# Patient Record
Sex: MALE | Race: WHITE | NOT HISPANIC OR LATINO | Employment: OTHER | URBAN - NONMETROPOLITAN AREA
[De-identification: names, ages, dates, MRNs, and addresses within clinical notes are randomized per-mention and may not be internally consistent; named-entity substitution may affect disease eponyms.]

---

## 2017-01-03 ENCOUNTER — OFFICE VISIT (OUTPATIENT)
Dept: UROLOGY | Facility: CLINIC | Age: 67
End: 2017-01-03

## 2017-01-03 DIAGNOSIS — N52.31 ERECTILE DYSFUNCTION FOLLOWING RADICAL PROSTATECTOMY: Primary | ICD-10-CM

## 2017-01-03 DIAGNOSIS — C61 PROSTATE CANCER (HCC): ICD-10-CM

## 2017-01-03 LAB — PSA SERPL-MCNC: 0.02 NG/ML (ref 0–4)

## 2017-01-03 PROCEDURE — 99213 OFFICE O/P EST LOW 20 MIN: CPT | Performed by: UROLOGY

## 2017-01-03 PROCEDURE — 96372 THER/PROPH/DIAG INJ SC/IM: CPT | Performed by: UROLOGY

## 2017-01-03 PROCEDURE — 84153 ASSAY OF PSA TOTAL: CPT | Performed by: UROLOGY

## 2017-01-03 PROCEDURE — 36415 COLL VENOUS BLD VENIPUNCTURE: CPT | Performed by: UROLOGY

## 2017-01-03 NOTE — PROGRESS NOTES
Chief Complaint:          Chief Complaint   Patient presents with   • Erectile Dysfunction       HPI:   66 y.o. male.    HPI  Pt here because the triple p he has been using is no longer effective.  It has never worked in his home.        Past Medical History:        Past Medical History   Diagnosis Date   • Elevated prostate specific antigen (PSA)    • Erectile dysfunction    • Hypercholesterolemia    • Hypercholesterolemia    • Prostatitis          Current Meds:     Current Outpatient Prescriptions   Medication Sig Dispense Refill   • amLODIPine-atorvastatin (CADUET) 5-10 MG per tablet Take 1 tablet by mouth daily.     • Multiple Vitamin (MULTI VITAMIN DAILY) tablet Take 1 tablet by mouth daily.     • sildenafil (REVATIO) 20 MG tablet Take  by mouth.     • SILDENAFIL CITRATE PO Take 20 mg by mouth. Take 3-5 tablets as needed       No current facility-administered medications for this visit.         Allergies:      Allergies   Allergen Reactions   • Amoxicillin         Past Surgical History:     Past Surgical History   Procedure Laterality Date   • Other surgical history       destruction of hemorrhoids, Rubber band ligatioin of hemmorrhoids   • Other surgical history       surgery for an ulcer, PNEUMOTHORAX AND ULCER THERAPY   • Hemorrhoidectomy           Social History:     Social History     Social History   • Marital status:      Spouse name: N/A   • Number of children: N/A   • Years of education: N/A     Occupational History   • Not on file.     Social History Main Topics   • Smoking status: Former Smoker   • Smokeless tobacco: Never Used      Comment: 3 packs per day for 20 yrs with a duration of 21 years of non smoking   • Alcohol use No   • Drug use: No   • Sexual activity: Not on file     Other Topics Concern   • Not on file     Social History Narrative       Family History:     Family History   Problem Relation Age of Onset   • Nephrolithiasis Father    • Heart disease Sister    • Diabetes Other         Review of Systems:     Review of Systems   Constitutional: Negative.    HENT: Negative.    Eyes: Negative.    Respiratory: Negative.    Cardiovascular: Negative.    Gastrointestinal: Negative.    Endocrine: Negative.    Genitourinary: Negative.    Musculoskeletal: Negative.    Skin: Negative.    Allergic/Immunologic: Negative.    Neurological: Negative.    Hematological: Negative.    Psychiatric/Behavioral: Negative.        Physical Exam:     Physical Exam    Procedure:     No notes on file  0.5 cc Triple P injection  Fair response.      Assessment:     Encounter Diagnoses   Name Primary?   • Erectile dysfunction following radical prostatectomy Yes   • Prostate cancer      No orders of the defined types were placed in this encounter.      Plan:   Discussed either the medicine was ineffective or injection inaccurate.  We will see him back in 1 week to observe him doing the injection with the same medication we injected him today with.  He had a fair to good response to my injection.    Counseling was given to patient for the following topics instructions for management. and the interim medical history and current results were reviewed.  A treatment plan with follow-up was made. Total time of the encounter was 21 minutes and 21 minutes were spent discussing Erectile dysfunction following radical prostatectomy [N52.31] face-to-face.    This document has been electronically signed by Shaw Sebastian MD January 3, 2017 1:50 PM

## 2017-01-03 NOTE — MR AVS SNAPSHOT
Alexander Zamora   1/3/2017 1:00 PM   Office Visit    Dept Phone:  [phone]   Encounter #:  [id number]    Provider:  Shaw Sebastian MD   Department:  [facility]                Your Full Care Plan              Your Updated Medication List          This list is accurate as of: 1/3/17 11:59 PM.  Always use your most recent med list.                amLODIPine-atorvastatin 5-10 MG per tablet   Commonly known as:  CADUET       MULTI VITAMIN DAILY tablet       sildenafil 20 MG tablet   Commonly known as:  REVATIO       SILDENAFIL CITRATE PO               We Performed the Following     PSA       You Were Diagnosed With        Codes Comments    Erectile dysfunction following radical prostatectomy    -  Primary ICD-10-CM: N52.31  ICD-9-CM: 607.84     Prostate cancer     ICD-10-CM: C61  ICD-9-CM: 185       Instructions     None    Patient Instructions History      Upcoming Appointments     Visit Type Date Time Department    FOLLOW UP 1/10/2017  1:00 PM [facility]      [employer] Signup     [facility] [employer] allows you to send messages to your doctor, view your test results, renew your prescriptions, schedule appointments, and more. To sign up, go to [employer] and click on the Sign Up Now link in the New User? box. Enter your [employer] Activation Code exactly as it appears below along with the last four digits of your Social Security Number and your Date of Birth () to complete the sign-up process. If you do not sign up before the expiration date, you must request a new code.    [employer] Activation Code: [email or web address]  Expires: 2017  2:00 PM    If you have questions, you can email [email or web address] or call [phone] to talk to our [employer] staff. Remember, [employer] is NOT to be used for urgent needs. For medical emergencies, dial 911.               Other Info from Your Visit           Allergies     Amoxicillin           Reason for Visit     Erectile Dysfunction           Vital Signs     Smoking Status                   Former Smoker           Problems and Diagnoses Noted     Failure of erection    Prostate cancer      Results     PSA      Component Value Standard Range & Units    PSA 0.020 0.000 - 4.000 ng/mL

## 2017-01-10 ENCOUNTER — OFFICE VISIT (OUTPATIENT)
Dept: UROLOGY | Facility: CLINIC | Age: 67
End: 2017-01-10

## 2017-01-10 VITALS — DIASTOLIC BLOOD PRESSURE: 79 MMHG | HEART RATE: 67 BPM | SYSTOLIC BLOOD PRESSURE: 150 MMHG

## 2017-01-10 DIAGNOSIS — C61 PROSTATE CANCER (HCC): Primary | ICD-10-CM

## 2017-01-10 DIAGNOSIS — N52.31 ERECTILE DYSFUNCTION FOLLOWING RADICAL PROSTATECTOMY: ICD-10-CM

## 2017-01-10 PROCEDURE — 99213 OFFICE O/P EST LOW 20 MIN: CPT | Performed by: UROLOGY

## 2017-01-10 PROCEDURE — 96372 THER/PROPH/DIAG INJ SC/IM: CPT | Performed by: UROLOGY

## 2017-01-10 NOTE — PROGRESS NOTES
Chief Complaint:          Chief Complaint   Patient presents with   • Erectile Dysfunction     f/u PPP       HPI:   66 y.o. male.  Pt's injection by us last week lasted 3 to 4 hours.  His erection increased when he went home.    HPI        Past Medical History:        Past Medical History   Diagnosis Date   • Elevated prostate specific antigen (PSA)    • Erectile dysfunction    • Hypercholesterolemia    • Hypercholesterolemia    • Prostatitis          Current Meds:     Current Outpatient Prescriptions   Medication Sig Dispense Refill   • amLODIPine-atorvastatin (CADUET) 5-10 MG per tablet Take 1 tablet by mouth daily.     • Multiple Vitamin (MULTI VITAMIN DAILY) tablet Take 1 tablet by mouth daily.     • sildenafil (REVATIO) 20 MG tablet Take  by mouth.     • SILDENAFIL CITRATE PO Take 20 mg by mouth. Take 3-5 tablets as needed       No current facility-administered medications for this visit.         Allergies:      Allergies   Allergen Reactions   • Amoxicillin         Past Surgical History:     Past Surgical History   Procedure Laterality Date   • Other surgical history       destruction of hemorrhoids, Rubber band ligatioin of hemmorrhoids   • Other surgical history       surgery for an ulcer, PNEUMOTHORAX AND ULCER THERAPY   • Hemorrhoidectomy           Social History:     Social History     Social History   • Marital status:      Spouse name: N/A   • Number of children: N/A   • Years of education: N/A     Occupational History   • Not on file.     Social History Main Topics   • Smoking status: Former Smoker   • Smokeless tobacco: Never Used      Comment: 3 packs per day for 20 yrs with a duration of 21 years of non smoking   • Alcohol use No   • Drug use: No   • Sexual activity: Not on file     Other Topics Concern   • Not on file     Social History Narrative       Family History:     Family History   Problem Relation Age of Onset   • Nephrolithiasis Father    • Heart disease Sister    • Diabetes Other         Review of Systems:     Review of Systems   All other systems reviewed and are negative.      Physical Exam:     Physical Exam    Procedure:     No notes on file  0.5 cc of triple p injected.  We used his medication from his home.    Assessment:     Encounter Diagnoses   Name Primary?   • Prostate cancer Yes   • Erectile dysfunction following radical prostatectomy      No orders of the defined types were placed in this encounter.      Plan:   Pt had a good response with 0.5 cc triple p.  Will see him in 2 weeks and will teach him proper technique.  Will use his medication from home.  Will see him in one year with psa prior for his follow up on prostate cancer sp robotic radical prostatectomy.    Counseling was given to patient for the following topics instructions for management. and the interim medical history and current results were reviewed.  A treatment plan with follow-up was made. Total time of the encounter was 21 minutes and 21 minutes were spent discussing Prostate cancer [C61] face-to-face.      This document has been electronically signed by Shaw Sebastian MD January 10, 2017 1:59 PM

## 2017-01-10 NOTE — MR AVS SNAPSHOT
Alexander Zamora   1/10/2017 1:00 PM   Office Visit    Dept Phone:  [phone]   Encounter #:  [id number]    Provider:  Shaw Sebastian MD   Department:  [facility]                Your Full Care Plan              Your Updated Medication List          This list is accurate as of: 1/10/17  2:27 PM.  Always use your most recent med list.                amLODIPine-atorvastatin 5-10 MG per tablet   Commonly known as:  CADUET       MULTI VITAMIN DAILY tablet       sildenafil 20 MG tablet   Commonly known as:  REVATIO       SILDENAFIL CITRATE PO               You Were Diagnosed With        Codes Comments    Prostate cancer    -  Primary ICD-10-CM: C61  ICD-9-CM: 185     Erectile dysfunction following radical prostatectomy     ICD-10-CM: N52.31  ICD-9-CM: 607.84       Instructions     None    Patient Instructions History      Upcoming Appointments     Visit Type Date Time Department    FOLLOW UP 1/10/2017  1:00 PM [facility]      [employer] Signup     [facility] [employer] allows you to send messages to your doctor, view your test results, renew your prescriptions, schedule appointments, and more. To sign up, go to [employer] and click on the Sign Up Now link in the New User? box. Enter your [employer] Activation Code exactly as it appears below along with the last four digits of your Social Security Number and your Date of Birth () to complete the sign-up process. If you do not sign up before the expiration date, you must request a new code.    [employer] Activation Code: [email or web address]  Expires: 2017  2:00 PM    If you have questions, you can email [email or web address] or call [phone] to talk to our [employer] staff. Remember, [employer] is NOT to be used for urgent needs. For medical emergencies, dial 911.               Other Info from Your Visit           Allergies     Amoxicillin        Reason for Visit     Erectile Dysfunction  f/u PPP      Vital Signs     Blood Pressure Pulse Smoking Status             150/79 (BP Location: Right arm, Patient Position: Sitting, Cuff Size: Adult) 66 Former Smoker         Problems and Diagnoses Noted     Failure of erection    Prostate cancer

## 2017-08-28 DIAGNOSIS — N39.45 CONTINUOUS LEAKAGE OF URINE: ICD-10-CM

## 2017-08-28 DIAGNOSIS — N39.45 CONTINUOUS LEAKAGE OF URINE: Primary | ICD-10-CM

## 2017-08-28 RX ORDER — MEDICAL SUPPLY, MISCELLANEOUS
2 EACH MISCELLANEOUS CONTINUOUS
Qty: 2 EACH | Refills: 0 | Status: SHIPPED | OUTPATIENT
Start: 2017-08-28 | End: 2019-12-04

## 2017-08-28 RX ORDER — MEDICAL SUPPLY, MISCELLANEOUS
2 EACH MISCELLANEOUS CONTINUOUS
Qty: 2 EACH | Refills: 0 | Status: SHIPPED | OUTPATIENT
Start: 2017-08-28 | End: 2017-08-28 | Stop reason: SDUPTHER

## 2018-01-26 ENCOUNTER — LAB (OUTPATIENT)
Dept: UROLOGY | Facility: CLINIC | Age: 68
End: 2018-01-26

## 2018-01-26 DIAGNOSIS — C61 PROSTATE CANCER (HCC): Primary | ICD-10-CM

## 2018-01-26 LAB — PSA SERPL-MCNC: 0.01 NG/ML (ref 0–4)

## 2018-01-26 PROCEDURE — 36415 COLL VENOUS BLD VENIPUNCTURE: CPT | Performed by: UROLOGY

## 2018-01-26 PROCEDURE — 84153 ASSAY OF PSA TOTAL: CPT | Performed by: UROLOGY

## 2018-02-01 ENCOUNTER — OFFICE VISIT (OUTPATIENT)
Dept: UROLOGY | Facility: CLINIC | Age: 68
End: 2018-02-01

## 2018-02-01 VITALS — WEIGHT: 168 LBS | HEIGHT: 69 IN | BODY MASS INDEX: 24.88 KG/M2

## 2018-02-01 DIAGNOSIS — C61 PROSTATE CANCER (HCC): Primary | ICD-10-CM

## 2018-02-01 DIAGNOSIS — N36.42 INTRINSIC SPHINCTER DEFICIENCY (ISD): ICD-10-CM

## 2018-02-01 DIAGNOSIS — N52.31 ERECTILE DYSFUNCTION FOLLOWING RADICAL PROSTATECTOMY: ICD-10-CM

## 2018-02-01 PROCEDURE — 99214 OFFICE O/P EST MOD 30 MIN: CPT | Performed by: UROLOGY

## 2018-02-01 NOTE — PROGRESS NOTES
Chief Complaint:          Chief Complaint   Patient presents with   • Prostate Cancer     Yearly Follow up    • Erectile Dysfunction       HPI:   67 y.o. male.    HPI  Pt had a radical robotic prostatectomy 2 years ago his psa is 0.01  Pt is not happy with SENDY.  Pt can't do the injections because he has a tremor.  Pt has 1 to 2 pad a day leakage.  The cunningham clamp is uncomfortable.      Past Medical History:        Past Medical History:   Diagnosis Date   • Elevated prostate specific antigen (PSA)    • Erectile dysfunction    • Hypercholesterolemia    • Hypercholesterolemia    • Prostatitis          Current Meds:     Current Outpatient Prescriptions   Medication Sig Dispense Refill   • amLODIPine-atorvastatin (CADUET) 5-10 MG per tablet Take 1 tablet by mouth daily.     • Incontinence Supplies (BARD C[place]AM INCONTIN CLAMP) misc 2 Units Continuous. 2 each 0   • Multiple Vitamin (MULTI VITAMIN DAILY) tablet Take 1 tablet by mouth daily.     • sildenafil (REVATIO) 20 MG tablet Take  by mouth.     • SILDENAFIL CITRATE PO Take 20 mg by mouth. Take 3-5 tablets as needed       No current facility-administered medications for this visit.         Allergies:      Allergies   Allergen Reactions   • Amoxicillin         Past Surgical History:     Past Surgical History:   Procedure Laterality Date   • HEMORRHOIDECTOMY     • OTHER SURGICAL HISTORY      destruction of hemorrhoids, Rubber band ligatioin of hemmorrhoids   • OTHER SURGICAL HISTORY      surgery for an ulcer, PNEUMOTHORAX AND ULCER THERAPY         Social History:     Social History     Social History   • Marital status:      Spouse name: N/A   • Number of children: N/A   • Years of education: N/A     Occupational History   • Not on file.     Social History Main Topics   • Smoking status: Former Smoker   • Smokeless tobacco: Never Used      Comment: 3 packs per day for 20 yrs with a duration of 21 years of non smoking   • Alcohol use No   • Drug use: No   •  Sexual activity: Not on file     Other Topics Concern   • Not on file     Social History Narrative       Family History:     Family History   Problem Relation Age of Onset   • Nephrolithiasis Father    • Heart disease Sister    • Diabetes Other        Review of Systems:     Review of Systems   Constitutional: Positive for fatigue.   Gastrointestinal: Negative for constipation, diarrhea, nausea and vomiting.   Genitourinary: Negative for frequency.   Neurological: Negative for headaches.       Physical Exam:     Physical Exam    Procedure:     No notes on file      Assessment:     Encounter Diagnoses   Name Primary?   • Prostate cancer Yes   • Erectile dysfunction following radical prostatectomy    • Intrinsic sphincter deficiency (ISD)      No orders of the defined types were placed in this encounter.      Plan:   Will try and teach the pt's wife on penile injections with triple P.  I discussed artificial sphincter for intrinsic sphincter deficiency and the success rate for this and we offer referral to [facility].  I said that I would recommend penile injections over a penile injections.  I discussed how well his is doing from his prostate cancer.  Pt and I agree to reteach injection techniques and if he and his wife can do it will test the medication in office until they are comfortable.    Counseling was given to patient for the following topics instructions for management and impressions. and the interim medical history and current results were reviewed.  A treatment plan with follow-up was made. Total time of the encounter was 37 minutes and 33 minutes were spent discussing Prostate cancer [C61] face-to-face.       This document has been electronically signed by Shaw Sebastian MD February 1, 2018 4:32 PM

## 2018-03-01 ENCOUNTER — OFFICE VISIT (OUTPATIENT)
Dept: UROLOGY | Facility: CLINIC | Age: 68
End: 2018-03-01

## 2018-03-01 VITALS — WEIGHT: 168 LBS | BODY MASS INDEX: 24.88 KG/M2 | HEIGHT: 69 IN

## 2018-03-01 DIAGNOSIS — R33.9 INCOMPLETE BLADDER EMPTYING: Primary | ICD-10-CM

## 2018-03-01 PROCEDURE — 99213 OFFICE O/P EST LOW 20 MIN: CPT | Performed by: UROLOGY

## 2018-03-01 PROCEDURE — 51798 US URINE CAPACITY MEASURE: CPT | Performed by: UROLOGY

## 2018-03-01 NOTE — PROGRESS NOTES
Chief Complaint:          Dribbling and prostate cancer    HPI:   67 y.o. male.    HPI  Pt's wife does not want injections  He has used 0.3 cc in the past with triple.      Past Medical History:        Past Medical History:   Diagnosis Date   • Elevated prostate specific antigen (PSA)    • Erectile dysfunction    • Hypercholesterolemia    • Hypercholesterolemia    • Prostatitis          Current Meds:     Current Outpatient Prescriptions   Medication Sig Dispense Refill   • amLODIPine-atorvastatin (CADUET) 5-10 MG per tablet Take 1 tablet by mouth daily.     • Incontinence Supplies (BARD CUNNING[place]AM INCONTIN CLAMP) misc 2 Units Continuous. 2 each 0   • Multiple Vitamin (MULTI VITAMIN DAILY) tablet Take 1 tablet by mouth daily.     • sildenafil (REVATIO) 20 MG tablet Take  by mouth.     • SILDENAFIL CITRATE PO Take 20 mg by mouth. Take 3-5 tablets as needed       No current facility-administered medications for this visit.         Allergies:      Allergies   Allergen Reactions   • Amoxicillin         Past Surgical History:     Past Surgical History:   Procedure Laterality Date   • HEMORRHOIDECTOMY     • OTHER SURGICAL HISTORY      destruction of hemorrhoids, Rubber band ligatioin of hemmorrhoids   • OTHER SURGICAL HISTORY      surgery for an ulcer, PNEUMOTHORAX AND ULCER THERAPY         Social History:     Social History     Social History   • Marital status:      Spouse name: N/A   • Number of children: N/A   • Years of education: N/A     Occupational History   • Not on file.     Social History Main Topics   • Smoking status: Former Smoker   • Smokeless tobacco: Never Used      Comment: 3 packs per day for 20 yrs with a duration of 21 years of non smoking   • Alcohol use No   • Drug use: No   • Sexual activity: Not on file     Other Topics Concern   • Not on file     Social History Narrative       Family History:     Family History   Problem Relation Age of Onset   • Nephrolithiasis Father    • Heart disease  "Sister    • Diabetes Other        Review of Systems:     Review of Systems   Constitutional: Negative for chills, fatigue and fever.   HENT: Negative for congestion and sinus pressure.    Eyes: Negative for pain.   Respiratory: Negative for chest tightness, shortness of breath and wheezing.    Cardiovascular: Negative for chest pain.   Gastrointestinal: Negative for abdominal pain, constipation, diarrhea, nausea and vomiting.   Endocrine: Negative for cold intolerance and heat intolerance.   Genitourinary: Negative for difficulty urinating, frequency and urgency.   Musculoskeletal: Negative for back pain and neck pain.   Neurological: Negative for dizziness and headaches.   Hematological: Does not bruise/bleed easily.   Psychiatric/Behavioral: The patient is not nervous/anxious.        The following portions of the patient's history were reviewed and updated as appropriate:allergies, current medications, past family history, past medical history, past social history, past surgical history and ROS  Physical Exam:     Physical Exam    Ht 175.3 cm (69.02\")  Wt 76.2 kg (168 lb)  BMI 24.8 kg/m2   Procedure:         Assessment:   No diagnosis found.  No orders of the defined types were placed in this encounter.      Plan:   Will rx triple p and will teach him how to do self injection again will start with 0.3 cc.  Will see him in a month.  Pt has urinary frequency .  Will check post void bladder scan today.    Counseling was given to patient for the following topics diagnostic results including: penile injections and instructions for management as follows: erectile dysfunction. The interim medical history and current results were reviewed.  A treatment plan with follow-up was made for No primary diagnosis found.. I spent 24 minutes face to face with Alexander Zamora and 87 percentage was spent in counseling.    Patient's BMI is within normal parameters. No follow-up required.    This document has been electronically " signed by Shaw Sebastian MD March 1, 2018 3:29 PM

## 2018-04-11 ENCOUNTER — OFFICE VISIT (OUTPATIENT)
Dept: UROLOGY | Facility: CLINIC | Age: 68
End: 2018-04-11

## 2018-04-11 VITALS — HEIGHT: 69 IN | BODY MASS INDEX: 24.88 KG/M2 | WEIGHT: 168 LBS

## 2018-04-11 DIAGNOSIS — C61 PROSTATE CANCER (HCC): ICD-10-CM

## 2018-04-11 DIAGNOSIS — N52.9 ERECTILE DYSFUNCTION, UNSPECIFIED ERECTILE DYSFUNCTION TYPE: Primary | ICD-10-CM

## 2018-04-11 PROCEDURE — 99213 OFFICE O/P EST LOW 20 MIN: CPT | Performed by: UROLOGY

## 2018-04-11 NOTE — PROGRESS NOTES
Chief Complaint:          Erectile dysfunction    HPI:   67 y.o. male.    HPI  Pt has used triple P in the past and his dose was 0.5 cc by history.  Pt had a robotic radical prostatectomy in 10/2015  His last psa was 0.01 in 1/2018    Past Medical History:        Past Medical History:   Diagnosis Date   • Elevated prostate specific antigen (PSA)    • Erectile dysfunction    • Hypercholesterolemia    • Hypercholesterolemia    • Prostatitis          Current Meds:     Current Outpatient Prescriptions   Medication Sig Dispense Refill   • amLODIPine-atorvastatin (CADUET) 5-10 MG per tablet Take 1 tablet by mouth daily.     • Incontinence Supplies (BARD CUNN[place]AM INCONTIN CLAMP) misc 2 Units Continuous. 2 each 0   • Multiple Vitamin (MULTI VITAMIN DAILY) tablet Take 1 tablet by mouth daily.     • sildenafil (REVATIO) 20 MG tablet Take  by mouth.     • SILDENAFIL CITRATE PO Take 20 mg by mouth. Take 3-5 tablets as needed       No current facility-administered medications for this visit.         Allergies:      Allergies   Allergen Reactions   • Amoxicillin         Past Surgical History:     Past Surgical History:   Procedure Laterality Date   • HEMORRHOIDECTOMY     • OTHER SURGICAL HISTORY      destruction of hemorrhoids, Rubber band ligatioin of hemmorrhoids   • OTHER SURGICAL HISTORY      surgery for an ulcer, PNEUMOTHORAX AND ULCER THERAPY         Social History:     Social History     Social History   • Marital status:      Spouse name: N/A   • Number of children: N/A   • Years of education: N/A     Occupational History   • Not on file.     Social History Main Topics   • Smoking status: Former Smoker   • Smokeless tobacco: Never Used      Comment: 3 packs per day for 20 yrs with a duration of 21 years of non smoking   • Alcohol use No   • Drug use: No   • Sexual activity: Not on file     Other Topics Concern   • Not on file     Social History Narrative   • No narrative on file       Family History:     Family  "History   Problem Relation Age of Onset   • Nephrolithiasis Father    • Heart disease Sister    • Diabetes Other        Review of Systems:     Review of Systems   Constitutional: Negative for chills, fatigue and fever.   HENT: Negative for congestion and sinus pressure.    Respiratory: Negative for shortness of breath.    Cardiovascular: Negative for chest pain.   Gastrointestinal: Negative for abdominal pain, constipation, diarrhea, nausea and vomiting.   Genitourinary: Negative for difficulty urinating, flank pain, hematuria and urgency.   Musculoskeletal: Positive for back pain and neck pain.   Neurological: Negative for dizziness and headaches.   Hematological: Does not bruise/bleed easily.   Psychiatric/Behavioral: The patient is nervous/anxious.        IPSS Questionnaire (AUA-7):  Over the past month…    1)  How often have you had a sensation of not emptying your bladder completely after you finish urinating?  0 - Not at all   2)  How often have you had to urinate again less than two hours after you finished urinating? 1 - Less than 1 time in 5   3)  How often have you found you stopped and started again several times when you urinated?  2 - Less than half the time   4) How difficult have you found it to postpone urination?  0 - Not at all   5) How often have you had a weak urinary stream?  0 - Not at all   6) How often have you had to push or strain to begin urination?  0 - Not at all   7) How many times did you most typically get up to urinate from the time you went to bed until the time you got up in the morning?  3 - 3 times   Total score:  0-7 mildly symptomatic                                                6               The following portions of the patient's history were reviewed and updated as appropriate:allergies, current medications, past family history, past medical history, past social history, past surgical history, problem list and ROS  Physical Exam:     Physical Exam    Ht 175.3 cm (69.02\") "   Wt 76.2 kg (168 lb)   BMI 24.80 kg/m²    Procedure:     Pt was injected with 0.3 cc with good response.  Lab Results   Component Value Date    PSA 0.010 01/26/2018    PSA 0.020 01/03/2017    PSA 0.03 04/19/2016        Assessment:     Encounter Diagnoses   Name Primary?   • Erectile dysfunction, unspecified erectile dysfunction type Yes   • Prostate cancer      No orders of the defined types were placed in this encounter.      Plan:   Return to learn self injection technique.  Will check a psa next January.    Patient's Body mass index is 24.8 kg/m². BMI is within normal parameters. No follow-up required.      Counseling was given to patient for the following topics instructions for management as follows: erections. The interim medical history and current results were reviewed.  A treatment plan with follow-up was made for Erectile dysfunction, unspecified erectile dysfunction type [N52.9]. I spent 21 minutes face to face with Alexander Zamora and 80 percentage was spent in counseling.    This document has been electronically signed by Shaw Sebastian MD April 11, 2018 11:06 AM

## 2018-05-16 ENCOUNTER — OFFICE VISIT (OUTPATIENT)
Dept: UROLOGY | Facility: CLINIC | Age: 68
End: 2018-05-16

## 2018-05-16 VITALS — BODY MASS INDEX: 24.88 KG/M2 | WEIGHT: 168 LBS | HEIGHT: 69 IN

## 2018-05-16 DIAGNOSIS — N47.1 PHIMOSIS: Primary | ICD-10-CM

## 2018-05-16 DIAGNOSIS — C61 PROSTATE CANCER (HCC): ICD-10-CM

## 2018-05-16 DIAGNOSIS — N52.31 ERECTILE DYSFUNCTION FOLLOWING RADICAL PROSTATECTOMY: ICD-10-CM

## 2018-05-16 PROCEDURE — 99214 OFFICE O/P EST MOD 30 MIN: CPT | Performed by: UROLOGY

## 2018-05-16 NOTE — PROGRESS NOTES
Chief Complaint:          Erectile dysfunction    HPI:   68 y.o. male.    HPI  Pt was up and down after last triple p injection for 6 hours.  We used 0.3 cc.  He was never rock hard.  Pt has phimosis    Past Medical History:        Past Medical History:   Diagnosis Date   • Elevated prostate specific antigen (PSA)    • Erectile dysfunction    • Hypercholesterolemia    • Hypercholesterolemia    • Prostatitis          Current Meds:     Current Outpatient Prescriptions   Medication Sig Dispense Refill   • amLODIPine-atorvastatin (CADUET) 5-10 MG per tablet Take 1 tablet by mouth daily.     • Incontinence Supplies (BARD C[place]AM INCONTIN CLAMP) misc 2 Units Continuous. 2 each 0   • Multiple Vitamin (MULTI VITAMIN DAILY) tablet Take 1 tablet by mouth daily.     • sildenafil (REVATIO) 20 MG tablet Take  by mouth.     • SILDENAFIL CITRATE PO Take 20 mg by mouth. Take 3-5 tablets as needed       No current facility-administered medications for this visit.         Allergies:      Allergies   Allergen Reactions   • Amoxicillin         Past Surgical History:     Past Surgical History:   Procedure Laterality Date   • HEMORRHOIDECTOMY     • OTHER SURGICAL HISTORY      destruction of hemorrhoids, Rubber band ligatioin of hemmorrhoids   • OTHER SURGICAL HISTORY      surgery for an ulcer, PNEUMOTHORAX AND ULCER THERAPY         Social History:     Social History     Social History   • Marital status:      Spouse name: N/A   • Number of children: N/A   • Years of education: N/A     Occupational History   • Not on file.     Social History Main Topics   • Smoking status: Former Smoker   • Smokeless tobacco: Never Used      Comment: 3 packs per day for 20 yrs with a duration of 21 years of non smoking   • Alcohol use No   • Drug use: No   • Sexual activity: Not on file     Other Topics Concern   • Not on file     Social History Narrative   • No narrative on file       Family History:     Family History   Problem Relation Age of  Onset   • Nephrolithiasis Father    • Heart disease Sister    • Diabetes Other        Review of Systems:     Review of Systems   Constitutional: Negative for chills, fatigue and fever.   HENT: Negative for congestion and sinus pressure.    Respiratory: Negative for shortness of breath.    Cardiovascular: Negative for chest pain.   Gastrointestinal: Negative for abdominal pain, diarrhea, nausea and vomiting.   Genitourinary: Negative for frequency and urgency.   Musculoskeletal: Negative for back pain and neck pain.   Neurological: Negative for dizziness and headaches.   Hematological: Does not bruise/bleed easily.   Psychiatric/Behavioral: The patient is not nervous/anxious.            I have reviewed the follow portions of the patient's history and confirmed they are accurate today:  allergies, current medications, past family history, past medical history, past social history, past surgical history, problem list and ROS  Physical Exam:     Physical Exam   Constitutional: He is oriented to person, place, and time.   HENT:   Head: Normocephalic and atraumatic.   Right Ear: External ear normal.   Left Ear: External ear normal.   Nose: Nose normal.   Mouth/Throat: Oropharynx is clear and moist.   Eyes: Conjunctivae and EOM are normal. Pupils are equal, round, and reactive to light.   Neck: Normal range of motion. Neck supple. No thyromegaly present.   Cardiovascular: Normal rate, regular rhythm, normal heart sounds and intact distal pulses.    No murmur heard.  Pulmonary/Chest: Effort normal and breath sounds normal. No respiratory distress. He has no wheezes. He has no rales. He exhibits no tenderness.   Abdominal: Soft. Bowel sounds are normal. He exhibits no distension and no mass. There is no tenderness. No hernia.   Genitourinary: Rectum normal.   Genitourinary Comments: Absent Prostate  Phimosis of foreskin   Musculoskeletal: Normal range of motion. He exhibits no edema or tenderness.   Lymphadenopathy:     He  "has no cervical adenopathy.   Neurological: He is alert and oriented to person, place, and time. No cranial nerve deficit. He exhibits normal muscle tone. Coordination normal.   Skin: Skin is warm. No rash noted.   Psychiatric: He has a normal mood and affect. His behavior is normal. Judgment and thought content normal.   Nursing note and vitals reviewed.      Ht 175.3 cm (69.02\")   Wt 76.2 kg (168 lb)   BMI 24.80 kg/m²    Procedure:   0.2 cc triple P injected.      Assessment:     Encounter Diagnoses   Name Primary?   • Phimosis Yes   • Prostate cancer    • Erectile dysfunction following radical prostatectomy      No orders of the defined types were placed in this encounter.      Plan:   I would recommend circumcision.  Pt will use 0.2 cc of triple p    Patient's Body mass index is 24.8 kg/m². BMI is within normal parameters. No follow-up required.      Counseling was given to patient for the following topics impressions as follows: phimosis. The interim medical history and current results were reviewed.  A treatment plan with follow-up was made for Phimosis [N47.1]. I spent 31 minutes face to face with Alexander Zamora and 80 percentage was spent in counseling.    This document has been electronically signed by Shaw Sebastian MD May 16, 2018 10:18 AM  "

## 2018-06-11 ENCOUNTER — HOSPITAL ENCOUNTER (OUTPATIENT)
Dept: PHYSICAL THERAPY | Facility: HOSPITAL | Age: 68
Setting detail: THERAPIES SERIES
Discharge: HOME | End: 2018-06-11

## 2018-06-11 DIAGNOSIS — M54.5 LOW BACK PAIN, UNSPECIFIED BACK PAIN LATERALITY, UNSPECIFIED CHRONICITY, WITH SCIATICA PRESENCE UNSPECIFIED: Primary | ICD-10-CM

## 2018-06-11 PROCEDURE — G8978 MOBILITY CURRENT STATUS: HCPCS | Performed by: PHYSICAL THERAPIST

## 2018-06-11 PROCEDURE — 97163 PT EVAL HIGH COMPLEX 45 MIN: CPT | Performed by: PHYSICAL THERAPIST

## 2018-06-11 PROCEDURE — G8979 MOBILITY GOAL STATUS: HCPCS | Performed by: PHYSICAL THERAPIST

## 2018-06-12 ENCOUNTER — TELEPHONE (OUTPATIENT)
Dept: UROLOGY | Facility: CLINIC | Age: 68
End: 2018-06-12

## 2018-06-12 NOTE — TELEPHONE ENCOUNTER
I called and spoke with the patient regarding the order for a circumcision to be done per Dr Sebastian. The patient informed me that he was still thinking about it and he would call me within a month to let me know for sure if he wanted to have it done.

## 2018-06-13 ENCOUNTER — HOSPITAL ENCOUNTER (OUTPATIENT)
Dept: PHYSICAL THERAPY | Facility: HOSPITAL | Age: 68
Setting detail: THERAPIES SERIES
Discharge: HOME | End: 2018-06-13

## 2018-06-13 DIAGNOSIS — M54.5 LOW BACK PAIN, UNSPECIFIED BACK PAIN LATERALITY, UNSPECIFIED CHRONICITY, WITH SCIATICA PRESENCE UNSPECIFIED: Primary | ICD-10-CM

## 2018-06-13 PROCEDURE — G0283 ELEC STIM OTHER THAN WOUND: HCPCS

## 2018-06-13 PROCEDURE — 97110 THERAPEUTIC EXERCISES: CPT

## 2018-06-13 PROCEDURE — 97035 APP MDLTY 1+ULTRASOUND EA 15: CPT

## 2018-06-15 ENCOUNTER — HOSPITAL ENCOUNTER (OUTPATIENT)
Dept: PHYSICAL THERAPY | Facility: HOSPITAL | Age: 68
Setting detail: THERAPIES SERIES
Discharge: HOME | End: 2018-06-15

## 2018-06-15 DIAGNOSIS — M54.5 LOW BACK PAIN, UNSPECIFIED BACK PAIN LATERALITY, UNSPECIFIED CHRONICITY, WITH SCIATICA PRESENCE UNSPECIFIED: Primary | ICD-10-CM

## 2018-06-15 PROCEDURE — G0283 ELEC STIM OTHER THAN WOUND: HCPCS

## 2018-06-15 PROCEDURE — 97110 THERAPEUTIC EXERCISES: CPT

## 2018-06-15 PROCEDURE — 97035 APP MDLTY 1+ULTRASOUND EA 15: CPT

## 2018-06-15 NOTE — THERAPY TREATMENT NOTE
Outpatient Physical Therapy Ortho Treatment Note   [facility]     Patient Name: Alexander Zamora  : 1950  MRN: [id number]  Today's Date: 6/15/2018      Visit Date: 06/15/2018    Visit Dx:    ICD-10-CM ICD-9-CM   1. Low back pain, unspecified back pain laterality, unspecified chronicity, with sciatica presence unspecified M54.5 724.2       Patient Active Problem List   Diagnosis   • Prostate cancer   • Erectile dysfunction following radical prostatectomy        Past Medical History:   Diagnosis Date   • Cancer     Postrate   • Elevated prostate specific antigen (PSA)    • Erectile dysfunction    • Hypercholesterolemia    • Hypercholesterolemia    • Prostatitis         Past Surgical History:   Procedure Laterality Date   • HEMORRHOIDECTOMY     • OTHER SURGICAL HISTORY      destruction of hemorrhoids, Rubber band ligatioin of hemmorrhoids   • OTHER SURGICAL HISTORY      surgery for an ulcer, PNEUMOTHORAX AND ULCER THERAPY             PT Ortho     Row Name 06/15/18 1000       Subjective Comments    Subjective Comments Patient reports that his back pain is better today. Patient reports that he is working on his home exercises.  -AC       Subjective Pain    Able to rate subjective pain? yes  -AC    Pre-Treatment Pain Level 5  -AC    Post-Treatment Pain Level 0  -AC    Row Name 18 0900       Subjective Comments    Subjective Comments Patient arrives to therapy w/ reports of 8/10 low back pain.  Pt verbalizes compliance of initiating home program w/ no complaints.   -MARLEN       Subjective Pain    Able to rate subjective pain? yes  -MARLEN    Pre-Treatment Pain Level 8  -MARLEN    Post-Treatment Pain Level 0  -MARLEN      User Key  (r) = Recorded By, (t) = Taken By, (c) = Cosigned By    Initials Name Provider Type    MARLEN Wendy Ward, PTA Physical Therapy Assistant    LISA Mccarty PTA Physical Therapy Assistant                            PT Assessment/Plan     Row Name 06/15/18 1016          PT  Assessment    Assessment Comments New ther ex added per the patient's tolerance, patient demonstrated and understood new ther ex with no increase in pain noted. Patient tolerated treatment session well with rest breaks taken as needed by the patient. Reps increased per the pateint's tolerance with no increase in pain noted. No adverse reactions with modalities or treatment. Decrease in pain noted following treatment session.   -AC        PT Plan    PT Plan Comments Continue per PT's POC, progress per the patient's tolerance.  -AC       User Key  (r) = Recorded By, (t) = Taken By, (c) = Cosigned By    Initials Name Provider Type    LISA Mccarty PTA Physical Therapy Assistant                Modalities     Row Name 06/15/18 1000             Moist Heat    MH Applied Yes   No redness noted following moist heat  -AC      Location lumbar  -AC      Rx Minutes 15 mins  -AC      MH Prior to Rx Yes  -AC         Ultrasound 70718    Location lumbar paraspinals   8 minutes, prone  -AC      Duty Cycle 100  -AC      Frequency 1.0 MHz  -AC      Intensity - Wts/cm 1.2  -AC         ELECTRICAL STIMULATION    Attended/Unattended Unattended  -AC      Stimulation Type IFC  -AC      Max mAmp --   per the patient's tolerance, 15 minutes with MH  -AC      Location/Electrode Placement/Other lumbar   -AC        User Key  (r) = Recorded By, (t) = Taken By, (c) = Cosigned By    Initials Name Provider Type    LISA Mccarty PTA Physical Therapy Assistant                Exercises     Row Name 06/15/18 1000             Subjective Comments    Subjective Comments Patient reports that his back pain is better today. Patient reports that he is working on his home exercises.  -AC         Subjective Pain    Able to rate subjective pain? yes  -AC      Pre-Treatment Pain Level 5  -AC      Post-Treatment Pain Level 0  -AC         Total Minutes    75347 - PT Therapeutic Exercise Minutes 30  -AC         Exercise 1    Exercise Name 1  SKTC 20 sec hold x3, LTR x25, piriformis stretch 20 sec hold x3, bridges 10x2, supine clams 10x2, PPT x25, supine march 10x2, ball squeeze 10x2, scap squeeze 10x2  -AC      Cueing 1 Verbal;Tactile;Demo  -AC      Time 1 30 minutes  -AC        User Key  (r) = Recorded By, (t) = Taken By, (c) = Cosigned By    Initials Name Provider Type    AC Angelica Mccarty PTA Physical Therapy Assistant                             Therapy Education  Given: HEP, Symptoms/condition management, Pain management, Posture/body mechanics  Program: Progressed  How Provided: Verbal, Demonstration  Provided to: Patient  Level of Understanding: Verbalized, Demonstrated              Time Calculation:   Start Time: 0900  Stop Time: 1000  Time Calculation (min): 60 min  Therapy Suggested Charges     Code   Minutes Charges    45174 (CPT®) Hc Pt Neuromusc Re Education Ea 15 Min      73114 (CPT®) Hc Pt Ther Proc Ea 15 Min 30 2    50839 (CPT®) Hc Gait Training Ea 15 Min      14668 (CPT®) Hc Pt Therapeutic Act Ea 15 Min      18660 (CPT®) Hc Pt Manual Therapy Ea 15 Min      07600 (CPT®) Hc Pt Ther Massage- Per 15 Min      61473 (CPT®) Hc Pt Iontophoresis Ea 15 Min      64231 (CPT®) Hc Pt Elec Stim Ea-Per 15 Min      08395 (CPT®) Hc Pt Ultrasound Ea 15 Min      17998 (CPT®) Hc Pt Self Care/Mgmt/Train Ea 15 Min      Total  30 2        Therapy Charges for Today     Code Description Service Date Service Provider Modifiers Qty    37264942352 HC PT ELECTRICAL STIM UNATTENDED 6/15/2018 Angelica Mccarty PTA  1    82516913772 HC PT THER PROC EA 15 MIN 6/15/2018 Angelica Mccarty PTA GP 2    20848215434 HC PT ULTRASOUND EA 15 MIN 6/15/2018 Angelica Mccarty PTA GP 1                    Angelica Mccarty PTA  6/15/2018

## 2018-06-18 ENCOUNTER — HOSPITAL ENCOUNTER (OUTPATIENT)
Dept: PHYSICAL THERAPY | Facility: HOSPITAL | Age: 68
Setting detail: THERAPIES SERIES
Discharge: HOME | End: 2018-06-18

## 2018-06-18 DIAGNOSIS — M54.5 LOW BACK PAIN, UNSPECIFIED BACK PAIN LATERALITY, UNSPECIFIED CHRONICITY, WITH SCIATICA PRESENCE UNSPECIFIED: Primary | ICD-10-CM

## 2018-06-18 PROCEDURE — 97035 APP MDLTY 1+ULTRASOUND EA 15: CPT

## 2018-06-18 PROCEDURE — 97110 THERAPEUTIC EXERCISES: CPT

## 2018-06-18 PROCEDURE — G0283 ELEC STIM OTHER THAN WOUND: HCPCS

## 2018-06-18 NOTE — THERAPY TREATMENT NOTE
Outpatient Physical Therapy Ortho Treatment Note   [facility]     Patient Name: Alexander Zamora  : 1950  MRN: [id number]  Today's Date: 2018      Visit Date: 2018    Visit Dx:    ICD-10-CM ICD-9-CM   1. Low back pain, unspecified back pain laterality, unspecified chronicity, with sciatica presence unspecified M54.5 724.2       Patient Active Problem List   Diagnosis   • Prostate cancer   • Erectile dysfunction following radical prostatectomy        Past Medical History:   Diagnosis Date   • Cancer     Postrate   • Elevated prostate specific antigen (PSA)    • Erectile dysfunction    • Hypercholesterolemia    • Hypercholesterolemia    • Prostatitis         Past Surgical History:   Procedure Laterality Date   • HEMORRHOIDECTOMY     • OTHER SURGICAL HISTORY      destruction of hemorrhoids, Rubber band ligatioin of hemmorrhoids   • OTHER SURGICAL HISTORY      surgery for an ulcer, PNEUMOTHORAX AND ULCER THERAPY             PT Ortho     Row Name 18 1000       Subjective Comments    Subjective Comments Patient states that his pain is worse in his back today secondary to mowing and working on his car.  -AC       Subjective Pain    Able to rate subjective pain? yes  -AC    Pre-Treatment Pain Level 9  -AC    Post-Treatment Pain Level 6  -AC      User Key  (r) = Recorded By, (t) = Taken By, (c) = Cosigned By    Initials Name Provider Type    LISA Mccarty, PTA Physical Therapy Assistant                            PT Assessment/Plan     Row Name 18 1037          PT Assessment    Assessment Comments Patient tolerated treatment session well with rest breaks taken as needed by the patient. Educated patient to perform ther ex per his toleance, patient verbalized understanding. Per patient's request exercises continued to be performed but decrease in reps performed on several exercises due to patient's increased pain. No adverse reactions with modalities or treatment session. Decrease  in pain noted following treatment session.   -AC        PT Plan    PT Plan Comments Continue per PT's POC, progress per the patient's tolerance.  -AC       User Key  (r) = Recorded By, (t) = Taken By, (c) = Cosigned By    Initials Name Provider Type    LISA Mccarty PTA Physical Therapy Assistant                Modalities     Row Name 06/18/18 1000             Moist Heat    MH Applied Yes   No redness noted following moist heat  -AC      Location lumbar  -AC      Rx Minutes 15 mins  -AC      MH Prior to Rx Yes  -AC         Ultrasound 20061    Location lumbar paraspinals   8 minutes  -AC      Duty Cycle 100  -AC      Frequency 1.0 MHz  -AC      Intensity - Wts/cm 1.2  -AC         ELECTRICAL STIMULATION    Attended/Unattended Unattended   No irritation noted following estim  -AC      Stimulation Type IFC  -AC      Max mAmp --   per the patient's tolerance, 15 minutes  -AC      Location/Electrode Placement/Other lumbar   -AC        User Key  (r) = Recorded By, (t) = Taken By, (c) = Cosigned By    Initials Name Provider Type    LISA Mccarty PTA Physical Therapy Assistant                Exercises     Row Name 06/18/18 1000             Subjective Comments    Subjective Comments Patient states that his pain is worse in his back today secondary to mowing and working on his car.  -AC         Subjective Pain    Able to rate subjective pain? yes  -AC      Pre-Treatment Pain Level 9  -AC      Post-Treatment Pain Level 6  -AC         Total Minutes    06554 - PT Therapeutic Exercise Minutes 30  -AC         Exercise 1    Exercise Name 1 SKTC 20 sec hold x3, LTR 10x2, piriformis stretch 20 sec hold x3, bridges 10x2, supine clams 10x2, PPT 10x2, supine march 10x2, ball squeeze 10x2, scap squeeze 10x2  -AC      Cueing 1 Verbal;Tactile;Demo  -AC      Time 1 30 minutes  -AC        User Key  (r) = Recorded By, (t) = Taken By, (c) = Cosigned By    Initials Name Provider Type    LISA Mccarty PTA  Physical Therapy Assistant                             Therapy Education  Given: HEP, Symptoms/condition management, Pain management, Posture/body mechanics  Program: Reinforced  How Provided: Verbal, Demonstration  Provided to: Patient  Level of Understanding: Verbalized, Demonstrated              Time Calculation:   Start Time: 0928  Stop Time: 1028  Time Calculation (min): 60 min  Therapy Suggested Charges     Code   Minutes Charges    10355 (CPT®) Hc Pt Neuromusc Re Education Ea 15 Min      26750 (CPT®) Hc Pt Ther Proc Ea 15 Min 30 2    68202 (CPT®) Hc Gait Training Ea 15 Min      52003 (CPT®) Hc Pt Therapeutic Act Ea 15 Min      26709 (CPT®) Hc Pt Manual Therapy Ea 15 Min      28525 (CPT®) Hc Pt Ther Massage- Per 15 Min      10706 (CPT®) Hc Pt Iontophoresis Ea 15 Min      12863 (CPT®) Hc Pt Elec Stim Ea-Per 15 Min      20949 (CPT®) Hc Pt Ultrasound Ea 15 Min      25621 (CPT®) Hc Pt Self Care/Mgmt/Train Ea 15 Min      Total  30 2        Therapy Charges for Today     Code Description Service Date Service Provider Modifiers Qty    31654955850 HC PT THER PROC EA 15 MIN 6/18/2018 Angelica Mccarty PTA GP 2    56348826386 HC PT ELECTRICAL STIM UNATTENDED 6/18/2018 Angelica Mccarty PTA  1    63210583705 HC PT ULTRASOUND EA 15 MIN 6/18/2018 Angelica Mccarty PTA GP 1                    Angeliac Mccarty, [facility]  6/18/2018

## 2018-06-20 ENCOUNTER — HOSPITAL ENCOUNTER (OUTPATIENT)
Dept: PHYSICAL THERAPY | Facility: HOSPITAL | Age: 68
Setting detail: THERAPIES SERIES
Discharge: HOME | End: 2018-06-20

## 2018-06-20 DIAGNOSIS — M54.5 LOW BACK PAIN, UNSPECIFIED BACK PAIN LATERALITY, UNSPECIFIED CHRONICITY, WITH SCIATICA PRESENCE UNSPECIFIED: Primary | ICD-10-CM

## 2018-06-20 PROCEDURE — G0283 ELEC STIM OTHER THAN WOUND: HCPCS | Performed by: PHYSICAL THERAPIST

## 2018-06-20 PROCEDURE — 97035 APP MDLTY 1+ULTRASOUND EA 15: CPT | Performed by: PHYSICAL THERAPIST

## 2018-06-20 PROCEDURE — 97110 THERAPEUTIC EXERCISES: CPT | Performed by: PHYSICAL THERAPIST

## 2018-06-20 NOTE — PROGRESS NOTES
Outpatient Physical Therapy Ortho Treatment Note   [facility]     Patient Name: Alexander Zamora  : 1950  MRN: [id number]  Today's Date: 2018      Visit Date: 2018    Visit Dx:    ICD-10-CM ICD-9-CM   1. Low back pain, unspecified back pain laterality, unspecified chronicity, with sciatica presence unspecified M54.5 724.2       Patient Active Problem List   Diagnosis   • Prostate cancer   • Erectile dysfunction following radical prostatectomy        Past Medical History:   Diagnosis Date   • Cancer     Postrate   • Elevated prostate specific antigen (PSA)    • Erectile dysfunction    • Hypercholesterolemia    • Hypercholesterolemia    • Prostatitis         Past Surgical History:   Procedure Laterality Date   • HEMORRHOIDECTOMY     • OTHER SURGICAL HISTORY      destruction of hemorrhoids, Rubber band ligatioin of hemmorrhoids   • OTHER SURGICAL HISTORY      surgery for an ulcer, PNEUMOTHORAX AND ULCER THERAPY             PT Ortho     Row Name 18 1300       Subjective Comments    Subjective Comments Pt reports ongoing low back pain prior to today's treatment session.  -AD       Subjective Pain    Able to rate subjective pain? yes  -AD    Pre-Treatment Pain Level 5  -AD    Post-Treatment Pain Level 0  -AD    Row Name 18 1000       Subjective Comments    Subjective Comments Patient states that his pain is worse in his back today secondary to mowing and working on his car.  -AC       Subjective Pain    Able to rate subjective pain? yes  -AC    Pre-Treatment Pain Level 9  -AC    Post-Treatment Pain Level 6  -AC      User Key  (r) = Recorded By, (t) = Taken By, (c) = Cosigned By    Initials Name Provider Type    AD Ashley Claudene Dalton, PT Physical Therapist    AC Angelica Mccarty, PTA Physical Therapy Assistant                            PT Assessment/Plan     Row Name 18 1311          PT Assessment    Assessment Comments Today's session was initiated with moist heat  combined with IFC to the lumbar region, in the seated position. Therapeutic exercises were then performed to address lumbar range of motion, strength, core stability, and posture. Therapeutic exercises were progressed to include standing exercises as well as the total gym. The session concluded with therapeutic ultrasound to bilateral paraspinals. No skin irritation was observed following modalities. He will be progressed as tolerated.  -AD        PT Plan    PT Plan Comments Progress as tolerated per POC.  -AD       User Key  (r) = Recorded By, (t) = Taken By, (c) = Cosigned By    Initials Name Provider Type    AD Ashley Claudene Dalton, PT Physical Therapist                Modalities     Row Name 06/20/18 1300             Moist Heat    MH Applied Yes  -AD      Location lumbar  -AD      Rx Minutes 15 mins  -AD      MH Prior to Rx Yes   With IFC, no skin irrittion observed.  -AD         Ultrasound 86979    Location lumbar paraspinals   8 minutes  -AD      Duty Cycle 100  -AD      Frequency 1.0 MHz  -AD      Intensity - Wts/cm 1.2  -AD      34790 - PT Ultrasound Minutes 8   No skin irritation observed.  -AD         ELECTRICAL STIMULATION    Attended/Unattended Unattended   No irritation noted following estim  -AD      Stimulation Type IFC  -AD      Max mAmp --   per the patient's tolerance, 15 minutes  -AD      Location/Electrode Placement/Other lumbar   -AD        User Key  (r) = Recorded By, (t) = Taken By, (c) = Cosigned By    Initials Name Provider Type    AD Ashley Claudene Dalton, PT Physical Therapist                Exercises     Row Name 06/20/18 1300             Subjective Comments    Subjective Comments Pt reports ongoing low back pain prior to today's treatment session.  -AD         Subjective Pain    Able to rate subjective pain? yes  -AD      Pre-Treatment Pain Level 5  -AD      Post-Treatment Pain Level 0  -AD         Total Minutes    16313 - PT Therapeutic Exercise Minutes 35  -AD         Exercise 1     Exercise Name 1 SKTC, LTR,supine clams, bridges, piriformis stretch, supine march, supine ball squeeze, PPT, scapular squeeze, mid row with RTB, low row with RTB, total gym (double leg, single leg), standing hip extension, standing hip abduction.  -AD      Cueing 1 Verbal;Tactile;Demo  -AD      Time 1 35 minutes  -AD        User Key  (r) = Recorded By, (t) = Taken By, (c) = Cosigned By    Initials Name Provider Type    AD Ashley Claudene Dalton, PT Physical Therapist                             Therapy Education  Given: HEP, Symptoms/condition management, Pain management, Posture/body mechanics  Program: Reinforced  How Provided: Verbal, Demonstration  Provided to: Patient  Level of Understanding: Verbalized, Demonstrated              Time Calculation:   Start Time: 0920  Stop Time: 1026  Time Calculation (min): 66 min  Therapy Suggested Charges     Code   Minutes Charges    12551 (CPT®) Hc Pt Neuromusc Re Education Ea 15 Min      78434 (CPT®) Hc Pt Ther Proc Ea 15 Min 35 2    71586 (CPT®) Hc Gait Training Ea 15 Min      06624 (CPT®) Hc Pt Therapeutic Act Ea 15 Min      55619 (CPT®) Hc Pt Manual Therapy Ea 15 Min      87745 (CPT®) Hc Pt Ther Massage- Per 15 Min      36720 (CPT®) Hc Pt Iontophoresis Ea 15 Min      95182 (CPT®) Hc Pt Elec Stim Ea-Per 15 Min      84556 (CPT®) Hc Pt Ultrasound Ea 15 Min 8 1    73105 (CPT®) Hc Pt Self Care/Mgmt/Train Ea 15 Min      Total  43 3        Therapy Charges for Today     Code Description Service Date Service Provider Modifiers Qty    92838284263 HC PT THER PROC EA 15 MIN 6/20/2018 Ashley Claudene Dalton, PT GP 2    55124847671 HC PT ULTRASOUND EA 15 MIN 6/20/2018 Ashley Claudene Dalton, PT GP 1    56679752679 HC PT ELECTRICAL STIM UNATTENDED 6/20/2018 Ashley Claudene Dalton, PT  1                    Ashley Claudene Dalton, PT  6/20/2018

## 2018-06-22 ENCOUNTER — HOSPITAL ENCOUNTER (OUTPATIENT)
Dept: PHYSICAL THERAPY | Facility: HOSPITAL | Age: 68
Setting detail: THERAPIES SERIES
Discharge: HOME | End: 2018-06-22

## 2018-06-22 DIAGNOSIS — M54.5 LOW BACK PAIN, UNSPECIFIED BACK PAIN LATERALITY, UNSPECIFIED CHRONICITY, WITH SCIATICA PRESENCE UNSPECIFIED: Primary | ICD-10-CM

## 2018-06-22 PROCEDURE — G0283 ELEC STIM OTHER THAN WOUND: HCPCS

## 2018-06-22 PROCEDURE — 97035 APP MDLTY 1+ULTRASOUND EA 15: CPT

## 2018-06-22 PROCEDURE — 97110 THERAPEUTIC EXERCISES: CPT

## 2018-06-22 NOTE — THERAPY TREATMENT NOTE
Outpatient Physical Therapy Ortho Treatment Note   Sin     Patient Name: Alexander Zamora  : 1950  MRN: [id number]  Today's Date: 2018      Visit Date: 2018    Visit Dx:    ICD-10-CM ICD-9-CM   1. Low back pain, unspecified back pain laterality, unspecified chronicity, with sciatica presence unspecified M54.5 724.2       Patient Active Problem List   Diagnosis   • Prostate cancer   • Erectile dysfunction following radical prostatectomy        Past Medical History:   Diagnosis Date   • Cancer     Postrate   • Elevated prostate specific antigen (PSA)    • Erectile dysfunction    • Hypercholesterolemia    • Hypercholesterolemia    • Prostatitis         Past Surgical History:   Procedure Laterality Date   • HEMORRHOIDECTOMY     • OTHER SURGICAL HISTORY      destruction of hemorrhoids, Rubber band ligatioin of hemmorrhoids   • OTHER SURGICAL HISTORY      surgery for an ulcer, PNEUMOTHORAX AND ULCER THERAPY             PT Ortho     Row Name 18 1000       Subjective Comments    Subjective Comments Patient states that he was cramping in his legs following last treatment session.   -AC       Subjective Pain    Able to rate subjective pain? yes  -AC    Pre-Treatment Pain Level 10  -AC    Post-Treatment Pain Level 3  -AC    Row Name 18 1300       Subjective Comments    Subjective Comments Pt reports ongoing low back pain prior to today's treatment session.  -AD       Subjective Pain    Able to rate subjective pain? yes  -AD    Pre-Treatment Pain Level 5  -AD    Post-Treatment Pain Level 0  -AD      User Key  (r) = Recorded By, (t) = Taken By, (c) = Cosigned By    Initials Name Provider Type    AD Ashley Claudene Dalton, PT Physical Therapist    AC Angelica Mccarty, PTA Physical Therapy Assistant                            PT Assessment/Plan     Row Name 18 1056          PT Assessment    Assessment Comments Patient tolerated treatment session well with rest breaks taken as  needed by the patient. Several standing exercises and TG not performed secondary to patient's reports of cramping last treatment session and increased pain prior to treatment session. No adverse reactions with modalities or treatment. Educated patient to perform ther ex per his tolerance, patient verbalized understanding.   -AC        PT Plan    PT Plan Comments Continue per PT's POC, progress per the patient's tolerance.  -AC       User Key  (r) = Recorded By, (t) = Taken By, (c) = Cosigned By    Initials Name Provider Type    LISA Mccarty PTA Physical Therapy Assistant                Modalities     Row Name 06/22/18 1000             Moist Heat    MH Applied Yes   No redness noted following moist heat  -AC      Location lumbar  -AC      Rx Minutes 15 mins  -AC      MH Prior to Rx Yes  -AC         Ultrasound 33531    Location lumbar paraspinals  -AC      Duty Cycle 100  -AC      Frequency 1.0 MHz  -AC      Intensity - Wts/cm 1.2  -AC      40021 - PT Ultrasound Minutes 8  -AC         ELECTRICAL STIMULATION    Attended/Unattended Unattended   No irritation noted following estim  -AC      Stimulation Type IFC  -AC      Max mAmp --   per the patient's tolerance, 15 minutes with MH  -AC      Location/Electrode Placement/Other lumbar   -AC        User Key  (r) = Recorded By, (t) = Taken By, (c) = Cosigned By    Initials Name Provider Type    LISA Mccarty PTA Physical Therapy Assistant                Exercises     Row Name 06/22/18 1000             Subjective Comments    Subjective Comments Patient states that he was cramping in his legs following last treatment session.   -AC         Subjective Pain    Able to rate subjective pain? yes  -AC      Pre-Treatment Pain Level 10  -AC      Post-Treatment Pain Level 3  -AC         Total Minutes    34709 - PT Therapeutic Exercise Minutes 30  -AC         Exercise 1    Exercise Name 1 SKTC 20 sec hold x3, LTR 10x2, piriformis stretch 20 sec hold x3, bridges  10x2, supine ball squeeze 10x2, supine clams 10x2, scap squeeze 10x2, seated march 10x2, LAQ 10x2  -AC      Cueing 1 Verbal;Tactile;Demo  -AC      Time 1 30 minutes  -AC        User Key  (r) = Recorded By, (t) = Taken By, (c) = Cosigned By    Initials Name Provider Type    AC Angelica Mccarty PTA Physical Therapy Assistant                             Therapy Education  Given: HEP, Symptoms/condition management, Pain management, Posture/body mechanics  Program: Reinforced  How Provided: Verbal, Demonstration  Provided to: Patient  Level of Understanding: Verbalized, Demonstrated              Time Calculation:   Start Time: 0942  Stop Time: 1043  Time Calculation (min): 61 min  Therapy Suggested Charges     Code   Minutes Charges    77453 (CPT®) Hc Pt Neuromusc Re Education Ea 15 Min      28516 (CPT®) Hc Pt Ther Proc Ea 15 Min 30 2    89739 (CPT®) Hc Gait Training Ea 15 Min      77033 (CPT®) Hc Pt Therapeutic Act Ea 15 Min      00005 (CPT®) Hc Pt Manual Therapy Ea 15 Min      70569 (CPT®) Hc Pt Ther Massage- Per 15 Min      46963 (CPT®) Hc Pt Iontophoresis Ea 15 Min      99252 (CPT®) Hc Pt Elec Stim Ea-Per 15 Min      20462 (CPT®) Hc Pt Ultrasound Ea 15 Min 8 1    50075 (CPT®) Hc Pt Self Care/Mgmt/Train Ea 15 Min      Total  38 3        Therapy Charges for Today     Code Description Service Date Service Provider Modifiers Qty    18150984900 HC PT THER PROC EA 15 MIN 6/22/2018 Angelica Mccarty PTA GP 2    81742450336 HC PT ULTRASOUND EA 15 MIN 6/22/2018 Angelica Mccarty PTA GP 1    30651214017 HC PT ELECTRICAL STIM UNATTENDED 6/22/2018 Angelica Mccarty PTA  1                    Angelica Mccarty PTA  6/22/2018

## 2018-06-25 ENCOUNTER — HOSPITAL ENCOUNTER (OUTPATIENT)
Dept: PHYSICAL THERAPY | Facility: HOSPITAL | Age: 68
Setting detail: THERAPIES SERIES
Discharge: HOME | End: 2018-06-25

## 2018-06-25 DIAGNOSIS — M54.5 LOW BACK PAIN, UNSPECIFIED BACK PAIN LATERALITY, UNSPECIFIED CHRONICITY, WITH SCIATICA PRESENCE UNSPECIFIED: Primary | ICD-10-CM

## 2018-06-25 PROCEDURE — G0283 ELEC STIM OTHER THAN WOUND: HCPCS

## 2018-06-25 PROCEDURE — 97110 THERAPEUTIC EXERCISES: CPT

## 2018-06-25 PROCEDURE — 97035 APP MDLTY 1+ULTRASOUND EA 15: CPT

## 2018-06-25 NOTE — THERAPY TREATMENT NOTE
Outpatient Physical Therapy Ortho Treatment Note  APRIL Vogt     Patient Name: Alexander Zamora  : 1950  MRN: [id number]  Today's Date: 2018      Visit Date: 2018    Visit Dx:    ICD-10-CM ICD-9-CM   1. Low back pain, unspecified back pain laterality, unspecified chronicity, with sciatica presence unspecified M54.5 724.2       Patient Active Problem List   Diagnosis   • Prostate cancer   • Erectile dysfunction following radical prostatectomy        Past Medical History:   Diagnosis Date   • Cancer     Postrate   • Elevated prostate specific antigen (PSA)    • Erectile dysfunction    • Hypercholesterolemia    • Hypercholesterolemia    • Prostatitis         Past Surgical History:   Procedure Laterality Date   • HEMORRHOIDECTOMY     • OTHER SURGICAL HISTORY      destruction of hemorrhoids, Rubber band ligatioin of hemmorrhoids   • OTHER SURGICAL HISTORY      surgery for an ulcer, PNEUMOTHORAX AND ULCER THERAPY             PT Ortho     Row Name 18 1000       Subjective Comments    Subjective Comments Patient reports that his back is feeling better today. Patient states that he wants to do the bike and total gym.  -AC       Subjective Pain    Able to rate subjective pain? yes  -AC    Pre-Treatment Pain Level 4  -AC      User Key  (r) = Recorded By, (t) = Taken By, (c) = Cosigned By    Initials Name Provider Type    LISA Mccarty, PTA Physical Therapy Assistant                            PT Assessment/Plan     Row Name 18 1038          PT Assessment    Assessment Comments Reps increased per the patient's tolerance with no increase in pain noted. Patient tolerated treatment session well with no increase in pain noted. Educated patient to perform ther ex per his tolerance, patient verbalized understanding. No adverse reactions with modalities or treatment. Patient requested to perform LE bike and TG with no pain noted. Decrease in pain noted following treatment session. Slight  "redness noted on the lumbar musculature where estim patches were placed, patient educated and said \"it was fine, it would go away.\"  -AC        PT Plan    PT Plan Comments Continue per PT's POC, progress per the patient's tolerance.  -AC       User Key  (r) = Recorded By, (t) = Taken By, (c) = Cosigned By    Initials Name Provider Type    LISA Mccarty PTA Physical Therapy Assistant                Modalities     Row Name 06/25/18 1000             Moist Heat    MH Applied Yes   No redness noted following moist heat  -AC      Location lumbar  -AC      Rx Minutes 15 mins  -AC      MH Prior to Rx Yes  -AC         Ultrasound 19538    Location lumbar paraspinals  -AC      Duty Cycle 100  -AC      Frequency 1.0 MHz  -AC      Intensity - Wts/cm 1.2  -AC      51684 - PT Ultrasound Minutes 8  -AC         ELECTRICAL STIMULATION    Attended/Unattended Unattended   No irritation noted following estim  -AC      Stimulation Type IFC  -AC      Max mAmp --   per the patient's tolerance, 15 minutes with MH  -AC      Location/Electrode Placement/Other lumbar   -AC        User Key  (r) = Recorded By, (t) = Taken By, (c) = Cosigned By    Initials Name Provider Type     Angelica Mccarty PTA Physical Therapy Assistant                Exercises     Row Name 06/25/18 1000             Subjective Comments    Subjective Comments Patient reports that his back is feeling better today. Patient states that he wants to do the bike and total gym.  -AC         Subjective Pain    Able to rate subjective pain? yes  -AC      Pre-Treatment Pain Level 4  -AC         Total Minutes    16890 - PT Therapeutic Exercise Minutes 35  -AC         Exercise 1    Exercise Name 1 SKTC 20 sec hold x3, LTR 15x2, piriformis stretch 20 sec hold x3, bridges 15x2, supine ball squeeze 15x2, supine clams 15x2, scap squeeze 15x2, seated march 15x2, LAQ 15x2, LE bike LV 1.5 6 min, TG LV 16 15x2  -AC      Cueing 1 Verbal;Tactile;Demo  -AC      Time 1 35 " minutes  -AC        User Key  (r) = Recorded By, (t) = Taken By, (c) = Cosigned By    Initials Name Provider Type    AC Angelica Mccarty PTA Physical Therapy Assistant                             Therapy Education  Given: Symptoms/condition management, HEP, Pain management, Posture/body mechanics  Program: Progressed  How Provided: Verbal, Demonstration  Provided to: Patient  Level of Understanding: Verbalized, Demonstrated              Time Calculation:   Start Time: 0940  Stop Time: 1040  Time Calculation (min): 60 min  Therapy Suggested Charges     Code   Minutes Charges    07078 (CPT®) Hc Pt Neuromusc Re Education Ea 15 Min      21098 (CPT®) Hc Pt Ther Proc Ea 15 Min 35 2    36047 (CPT®) Hc Gait Training Ea 15 Min      75058 (CPT®) Hc Pt Therapeutic Act Ea 15 Min      59639 (CPT®) Hc Pt Manual Therapy Ea 15 Min      68487 (CPT®) Hc Pt Ther Massage- Per 15 Min      00370 (CPT®) Hc Pt Iontophoresis Ea 15 Min      88436 (CPT®) Hc Pt Elec Stim Ea-Per 15 Min      53307 (CPT®) Hc Pt Ultrasound Ea 15 Min 8 1    37741 (CPT®) Hc Pt Self Care/Mgmt/Train Ea 15 Min      Total  43 3        Therapy Charges for Today     Code Description Service Date Service Provider Modifiers Qty    01736906891 HC PT THER PROC EA 15 MIN 6/25/2018 Angelica Mccarty, PTA GP 2    46692052619 HC PT ULTRASOUND EA 15 MIN 6/25/2018 Angelica Mccarty, PTA GP 1    91144518433 HC PT ELECTRICAL STIM UNATTENDED 6/25/2018 Angelica Mccarty PTA  1                    Angelica Mccarty PTA  6/25/2018

## 2018-06-27 ENCOUNTER — HOSPITAL ENCOUNTER (OUTPATIENT)
Dept: PHYSICAL THERAPY | Facility: HOSPITAL | Age: 68
Setting detail: THERAPIES SERIES
Discharge: HOME | End: 2018-06-27

## 2018-06-27 DIAGNOSIS — M54.5 LOW BACK PAIN, UNSPECIFIED BACK PAIN LATERALITY, UNSPECIFIED CHRONICITY, WITH SCIATICA PRESENCE UNSPECIFIED: Primary | ICD-10-CM

## 2018-06-27 PROCEDURE — 97110 THERAPEUTIC EXERCISES: CPT

## 2018-06-27 PROCEDURE — G0283 ELEC STIM OTHER THAN WOUND: HCPCS

## 2018-06-27 NOTE — THERAPY TREATMENT NOTE
Outpatient Physical Therapy Ortho Treatment Note   [facility]     Patient Name: Alexander Zamora  : 1950  MRN: [id number]  Today's Date: 2018      Visit Date: 2018    Visit Dx:    ICD-10-CM ICD-9-CM   1. Low back pain, unspecified back pain laterality, unspecified chronicity, with sciatica presence unspecified M54.5 724.2       Patient Active Problem List   Diagnosis   • Prostate cancer   • Erectile dysfunction following radical prostatectomy        Past Medical History:   Diagnosis Date   • Cancer     Postrate   • Elevated prostate specific antigen (PSA)    • Erectile dysfunction    • Hypercholesterolemia    • Hypercholesterolemia    • Prostatitis         Past Surgical History:   Procedure Laterality Date   • HEMORRHOIDECTOMY     • OTHER SURGICAL HISTORY      destruction of hemorrhoids, Rubber band ligatioin of hemmorrhoids   • OTHER SURGICAL HISTORY      surgery for an ulcer, PNEUMOTHORAX AND ULCER THERAPY             PT Ortho     Row Name 18 1000       Subjective Comments    Subjective Comments Patient reports that his back is feeling better today. Patient states that he wants to do the bike and total gym.  -AC       Subjective Pain    Able to rate subjective pain? yes  -AC    Pre-Treatment Pain Level 4  -AC      User Key  (r) = Recorded By, (t) = Taken By, (c) = Cosigned By    Initials Name Provider Type    LISA Mccarty, PTA Physical Therapy Assistant                            PT Assessment/Plan     Row Name 18 1329          PT Assessment    Assessment Comments TE for lumbar strengthening and stabilization added this date. Patient tolerant to treatment session with no increased pain noted throughout. Patient provided education on newly added TE, education was verbalized.   -RF     Please refer to paper survey for additional self-reported information Yes  -RF     Rehab Potential Good  -RF     Patient/caregiver participated in establishment of treatment plan and  goals Yes  -RF     Patient would benefit from skilled therapy intervention Yes  -RF       User Key  (r) = Recorded By, (t) = Taken By, (c) = Cosigned By    Initials Name Provider Type    RF Vandana Connell PTA Physical Therapy Assistant                Modalities     Row Name 06/27/18 1100             Moist Heat    Location lumbar  -RF      Rx Minutes 15 mins  -RF      MH Prior to Rx Yes  -RF         ELECTRICAL STIMULATION    Attended/Unattended Unattended   No irritation noted following estim  -RF      Stimulation Type IFC  -RF      Max mAmp --   per the patient's tolerance, 15 minutes with MH  -RF      Location/Electrode Placement/Other lumbar   -RF        User Key  (r) = Recorded By, (t) = Taken By, (c) = Cosigned By    Initials Name Provider Type    RF Vandana Connell PTA Physical Therapy Assistant                Exercises     Row Name 06/27/18 1300 06/27/18 1200          Subjective Pain    Able to rate subjective pain? yes  -RF  --     Pre-Treatment Pain Level 4  -RF  --     Post-Treatment Pain Level 0  -RF  --        Total Minutes    41280 - PT Therapeutic Exercise Minutes 35  -RF  --        Exercise 1    Exercise Name 1 SKTC 20 sec hold x3, LTR 15x2, piriformis stretch 20 sec hold x3, pelvic tilt X20, bridges 15x2, supine ball squeeze 15x2, supine clams 15x2, supine clams 20X2, scap squeeze 15x2, seated march 15x2, LAQ 15x2, LE bike LV 4.0 6 min, TG LV 16 15x2  -RF SKTC 20 sec hold x3, LTR 15x2, piriformis stretch 20 sec hold x3, bridges 15x2, supine ball squeeze 15x2, supine clams 15x2, scap squeeze 15x2, seated march 15x2, LAQ 15x2, LE bike LV 1.5 6 min, TG LV 16 15x2  -RF     Cueing 1 Verbal;Tactile  -RF Verbal;Tactile;Demo  -RF     Time 1 35 minutes  -RF 35 minutes  -RF     Additional Comments Pt cued for pelvic tilt and neutral spine throughout  -RF  --       User Key  (r) = Recorded By, (t) = Taken By, (c) = Cosigned By    Initials Name Provider Type    RF Vandana Connell PTA Physical Therapy  Assistant                               PT OP Goals     Row Name 06/27/18 1332          Time Calculation    PT Goal Re-Cert Due Date 06/11/18  -RF       User Key  (r) = Recorded By, (t) = Taken By, (c) = Cosigned By    Initials Name Provider Type    RF Vandana Connell PTA Physical Therapy Assistant          Therapy Education  Education Details: Pt educated on new TE for HEP with cuing for correct technique.   Given: HEP, Symptoms/condition management  Program: Progressed  How Provided: Verbal, Demonstration  Provided to: Patient  Level of Understanding: Verbalized, Demonstrated              Time Calculation:   Start Time: 0945  Stop Time: 1040  Time Calculation (min): 55 min  Total Timed Code Minutes- PT: 55 minute(s)  Therapy Suggested Charges     Code   Minutes Charges    69946 (CPT®) Hc Pt Neuromusc Re Education Ea 15 Min      48680 (CPT®) Hc Pt Ther Proc Ea 15 Min 35 2    88059 (CPT®) Hc Gait Training Ea 15 Min      43001 (CPT®) Hc Pt Therapeutic Act Ea 15 Min      96117 (CPT®) Hc Pt Manual Therapy Ea 15 Min      66921 (CPT®) Hc Pt Ther Massage- Per 15 Min      86320 (CPT®) Hc Pt Iontophoresis Ea 15 Min      99118 (CPT®) Hc Pt Elec Stim Ea-Per 15 Min      50060 (CPT®) Hc Pt Ultrasound Ea 15 Min      57982 (CPT®) Hc Pt Self Care/Mgmt/Train Ea 15 Min      Total  35 2        Therapy Charges for Today     Code Description Service Date Service Provider Modifiers Qty    73535885129 HC PT ELECTRICAL STIM UNATTENDED 6/27/2018 Vandana Connell, PTA  1    42536663016 HC PT THER PROC EA 15 MIN 6/27/2018 Vandana Connell, [facility] GP 3                    Vandana Connell PTA  6/27/2018

## 2018-06-29 ENCOUNTER — HOSPITAL ENCOUNTER (OUTPATIENT)
Dept: PHYSICAL THERAPY | Facility: HOSPITAL | Age: 68
Setting detail: THERAPIES SERIES
Discharge: HOME | End: 2018-06-29

## 2018-06-29 DIAGNOSIS — M54.5 LOW BACK PAIN, UNSPECIFIED BACK PAIN LATERALITY, UNSPECIFIED CHRONICITY, WITH SCIATICA PRESENCE UNSPECIFIED: Primary | ICD-10-CM

## 2018-06-29 PROCEDURE — 97110 THERAPEUTIC EXERCISES: CPT

## 2018-06-29 PROCEDURE — G0283 ELEC STIM OTHER THAN WOUND: HCPCS

## 2018-06-29 NOTE — THERAPY TREATMENT NOTE
Outpatient Physical Therapy Ortho Treatment Note   [facility]     Patient Name: Alexander Zamora  : 1950  MRN: [id number]  Today's Date: 2018      Visit Date: 2018    Visit Dx:    ICD-10-CM ICD-9-CM   1. Low back pain, unspecified back pain laterality, unspecified chronicity, with sciatica presence unspecified M54.5 724.2       Patient Active Problem List   Diagnosis   • Prostate cancer   • Erectile dysfunction following radical prostatectomy        Past Medical History:   Diagnosis Date   • Cancer     Postrate   • Elevated prostate specific antigen (PSA)    • Erectile dysfunction    • Hypercholesterolemia    • Hypercholesterolemia    • Prostatitis         Past Surgical History:   Procedure Laterality Date   • HEMORRHOIDECTOMY     • OTHER SURGICAL HISTORY      destruction of hemorrhoids, Rubber band ligatioin of hemmorrhoids   • OTHER SURGICAL HISTORY      surgery for an ulcer, PNEUMOTHORAX AND ULCER THERAPY             PT Ortho     Row Name 18 1000       Subjective Pain    Able to rate subjective pain? yes  -MARLEN    Pre-Treatment Pain Level 5  -MARLEN    Post-Treatment Pain Level 0  -MARLEN      User Key  (r) = Recorded By, (t) = Taken By, (c) = Cosigned By    Initials Name Provider Type    MARLEN Wendy Ward, PTA Physical Therapy Assistant                            PT Assessment/Plan     Row Name 18 1123          PT Assessment    Assessment Comments Patient responded well to today's session w/ no reports of pain noted at conclusion.  Pt reported subjectively improvement in symptoms w/ therapy, and wishes to continue.  TE progressed w/ initiation of tband postural strengthening.  Pt received cues during new added therex.  Pt continues to progress as tolerated.  No adverse reactions observed following modalities.   -MARLEN        PT Plan    PT Plan Comments Continue with PT's POC and will progress tx as tolerated by patient.   -MARLEN       User Key  (r) = Recorded By, (t) = Taken By, (c) =  "Cosigned By    Initials Name Provider Type    MARLEN Ward PTA Physical Therapy Assistant                Modalities     Row Name 06/29/18 1000             Moist Heat    MH Applied Yes   no redness observed following MH  -MARLEN      Location lumbar  -MARLEN      Rx Minutes 15 mins  -MARLEN      MH Prior to Rx Yes   w/ estim in seated position  -MARLEN         ELECTRICAL STIMULATION    Attended/Unattended Unattended   no skin irritation observed following estim  -MARLEN      Stimulation Type IFC  -MARLEN      Max mAmp --   as to pt's tolerance  -MARLEN      Location/Electrode Placement/Other lumbar   -MARLEN        User Key  (r) = Recorded By, (t) = Taken By, (c) = Cosigned By    Initials Name Provider Type    MARLEN Wendy Aelurossamanta Ward, [facility] Physical Therapy Assistant                Exercises     Row Name 06/29/18 1000             Subjective Comments    Subjective Comments Patient states his pain is increased today due to mowing yesterday.  Pt states he feels therapy is helping, and he wishes to continue.  Pt reports 5/10 pain prior to tx.   -MARLEN         Subjective Pain    Able to rate subjective pain? yes  -MARLEN      Pre-Treatment Pain Level 5  -MARLEN      Post-Treatment Pain Level 0  -MARLEN         Total Minutes    66979 - PT Therapeutic Exercise Minutes 40  -MARLEN         Exercise 1    Exercise Name 1 SKTC 3x20\", piriformis stretch 3x20\", LTR 15x2, PPT 15x2, ball squeeze 15x2, supine march 15x2, hip abd w/ tband (green) 15x2, bridge 10x3, TG squats LV 17 15x2, LE bike LV 4 x10min, midrow w/ tband (red) 10x2   -MARLEN      Cueing 1 Verbal;Tactile;Demo  -MARLEN      Time 1 40  -MARLEN        User Key  (r) = Recorded By, (t) = Taken By, (c) = Cosigned By    Initials Name Provider Type    MARLEN Nguyen Aelurossamanta Ward PTA Physical Therapy Assistant                                            Time Calculation:   Start Time: 0925  Stop Time: 1025  Time Calculation (min): 60 min  Therapy Suggested Charges     Code   Minutes Charges    99419 (CPT®)  Pt Neuromusc Re " Education Ea 15 Min      96602 (CPT®) Hc Pt Ther Proc Ea 15 Min 40 3    92505 (CPT®) Hc Gait Training Ea 15 Min      25312 (CPT®) Hc Pt Therapeutic Act Ea 15 Min      67056 (CPT®) Hc Pt Manual Therapy Ea 15 Min      98463 (CPT®) Hc Pt Ther Massage- Per 15 Min      23588 (CPT®) Hc Pt Iontophoresis Ea 15 Min      56102 (CPT®) Hc Pt Elec Stim Ea-Per 15 Min      60551 (CPT®) Hc Pt Ultrasound Ea 15 Min      94585 (CPT®) Hc Pt Self Care/Mgmt/Train Ea 15 Min      Total  40 3        Therapy Charges for Today     Code Description Service Date Service Provider Modifiers Qty    57334628380 HC PT THER PROC EA 15 MIN 6/29/2018 Wendy Ward, PTA GP 3    64836815629 HC PT ELECTRICAL STIM UNATTENDED 6/29/2018 Wendy Ward, PTA  1                    Wendy Mcgee. Sylvia, PTA  6/29/2018

## 2018-07-02 ENCOUNTER — HOSPITAL ENCOUNTER (OUTPATIENT)
Dept: PHYSICAL THERAPY | Facility: HOSPITAL | Age: 68
Setting detail: THERAPIES SERIES
Discharge: HOME | End: 2018-07-02

## 2018-07-02 DIAGNOSIS — M54.5 LOW BACK PAIN, UNSPECIFIED BACK PAIN LATERALITY, UNSPECIFIED CHRONICITY, WITH SCIATICA PRESENCE UNSPECIFIED: Primary | ICD-10-CM

## 2018-07-02 PROCEDURE — G0283 ELEC STIM OTHER THAN WOUND: HCPCS

## 2018-07-02 PROCEDURE — 97110 THERAPEUTIC EXERCISES: CPT

## 2018-07-02 NOTE — THERAPY TREATMENT NOTE
Outpatient Physical Therapy Ortho Treatment Note  APRIL Vogt     Patient Name: Alexander Zamora  : 1950  MRN: [id number]  Today's Date: 2018      Visit Date: 2018    Visit Dx:    ICD-10-CM ICD-9-CM   1. Low back pain, unspecified back pain laterality, unspecified chronicity, with sciatica presence unspecified M54.5 724.2       Patient Active Problem List   Diagnosis   • Prostate cancer   • Erectile dysfunction following radical prostatectomy        Past Medical History:   Diagnosis Date   • Cancer     Postrate   • Elevated prostate specific antigen (PSA)    • Erectile dysfunction    • Hypercholesterolemia    • Hypercholesterolemia    • Prostatitis         Past Surgical History:   Procedure Laterality Date   • HEMORRHOIDECTOMY     • OTHER SURGICAL HISTORY      destruction of hemorrhoids, Rubber band ligatioin of hemmorrhoids   • OTHER SURGICAL HISTORY      surgery for an ulcer, PNEUMOTHORAX AND ULCER THERAPY             PT Ortho     Row Name 18 1200       Subjective Comments    Subjective Comments Patient reports that he is having soreness in his low back. Patient states he has been mowing this weekend and thats why he is having so much pain. Patient reports that he thinks therapy is helping him.  -AC       Subjective Pain    Able to rate subjective pain? yes  -AC    Pre-Treatment Pain Level 5  -AC    Post-Treatment Pain Level 0  -AC      User Key  (r) = Recorded By, (t) = Taken By, (c) = Cosigned By    Initials Name Provider Type    LISA Mccarty, PTA Physical Therapy Assistant                            PT Assessment/Plan     Row Name 18 1222          PT Assessment    Assessment Comments New standing ther ex and step ups added to treatment session per the patient's tolerance with no increase in pain noted. Patient tolerated treatment session good with rest breaks taken as needed by the patient. Educated patient to perform ther ex per his tolerance, patient verbalized  "understanding. Verbal cues needed on proper technique of exercises. No adverse reactions with modalities or treatment. Christen Mon, PT, DPT initiated treatment session.   -AC        PT Plan    PT Plan Comments Continue per PT's POC, progress per the patient's tolerance.  -AC       User Key  (r) = Recorded By, (t) = Taken By, (c) = Cosigned By    Initials Name Provider Type     Angelica Mccarty, [facility] Physical Therapy Assistant                Modalities     Row Name 07/02/18 1200             Moist Heat    MH Applied Yes   No redness noted following moist heat  -AC      Location lumbar  -AC      Rx Minutes 15 mins  -AC      MH Prior to Rx Yes  -AC         ELECTRICAL STIMULATION    Attended/Unattended Unattended   No irritation noted following estim  -AC      Stimulation Type IFC  -AC      Max mAmp --   per the patient's tolerance, 15 minutes with MH  -AC      Location/Electrode Placement/Other lumbar   -AC        User Key  (r) = Recorded By, (t) = Taken By, (c) = Cosigned By    Initials Name Provider Type     Angelica Mccarty, [facility] Physical Therapy Assistant                Exercises     Row Name 07/02/18 1200             Subjective Comments    Subjective Comments Patient reports that he is having soreness in his low back. Patient states he has been mowing this weekend and thats why he is having so much pain. Patient reports that he thinks therapy is helping him.  -AC         Subjective Pain    Able to rate subjective pain? yes  -AC      Pre-Treatment Pain Level 5  -AC      Post-Treatment Pain Level 0  -AC         Total Minutes    51629 - PT Therapeutic Exercise Minutes 40  -AC         Exercise 1    Exercise Name 1 SKTC 20 sec hold x3, piriformis stretch 20 sec hold x3, LTR 15x2, ball squeeze 15x2, bridge 15x2, PPT 15x2, supine hip abd with GTB 15x2, St. march 10x2, midrow with RTB 10x2, low row with RTB 10x2, TG LV 17 15x2, step ups 6\" step 10x2, LE bike LV 4 10 min  -AC      Cueing 1 " Verbal;Tactile;Demo  -AC      Time 1 40 minutes  -AC        User Key  (r) = Recorded By, (t) = Taken By, (c) = Cosigned By    Initials Name Provider Type    AC Angelica Mccarty PTA Physical Therapy Assistant                             Therapy Education  Given: HEP, Symptoms/condition management, Posture/body mechanics, Pain management  Program: Progressed  How Provided: Verbal  Provided to: Patient  Level of Understanding: Verbalized, Demonstrated              Time Calculation:   Start Time: 0945  Stop Time: 1045  Time Calculation (min): 60 min  Therapy Suggested Charges     Code   Minutes Charges    80589 (CPT®) Hc Pt Neuromusc Re Education Ea 15 Min      07231 (CPT®) Hc Pt Ther Proc Ea 15 Min 40 3    80128 (CPT®) Hc Gait Training Ea 15 Min      16451 (CPT®) Hc Pt Therapeutic Act Ea 15 Min      63507 (CPT®) Hc Pt Manual Therapy Ea 15 Min      96304 (CPT®) Hc Pt Ther Massage- Per 15 Min      52794 (CPT®) Hc Pt Iontophoresis Ea 15 Min      27965 (CPT®) Hc Pt Elec Stim Ea-Per 15 Min      04266 (CPT®) Hc Pt Ultrasound Ea 15 Min      46659 (CPT®) Hc Pt Self Care/Mgmt/Train Ea 15 Min      Total  40 3        Therapy Charges for Today     Code Description Service Date Service Provider Modifiers Qty    24729025428 HC PT THER PROC EA 15 MIN 7/2/2018 Angelica Mccarty, PTA GP 3    21654050737 HC PT ELECTRICAL STIM UNATTENDED 7/2/2018 Angelica Mccarty PTA  1                    Angelica Mccarty PTA  7/2/2018

## 2018-07-03 ENCOUNTER — HOSPITAL ENCOUNTER (OUTPATIENT)
Dept: PHYSICAL THERAPY | Facility: HOSPITAL | Age: 68
Setting detail: THERAPIES SERIES
Discharge: HOME | End: 2018-07-03

## 2018-07-03 DIAGNOSIS — M54.5 LOW BACK PAIN, UNSPECIFIED BACK PAIN LATERALITY, UNSPECIFIED CHRONICITY, WITH SCIATICA PRESENCE UNSPECIFIED: Primary | ICD-10-CM

## 2018-07-03 PROCEDURE — 97110 THERAPEUTIC EXERCISES: CPT | Performed by: PHYSICAL THERAPIST

## 2018-07-03 PROCEDURE — G0283 ELEC STIM OTHER THAN WOUND: HCPCS | Performed by: PHYSICAL THERAPIST

## 2018-07-03 PROCEDURE — G8979 MOBILITY GOAL STATUS: HCPCS | Performed by: PHYSICAL THERAPIST

## 2018-07-03 PROCEDURE — G8978 MOBILITY CURRENT STATUS: HCPCS | Performed by: PHYSICAL THERAPIST

## 2018-07-03 NOTE — THERAPY RE-EVALUATION
Outpatient Physical Therapy Ortho Re-Evaluation  APRIL Vogt     Patient Name: Alexander Zamora  : 1950  MRN: [id number]  Today's Date: 7/3/2018      Visit Date: 2018    Patient Active Problem List   Diagnosis   • Prostate cancer (CMS/HCC)   • Erectile dysfunction following radical prostatectomy        Past Medical History:   Diagnosis Date   • Cancer (CMS/HCC)     Postrate   • Elevated prostate specific antigen (PSA)    • Erectile dysfunction    • Hypercholesterolemia    • Hypercholesterolemia    • Prostatitis         Past Surgical History:   Procedure Laterality Date   • HEMORRHOIDECTOMY     • OTHER SURGICAL HISTORY      destruction of hemorrhoids, Rubber band ligatioin of hemmorrhoids   • OTHER SURGICAL HISTORY      surgery for an ulcer, PNEUMOTHORAX AND ULCER THERAPY       Visit Dx:     ICD-10-CM ICD-9-CM   1. Low back pain, unspecified back pain laterality, unspecified chronicity, with sciatica presence unspecified M54.5 724.2             Patient History     Row Name 18 1300             Pain     Pain Location Back  -BE      Pain at Present 0  -BE      Pain at Best 0  -BE      Pain at Worst 8  -BE        User Key  (r) = Recorded By, (t) = Taken By, (c) = Cosigned By    Initials Name Provider Type    BE Christen Mon, PT Physical Therapist                PT Ortho     Row Name 18 1300       Subjective Comments    Subjective Comments Patient reports that he has noticed decreased pain since starting therapy.  He reports that he performs his HEP daily.  -BE       Subjective Pain    Able to rate subjective pain? yes  -BE    Pre-Treatment Pain Level 0  -BE    Post-Treatment Pain Level 0  -BE       Myotomal Screen- Lower Quarter Clearing    Hip flexion (L2) Right:;4+ (Good +);Left:;4 (Good)  -BE    Knee extension (L3) Bilateral:;4+ (Good +)  -BE    Knee flexion (S2) Bilateral:;4+ (Good +)  -BE       Lumbar ROM Screen- Lower Quarter Clearing    Lumbar Flexion Impaired   75%  -BE     Lumbar Extension Normal  -BE    Lumbar Lateral Flexion Normal  -BE    Lumbar Rotation Normal  -BE    Row Name 07/02/18 1200       Subjective Comments    Subjective Comments Patient reports that he is having soreness in his low back. Patient states he has been mowing this weekend and thats why he is having so much pain. Patient reports that he thinks therapy is helping him.  -AC       Subjective Pain    Able to rate subjective pain? yes  -AC    Pre-Treatment Pain Level 5  -AC    Post-Treatment Pain Level 0  -AC      User Key  (r) = Recorded By, (t) = Taken By, (c) = Cosigned By    Initials Name Provider Type    AC Angelica Mccarty, PTA Physical Therapy Assistant    BE Christen Mon, PT Physical Therapist                      Therapy Education  Given: HEP, Symptoms/condition management, Posture/body mechanics, Pain management  Program: Reinforced  How Provided: Verbal  Provided to: Patient  Level of Understanding: Verbalized, Demonstrated           PT OP Goals     Row Name 07/03/18 1400 07/03/18 1300       PT Short Term Goals    STG Date to Achieve  -- 06/25/18  -BE    STG 1  -- Pt will be instructed in HEP for improved independence.  -BE    STG 1 Progress  -- Met  -BE    STG 2  -- Pt will demonstrate 4/5 LLE strength for improved function.  -BE    STG 2 Progress  -- Met  -BE    STG 3  -- Pt will report the ability to stand for 60 minutes with no more than 3/10 lumbar pain,  -BE    STG 3 Progress  -- Met  -BE    STG 3 Progress Comments  -- Patient reports no increase in back pain when standing for 60 minutes.  -BE       Long Term Goals    LTG Date to Achieve  -- 07/11/18  -BE    LTG 1  -- Pt will demonstrate lumbar ROM within normal limits for improved mobility and function.  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  -BE    LTG 2 Progress  -- Ongoing;Progressing  -BE    LTG 2 Progress Comments  -- Patient reports  pain of 4-8/10 with lawn mowing.  -BE    LTG 3  -- Pt will demonstrate 4+/5 BLE strength for improved function.  -BE    LTG 3 Progress  -- Partially Met;Ongoing  -BE    LTG 4  -- Pt will report less than 20% impairment on the Modified Oswestry for improved independence.  -BE    LTG 4 Progress  -- Met  -BE    LTG 4 Progress Comments  -- 7/50=14% impairment  -BE       Time Calculation    PT Goal Re-Cert Due Date 08/02/18  -BE  --      User Key  (r) = Recorded By, (t) = Taken By, (c) = Cosigned By    Initials Name Provider Type    BE Christen Mon, PT Physical Therapist                PT Assessment/Plan     Row Name 07/03/18 1413          PT Assessment    Functional Limitations Decreased safety during functional activities;Limitation in home management;Limitations in community activities;Performance in work activities;Performance in self-care ADL;Performance in leisure activities;Limitations in functional capacity and performance  -BE     Impairments Balance;Gait;Range of motion;Sensation;Muscle strength;Pain;Poor body mechanics;Posture;Joint mobility;Joint integrity  -BE     Assessment Comments Patient is making improvements with skilled PT.  Patient has shown improved lumbar ROM and LE strength.  Patient does continue to lack full lumbar flexion and displays some weakness in the LEs.  Patient reports pain of 0/10 at best and 8/10 at worst.  He reports a 14% functional mobility impairment, based on his response to the Modified Oswestry; this is a 10% improvement since start of care.  Patient will continue to benefit from skilled PT so that he can achieve his maximum level of function.  -BE     Please refer to paper survey for additional self-reported information Yes  -BE     Rehab Potential Good  -BE     Patient would benefit from skilled therapy intervention Yes  -BE        PT Plan    PT Frequency 2x/week  -BE     Predicted Duration of Therapy Intervention (Therapy Eval) 4 weeks  -BE     Planned CPT's? PT  RE-EVAL: 97124;PT THER PROC EA 15 MIN: 24465;PT THER ACT EA 15 MIN: 31656;PT MANUAL THERAPY EA 15 MIN: 08375;PT NEUROMUSC RE-EDUCATION EA 15 MIN: 15963;PT GAIT TRAINING EA 15 MIN: 46730;PT ELECTRICAL STIM UNATTEND: ;PT ELECTRICAL STIM ATTD EA 15 MIN: 54506;PT ULTRASOUND EA 15 MIN: 39439;PT HOT OR COLD PACK TREAT MCARE;PT THER SUPP EA 15 MIN  -BE     PT Plan Comments Progress per patient's tolerance and POC.  -BE       User Key  (r) = Recorded By, (t) = Taken By, (c) = Cosigned By    Initials Name Provider Type    SUSAN Mon, PT Physical Therapist                Modalities     Row Name 07/03/18 1300             Moist Heat    MH Applied Yes   no redness following MH  -BE      Location lumbar  -BE      Rx Minutes 10 mins  -BE      MH Prior to Rx Yes   with ESTIM in seated position  -BE         ELECTRICAL STIMULATION    Attended/Unattended Unattended   No irritation noted following estim  -BE      Stimulation Type IFC  -BE      Max mAmp --   per the patient's tolerance, 10 minutes with MH  -BE      Location/Electrode Placement/Other lumbar   -BE         Other Treatment Provided    Rx Minutes 10 mins   MH/ESTIM  -BE        User Key  (r) = Recorded By, (t) = Taken By, (c) = Cosigned By    Initials Name Provider Type    BE Christen Mon, PT Physical Therapist              Exercises     Row Name 07/03/18 1400 07/03/18 1300          Subjective Comments    Subjective Comments  -- Patient reports that he has noticed decreased pain since starting therapy.  He reports that he performs his HEP daily.  -BE        Subjective Pain    Able to rate subjective pain?  -- yes  -BE     Pre-Treatment Pain Level  -- 0  -BE     Post-Treatment Pain Level  -- 0  -BE        Total Minutes    37618 - PT Therapeutic Exercise Minutes 40  -BE  --        Exercise 1    Exercise Name 1 SKTC 20 sec hold x3, piriformis stretch 20 sec hold x3, LTR 15x2, ball squeeze 15x2, bridge 15x2, PPT 15x2, St. march 10x2, midrow with RTB 10x2,  "low row with RTB 10x2, TG LV 17 15x2, step ups 6\" step 10x2, LE bike LV 4 10 min  -BE  --     Cueing 1 Verbal;Tactile;Demo  -BE  --     Time 1 40 minutes  -BE  --       User Key  (r) = Recorded By, (t) = Taken By, (c) = Cosigned By    Initials Name Provider Type    BE Christen Mon, PT Physical Therapist                        Outcome Measure Options: Modifed Owestry  Modified Oswestry  Modified Oswestry Score/Comments: 7/50=14%      Time Calculation:     Therapy Suggested Charges     Code   Minutes Charges    12085 (CPT®) Hc Pt Neuromusc Re Education Ea 15 Min      76078 (CPT®) Hc Pt Ther Proc Ea 15 Min 40 3    97761 (CPT®) Hc Gait Training Ea 15 Min      35638 (CPT®) Hc Pt Therapeutic Act Ea 15 Min      06544 (CPT®) Hc Pt Manual Therapy Ea 15 Min      94500 (CPT®) Hc Pt Ther Massage- Per 15 Min      39410 (CPT®) Hc Pt Iontophoresis Ea 15 Min      94389 (CPT®) Hc Pt Elec Stim Ea-Per 15 Min      26298 (CPT®) Hc Pt Ultrasound Ea 15 Min      84968 (CPT®) Hc Pt Self Care/Mgmt/Train Ea 15 Min      Total  40 3          Start Time: 1300  Stop Time: 1400  Time Calculation (min): 60 min     Therapy Charges for Today     Code Description Service Date Service Provider Modifiers Qty    28244782137 HC PT MOBILITY CURRENT 7/3/2018 Christen Mon, PT GP, CI 1    38044977839 HC PT MOBILITY PROJECTED 7/3/2018 Christen Mon, PT GP, CI 1    51247162984 HC PT THER PROC EA 15 MIN 7/3/2018 Christen Mon, PT GP 3    13447599496 HC PT ELECTRICAL STIM UNATTENDED 7/3/2018 Christen Mon, PT  1          PT G-Codes  Outcome Measure Options: Modifed Owestry  Score: 7/50=14%  Functional Limitation: Mobility: Walking and moving around  Mobility: Walking and Moving Around Current Status (): At least 1 percent but less than 20 percent impaired, limited or restricted  Mobility: Walking and Moving Around Goal Status (): At least 1 percent but less than 20 percent impaired, limited or restricted         Christen MURRAY" Elieser, PT  7/3/2018

## 2018-07-09 ENCOUNTER — HOSPITAL ENCOUNTER (OUTPATIENT)
Dept: PHYSICAL THERAPY | Facility: HOSPITAL | Age: 68
Setting detail: THERAPIES SERIES
Discharge: HOME | End: 2018-07-09

## 2018-07-09 DIAGNOSIS — M54.5 LOW BACK PAIN, UNSPECIFIED BACK PAIN LATERALITY, UNSPECIFIED CHRONICITY, WITH SCIATICA PRESENCE UNSPECIFIED: Primary | ICD-10-CM

## 2018-07-09 PROCEDURE — 97110 THERAPEUTIC EXERCISES: CPT

## 2018-07-09 PROCEDURE — G0283 ELEC STIM OTHER THAN WOUND: HCPCS

## 2018-07-09 NOTE — THERAPY TREATMENT NOTE
Outpatient Physical Therapy Ortho Treatment Note  APRIL Vogt     Patient Name: Alexander Zamora  : 1950  MRN: [id number]  Today's Date: 2018      Visit Date: 2018    Visit Dx:    ICD-10-CM ICD-9-CM   1. Low back pain, unspecified back pain laterality, unspecified chronicity, with sciatica presence unspecified M54.5 724.2       Patient Active Problem List   Diagnosis   • Prostate cancer (CMS/HCC)   • Erectile dysfunction following radical prostatectomy        Past Medical History:   Diagnosis Date   • Cancer (CMS/HCC)     Postrate   • Elevated prostate specific antigen (PSA)    • Erectile dysfunction    • Hypercholesterolemia    • Hypercholesterolemia    • Prostatitis         Past Surgical History:   Procedure Laterality Date   • HEMORRHOIDECTOMY     • OTHER SURGICAL HISTORY      destruction of hemorrhoids, Rubber band ligatioin of hemmorrhoids   • OTHER SURGICAL HISTORY      surgery for an ulcer, PNEUMOTHORAX AND ULCER THERAPY             PT Ortho     Row Name 18 1000       Subjective Comments    Subjective Comments Patient states that he is having increased back pain today. Patient states that he thinks the hot pack makes his back and legs weak because its hot outside.,  -AC       Subjective Pain    Able to rate subjective pain? yes  -AC    Pre-Treatment Pain Level 7  -AC    Post-Treatment Pain Level 0  -AC      User Key  (r) = Recorded By, (t) = Taken By, (c) = Cosigned By    Initials Name Provider Type    LISA Mccarty, PTA Physical Therapy Assistant                            PT Assessment/Plan     Row Name 18 1040          PT Assessment    Assessment Comments Patient tolerated treatment session well with rest breaks taken as needed by the patient. Educated patient to perform ther ex per his tolerance, patient verbalized understanding. No adverse reactions with modalities or treatment. Estim performed with ice today secondary to patient's request. Treatment session  "kept the same due to patient's reports of increased pain in the low back musculature. Decrease in pain noted following treatment session.  -AC        PT Plan    PT Plan Comments Continue per PT's POC, progress per the patient's tolerance.  -AC       User Key  (r) = Recorded By, (t) = Taken By, (c) = Cosigned By    Initials Name Provider Type     Angelica Mccarty, [facility] Physical Therapy Assistant                Modalities     Row Name 07/09/18 1000             Ice    Ice Applied Yes   No redness noted following ice  -AC      Location low back  -AC      Rx Minutes 15 mins  -AC      Ice S/P Rx Yes  -AC         ELECTRICAL STIMULATION    Attended/Unattended Unattended   No irritation noted following estim  -AC      Stimulation Type IFC  -AC      Max mAmp --   per the patient's tolerance, 15 minutes with ice  -AC      Location/Electrode Placement/Other lumbar   -AC        User Key  (r) = Recorded By, (t) = Taken By, (c) = Cosigned By    Initials Name Provider Type     Angelica Mccarty, [facility] Physical Therapy Assistant                Exercises     Row Name 07/09/18 1000             Subjective Comments    Subjective Comments Patient states that he is having increased back pain today. Patient states that he thinks the hot pack makes his back and legs weak because its hot outside.,  -AC         Subjective Pain    Able to rate subjective pain? yes  -AC      Pre-Treatment Pain Level 7  -AC      Post-Treatment Pain Level 0  -AC         Total Minutes    52077 - PT Therapeutic Exercise Minutes 40  -AC         Exercise 1    Exercise Name 1 SKTC 20 sec hold x3, piriformis stretch 20 sec hold x3, LTR 15x2, ball squeeze 15x2, bridge 15x2, PPT 15x2, St. march 10x2, midrow with RTB 10x2, low row with RTB 10x2, TG LV 17 15x2, step ups 6\" step 10x2, LE bike LV 4.2 10 min  -AC      Cueing 1 Verbal;Tactile;Demo  -AC      Time 1 40 minutes  -AC        User Key  (r) = Recorded By, (t) = Taken By, (c) = Cosigned By    Initials Name " Provider Type    AC Angelica Mccarty PTA Physical Therapy Assistant                             Therapy Education  Given: Symptoms/condition management, HEP, Pain management, Posture/body mechanics  Program: Reinforced  How Provided: Verbal, Demonstration  Provided to: Patient  Level of Understanding: Verbalized, Demonstrated              Time Calculation:   Start Time: 0945  Stop Time: 1045  Time Calculation (min): 60 min  Therapy Suggested Charges     Code   Minutes Charges    61912 (CPT®) Hc Pt Neuromusc Re Education Ea 15 Min      25161 (CPT®) Hc Pt Ther Proc Ea 15 Min 40 3    43282 (CPT®) Hc Gait Training Ea 15 Min      12978 (CPT®) Hc Pt Therapeutic Act Ea 15 Min      26071 (CPT®) Hc Pt Manual Therapy Ea 15 Min      87557 (CPT®) Hc Pt Ther Massage- Per 15 Min      13574 (CPT®) Hc Pt Iontophoresis Ea 15 Min      95968 (CPT®) Hc Pt Elec Stim Ea-Per 15 Min      34902 (CPT®) Hc Pt Ultrasound Ea 15 Min      09433 (CPT®) Hc Pt Self Care/Mgmt/Train Ea 15 Min      Total  40 3        Therapy Charges for Today     Code Description Service Date Service Provider Modifiers Qty    52789415716 HC PT THER PROC EA 15 MIN 7/9/2018 Angelica Mccarty, PTA GP 3    48734780191 HC PT ELECTRICAL STIM UNATTENDED 7/9/2018 Angelica Mccarty, [facility]  1                    Angelica Mccarty, [facility]  7/9/2018

## 2018-07-11 ENCOUNTER — HOSPITAL ENCOUNTER (OUTPATIENT)
Dept: PHYSICAL THERAPY | Facility: HOSPITAL | Age: 68
Setting detail: THERAPIES SERIES
Discharge: HOME | End: 2018-07-11

## 2018-07-11 DIAGNOSIS — M54.5 LOW BACK PAIN, UNSPECIFIED BACK PAIN LATERALITY, UNSPECIFIED CHRONICITY, WITH SCIATICA PRESENCE UNSPECIFIED: Primary | ICD-10-CM

## 2018-07-11 PROCEDURE — 97110 THERAPEUTIC EXERCISES: CPT

## 2018-07-11 PROCEDURE — G0283 ELEC STIM OTHER THAN WOUND: HCPCS

## 2018-07-11 NOTE — THERAPY TREATMENT NOTE
Outpatient Physical Therapy Ortho Treatment Note   Sin     Patient Name: Alexander Zamora  : 1950  MRN: [id number]  Today's Date: 2018      Visit Date: 2018    Visit Dx:    ICD-10-CM ICD-9-CM   1. Low back pain, unspecified back pain laterality, unspecified chronicity, with sciatica presence unspecified M54.5 724.2       Patient Active Problem List   Diagnosis   • Prostate cancer (CMS/HCC)   • Erectile dysfunction following radical prostatectomy        Past Medical History:   Diagnosis Date   • Cancer (CMS/HCC)     Postrate   • Elevated prostate specific antigen (PSA)    • Erectile dysfunction    • Hypercholesterolemia    • Hypercholesterolemia    • Prostatitis         Past Surgical History:   Procedure Laterality Date   • HEMORRHOIDECTOMY     • OTHER SURGICAL HISTORY      destruction of hemorrhoids, Rubber band ligatioin of hemmorrhoids   • OTHER SURGICAL HISTORY      surgery for an ulcer, PNEUMOTHORAX AND ULCER THERAPY             PT Ortho     Row Name 18 1000       Subjective Comments    Subjective Comments Patient states of no pain prior to treatment.  Pt states he was able to mow a few lawns following previous therapy session.    -MARLEN       Subjective Pain    Able to rate subjective pain? yes  -MARLEN    Pre-Treatment Pain Level 0  -MARLEN    Row Name 18 1000       Subjective Comments    Subjective Comments Patient states that he is having increased back pain today. Patient states that he thinks the hot pack makes his back and legs weak because its hot outside.,  -AC       Subjective Pain    Able to rate subjective pain? yes  -AC    Pre-Treatment Pain Level 7  -AC    Post-Treatment Pain Level 0  -AC      User Key  (r) = Recorded By, (t) = Taken By, (c) = Cosigned By    Initials Name Provider Type    MARLEN Wendy Ward, PTA Physical Therapy Assistant    AC Angelica Mccarty, PTA Physical Therapy Assistant                            PT Assessment/Plan     Row Name  "07/11/18 1033          PT Assessment    Assessment Comments Patient tolerated treatment well today w/ no complaints of pain noted pre and post session.  Pt initiated tx w/ TE for improved lumbar ROM, functional mobility, core stability and LE strength, as to pt's tolerance.  Treatment concluded with cryotherapy w/ estim.   Resistance on LE bike increased during today's session w/ good response.  No adverse reactions observed following modalities.   -MARLEN        PT Plan    PT Plan Comments Continue with PT's POC and will progress tx as tolerated by patient.   -MARLEN       User Key  (r) = Recorded By, (t) = Taken By, (c) = Cosigned By    Initials Name Provider Type    MARLEN Ward PTA Physical Therapy Assistant                Modalities     Row Name 07/11/18 1000             Ice    Ice Applied Yes   no skin irritation observed following  -MARLEN      Location low back  -MARLEN      Rx Minutes 10 mins  -MARLEN      Ice S/P Rx Yes   w/ estim in seated position  -MARLEN         ELECTRICAL STIMULATION    Attended/Unattended Unattended   no skin irritation observed following estim  -MARLEN      Stimulation Type IFC  -MARLEN      Max mAmp --   as to pt's tolerance  -MARLEN      Location/Electrode Placement/Other lumbar   -MARLEN        User Key  (r) = Recorded By, (t) = Taken By, (c) = Cosigned By    Initials Name Provider Type    MARLEN Ward PTA Physical Therapy Assistant                Exercises     Row Name 07/11/18 1000             Subjective Comments    Subjective Comments Patient states of no pain prior to treatment.  Pt states he was able to mow a few lawns following previous therapy session.    -MARLEN         Subjective Pain    Able to rate subjective pain? yes  -MARLEN      Pre-Treatment Pain Level 0  -MARLEN         Total Minutes    03221 - PT Therapeutic Exercise Minutes 45  -MARLEN         Exercise 1    Exercise Name 1 LTR 15x2, SKTC 3x20\", piriformis stretch 3x20\", PPT 15x2, bridge 15x2, ball squeeze 15x2, St. march 15x2, midrow w/ " "tband (green) 15x2, low row w/tband (green) 15x2, step ups 6\" 10x3, LE bike LV 4.5 x10min  -MARLEN      Cueing 1 Verbal;Tactile;Demo  -MARLEN      Time 1 45 min  -MARLEN        User Key  (r) = Recorded By, (t) = Taken By, (c) = Cosigned By    Initials Name Provider Type    MARLEN Wendy Ward PTA Physical Therapy Assistant                                            Time Calculation:   Start Time: 0940  Stop Time: 1040  Time Calculation (min): 60 min  Therapy Suggested Charges     Code   Minutes Charges    84389 (CPT®) Hc Pt Neuromusc Re Education Ea 15 Min      14015 (CPT®) Hc Pt Ther Proc Ea 15 Min 45 3    75847 (CPT®) Hc Gait Training Ea 15 Min      48640 (CPT®) Hc Pt Therapeutic Act Ea 15 Min      98041 (CPT®) Hc Pt Manual Therapy Ea 15 Min      73882 (CPT®) Hc Pt Ther Massage- Per 15 Min      47548 (CPT®) Hc Pt Iontophoresis Ea 15 Min      35365 (CPT®) Hc Pt Elec Stim Ea-Per 15 Min      08643 (CPT®) Hc Pt Ultrasound Ea 15 Min      66459 (CPT®) Hc Pt Self Care/Mgmt/Train Ea 15 Min      Total  45 3        Therapy Charges for Today     Code Description Service Date Service Provider Modifiers Qty    47323262467 HC PT THER PROC EA 15 MIN 7/11/2018 Wendy Ward PTA GP 3    42528299214 HC PT ELECTRICAL STIM UNATTENDED 7/11/2018 Wendy Ward, [facility]  1                    Wendy Ward PTA  7/11/2018     "

## 2018-07-16 ENCOUNTER — HOSPITAL ENCOUNTER (OUTPATIENT)
Dept: PHYSICAL THERAPY | Facility: HOSPITAL | Age: 68
Setting detail: THERAPIES SERIES
Discharge: HOME | End: 2018-07-16

## 2018-07-16 DIAGNOSIS — M54.5 LOW BACK PAIN, UNSPECIFIED BACK PAIN LATERALITY, UNSPECIFIED CHRONICITY, WITH SCIATICA PRESENCE UNSPECIFIED: Primary | ICD-10-CM

## 2018-07-16 PROCEDURE — 97110 THERAPEUTIC EXERCISES: CPT

## 2018-07-16 PROCEDURE — G0283 ELEC STIM OTHER THAN WOUND: HCPCS

## 2018-07-16 NOTE — THERAPY TREATMENT NOTE
Outpatient Physical Therapy Ortho Treatment Note  APRIL Vogt     Patient Name: Alexander Zamora  : 1950  MRN: [id number]  Today's Date: 2018      Visit Date: 2018    Visit Dx:    ICD-10-CM ICD-9-CM   1. Low back pain, unspecified back pain laterality, unspecified chronicity, with sciatica presence unspecified M54.5 724.2       Patient Active Problem List   Diagnosis   • Prostate cancer (CMS/HCC)   • Erectile dysfunction following radical prostatectomy        Past Medical History:   Diagnosis Date   • Cancer (CMS/HCC)     Postrate   • Elevated prostate specific antigen (PSA)    • Erectile dysfunction    • Hypercholesterolemia    • Hypercholesterolemia    • Prostatitis         Past Surgical History:   Procedure Laterality Date   • HEMORRHOIDECTOMY     • OTHER SURGICAL HISTORY      destruction of hemorrhoids, Rubber band ligatioin of hemmorrhoids   • OTHER SURGICAL HISTORY      surgery for an ulcer, PNEUMOTHORAX AND ULCER THERAPY             PT Ortho     Row Name 18 1200       Subjective Comments    Subjective Comments Patient reports of mowing 3 yards this weekend with no increase in pain. Patient states of stiffness in his low back.  -AC       Subjective Pain    Able to rate subjective pain? yes  -AC    Pre-Treatment Pain Level 0  -AC    Post-Treatment Pain Level 0  -AC      User Key  (r) = Recorded By, (t) = Taken By, (c) = Cosigned By    Initials Name Provider Type    LISA Mccarty PTA Physical Therapy Assistant                            PT Assessment/Plan     Row Name 18 9039          PT Assessment    Assessment Comments Patient tolerated treatment session well with rest breaks taken as needed by the patient. Verbal and tactile cues needed on proper technique of exercises. No adverse reactions with modalities or treatment. Pain remained the same from pre to post treatment.   -AC        PT Plan    PT Plan Comments Continue per PT's POC, progress per the patient's  "tolernace.  -AC       User Key  (r) = Recorded By, (t) = Taken By, (c) = Cosigned By    Initials Name Provider Type     Angelica Mccarty, [facility] Physical Therapy Assistant                Modalities     Row Name 07/16/18 1200             Ice    Ice Applied Yes   No skin irritation noted   -AC      Location low back  -AC      Rx Minutes 10 mins  -AC      Ice S/P Rx Yes  -AC         ELECTRICAL STIMULATION    Attended/Unattended Unattended   No irritation noted following estim  -AC      Stimulation Type IFC  -AC      Max mAmp --   per the patient's tolerance, 10 minutes with ice  -AC      Location/Electrode Placement/Other lumbar   -AC        User Key  (r) = Recorded By, (t) = Taken By, (c) = Cosigned By    Initials Name Provider Type     Angelica Mccarty, [facility] Physical Therapy Assistant                Exercises     Row Name 07/16/18 1200             Subjective Comments    Subjective Comments Patient reports of mowing 3 yards this weekend with no increase in pain. Patient states of stiffness in his low back.  -AC         Subjective Pain    Able to rate subjective pain? yes  -AC      Pre-Treatment Pain Level 0  -AC      Post-Treatment Pain Level 0  -AC         Total Minutes    59528 - PT Therapeutic Exercise Minutes 45  -AC         Exercise 1    Exercise Name 1 LTR 15x2, SKTC 3x20\", piriformis stretch 3x20\", PPT 15x2, bridge 15x2, ball squeeze 15x2, St. march 15x2, midrow w/ tband (green) 15x2, low row w/tband (green) 15x2, step ups 6\" 10x3, LE bike LV 4.5 x10min, TG LV 18 10x2, St. hip abd x15 each  -AC      Cueing 1 Verbal;Tactile;Demo  -AC      Time 1 45 min  -AC        User Key  (r) = Recorded By, (t) = Taken By, (c) = Cosigned By    Initials Name Provider Type     Angelica Mccarty, [facility] Physical Therapy Assistant                             Therapy Education  Given: HEP, Symptoms/condition management  Program: Reinforced  How Provided: Verbal, Demonstration  Provided to: Patient  Level of " Understanding: Verbalized, Demonstrated              Time Calculation:   Start Time: 1000  Stop Time: 1100  Time Calculation (min): 60 min  Therapy Suggested Charges     Code   Minutes Charges    03683 (CPT®) Hc Pt Neuromusc Re Education Ea 15 Min      66836 (CPT®) Hc Pt Ther Proc Ea 15 Min 45 3    46009 (CPT®) Hc Gait Training Ea 15 Min      02297 (CPT®) Hc Pt Therapeutic Act Ea 15 Min      53389 (CPT®) Hc Pt Manual Therapy Ea 15 Min      60377 (CPT®) Hc Pt Ther Massage- Per 15 Min      57605 (CPT®) Hc Pt Iontophoresis Ea 15 Min      08738 (CPT®) Hc Pt Elec Stim Ea-Per 15 Min      99304 (CPT®) Hc Pt Ultrasound Ea 15 Min      47072 (CPT®) Hc Pt Self Care/Mgmt/Train Ea 15 Min      Total  45 3        Therapy Charges for Today     Code Description Service Date Service Provider Modifiers Qty    14348107902 HC PT THER PROC EA 15 MIN 7/16/2018 Angelica Mccarty, PTA GP 3    20647079850 HC PT ELECTRICAL STIM UNATTENDED 7/16/2018 Angelica Mccarty, PTA  1                    Angelica Mccarty, OM[facility]  7/16/2018

## 2018-07-18 ENCOUNTER — HOSPITAL ENCOUNTER (OUTPATIENT)
Dept: PHYSICAL THERAPY | Facility: HOSPITAL | Age: 68
Setting detail: THERAPIES SERIES
Discharge: HOME | End: 2018-07-18

## 2018-07-18 DIAGNOSIS — M54.5 LOW BACK PAIN, UNSPECIFIED BACK PAIN LATERALITY, UNSPECIFIED CHRONICITY, WITH SCIATICA PRESENCE UNSPECIFIED: Primary | ICD-10-CM

## 2018-07-18 PROCEDURE — G0283 ELEC STIM OTHER THAN WOUND: HCPCS

## 2018-07-18 PROCEDURE — 97110 THERAPEUTIC EXERCISES: CPT

## 2018-07-18 PROCEDURE — G8979 MOBILITY GOAL STATUS: HCPCS

## 2018-07-18 PROCEDURE — G8978 MOBILITY CURRENT STATUS: HCPCS

## 2018-07-18 NOTE — THERAPY DISCHARGE NOTE
Outpatient Physical Therapy Ortho Treatment Note/Discharge Summary   Sin     Patient Name: Alexander Zamora  : 1950  MRN: [id number]  Today's Date: 2018      Visit Date: 2018    Visit Dx:    ICD-10-CM ICD-9-CM   1. Low back pain, unspecified back pain laterality, unspecified chronicity, with sciatica presence unspecified M54.5 724.2       Patient Active Problem List   Diagnosis   • Prostate cancer (CMS/HCC)   • Erectile dysfunction following radical prostatectomy        Past Medical History:   Diagnosis Date   • Cancer (CMS/HCC)     Postrate   • Elevated prostate specific antigen (PSA)    • Erectile dysfunction    • Hypercholesterolemia    • Hypercholesterolemia    • Prostatitis         Past Surgical History:   Procedure Laterality Date   • HEMORRHOIDECTOMY     • OTHER SURGICAL HISTORY      destruction of hemorrhoids, Rubber band ligatioin of hemmorrhoids   • OTHER SURGICAL HISTORY      surgery for an ulcer, PNEUMOTHORAX AND ULCER THERAPY             PT Ortho     Row Name 18 1000       Subjective Pain    Able to rate subjective pain? 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 Patient states of stiffness in his low back.  -AC       Subjective Pain    Able to rate subjective pain? yes  -AC    Pre-Treatment Pain Level 0  -AC    Post-Treatment Pain Level 0  -AC      User Key  (r) = Recorded By, (t) = Taken By, (c) = Cosigned By    Initials Name Provider Type    MARLEN Wendy Ward, PTA Physical Therapy Assistant    AC Angelica Mccarty PTA Physical Therapy Assistant                            PT Assessment/Plan     Row Name 18 1117          PT Assessment    Assessment Comments Patient tolerated treatment well today w/ no changes in pain reported pre and post session.  Patient displayed good mechanics while performing exercises.  Pt displayed improved  "improved functional mobility and LE strength while in therapy.  Supervising therapist Austin Costello, PT completed patient's discharge.  See record for details. No adverse reactions observed following modalities.   -MARLEN        PT Plan    PT Plan Comments Continue with PT's POC and will progress tx as tolerated by patient.   -MARLEN       User Key  (r) = Recorded By, (t) = Taken By, (c) = Cosigned By    Initials Name Provider Type    MARLEN Wendy Ward PTA Physical Therapy Assistant                Modalities     Row Name 07/18/18 1000             Ice    Ice Applied Yes   no skin irritation noted following  -MARLEN      Location low back  -MARLEN      Rx Minutes 15 mins  -MARLEN      Ice S/P Rx Yes  -MARLEN         ELECTRICAL STIMULATION    Attended/Unattended Unattended   no skin irritation observed following  -MARLEN      Stimulation Type IFC  -MARLEN      Max mAmp --   as to pt's tolerance  -MARLEN      Location/Electrode Placement/Other lumbar   -MARLEN        User Key  (r) = Recorded By, (t) = Taken By, (c) = Cosigned By    Initials Name Provider Type    MARLEN Wendy Ward PTA Physical Therapy Assistant                Exercises     Row Name 07/18/18 1000             Subjective Comments    Subjective Comments Patient states today is his last scheduled visit, and he wishes to discharge following.  Pt states he is independent w/ home exercise program.   -MARLEN         Subjective Pain    Able to rate subjective pain? yes  -MARLEN      Pre-Treatment Pain Level 0  -MARLEN      Post-Treatment Pain Level 0  -MARLEN         Total Minutes    35615 - PT Therapeutic Exercise Minutes 40  -MARLEN         Exercise 1    Exercise Name 1 LTR 15x2, SKTC 3x20\", piriformis stretch 3x20\", PPT 15x2, bridge 15x2, St. march 15x2, TG squats LV 18 15x2, step ups 6\" 15x2, midrow w/ tband (green) 15x2, lowrow w/ tband (green) 15x2, LE bike LV 4 x10 min   therex initiated by Angelica Mccarty PTA  -MARLEN      Cueing 1 Verbal;Tactile;Demo  -MARLEN      Time 1 40 min  -MARLEN        User Key  (r) = " Recorded By, (t) = Taken By, (c) = Cosigned By    Initials Name Provider Type    MARLEN Ward PTA Physical Therapy Assistant                               PT OP Goals     Row Name 07/18/18 1100          PT Short Term Goals    STG 1 Pt will be instructed in HEP for improved independence.  -MARLEN     STG 1 Progress Met  -MARLEN     STG 2 Pt will demonstrate 4/5 LLE strength for improved function.  -MARLEN     STG 2 Progress Met  -MARLEN     STG 3 Pt will report the ability to stand for 60 minutes with no more than 3/10 lumbar pain,  -MARLEN     STG 3 Progress Met  -MARLEN        Long Term Goals    LTG 1 Pt will demonstrate lumbar ROM within normal limits for improved mobility and function.  -MARLEN     LTG 1 Progress Partially Met;Ongoing  -MARLEN     LTG 1 Progress Comments Pt continues to display impaired lumbar flexion AROM; ongoing.   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  -MARLEN     LTG 2 Progress Met  -MARLEN     LTG 3 Pt will demonstrate 4+/5 BLE strength for improved function.  -MARLEN     LTG 3 Progress Met  -MARLEN     LTG 4 Pt will report less than 20% impairment on the Modified Oswestry for improved independence.  -MARLEN     LTG 4 Progress Met  -MARLEN     LTG 4 Progress Comments 5/50= 10%  -MARLEN       User Key  (r) = Recorded By, (t) = Taken By, (c) = Cosigned By    Initials Name Provider Type    MARLEN Ward PTA Physical Therapy Assistant          Therapy Education  Given: HEP, Symptoms/condition management, Posture/body mechanics, Pain management  Program: Reinforced  How Provided: Verbal, Demonstration  Provided to: Patient  Level of Understanding: Verbalized, Demonstrated    Outcome Measure Options: Monie [place]  Modified Oswestry  Modified Oswestry Score/Comments: 5/50=10%      Time Calculation:   Start Time: 0947  Stop Time: 1055  Time Calculation (min): 68 min  Therapy Suggested Charges     Code   Minutes Charges    20114 (CPT®) Hc Pt Neuromusc Re Education Ea 15 Min      39783  (CPT®) Hc Pt Ther Proc Ea 15 Min 40 3    34969 (CPT®) Hc Gait Training Ea 15 Min      47560 (CPT®) Hc Pt Therapeutic Act Ea 15 Min      42400 (CPT®) Hc Pt Manual Therapy Ea 15 Min      15712 (CPT®) Hc Pt Ther Massage- Per 15 Min      75181 (CPT®) Hc Pt Iontophoresis Ea 15 Min      97374 (CPT®) Hc Pt Elec Stim Ea-Per 15 Min      87526 (CPT®) Hc Pt Ultrasound Ea 15 Min      56429 (CPT®) Hc Pt Self Care/Mgmt/Train Ea 15 Min      Total  40 3        Therapy Charges for Today     Code Description Service Date Service Provider Modifiers Qty    22602095205 HC PT MOBILITY CURRENT 7/18/2018 Austin Costello, PT GP, CI 1    93433473956 HC PT MOBILITY PROJECTED 7/18/2018 Austin Costello, PT GP, CI 1    52238180061 HC PT THER PROC EA 15 MIN 7/18/2018 Austin Costello, PT GP 3    19725831797 HC PT ELECTRICAL STIM UNATTENDED 7/18/2018 Austin Costello, PT  1          PT G-Codes  Outcome Measure Options: Monie [place]  Score: 10%  Mobility: Walking and Moving Around Current Status (): At least 1 percent but less than 20 percent impaired, limited or restricted  Mobility: Walking and Moving Around Goal Status (): At least 1 percent but less than 20 percent impaired, limited or restricted     OP PT Discharge Summary  Date of Discharge: 07/18/18  Reason for Discharge: Maximum functional potential achieved, At baseline function  Discharge Destination: Home with home program  Discharge Instructions/Additional Comments: Pt has demonstrated good effort with therapy and has met 3/3 stgs and 2/3 ltgs.  Pt reports 0/10 pain and demonstrated HEP with no cues today.  Pt will cont with HEP and will be discharged at this time.  Pt instructed to contact therapy as needed.      Austin Costello, PT  7/18/2018

## 2018-07-18 NOTE — THERAPY TREATMENT NOTE
Outpatient Physical Therapy Ortho Treatment Note  APRIL Vogt     Patient Name: Alexander Zamora  : 1950  MRN: [id number]  Today's Date: 2018      Visit Date: 2018    Visit Dx:    ICD-10-CM ICD-9-CM   1. Low back pain, unspecified back pain laterality, unspecified chronicity, with sciatica presence unspecified M54.5 724.2       Patient Active Problem List   Diagnosis   • Prostate cancer (CMS/HCC)   • Erectile dysfunction following radical prostatectomy        Past Medical History:   Diagnosis Date   • Cancer (CMS/HCC)     Postrate   • Elevated prostate specific antigen (PSA)    • Erectile dysfunction    • Hypercholesterolemia    • Hypercholesterolemia    • Prostatitis         Past Surgical History:   Procedure Laterality Date   • HEMORRHOIDECTOMY     • OTHER SURGICAL HISTORY      destruction of hemorrhoids, Rubber band ligatioin of hemmorrhoids   • OTHER SURGICAL HISTORY      surgery for an ulcer, PNEUMOTHORAX AND ULCER THERAPY             PT Ortho     Row Name 18 1000       Subjective Pain    Able to rate subjective pain? 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 Patient states of stiffness in his low back.  -AC       Subjective Pain    Able to rate subjective pain? yes  -AC    Pre-Treatment Pain Level 0  -AC    Post-Treatment Pain Level 0  -AC      User Key  (r) = Recorded By, (t) = Taken By, (c) = Cosigned By    Initials Name Provider Type    MARLEN Wendy Ward, PTA Physical Therapy Assistant    LISA Mccarty PTA Physical Therapy Assistant                            PT Assessment/Plan     Row Name 18 1117          PT Assessment    Assessment Comments Patient tolerated treatment well today w/ no changes in pain reported pre and post session.  Patient displayed good mechanics while performing exercises.  Pt noted with improved functional mobility and  "LE strength while in therapy.  Supervising therapist Austin Costello, PT completed patient's discharge.  See record for details. No adverse reactions observed following modalities.   -MARLEN        PT Plan    PT Plan Comments Patient discharged from therapy services on this date secondary to maximum level achieved and per pt request.   -MARLEN       User Key  (r) = Recorded By, (t) = Taken By, (c) = Cosigned By    Initials Name Provider Type    MARLEN Ward PTA Physical Therapy Assistant                Modalities     Row Name 07/18/18 1000             Ice    Ice Applied Yes   no skin irritation noted following  -MARLEN      Location low back  -MARLEN      Rx Minutes 15 mins  -MARLEN      Ice S/P Rx Yes  -MARLEN         ELECTRICAL STIMULATION    Attended/Unattended Unattended   no skin irritation observed following  -MARLEN      Stimulation Type IFC  -MARLEN      Max mAmp --   as to pt's tolerance  -MARLEN      Location/Electrode Placement/Other lumbar   -MARLEN        User Key  (r) = Recorded By, (t) = Taken By, (c) = Cosigned By    Initials Name Provider Type    MARLEN Wendy Ward PTA Physical Therapy Assistant                Exercises     Row Name 07/18/18 1000             Subjective Comments    Subjective Comments Patient states today is his last scheduled visit, and he wishes to discharge following.  Pt states he is independent w/ home exercise program.   -MARLEN         Subjective Pain    Able to rate subjective pain? yes  -MARLEN      Pre-Treatment Pain Level 0  -MARLEN      Post-Treatment Pain Level 0  -MARLEN         Total Minutes    51637 - PT Therapeutic Exercise Minutes 40  -MARLEN         Exercise 1    Exercise Name 1 LTR 15x2, SKTC 3x20\", piriformis stretch 3x20\", PPT 15x2, bridge 15x2, St. march 15x2, TG squats LV 18 15x2, step ups 6\" 15x2, midrow w/ tband (green) 15x2, lowrow w/ tband (green) 15x2, LE bike LV 4 x10 min   therex initiated by Angelica Mccarty PTA  -MARLEN      Cueing 1 Verbal;Tactile;Demo  -MARLEN      Time 1 40 min  -MARLEN        User Key "  (r) = Recorded By, (t) = Taken By, (c) = Cosigned By    Initials Name Provider Type    MARLEN Ward PTA Physical Therapy Assistant                               PT OP Goals     Row Name 07/18/18 1100          PT Short Term Goals    STG 1 Pt will be instructed in HEP for improved independence.  -MARLEN     STG 1 Progress Met  -MARLEN     STG 2 Pt will demonstrate 4/5 LLE strength for improved function.  -MARLEN     STG 2 Progress Met  -MARLEN     STG 3 Pt will report the ability to stand for 60 minutes with no more than 3/10 lumbar pain,  -MARLEN     STG 3 Progress Met  -MARLEN        Long Term Goals    LTG 1 Pt will demonstrate lumbar ROM within normal limits for improved mobility and function.  -MARLEN     LTG 1 Progress Partially Met;Ongoing  -MARLEN     LTG 1 Progress Comments Pt continues to display impaired lumbar flexion AROM; ongoing.   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  -MARLEN     LTG 2 Progress Met  -MARLEN     LTG 3 Pt will demonstrate 4+/5 BLE strength for improved function.  -MARLEN     LTG 3 Progress Met  -MARLEN     LTG 4 Pt will report less than 20% impairment on the Modified Oswestry for improved independence.  -MARLEN     LTG 4 Progress Met  -MARLEN     LTG 4 Progress Comments 5/50= 10%  -MARLEN       User Key  (r) = Recorded By, (t) = Taken By, (c) = Cosigned By    Initials Name Provider Type    MARLEN Ward PTA Physical Therapy Assistant          Therapy Education  Given: HEP, Symptoms/condition management, Posture/body mechanics, Pain management  Program: Reinforced  How Provided: Verbal, Demonstration  Provided to: Patient  Level of Understanding: Verbalized, Demonstrated    Outcome Measure Options: Monie London  Modified Oswestry  Modified Oswestry Score/Comments: 5/50=10%      Time Calculation:   Start Time: 0947  Stop Time: 1055  Time Calculation (min): 68 min  Therapy Suggested Charges     Code   Minutes Charges    71846 (CPT®) Hc Pt Neuromusc Re Education Ea 15 Min       89513 (CPT®) Hc Pt Ther Proc Ea 15 Min 40 3    26055 (CPT®) Hc Gait Training Ea 15 Min      12072 (CPT®) Hc Pt Therapeutic Act Ea 15 Min      64083 (CPT®) Hc Pt Manual Therapy Ea 15 Min      51994 (CPT®) Hc Pt Ther Massage- Per 15 Min      35284 (CPT®) Hc Pt Iontophoresis Ea 15 Min      40481 (CPT®) Hc Pt Elec Stim Ea-Per 15 Min      46349 (CPT®) Hc Pt Ultrasound Ea 15 Min      73532 (CPT®) Hc Pt Self Care/Mgmt/Train Ea 15 Min      Total  40 3            PT G-Codes  Outcome Measure Options: Monie Mcgee. Sylvia, PTA  7/18/2018

## 2018-11-28 ENCOUNTER — OFFICE VISIT (OUTPATIENT)
Dept: UROLOGY | Facility: CLINIC | Age: 68
End: 2018-11-28

## 2018-11-28 VITALS — BODY MASS INDEX: 24.88 KG/M2 | HEIGHT: 69 IN | WEIGHT: 168 LBS

## 2018-11-28 DIAGNOSIS — C61 PROSTATE CANCER (HCC): Primary | ICD-10-CM

## 2018-11-28 DIAGNOSIS — N52.31 ERECTILE DYSFUNCTION AFTER RADICAL PROSTATECTOMY: ICD-10-CM

## 2018-11-28 PROCEDURE — 99213 OFFICE O/P EST LOW 20 MIN: CPT | Performed by: UROLOGY

## 2018-11-28 NOTE — PROGRESS NOTES
Chief Complaint:          Prostate cancer    HPI:   68 y.o. male.  Pt is doing well with triple P of 0.2 cc and it lasts about 2 hours.  He has had a robotic radical prostatectomy his psa was 0.01 in January of this year.  HPI      Past Medical History:        Past Medical History:   Diagnosis Date   • Cancer (CMS/HCC) 2015    Postrate   • Elevated prostate specific antigen (PSA)    • Erectile dysfunction    • Hypercholesterolemia    • Hypercholesterolemia    • Prostatitis          Current Meds:     Current Outpatient Medications   Medication Sig Dispense Refill   • amLODIPine-atorvastatin (CADUET) 5-10 MG per tablet Take 1 tablet by mouth daily.     • Incontinence Supplies (BARD CUNN[place]HAM INCONTIN CLAMP) misc 2 Units Continuous. 2 each 0   • sildenafil (REVATIO) 20 MG tablet Take  by mouth.     • SILDENAFIL CITRATE PO Take 20 mg by mouth. Take 3-5 tablets as needed     • Multiple Vitamin (MULTI VITAMIN DAILY) tablet Take 1 tablet by mouth daily.       No current facility-administered medications for this visit.         Allergies:      Allergies   Allergen Reactions   • Amoxicillin         Past Surgical History:     Past Surgical History:   Procedure Laterality Date   • HEMORRHOIDECTOMY     • OTHER SURGICAL HISTORY      destruction of hemorrhoids, Rubber band ligatioin of hemmorrhoids   • OTHER SURGICAL HISTORY      surgery for an ulcer, PNEUMOTHORAX AND ULCER THERAPY         Social History:     Social History     Socioeconomic History   • Marital status:      Spouse name: Not on file   • Number of children: Not on file   • Years of education: Not on file   • Highest education level: Not on file   Social Needs   • Financial resource strain: Not on file   • Food insecurity - worry: Not on file   • Food insecurity - inability: Not on file   • Transportation needs - medical: Not on file   • Transportation needs - non-medical: Not on file   Occupational History   • Not on file   Tobacco Use   • Smoking status:  Former Smoker   • Smokeless tobacco: Never Used   • Tobacco comment: 3 packs per day for 20 yrs with a duration of 21 years of non smoking   Substance and Sexual Activity   • Alcohol use: No   • Drug use: No   • Sexual activity: Not on file   Other Topics Concern   • Not on file   Social History Narrative   • Not on file       Family History:     Family History   Problem Relation Age of Onset   • Nephrolithiasis Father    • Heart disease Sister    • Diabetes Other        Review of Systems:     Review of Systems   Constitutional: Negative for fatigue.   HENT: Negative for sinus pressure and sinus pain.    Eyes: Negative for pain.   Respiratory: Negative for chest tightness.    Cardiovascular: Negative for chest pain.   Gastrointestinal: Negative for abdominal pain.   Endocrine: Negative for heat intolerance.   Genitourinary: Negative for frequency.   Musculoskeletal: Negative for back pain.   Allergic/Immunologic: Negative for food allergies.   Neurological: Negative for headaches.       I      I have reviewed the follow portions of the patient's history and confirmed they are accurate today:  allergies, current medications, past family history, past medical history, past social history, past surgical history, problem list and ROS  Physical Exam:     Physical Exam   Constitutional: He is oriented to person, place, and time.   HENT:   Head: Normocephalic and atraumatic.   Right Ear: External ear normal.   Left Ear: External ear normal.   Nose: Nose normal.   Mouth/Throat: Oropharynx is clear and moist.   Eyes: Conjunctivae and EOM are normal. Pupils are equal, round, and reactive to light.   Neck: Normal range of motion. Neck supple. No thyromegaly present.   Cardiovascular: Normal rate, regular rhythm, normal heart sounds and intact distal pulses.   No murmur heard.  Pulmonary/Chest: Effort normal and breath sounds normal. No respiratory distress. He has no wheezes. He has no rales. He exhibits no tenderness.  "  Abdominal: Soft. Bowel sounds are normal. He exhibits no distension and no mass. There is no tenderness. No hernia.   Genitourinary: Rectum normal and penis normal.   Genitourinary Comments: Absent.   Musculoskeletal: Normal range of motion. He exhibits no edema or tenderness.   Lymphadenopathy:     He has no cervical adenopathy.   Neurological: He is alert and oriented to person, place, and time. No cranial nerve deficit. He exhibits normal muscle tone. Coordination normal.   Skin: Skin is warm. No rash noted.   Psychiatric: He has a normal mood and affect. His behavior is normal. Judgment and thought content normal.   Nursing note and vitals reviewed.      Ht 175.3 cm (69\")   Wt 76.2 kg (168 lb)   BMI 24.81 kg/m²    Procedure:         Assessment:     Encounter Diagnoses   Name Primary?   • Erectile dysfunction after radical prostatectomy    • Prostate cancer (CMS/HCC) Yes     No orders of the defined types were placed in this encounter.      Plan:   Will check a psa next year.    Patient's Body mass index is 24.81 kg/m². BMI is within normal parameters. No follow-up required.      Counseling was given to patient for the following topics instructions for management as follows: prostate cancer and erectile dysfunction. The interim medical history and current results were reviewed.  A treatment plan with follow-up was made for Prostate cancer (CMS/HCC) [C61].  This document has been electronically signed by Shaw Sebastian MD November 28, 2018 5:13 PM  "

## 2018-12-21 ENCOUNTER — TELEPHONE (OUTPATIENT)
Dept: UROLOGY | Facility: CLINIC | Age: 68
End: 2018-12-21

## 2018-12-21 NOTE — TELEPHONE ENCOUNTER
[facility] called requesting patients last office note. Attn to Julia Corrales. Fax is [phone].

## 2019-02-01 ENCOUNTER — TRANSCRIBE ORDERS (OUTPATIENT)
Dept: PHYSICAL THERAPY | Facility: HOSPITAL | Age: 69
End: 2019-02-01

## 2019-02-01 ENCOUNTER — HOSPITAL ENCOUNTER (OUTPATIENT)
Dept: PHYSICAL THERAPY | Facility: HOSPITAL | Age: 69
Setting detail: THERAPIES SERIES
Discharge: HOME | End: 2019-02-01

## 2019-02-01 DIAGNOSIS — M54.40 LOW BACK PAIN WITH SCIATICA, SCIATICA LATERALITY UNSPECIFIED, UNSPECIFIED BACK PAIN LATERALITY, UNSPECIFIED CHRONICITY: Primary | ICD-10-CM

## 2019-02-01 DIAGNOSIS — M54.5 LOW BACK PAIN, UNSPECIFIED BACK PAIN LATERALITY, UNSPECIFIED CHRONICITY, WITH SCIATICA PRESENCE UNSPECIFIED: Primary | ICD-10-CM

## 2019-02-01 PROCEDURE — 97162 PT EVAL MOD COMPLEX 30 MIN: CPT | Performed by: PHYSICAL THERAPIST

## 2019-02-01 NOTE — THERAPY EVALUATION
"    Outpatient Physical Therapy Ortho Initial Evaluation   [facility]     Patient Name: Alexander Zamora  : 1950  MRN: [id number]  Today's Date: 2019      Visit Date: 2019    Patient Active Problem List   Diagnosis   • Prostate cancer (CMS/HCC)   • Erectile dysfunction following radical prostatectomy        Past Medical History:   Diagnosis Date   • Cancer (CMS/HCC) 2015    Postrate   • Depression    • Elevated prostate specific antigen (PSA)    • Erectile dysfunction    • Hypercholesterolemia    • Hypercholesterolemia    • Prostatitis         Past Surgical History:   Procedure Laterality Date   • HEMORRHOIDECTOMY     • OTHER SURGICAL HISTORY      destruction of hemorrhoids, Rubber band ligatioin of hemmorrhoids   • OTHER SURGICAL HISTORY      surgery for an ulcer, PNEUMOTHORAX AND ULCER THERAPY       Visit Dx:     ICD-10-CM ICD-9-CM   1. Low back pain, unspecified back pain laterality, unspecified chronicity, with sciatica presence unspecified M54.5 724.2       Patient History     Row Name 19 1000             History    Chief Complaint  Pain  -AD      Type of Pain  Back pain  -AD      Date Current Problem(s) Began  -- 2-3 weeks  -AD      Brief Description of Current Complaint  Pt reported waking up 2-3 weeks ago with severe pain, stating he had to \"roll out of bed\". He reported the pain has gradually increased over the last 2-3 weeks. He reported relief with hot showers and has not tried medication for pain relief. He stated he had a steroid shot in his shoulders for shoulder pain two days ago, and hopes to have a PT order for shoulder. He reported difficulty with lifting, prolonged sitting or standing, sleeping, and walking. He stated he is unable to perfom hobbies such as hunting and fishing due to back and arm pain.  -AD      Patient/Caregiver Goals  Relieve pain;Return to prior level of function;Improve strength  -AD      Smoking Status  Former smoker  -AD      Patient's Rating of General " "Health  Fair  -AD      Hand Dominance  right-handed  -AD      Occupation/sports/leisure activities  Fishing  -AD      Patient seeing anyone else for problem(s)?  Physician  -AD      How has patient tried to help current problem?  PT in 2018  -AD      Results of Clinical Tests  -- Pt reports being informed he had 2 slipped discs \"years ago\"  -AD         Pain     Pain Location  Back  -AD      Pain at Present  6  -AD      Pain at Best  6  -AD      Pain at Worst  9  -AD      Pain Frequency  Constant/continuous  -AD      Pain Description  Aching;Sharp;Shooting;Stabbing  -AD      What Performance Factors Make the Current Problem(s) WORSE?  Lifting, bending, twisting, prolonged standing and walking.  -AD      What Performance Factors Make the Current Problem(s) BETTER?  Hot shower  -AD      Tolerance Time- Standing  30 minutes  -AD      Tolerance Time- Sitting  30 minutes  -AD      Is your sleep disturbed?  Yes  -AD      Is medication used to assist with sleep?  No  -AD      Difficulties with ADL's?  Bathing, dressing  -AD         Fall Risk Assessment    Any falls in the past year:  No  -AD         Daily Activities    Primary Language  English  -AD      Pt Participated in POC and Goals  Yes  -AD         Safety    Are you being hurt, hit, or frightened by anyone at home or in your life?  No  -AD      Are you being neglected by a caregiver  No  -AD        User Key  (r) = Recorded By, (t) = Taken By, (c) = Cosigned By    Initials Name Provider Type    AD Dalton, Ashley Claudene, PT Physical Therapist          PT Ortho     Row Name 02/01/19 1000       Posture/Observations    Posture- WNL  -- Flexed forward posture.  -AD       Neural Tension Signs- Lower Quarter Clearing    Slump  Bilateral:;Negative  -AD    SLR  Bilateral:;Negative  -AD       Pathological Reflexes- Lower Quarter Clearing    Clonus  Bilateral:;Negative  -AD       Sensory Screen for Light Touch- Lower Quarter Clearing    L1 (inguinal area)  Bilateral:;Intact  " -AD    L2 (anterior mid thigh)  Bilateral:;Intact  -AD    L3 (distal anterior thigh)  Bilateral:;Intact  -AD    L4 (medial lower leg/foot)  Bilateral:;Intact  -AD       Myotomal Screen- Lower Quarter Clearing    Hip flexion (L2)  Bilateral:;4 (Good)  -AD    Knee extension (L3)  Bilateral:;4 (Good)  -AD    Knee flexion (S2)  Bilateral:;4 (Good)  -AD       Lumbar ROM Screen- Lower Quarter Clearing    Lumbar Flexion  Impaired 50%  -AD    Lumbar Extension  Impaired 50%  -AD    Lumbar Lateral Flexion  Impaired Right 75%, left 50%  -AD    Lumbar Rotation  Impaired Right 75%, left 50%  -AD       SI/Hip Screen- Lower Quarter Clearing    ASIS compression  Negative  -AD    ASIS distraction  Negative  -AD    Sendy's/Yoel's test  Bilateral:;Positive  -AD    Posterior thigh sheer  Bilateral:;Positive  -AD       Special Tests/Palpation    Special Tests/Palpation  -- Bilateral TP L1-L5 tenderness  -AD       Lumbosacral Palpation    SI  -- No tenderness to palpation  -AD    Spinous Process  -- No tenderness to palpation  -AD    Piriformis  Bilateral:;Tender  -AD    Greater Tuberosity  Bilateral:;Tender  -AD       Sensation    Sensation WNL?  WNL  -AD       Gait/Stairs Assessment/Training    Comment (Gait/Stairs)  Flexed forward posture with gait.  -AD      User Key  (r) = Recorded By, (t) = Taken By, (c) = Cosigned By    Initials Name Provider Type    AD Dalton, Ashley Claudene, PT Physical Therapist                      Therapy Education  Given: HEP, Symptoms/condition management, Posture/body mechanics, Pain management  Program: Reinforced  How Provided: Verbal, Demonstration  Provided to: Patient  Level of Understanding: Verbalized, Demonstrated     PT OP Goals     Row Name 02/01/19 1400          PT Short Term Goals    STG Date to Achieve  02/15/19  -AD     STG 1  Patient will be instructed in HEP for improved independence.  -AD     STG 1 Progress  New  -AD     STG 2  Pt will report less than 55% impairment on the Modified  Oswestry for improved independence.  -AD     STG 2 Progress  New  -AD     STG 3  Pt will report no more than 7/10 low back pain with daily activities.  -AD     STG 3 Progress  New  -AD        Long Term Goals    LTG 1  Pt will report 4+/5 bilateral lower extremity strength for improved functional independence.  -AD     LTG 1 Progress  New  -AD     LTG 2  Pt will demonstrate 75% lumbar ROM for improved function.  -AD     LTG 2 Progress  New  -AD     LTG 3  Pt will report less than 50% impairment on the Modified Oswestry for improved function.  -AD     LTG 3 Progress  New  -AD     LTG 4  Pt will report a maximum of 5/10 low back pain with daily and household activities.  -AD     LTG 4 Progress  New  -AD        Time Calculation    PT Goal Re-Cert Due Date  03/03/19  -AD       User Key  (r) = Recorded By, (t) = Taken By, (c) = Cosigned By    Initials Name Provider Type    AD Dalton, Ashley Claudene, PT Physical Therapist          PT Assessment/Plan     Row Name 02/01/19 1432          PT Assessment    Functional Limitations  Decreased safety during functional activities;Limitation in home management;Limitations in community activities;Performance in work activities;Performance in self-care ADL;Performance in leisure activities;Limitations in functional capacity and performance  -AD     Impairments  Balance;Gait;Range of motion;Sensation;Muscle strength;Pain;Poor body mechanics;Posture;Joint mobility;Joint integrity  -AD     Assessment Comments  The patient is a 68 year old male presenting to the clinic with low back pain. He demonstrated impaired lumbar ROM and bilateral lower extremity weakness and reported 64% impairment on the Modified Oswestry. Special tests to produce radicular pain were negative. He would benefit from skilled physical therapy to address functional limitations and impairments. He will be progressed as tolerated.  -AD     Please refer to paper survey for additional self-reported information  Yes  -AD      Rehab Potential  Good  -AD     Patient/caregiver participated in establishment of treatment plan and goals  Yes  -AD     Patient would benefit from skilled therapy intervention  Yes  -AD        PT Plan    PT Frequency  2x/week  -AD     Predicted Duration of Therapy Intervention (Therapy Eval)  4 weeks  -AD     Planned CPT's?  PT EVAL MOD COMPLELITY: 44103;PT THER ACT EA 15 MIN: 94850;PT THER PROC EA 15 MIN: 21560;PT RE-EVAL: 82010;PT MANUAL THERAPY EA 15 MIN: 04211;PT NEUROMUSC RE-EDUCATION EA 15 MIN: 71761;PT GAIT TRAINING EA 15 MIN: 09318;PT SELF CARE/HOME MGMT/TRAIN EA 15: 42702;PT HOT OR COLD PACK TREAT MCARE;PT ELECTRICAL STIM ATTD EA 15 MIN: 66627;PT ULTRASOUND EA 15 MIN: 80777;PT IONTOPHORESIS EA 15 MIN: 63852;PT THER SUPP EA 15 MIN  -AD     Physical Therapy Interventions (Optional Details)  balance training;gait training;gross motor skills;neuromuscular re-education;motor coordination training;modalities;manual therapy techniques;lumbar stabilization;joint mobilization;home exercise program;patient/family education;postural re-education;ROM (Range of Motion);stair training;strengthening;stretching  -AD     PT Plan Comments  The above noted interventions will be used to address functional limitations and impairments. He will be progressed as tolerated to improve functional independence.  -AD       User Key  (r) = Recorded By, (t) = Taken By, (c) = Cosigned By    Initials Name Provider Type    AD Dalton, Ashley Claudene, PT Physical Therapist            Exercises     Row Name 02/01/19 1400             Exercise 1    Exercise Name 1  HEP: LTR, SKTC, piriformis stretch  -AD        User Key  (r) = Recorded By, (t) = Taken By, (c) = Cosigned By    Initials Name Provider Type    AD Dalton, Ashley Claudene, PT Physical Therapist                        Outcome Measure Options: Modifed Owestry  Modified Oswestry  Modified Oswestry Score/Comments: 32/50 = 64%      Time Calculation:     Therapy Suggested Charges      Code   Minutes Charges    None             Start Time: 1000  Stop Time: 1100  Time Calculation (min): 60 min     Therapy Charges for Today     Code Description Service Date Service Provider Modifiers Qty    50364907881 HC PT EVAL MOD COMPLEXITY 4 2/1/2019 Dalton, Ashley Claudene, PT GP 1          PT G-Codes  Outcome Measure Options: Modifed Owestry  Modified Oswestry Score/Comments: 32/50 = 64%         Ashley Claudene Dalton, PT  2/1/2019

## 2019-02-05 ENCOUNTER — HOSPITAL ENCOUNTER (OUTPATIENT)
Dept: PHYSICAL THERAPY | Facility: HOSPITAL | Age: 69
Setting detail: THERAPIES SERIES
Discharge: HOME | End: 2019-02-05

## 2019-02-05 DIAGNOSIS — M25.511 CHRONIC RIGHT SHOULDER PAIN: ICD-10-CM

## 2019-02-05 DIAGNOSIS — M54.5 LOW BACK PAIN, UNSPECIFIED BACK PAIN LATERALITY, UNSPECIFIED CHRONICITY, WITH SCIATICA PRESENCE UNSPECIFIED: Primary | ICD-10-CM

## 2019-02-05 DIAGNOSIS — G89.29 CHRONIC LEFT SHOULDER PAIN: ICD-10-CM

## 2019-02-05 DIAGNOSIS — G89.29 CHRONIC RIGHT SHOULDER PAIN: ICD-10-CM

## 2019-02-05 DIAGNOSIS — M25.512 CHRONIC LEFT SHOULDER PAIN: ICD-10-CM

## 2019-02-05 PROCEDURE — 97110 THERAPEUTIC EXERCISES: CPT | Performed by: PHYSICAL THERAPIST

## 2019-02-05 PROCEDURE — 97035 APP MDLTY 1+ULTRASOUND EA 15: CPT | Performed by: PHYSICAL THERAPIST

## 2019-02-05 PROCEDURE — G0283 ELEC STIM OTHER THAN WOUND: HCPCS | Performed by: PHYSICAL THERAPIST

## 2019-02-05 NOTE — THERAPY RE-EVALUATION
Outpatient Physical Therapy Ortho Re-Evaluation  APRIL Vogt     Patient Name: Alexander Zamora  : 1950  MRN: [id number]  Today's Date: 2019      Visit Date: 2019    Patient Active Problem List   Diagnosis   • Prostate cancer (CMS/HCC)   • Erectile dysfunction following radical prostatectomy        Past Medical History:   Diagnosis Date   • Cancer (CMS/HCC) 2015    Postrate   • Depression    • Elevated prostate specific antigen (PSA)    • Erectile dysfunction    • Hypercholesterolemia    • Hypercholesterolemia    • Prostatitis         Past Surgical History:   Procedure Laterality Date   • HEMORRHOIDECTOMY     • OTHER SURGICAL HISTORY      destruction of hemorrhoids, Rubber band ligatioin of hemmorrhoids   • OTHER SURGICAL HISTORY      surgery for an ulcer, PNEUMOTHORAX AND ULCER THERAPY       Visit Dx:     ICD-10-CM ICD-9-CM   1. Low back pain, unspecified back pain laterality, unspecified chronicity, with sciatica presence unspecified M54.5 724.2   2. Chronic right shoulder pain M25.511 719.41    G89.29 338.29   3. Chronic left shoulder pain M25.512 719.41    G89.29 338.29       Patient History     Row Name 19 1400             History    Brief Description of Current Complaint  Patient presents with new order for bilateral shoulder pain.  He notes that he has been experiencing the pain for a few months.  He is unable to recall a specific mechanism of injury.  He reports that he experiences tingling in the right UE, which extencs to the fingertips.  He notes that it is difficult to lift his arms, noting the right UE is worse.  -BE         Pain     Pain Location  Back;Shoulder  -BE      Pain at Present  — shoulder-4/10, back-8/10  -BE      Pain at Best  — shoulder-4/10, back 6/10  -BE      Pain at Worst  — shoulder-10/10, back-9/10  -BE        User Key  (r) = Recorded By, (t) = Taken By, (c) = Cosigned By    Initials Name Provider Type    BE Christen Mon PT Physical Therapist           PT Ortho     Row Name 02/05/19 1600       Subjective Comments    Subjective Comments  Patient reports 5/10 pain today.  He notes that his right shoulder is bothering him the most.  -BE       Subjective Pain    Able to rate subjective pain?  yes  -BE    Pre-Treatment Pain Level  5  -BE    Post-Treatment Pain Level  2  -BE    Row Name 02/05/19 1400       Myotomal Screen- Upper Quarter Clearing    Elbow flexion/wrist extension (C6)  Bilateral:;4 (Good)  -BE    Elbow extension/wrist flexion (C7)  Bilateral:;4 (Good)  -BE       Cervical/Shoulder ROM Screen    Cervical quadrant (Spurling's)  — negative-no radicular symptoms reported  -BE       Myotomal Screen- Lower Quarter Clearing    Hip flexion (L2)  Bilateral:;4 (Good)  -BE    Knee extension (L3)  Bilateral:;4 (Good)  -BE    Knee flexion (S2)  Bilateral:;4 (Good)  -BE       Lumbar ROM Screen- Lower Quarter Clearing    Lumbar Flexion  Impaired 50%  -BE    Lumbar Extension  Impaired 50%  -BE    Lumbar Lateral Flexion  Impaired Right-75%, left-50%  -BE    Lumbar Rotation  Impaired Right-75%, Left-50%  -BE       Special Tests/Palpation    Special Tests/Palpation  Shoulder  -BE       Shoulder Impingement/Rotator Cuff Special Tests    Barakat-Tuan Test (RC Lesion vs. Bursitis)  Left:;Negative;Right:;Positive  -BE    Empty Can Test (RC Lesion)  Bilateral:;Negative  -BE    Speed's Test (LH of Biceps Lesion)  Left:;Negative;Right:;Positive  -BE       Shoulder Girdle Palpation    Shoulder Girdle Palpation?  Yes  -BE    Supraspinatus Insertion  Bilateral:;Tender  -BE    AC Joint  Right:;Tender  -BE    SC Joint  Right:;Tender  -BE    Long Head of Biceps  Bilateral:;Tender  -BE    Pect Minor  Right:;Tender;Guarded/taut  -BE    Upper Trap  Right:;Tender;Bilateral:;Guarded/taut  -BE    Levator Scapula  Right:;Tender;Bilateral:;Guarded/taut  -BE       General ROM    RT Upper Ext  Rt Shoulder ABduction;Rt Shoulder Flexion;Rt Shoulder External Rotation;Rt Shoulder Internal  Rotation  -BE    LT Upper Ext  Lt Shoulder ABduction;Lt Shoulder Flexion;Lt Shoulder External Rotation;Lt Shoulder Internal Rotation  -BE       Right Upper Ext    Rt Shoulder Abduction AROM  100  -BE    Rt Shoulder Flexion AROM  136  -BE    Rt Shoulder External Rotation AROM  58  -BE    Rt Shoulder Internal Rotation AROM  75  -BE       Left Upper Ext    Lt Shoulder Abduction AROM  120  -BE    Lt Shoulder Flexion AROM  153  -BE    Lt Shoulder External Rotation AROM  48  -BE    Lt Shoulder Internal Rotation AROM  50  -BE       MMT (Manual Muscle Testing)    Rt Upper Ext  Rt Shoulder Flexion;Rt Shoulder ABduction;Rt Shoulder Internal Rotation;Rt Shoulder External Rotation  -BE    Lt Upper Ext  Lt Shoulder Flexion;Lt Shoulder ABduction;Lt Shoulder Internal Rotation;Lt Shoulder External Rotation  -BE       MMT Right Upper Ext    Rt Shoulder Flexion MMT, Gross Movement  (3/5) fair  -BE    Rt Shoulder ABduction MMT, Gross Movement  (3/5) fair  -BE    Rt Shoulder Internal Rotation MMT, Gross Movement  (3/5) fair  -BE    Rt Shoulder External Rotation MMT, Gross Movement  (3/5) fair  -BE    Rt Upper Extremity Comments   MMT assessed in available ROM  -BE       MMT Left Upper Ext    Lt Shoulder Flexion MMT, Gross Movement  (4-/5) good minus  -BE    Lt Shoulder ABduction MMT, Gross Movement  (4-/5) good minus  -BE    Lt Shoulder Internal Rotation MMT, Gross Movement  (4-/5) good minus  -BE    Lt Shoulder External Rotation MMT, Gross Movement  (4-/5) good minus  -BE    Lt Upper Extremity Comments   MMT assessed in available ROM  -BE      User Key  (r) = Recorded By, (t) = Taken By, (c) = Cosigned By    Initials Name Provider Type    BE Christen Mon, PT Physical Therapist                      Therapy Education  Given: HEP, Symptoms/condition management, Posture/body mechanics, Pain management  Program: Reinforced  How Provided: Verbal, Demonstration  Provided to: Patient  Level of Understanding: Verbalized, Demonstrated      PT OP Goals     Row Name 02/05/19 1630 02/05/19 1600       PT Short Term Goals    STG Date to Achieve  —  02/15/19  -BE    STG 1  —  Patient will be instructed in HEP for improved independence.  -BE    STG 1 Progress  —  Ongoing  -BE    STG 2  —  Pt will report less than 55% impairment on the Modified Oswestry for improved independence.  -BE    STG 2 Progress  —  Ongoing  -BE    STG 3  —  Pt will report no more than 7/10 low back pain with daily activities.  -BE    STG 3 Progress  —  Ongoing  -BE    STG 4  —  Shoulder ROM will improve by at least 10 degrees to allow for greater ease with ADLs.  -BE    STG 4 Progress  —  New  -BE    STG 4 Progress Comments  —  goal established on 2/5, due to new order for bilateral shoulder pain.  -BE    STG 5  —  Patient will report shoulder pain no greater than 7/10 when performing self-care activities.  -BE    STG 5 Progress  —  New  -BE    STG 5 Progress Comments  —  goal established on 2/5, due to new order for bilateral shoulder pain  -BE       Long Term Goals    LTG 1  —  Pt will report 4+/5 bilateral lower extremity strength for improved functional independence.  -BE    LTG 1 Progress  —  Ongoing  -BE    LTG 2  —  Pt will demonstrate 75% lumbar ROM for improved function.  -BE    LTG 2 Progress  —  Ongoing  -BE    LTG 3  —  Pt will report less than 50% impairment on the Modified Oswestry for improved function.  -BE    LTG 3 Progress  —  Ongoing  -BE    LTG 4  —  Pt will report a maximum of 5/10 low back pain with daily and household activities.  -BE    LTG 4 Progress  —  Ongoing  -BE    LTG 5  —  Patient will report shoulder pain no greater than 4/10 when carrying groceries.  -BE    LTG 5 Progress  —  New  -BE    LTG 5 Progress Comments  —  goal established on 2/5, due to new order for bilateral shoulder pain  -BE    LTG 6  —  Shoulder flexion/abduction ROM will improve to at least 165 to allow for greater ease with ADLs.  -BE    LTG 6 Progress  —  Ongoing  -BE    LTG 6  Progress Comments  —  goal established on 2/5, due to new order for bilateral shoulder pain  -BE    LTG 7  —  UE strength will improve to at least 4/5 to prevent reinjury.  -BE    LTG 7 Progress  —  New  -BE    LTG 7 Progress Comments  —  goal established on 2/5, due to new order for bilateral shoulder pain  -BE    LTG 8  —  QuickDash will improve by at least 10% to show improved functional mobility.  -BE    LTG 8 Progress  —  New  -BE    LTG 8 Progress Comments  —  goal established on 2/5, due to new order for bilateral shoulder pain  -BE       Time Calculation    PT Goal Re-Cert Due Date  03/07/19  -BE  —      User Key  (r) = Recorded By, (t) = Taken By, (c) = Cosigned By    Initials Name Provider Type    BE Christen Mon PT Physical Therapist          PT Assessment/Plan     Row Name 02/05/19 1634          PT Assessment    Functional Limitations  Decreased safety during functional activities;Limitation in home management;Limitations in community activities;Performance in work activities;Performance in self-care ADL;Performance in leisure activities;Limitations in functional capacity and performance  -BE     Impairments  Balance;Gait;Range of motion;Sensation;Muscle strength;Pain;Poor body mechanics;Posture;Joint mobility;Joint integrity  -BE     Assessment Comments  Patient presents to therapy with new order for bilateral shoulder pain; patient was previously evaluated on 2/1/2019 for low back pain.  Patient displays decreased shoulder ROM, decreased UE strength, and increased shoulder pain; lumbar ROM and LE strength deficits continue to be present.  Patient reports an 88.64% functional mobility impairment for the shoulders, based on his response to the QuickDash.  Patient will benefit from skilled PT for treatment of bilateral shoulder pain and low back pain.  -BE     Please refer to paper survey for additional self-reported information  Yes  -BE     Rehab Potential  Good  -BE     Patient/caregiver  participated in establishment of treatment plan and goals  Yes  -BE     Patient would benefit from skilled therapy intervention  Yes  -BE        PT Plan    PT Frequency  2x/week  -BE     Predicted Duration of Therapy Intervention (Therapy Eval)  4 weeks  -BE     Planned CPT's?  PT RE-EVAL: 75662;PT THER PROC EA 15 MIN: 07905;PT THER ACT EA 15 MIN: 69098;PT MANUAL THERAPY EA 15 MIN: 63175;PT NEUROMUSC RE-EDUCATION EA 15 MIN: 39869;PT GAIT TRAINING EA 15 MIN: 31668;PT HOT OR COLD PACK TREAT MCARE;PT ELECTRICAL STIM UNATTEND: ;PT ELECTRICAL STIM ATTD EA 15 MIN: 87640;PT ULTRASOUND EA 15 MIN: 71358;PT IONTOPHORESIS EA 15 MIN: 10536;PT THER SUPP EA 15 MIN  -BE     PT Plan Comments  Progress per patient's tolerance and POC.  -BE       User Key  (r) = Recorded By, (t) = Taken By, (c) = Cosigned By    Initials Name Provider Type    BE Christen Mon PT Physical Therapist          Modalities     Row Name 02/05/19 1600             Moist Heat    MH Applied  Yes no redness following MH  -BE      Location  Low back  -BE      Rx Minutes  15 mins  -BE      MH Prior to Rx  Yes with ESTIM in seated position  -BE         Ultrasound 24200    Location  Right shoulder no skin irritation following US  -BE      Duty Cycle  50  -BE      Frequency  — 3.3 MHz  -BE      Intensity - Wts/cm  1.2  -BE      29400 - PT Ultrasound Minutes  8  -BE         ELECTRICAL STIMULATION    Attended/Unattended  Unattended no skin irritation following ESTIM  -BE      Stimulation Type  IFC  -BE      Max mAmp  — per patient's tolerance  -BE      Location/Electrode Placement/Other  Lumbar  -BE       PT E-Stim Unattended (Manual) Minutes  15  -BE        User Key  (r) = Recorded By, (t) = Taken By, (c) = Cosigned By    Initials Name Provider Type    BE Christen Mon PT Physical Therapist        Exercises     Row Name 02/05/19 1600             Subjective Comments    Subjective Comments  Patient reports 5/10 pain today.  He notes that his right  "shoulder is bothering him the most.  -BE         Subjective Pain    Able to rate subjective pain?  yes  -BE      Pre-Treatment Pain Level  5  -BE      Post-Treatment Pain Level  2  -BE         Total Minutes    74433 - PT Therapeutic Exercise Minutes  35  -BE         Exercise 1    Exercise Name 1  Seated march 2x10, LAQ 2x10, Ball squeeze 2x10, Hip abduction with RTB 2x10, Scap squeeze 15x, Table slides (flex) 2x10-bilateral, Walkaways 10x, LTR 2x10, SKTC 3x20\", Piriformist stretch 3x20\", Pulleys (flex) 2 min  -BE      Cueing 1  Verbal;Tactile;Demo;Auditory  -BE      Time 1  35 min  -BE        User Key  (r) = Recorded By, (t) = Taken By, (c) = Cosigned By    Initials Name Provider Type    BE Christen Mon, PT Physical Therapist                        Outcome Measure Options: Quick DASH  Quick DASH  Open a tight or new jar.: Unable  Do heavy household chores (e.g., wash walls, wash floors): Severe Difficulty  Carry a shopping bag or briefcase: Severe Difficulty  Wash your back: Unable  Use a knife to cut food: Severe Difficulty  Recreational activities in which you take some force or impact through your arm, should or hand (e.g. golf, hammering, tennis, etc.): Unable  During the past week, to what extent has your arm, shoulder, or hand problem interfered with your normal social activites with family, friends, neighbors or groups?: Extremely  During the past week, were you limited in your work or other regular daily activities as a result of your arm, shoulder or hand problem?: Very limited  Arm, Shoulder, or hand pain: Extreme  Tingling (pins and needles) in your arm, shoulder, or hand: Extreme  During the past week, how much difficulty have you had sleeping because of the pain in your arm, shoulder or hand?: Severe Difficulty  Number of Questions Answered: 11  Quick DASH Score: 88.64         Time Calculation:     Therapy Suggested Charges     Code   Minutes Charges    12353 (CPT®)  Pt Neuromusc Re Education " Ea 15 Min      19496 (CPT®) Hc Pt Ther Proc Ea 15 Min 35 2    52396 (CPT®) Hc Gait Training Ea 15 Min      02117 (CPT®) Hc Pt Therapeutic Act Ea 15 Min      89181 (CPT®) Hc Pt Manual Therapy Ea 15 Min      86165 (CPT®) Hc Pt Ther Massage- Per 15 Min      16289 (CPT®) Hc Pt Iontophoresis Ea 15 Min      57754 (CPT®) Hc Pt Elec Stim Ea-Per 15 Min      78777 (CPT®) Hc Pt Ultrasound Ea 15 Min 8 1    56157 (CPT®) Hc Pt Self Care/Mgmt/Train Ea 15 Min      20033 (CPT®) Hc Pt Prosthetic (S) Train Initial Encounter, Each 15 Min      03145 (CPT®) Hc Orthotic(S) Mgmt/Train Initial Encounter, Each 15min      17101 (CPT®) Hc Pt Aquatic Therapy Ea 15 Min      10082 (CPT®) Hc Pt Orthotic(S)/Prosthetic(S) Encounter, Each 15 Min       (CPT®) Hc Pt Electrical Stim Unattended 15 1    Total  58 4          Start Time: 1355  Stop Time: 1513  Time Calculation (min): 78 min     Therapy Charges for Today     Code Description Service Date Service Provider Modifiers Qty    75394256520 HC PT THER PROC EA 15 MIN 2/5/2019 Christen Mon, PT GP 2    83375009011 HC PT ULTRASOUND EA 15 MIN 2/5/2019 Christen Mon, PT GP 1    26896679032 HC PT ELECTRICAL STIM UNATTENDED 2/5/2019 Christen Mon, PT  1          PT G-Codes  Outcome Measure Options: Quick DASH  Quick DASH Score: 88.64         Christen Mon, PT  2/5/2019

## 2019-02-05 NOTE — THERAPY RE-EVALUATION
Outpatient Physical Therapy Ortho Re-Evaluation  APRIL Vogt     Patient Name: Alexander Zamora  : 1950  MRN: [id number]  Today's Date: 2019      Visit Date: 2019    Patient Active Problem List   Diagnosis   • Prostate cancer (CMS/HCC)   • Erectile dysfunction following radical prostatectomy        Past Medical History:   Diagnosis Date   • Cancer (CMS/HCC) 2015    Postrate   • Depression    • Elevated prostate specific antigen (PSA)    • Erectile dysfunction    • Hypercholesterolemia    • Hypercholesterolemia    • Prostatitis         Past Surgical History:   Procedure Laterality Date   • HEMORRHOIDECTOMY     • OTHER SURGICAL HISTORY      destruction of hemorrhoids, Rubber band ligatioin of hemmorrhoids   • OTHER SURGICAL HISTORY      surgery for an ulcer, PNEUMOTHORAX AND ULCER THERAPY       Visit Dx:     ICD-10-CM ICD-9-CM   1. Low back pain, unspecified back pain laterality, unspecified chronicity, with sciatica presence unspecified M54.5 724.2   2. Chronic right shoulder pain M25.511 719.41    G89.29 338.29   3. Chronic left shoulder pain M25.512 719.41    G89.29 338.29       Patient History     Row Name 19 1400             History    Brief Description of Current Complaint  Patient presents with new order for bilateral shoulder pain.  He notes that he has been experiencing the pain for a few months.  He is unable to recall a specific mechanism of injury.  He reports that he experiences tingling in the right UE, which extencs to the fingertips.  He notes that it is difficult to lift his arms, noting the right UE is worse.  -BE         Pain     Pain Location  Back;Shoulder  -BE      Pain at Present  -- shoulder-4/10, back-8/10  -BE      Pain at Best  -- shoulder-4/10, back 6/10  -BE      Pain at Worst  -- shoulder-10/10, back-9/10  -BE        User Key  (r) = Recorded By, (t) = Taken By, (c) = Cosigned By    Initials Name Provider Type    BE Christen Mon, PT Physical Therapist           PT Ortho     Row Name 02/05/19 1600       Subjective Comments    Subjective Comments  Patient reports 5/10 pain today.  He notes that his right shoulder is bothering him the most.  -BE       Subjective Pain    Able to rate subjective pain?  yes  -BE    Pre-Treatment Pain Level  5  -BE    Post-Treatment Pain Level  2  -BE    Row Name 02/05/19 1400       Myotomal Screen- Upper Quarter Clearing    Elbow flexion/wrist extension (C6)  Bilateral:;4 (Good)  -BE    Elbow extension/wrist flexion (C7)  Bilateral:;4 (Good)  -BE       Cervical/Shoulder ROM Screen    Cervical quadrant (Spurling's)  -- negative-no radicular symptoms reported  -BE       Myotomal Screen- Lower Quarter Clearing    Hip flexion (L2)  Bilateral:;4 (Good)  -BE    Knee extension (L3)  Bilateral:;4 (Good)  -BE    Knee flexion (S2)  Bilateral:;4 (Good)  -BE       Lumbar ROM Screen- Lower Quarter Clearing    Lumbar Flexion  Impaired 50%  -BE    Lumbar Extension  Impaired 50%  -BE    Lumbar Lateral Flexion  Impaired Right-75%, left-50%  -BE    Lumbar Rotation  Impaired Right-75%, Left-50%  -BE       Special Tests/Palpation    Special Tests/Palpation  Shoulder  -BE       Shoulder Impingement/Rotator Cuff Special Tests    Barakat-Tuan Test (RC Lesion vs. Bursitis)  Left:;Negative;Right:;Positive  -BE    Empty Can Test (RC Lesion)  Bilateral:;Negative  -BE    Speed's Test (LH of Biceps Lesion)  Left:;Negative;Right:;Positive  -BE       Shoulder Girdle Palpation    Shoulder Girdle Palpation?  Yes  -BE    Supraspinatus Insertion  Bilateral:;Tender  -BE    AC Joint  Right:;Tender  -BE    SC Joint  Right:;Tender  -BE    Long Head of Biceps  Bilateral:;Tender  -BE    Pect Minor  Right:;Tender;Guarded/taut  -BE    Upper Trap  Right:;Tender;Bilateral:;Guarded/taut  -BE    Levator Scapula  Right:;Tender;Bilateral:;Guarded/taut  -BE       General ROM    RT Upper Ext  Rt Shoulder ABduction;Rt Shoulder Flexion;Rt Shoulder External Rotation;Rt Shoulder Internal  Rotation  -BE    LT Upper Ext  Lt Shoulder ABduction;Lt Shoulder Flexion;Lt Shoulder External Rotation;Lt Shoulder Internal Rotation  -BE       Right Upper Ext    Rt Shoulder Abduction AROM  100  -BE    Rt Shoulder Flexion AROM  136  -BE    Rt Shoulder External Rotation AROM  58  -BE    Rt Shoulder Internal Rotation AROM  75  -BE       Left Upper Ext    Lt Shoulder Abduction AROM  120  -BE    Lt Shoulder Flexion AROM  153  -BE    Lt Shoulder External Rotation AROM  48  -BE    Lt Shoulder Internal Rotation AROM  50  -BE       MMT (Manual Muscle Testing)    Rt Upper Ext  Rt Shoulder Flexion;Rt Shoulder ABduction;Rt Shoulder Internal Rotation;Rt Shoulder External Rotation  -BE    Lt Upper Ext  Lt Shoulder Flexion;Lt Shoulder ABduction;Lt Shoulder Internal Rotation;Lt Shoulder External Rotation  -BE       MMT Right Upper Ext    Rt Shoulder Flexion MMT, Gross Movement  (3/5) fair  -BE    Rt Shoulder ABduction MMT, Gross Movement  (3/5) fair  -BE    Rt Shoulder Internal Rotation MMT, Gross Movement  (3/5) fair  -BE    Rt Shoulder External Rotation MMT, Gross Movement  (3/5) fair  -BE    Rt Upper Extremity Comments   MMT assessed in available ROM  -BE       MMT Left Upper Ext    Lt Shoulder Flexion MMT, Gross Movement  (4-/5) good minus  -BE    Lt Shoulder ABduction MMT, Gross Movement  (4-/5) good minus  -BE    Lt Shoulder Internal Rotation MMT, Gross Movement  (4-/5) good minus  -BE    Lt Shoulder External Rotation MMT, Gross Movement  (4-/5) good minus  -BE    Lt Upper Extremity Comments   MMT assessed in available ROM  -BE      User Key  (r) = Recorded By, (t) = Taken By, (c) = Cosigned By    Initials Name Provider Type    BE Christen Mon, PT Physical Therapist                      Therapy Education  Given: HEP, Symptoms/condition management, Posture/body mechanics, Pain management  Program: Reinforced  How Provided: Verbal, Demonstration  Provided to: Patient  Level of Understanding: Verbalized, Demonstrated      PT OP Goals     Row Name 02/05/19 1630 02/05/19 1600       PT Short Term Goals    STG Date to Achieve  --  02/15/19  -BE    STG 1  --  Patient will be instructed in HEP for improved independence.  -BE    STG 1 Progress  --  Ongoing  -BE    STG 2  --  Pt will report less than 55% impairment on the Modified Oswestry for improved independence.  -BE    STG 2 Progress  --  Ongoing  -BE    STG 3  --  Pt will report no more than 7/10 low back pain with daily activities.  -BE    STG 3 Progress  --  Ongoing  -BE    STG 4  --  Shoulder ROM will improve by at least 10 degrees to allow for greater ease with ADLs.  -BE    STG 4 Progress  --  New  -BE    STG 4 Progress Comments  --  goal established on 2/5, due to new order for bilateral shoulder pain.  -BE    STG 5  --  Patient will report shoulder pain no greater than 7/10 when performing self-care activities.  -BE    STG 5 Progress  --  New  -BE    STG 5 Progress Comments  --  goal established on 2/5, due to new order for bilateral shoulder pain  -BE       Long Term Goals    LTG 1  --  Pt will report 4+/5 bilateral lower extremity strength for improved functional independence.  -BE    LTG 1 Progress  --  Ongoing  -BE    LTG 2  --  Pt will demonstrate 75% lumbar ROM for improved function.  -BE    LTG 2 Progress  --  Ongoing  -BE    LTG 3  --  Pt will report less than 50% impairment on the Modified Oswestry for improved function.  -BE    LTG 3 Progress  --  Ongoing  -BE    LTG 4  --  Pt will report a maximum of 5/10 low back pain with daily and household activities.  -BE    LTG 4 Progress  --  Ongoing  -BE    LTG 5  --  Patient will report shoulder pain no greater than 4/10 when carrying groceries.  -BE    LTG 5 Progress  --  New  -BE    LTG 5 Progress Comments  --  goal established on 2/5, due to new order for bilateral shoulder pain  -BE    LTG 6  --  Shoulder flexion/abduction ROM will improve to at least 165 to allow for greater ease with ADLs.  -BE    LTG 6 Progress  --   Ongoing  -BE    LTG 6 Progress Comments  --  goal established on 2/5, due to new order for bilateral shoulder pain  -BE    LTG 7  --  UE strength will improve to at least 4/5 to prevent reinjury.  -BE    LTG 7 Progress  --  New  -BE    LTG 7 Progress Comments  --  goal established on 2/5, due to new order for bilateral shoulder pain  -BE    LTG 8  --  QuickDash will improve by at least 10% to show improved functional mobility.  -BE    LTG 8 Progress  --  New  -BE    LTG 8 Progress Comments  --  goal established on 2/5, due to new order for bilateral shoulder pain  -BE       Time Calculation    PT Goal Re-Cert Due Date  03/07/19  -BE  --      User Key  (r) = Recorded By, (t) = Taken By, (c) = Cosigned By    Initials Name Provider Type    BE Christen Mon PT Physical Therapist          PT Assessment/Plan     Row Name 02/05/19 1634          PT Assessment    Functional Limitations  Decreased safety during functional activities;Limitation in home management;Limitations in community activities;Performance in work activities;Performance in self-care ADL;Performance in leisure activities;Limitations in functional capacity and performance  -BE     Impairments  Balance;Gait;Range of motion;Sensation;Muscle strength;Pain;Poor body mechanics;Posture;Joint mobility;Joint integrity  -BE     Assessment Comments  Patient presents to therapy with new order for bilateral shoulder pain; patient was previously evaluated on 2/1/2019 for low back pain.  Patient displays decreased shoulder ROM, decreased UE strength, and increased shoulder pain; lumbar ROM and LE strength deficits continue to be present.  Patient reports an 88.64% functional mobility impairment for the shoulders, based on his response to the QuickDash.  Patient will benefit from skilled PT for treatment of bilateral shoulder pain and low back pain.  Session initiated by Angelica Mccarty PTA.    -BE     Please refer to paper survey for additional self-reported  information  Yes  -BE     Rehab Potential  Good  -BE     Patient/caregiver participated in establishment of treatment plan and goals  Yes  -BE     Patient would benefit from skilled therapy intervention  Yes  -BE        PT Plan    PT Frequency  2x/week  -BE     Predicted Duration of Therapy Intervention (Therapy Eval)  4 weeks  -BE     Planned CPT's?  PT RE-EVAL: 48870;PT THER PROC EA 15 MIN: 80616;PT THER ACT EA 15 MIN: 52275;PT MANUAL THERAPY EA 15 MIN: 19644;PT NEUROMUSC RE-EDUCATION EA 15 MIN: 35658;PT GAIT TRAINING EA 15 MIN: 52476;PT HOT OR COLD PACK TREAT MCARE;PT ELECTRICAL STIM UNATTEND: ;PT ELECTRICAL STIM ATTD EA 15 MIN: 57181;PT ULTRASOUND EA 15 MIN: 07323;PT IONTOPHORESIS EA 15 MIN: 45920;PT THER SUPP EA 15 MIN  -BE     PT Plan Comments  Progress per patient's tolerance and POC.  -BE       User Key  (r) = Recorded By, (t) = Taken By, (c) = Cosigned By    Initials Name Provider Type    BE Christen Mon PT Physical Therapist          Modalities     Row Name 02/05/19 1600             Moist Heat    MH Applied  Yes no redness following MH  -BE      Location  Low back  -BE      Rx Minutes  15 mins  -BE      MH Prior to Rx  Yes with ESTIM in seated position  -BE         Ultrasound 10325    Location  Right shoulder no skin irritation following US  -BE      Duty Cycle  50  -BE      Frequency  -- 3.3 MHz  -BE      Intensity - Wts/cm  1.2  -BE      74981 - PT Ultrasound Minutes  8  -BE         ELECTRICAL STIMULATION    Attended/Unattended  Unattended no skin irritation following ESTIM  -BE      Stimulation Type  IFC  -BE      Max mAmp  -- per patient's tolerance  -BE      Location/Electrode Placement/Other  Lumbar  -BE       PT E-Stim Unattended (Manual) Minutes  15  -BE        User Key  (r) = Recorded By, (t) = Taken By, (c) = Cosigned By    Initials Name Provider Type    BE Christen Mon PT Physical Therapist        Exercises     Row Name 02/05/19 1600             Subjective Comments     "Subjective Comments  Patient reports 5/10 pain today.  He notes that his right shoulder is bothering him the most.  -BE         Subjective Pain    Able to rate subjective pain?  yes  -BE      Pre-Treatment Pain Level  5  -BE      Post-Treatment Pain Level  2  -BE         Total Minutes    19023 - PT Therapeutic Exercise Minutes  35  -BE         Exercise 1    Exercise Name 1  Seated march 2x10, LAQ 2x10, Ball squeeze 2x10, Hip abduction with RTB 2x10, Scap squeeze 15x, Table slides (flex) 2x10-bilateral, Walkaways 10x, LTR 2x10, SKTC 3x20\", Piriformist stretch 3x20\", Pulleys (flex) 2 min  -BE      Cueing 1  Verbal;Tactile;Demo;Auditory  -BE      Time 1  35 min  -BE        User Key  (r) = Recorded By, (t) = Taken By, (c) = Cosigned By    Initials Name Provider Type    BE Christen Mon, PT Physical Therapist                        Outcome Measure Options: Quick DASH  Quick DASH  Open a tight or new jar.: Unable  Do heavy household chores (e.g., wash walls, wash floors): Severe Difficulty  Carry a shopping bag or briefcase: Severe Difficulty  Wash your back: Unable  Use a knife to cut food: Severe Difficulty  Recreational activities in which you take some force or impact through your arm, should or hand (e.g. golf, hammering, tennis, etc.): Unable  During the past week, to what extent has your arm, shoulder, or hand problem interfered with your normal social activites with family, friends, neighbors or groups?: Extremely  During the past week, were you limited in your work or other regular daily activities as a result of your arm, shoulder or hand problem?: Very limited  Arm, Shoulder, or hand pain: Extreme  Tingling (pins and needles) in your arm, shoulder, or hand: Extreme  During the past week, how much difficulty have you had sleeping because of the pain in your arm, shoulder or hand?: Severe Difficulty  Number of Questions Answered: 11  Quick DASH Score: 88.64         Time Calculation:     Therapy Suggested " Charges     Code   Minutes Charges    05928 (CPT®) Hc Pt Neuromusc Re Education Ea 15 Min      57409 (CPT®) Hc Pt Ther Proc Ea 15 Min 35 2    75363 (CPT®) Hc Gait Training Ea 15 Min      32245 (CPT®) Hc Pt Therapeutic Act Ea 15 Min      77663 (CPT®) Hc Pt Manual Therapy Ea 15 Min      06446 (CPT®) Hc Pt Ther Massage- Per 15 Min      94364 (CPT®) Hc Pt Iontophoresis Ea 15 Min      83809 (CPT®) Hc Pt Elec Stim Ea-Per 15 Min      21626 (CPT®) Hc Pt Ultrasound Ea 15 Min 8 1    56137 (CPT®) Hc Pt Self Care/Mgmt/Train Ea 15 Min      69439 (CPT®) Hc Pt Prosthetic (S) Train Initial Encounter, Each 15 Min      63626 (CPT®) Hc Orthotic(S) Mgmt/Train Initial Encounter, Each 15min      20758 (CPT®) Hc Pt Aquatic Therapy Ea 15 Min      58718 (CPT®) Hc Pt Orthotic(S)/Prosthetic(S) Encounter, Each 15 Min       (CPT®) Hc Pt Electrical Stim Unattended 15 1    Total  58 4          Start Time: 1355  Stop Time: 1513  Time Calculation (min): 78 min     Therapy Charges for Today     Code Description Service Date Service Provider Modifiers Qty    35834611316 HC PT THER PROC EA 15 MIN 2/5/2019 Christen Mon, PT GP 2    21126425456 HC PT ULTRASOUND EA 15 MIN 2/5/2019 Christen Mon, PT GP 1    40816913367 HC PT ELECTRICAL STIM UNATTENDED 2/5/2019 Christen Mon, PT  1          PT G-Codes  Outcome Measure Options: Quick DASH  Quick DASH Score: 88.64         Christen Mon, PT  2/5/2019

## 2019-02-05 NOTE — THERAPY RE-EVALUATION
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 Low back pain, unspecified back pain laterality, unspecified chronicity, with sciatica presence unspecified M54.5 724.2       Patient History     Row Name 19 1400             History    Brief Description of Current Complaint  Patient presents with new order for bilateral shoulder pain.  He notes that he has been experiencing the pain for a few months.  He is unable to recall a specific mechanism of injury.  He reports that he experiences tingling in the right UE, which extencs to the fingertips.  He notes that it is difficult to lift his arms, noting the right UE is worse.  -BE         Pain     Pain Location  Back;Shoulder  -BE      Pain at Present  — shoulder-4/10, back-8/10  -BE      Pain at Best  — shoulder-4/10, back 6/10  -BE      Pain at Worst  — shoulder-10/10, back-9/10  -BE        User Key  (r) = Recorded By, (t) = Taken By, (c) = Cosigned By    Initials Name Provider Type    BE Christen Mon PT Physical Therapist          PT Ortho     Row Name 19 1600       Subjective Comments    Subjective Comments  Patient reports 5/10 pain today.  He notes  that his right shoulder is bothering him the most.  -BE       Subjective Pain    Able to rate subjective pain?  yes  -BE    Pre-Treatment Pain Level  5  -BE    Post-Treatment Pain Level  2  -BE    Row Name 02/05/19 1400       Myotomal Screen- Upper Quarter Clearing    Elbow flexion/wrist extension (C6)  Bilateral:;4 (Good)  -BE    Elbow extension/wrist flexion (C7)  Bilateral:;4 (Good)  -BE       Cervical/Shoulder ROM Screen    Cervical quadrant (Spurling's)  — negative-no radicular symptoms reported  -BE       Myotomal Screen- Lower Quarter Clearing    Hip flexion (L2)  Bilateral:;4 (Good)  -BE    Knee extension (L3)  Bilateral:;4 (Good)  -BE    Knee flexion (S2)  Bilateral:;4 (Good)  -BE       Lumbar ROM Screen- Lower Quarter Clearing    Lumbar Flexion  Impaired 50%  -BE    Lumbar Extension  Impaired 50%  -BE    Lumbar Lateral Flexion  Impaired Right-75%, left-50%  -BE    Lumbar Rotation  Impaired Right-75%, Left-50%  -BE       Special Tests/Palpation    Special Tests/Palpation  Shoulder  -BE       Shoulder Impingement/Rotator Cuff Special Tests    Barakat-Tuan Test (RC Lesion vs. Bursitis)  Left:;Negative;Right:;Positive  -BE    Empty Can Test (RC Lesion)  Bilateral:;Negative  -BE    Speed's Test (LH of Biceps Lesion)  Left:;Negative;Right:;Positive  -BE       Shoulder Girdle Palpation    Shoulder Girdle Palpation?  Yes  -BE    Supraspinatus Insertion  Bilateral:;Tender  -BE    AC Joint  Right:;Tender  -BE    SC Joint  Right:;Tender  -BE    Long Head of Biceps  Bilateral:;Tender  -BE    Pect Minor  Right:;Tender;Guarded/taut  -BE    Upper Trap  Right:;Tender;Bilateral:;Guarded/taut  -BE    Levator Scapula  Right:;Tender;Bilateral:;Guarded/taut  -BE       General ROM    RT Upper Ext  Rt Shoulder ABduction;Rt Shoulder Flexion;Rt Shoulder External Rotation;Rt Shoulder Internal Rotation  -BE    LT Upper Ext  Lt Shoulder ABduction;Lt Shoulder Flexion;Lt Shoulder External Rotation;Lt Shoulder Internal Rotation  -BE        Right Upper Ext    Rt Shoulder Abduction AROM  100  -BE    Rt Shoulder Flexion AROM  136  -BE    Rt Shoulder External Rotation AROM  58  -BE    Rt Shoulder Internal Rotation AROM  75  -BE       Left Upper Ext    Lt Shoulder Abduction AROM  120  -BE    Lt Shoulder Flexion AROM  153  -BE    Lt Shoulder External Rotation AROM  48  -BE    Lt Shoulder Internal Rotation AROM  50  -BE       MMT (Manual Muscle Testing)    Rt Upper Ext  Rt Shoulder Flexion;Rt Shoulder ABduction;Rt Shoulder Internal Rotation;Rt Shoulder External Rotation  -BE    Lt Upper Ext  Lt Shoulder Flexion;Lt Shoulder ABduction;Lt Shoulder Internal Rotation;Lt Shoulder External Rotation  -BE       MMT Right Upper Ext    Rt Shoulder Flexion MMT, Gross Movement  (3/5) fair  -BE    Rt Shoulder ABduction MMT, Gross Movement  (3/5) fair  -BE    Rt Shoulder Internal Rotation MMT, Gross Movement  (3/5) fair  -BE    Rt Shoulder External Rotation MMT, Gross Movement  (3/5) fair  -BE    Rt Upper Extremity Comments   MMT assessed in available ROM  -BE       MMT Left Upper Ext    Lt Shoulder Flexion MMT, Gross Movement  (4-/5) good minus  -BE    Lt Shoulder ABduction MMT, Gross Movement  (4-/5) good minus  -BE    Lt Shoulder Internal Rotation MMT, Gross Movement  (4-/5) good minus  -BE    Lt Shoulder External Rotation MMT, Gross Movement  (4-/5) good minus  -BE    Lt Upper Extremity Comments   MMT assessed in available ROM  -BE      User Key  (r) = Recorded By, (t) = Taken By, (c) = Cosigned By    Initials Name Provider Type    BE Christen Mon, PT Physical Therapist                      Therapy Education  Given: HEP, Symptoms/condition management, Posture/body mechanics, Pain management  Program: Reinforced  How Provided: Verbal, Demonstration  Provided to: Patient  Level of Understanding: Verbalized, Demonstrated     PT OP Goals     Row Name 02/05/19 1630 02/05/19 1600       PT Short Term Goals    STG Date to Achieve  —  02/15/19  -BE    STG 1  —   Patient will be instructed in HEP for improved independence.  -BE    STG 1 Progress  —  Ongoing  -BE    STG 2  —  Pt will report less than 55% impairment on the Modified Oswestry for improved independence.  -BE    STG 2 Progress  —  Ongoing  -BE    STG 3  —  Pt will report no more than 7/10 low back pain with daily activities.  -BE    STG 3 Progress  —  Ongoing  -BE    STG 4  —  Shoulder ROM will improve by at least 10 degrees to allow for greater ease with ADLs.  -BE    STG 4 Progress  —  New  -BE    STG 4 Progress Comments  —  goal established on 2/5, due to new order for bilateral shoulder pain.  -BE    STG 5  —  Patient will report shoulder pain no greater than 7/10 when performing self-care activities.  -BE    STG 5 Progress  —  New  -BE    STG 5 Progress Comments  —  goal established on 2/5, due to new order for bilateral shoulder pain  -BE       Long Term Goals    LTG 1  —  Pt will report 4+/5 bilateral lower extremity strength for improved functional independence.  -BE    LTG 1 Progress  —  Ongoing  -BE    LTG 2  —  Pt will demonstrate 75% lumbar ROM for improved function.  -BE    LTG 2 Progress  —  Ongoing  -BE    LTG 3  —  Pt will report less than 50% impairment on the Modified Oswestry for improved function.  -BE    LTG 3 Progress  —  Ongoing  -BE    LTG 4  —  Pt will report a maximum of 5/10 low back pain with daily and household activities.  -BE    LTG 4 Progress  —  Ongoing  -BE    LTG 5  —  Patient will report shoulder pain no greater than 4/10 when carrying groceries.  -BE    LTG 5 Progress  —  New  -BE    LTG 5 Progress Comments  —  goal established on 2/5, due to new order for bilateral shoulder pain  -BE    LTG 6  —  Shoulder flexion/abduction ROM will improve to at least 165 to allow for greater ease with ADLs.  -BE    LTG 6 Progress  —  Ongoing  -BE    LTG 6 Progress Comments  —  goal established on 2/5, due to new order for bilateral shoulder pain  -BE    LTG 7  —  UE strength will improve to  at least 4/5 to prevent reinjury.  -BE    LTG 7 Progress  —  New  -BE    LTG 7 Progress Comments  —  goal established on 2/5, due to new order for bilateral shoulder pain  -BE    LTG 8  —  QuickDash will improve by at least 10% to show improved functional mobility.  -BE    LTG 8 Progress  —  New  -BE    LTG 8 Progress Comments  —  goal established on 2/5, due to new order for bilateral shoulder pain  -BE       Time Calculation    PT Goal Re-Cert Due Date  03/07/19  -BE  —      User Key  (r) = Recorded By, (t) = Taken By, (c) = Cosigned By    Initials Name Provider Type    BE Christen Mon PT Physical Therapist          PT Assessment/Plan     Row Name 02/05/19 1634          PT Assessment    Functional Limitations  Decreased safety during functional activities;Limitation in home management;Limitations in community activities;Performance in work activities;Performance in self-care ADL;Performance in leisure activities;Limitations in functional capacity and performance  -BE     Impairments  Balance;Gait;Range of motion;Sensation;Muscle strength;Pain;Poor body mechanics;Posture;Joint mobility;Joint integrity  -BE     Assessment Comments  Patient presents to therapy with new order for bilateral shoulder pain; patient was previously evaluated on 2/1/2019 for low back pain.  Patient displays decreased shoulder ROM, decreased UE strength, and increased shoulder pain; lumbar ROM and LE strength deficits continue to be present.  Patient reports an 88.64% functional mobility impairment for the shoulders, based on his response to the QuickDash.  Patient will benefit from skilled PT for treatment of bilateral shoulder pain and low back pain.  -BE     Please refer to paper survey for additional self-reported information  Yes  -BE     Rehab Potential  Good  -BE     Patient/caregiver participated in establishment of treatment plan and goals  Yes  -BE     Patient would benefit from skilled therapy intervention  Yes  -BE         PT Plan    PT Frequency  2x/week  -BE     Predicted Duration of Therapy Intervention (Therapy Eval)  4 weeks  -BE     Planned CPT's?  PT RE-EVAL: 70223;PT THER PROC EA 15 MIN: 57017;PT THER ACT EA 15 MIN: 25431;PT MANUAL THERAPY EA 15 MIN: 39623;PT NEUROMUSC RE-EDUCATION EA 15 MIN: 44009;PT GAIT TRAINING EA 15 MIN: 90960;PT HOT OR COLD PACK TREAT MCARE;PT ELECTRICAL STIM UNATTEND: ;PT ELECTRICAL STIM ATTD EA 15 MIN: 59379;PT ULTRASOUND EA 15 MIN: 23231;PT IONTOPHORESIS EA 15 MIN: 79115;PT THER SUPP EA 15 MIN  -BE     PT Plan Comments  Progress per patient's tolerance and POC.  -BE       User Key  (r) = Recorded By, (t) = Taken By, (c) = Cosigned By    Initials Name Provider Type    BE Christen Mon, PT Physical Therapist          Modalities     Row Name 02/05/19 1600             Moist Heat    MH Applied  Yes no redness following MH  -BE      Location  Low back  -BE      Rx Minutes  15 mins  -BE      MH Prior to Rx  Yes with ESTIM in seated position  -BE         Ultrasound 53276    Location  Right shoulder no skin irritation following US  -BE      Duty Cycle  50  -BE      Frequency  — 3.3 MHz  -BE      Intensity - Wts/cm  1.2  -BE      53297 - PT Ultrasound Minutes  8  -BE         ELECTRICAL STIMULATION    Attended/Unattended  Unattended no skin irritation following ESTIM  -BE      Stimulation Type  IFC  -BE      Max mAmp  — per patient's tolerance  -BE      Location/Electrode Placement/Other  Lumbar  -BE       PT E-Stim Unattended (Manual) Minutes  15  -BE        User Key  (r) = Recorded By, (t) = Taken By, (c) = Cosigned By    Initials Name Provider Type    BE Christen Mon, PT Physical Therapist        Exercises     Row Name 02/05/19 1600             Subjective Comments    Subjective Comments  Patient reports 5/10 pain today.  He notes that his right shoulder is bothering him the most.  -BE         Subjective Pain    Able to rate subjective pain?  yes  -BE      Pre-Treatment Pain Level  5   "-BE      Post-Treatment Pain Level  2  -BE         Total Minutes    66457 - PT Therapeutic Exercise Minutes  35  -BE         Exercise 1    Exercise Name 1  Seated march 2x10, LAQ 2x10, Ball squeeze 2x10, Hip abduction with RTB 2x10, Scap squeeze 15x, Table slides (flex) 2x10-bilateral, Walkaways 10x, LTR 2x10, SKTC 3x20\", Piriformist stretch 3x20\", Pulleys (flex) 2 min  -BE      Cueing 1  Verbal;Tactile;Demo;Auditory  -BE      Time 1  35 min  -BE        User Key  (r) = Recorded By, (t) = Taken By, (c) = Cosigned By    Initials Name Provider Type    BE Christen Mon, PT Physical Therapist                        Outcome Measure Options: Quick DASH  Quick DASH  Open a tight or new jar.: Unable  Do heavy household chores (e.g., wash walls, wash floors): Severe Difficulty  Carry a shopping bag or briefcase: Severe Difficulty  Wash your back: Unable  Use a knife to cut food: Severe Difficulty  Recreational activities in which you take some force or impact through your arm, should or hand (e.g. golf, hammering, tennis, etc.): Unable  During the past week, to what extent has your arm, shoulder, or hand problem interfered with your normal social activites with family, friends, neighbors or groups?: Extremely  During the past week, were you limited in your work or other regular daily activities as a result of your arm, shoulder or hand problem?: Very limited  Arm, Shoulder, or hand pain: Extreme  Tingling (pins and needles) in your arm, shoulder, or hand: Extreme  During the past week, how much difficulty have you had sleeping because of the pain in your arm, shoulder or hand?: Severe Difficulty  Number of Questions Answered: 11  Quick DASH Score: 88.64         Time Calculation:     Therapy Suggested Charges     Code   Minutes Charges    86220 (CPT®) Hc Pt Neuromusc Re Education Ea 15 Min      93870 (CPT®) Hc Pt Ther Proc Ea 15 Min 35 2    19695 (CPT®) Hc Gait Training Ea 15 Min      44645 (CPT®) Hc Pt Therapeutic Act " Ea 15 Min      19876 (CPT®) Hc Pt Manual Therapy Ea 15 Min      18330 (CPT®) Hc Pt Ther Massage- Per 15 Min      83792 (CPT®) Hc Pt Iontophoresis Ea 15 Min      72735 (CPT®) Hc Pt Elec Stim Ea-Per 15 Min      96030 (CPT®) Hc Pt Ultrasound Ea 15 Min 8 1    10733 (CPT®) Hc Pt Self Care/Mgmt/Train Ea 15 Min      61284 (CPT®) Hc Pt Prosthetic (S) Train Initial Encounter, Each 15 Min      59743 (CPT®) Hc Orthotic(S) Mgmt/Train Initial Encounter, Each 15min      68551 (CPT®) Hc Pt Aquatic Therapy Ea 15 Min      06038 (CPT®) Hc Pt Orthotic(S)/Prosthetic(S) Encounter, Each 15 Min       (CPT®) Hc Pt Electrical Stim Unattended 15 1    Total  58 4          Start Time: 1355  Stop Time: 1513  Time Calculation (min): 78 min     Therapy Charges for Today     Code Description Service Date Service Provider Modifiers Qty    59155427267 HC PT THER PROC EA 15 MIN 2/5/2019 Christen Mon, PT GP 2    80016068553 HC PT ULTRASOUND EA 15 MIN 2/5/2019 Christen Mon, PT GP 1    01049654783 HC PT ELECTRICAL STIM UNATTENDED 2/5/2019 Christen Mon, PT  1          PT G-Codes  Outcome Measure Options: Quick DASH  Quick DASH Score: 88.64         Christen Mon, PT  2/5/2019

## 2019-02-08 ENCOUNTER — HOSPITAL ENCOUNTER (OUTPATIENT)
Dept: PHYSICAL THERAPY | Facility: HOSPITAL | Age: 69
Setting detail: THERAPIES SERIES
Discharge: HOME | End: 2019-02-08

## 2019-02-08 DIAGNOSIS — M25.512 CHRONIC LEFT SHOULDER PAIN: ICD-10-CM

## 2019-02-08 DIAGNOSIS — M25.511 CHRONIC RIGHT SHOULDER PAIN: ICD-10-CM

## 2019-02-08 DIAGNOSIS — G89.29 CHRONIC LEFT SHOULDER PAIN: ICD-10-CM

## 2019-02-08 DIAGNOSIS — G89.29 CHRONIC RIGHT SHOULDER PAIN: ICD-10-CM

## 2019-02-08 DIAGNOSIS — M54.5 LOW BACK PAIN, UNSPECIFIED BACK PAIN LATERALITY, UNSPECIFIED CHRONICITY, WITH SCIATICA PRESENCE UNSPECIFIED: Primary | ICD-10-CM

## 2019-02-08 PROCEDURE — 97035 APP MDLTY 1+ULTRASOUND EA 15: CPT

## 2019-02-08 PROCEDURE — G0283 ELEC STIM OTHER THAN WOUND: HCPCS

## 2019-02-08 PROCEDURE — 97110 THERAPEUTIC EXERCISES: CPT

## 2019-02-08 NOTE — THERAPY TREATMENT NOTE
Outpatient Physical Therapy Ortho Treatment Note  [facility]      Patient Name: Alexander Zamora  : 1950  MRN: [id number]  Today's Date: 2019      Visit Date: 2019    Visit Dx:    ICD-10-CM ICD-9-CM   1. Low back pain, unspecified back pain laterality, unspecified chronicity, with sciatica presence unspecified M54.5 724.2   2. Chronic right shoulder pain M25.511 719.41    G89.29 338.29   3. 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  Pt states he experienced increased soreness following previous session.   -MARLEN       Subjective Pain    Able to rate subjective pain?  yes  -MARLEN    Pre-Treatment Pain Level  8  -MARLEN    Post-Treatment Pain Level  4  -MARLEN    Row Name 19 1600       Subjective Comments    Subjective Comments  Patient reports 5/10 pain today.  He notes that his right shoulder is bothering him the most.  -BE       Subjective Pain    Able to rate subjective pain?  yes  -BE    Pre-Treatment Pain Level  5  -BE    Post-Treatment Pain Level  2  -BE      User Key  (r) = Recorded By, (t) = Taken By, (c) = Cosigned By    Initials Name Provider Type    Wendy Barbosa PTA  Physical Therapy Assistant    Christen Kenyon, PT Physical Therapist                      PT Assessment/Plan     Row Name 02/08/19 1359          PT Assessment    Assessment Comments  Patient responded well to today's session w/ reports of decreased pain following, 4/10.  Pt received MH to bilateral shoulder and low back, estim to low back for pain control f/b therex as listed w/ focus on improved ROM to involved areas, lumbar stability and postural awareness.  Treatment concluded with continuous US.  Biofreeze utilized w/ US.  Pt continues to progress as tolerated to address goals, and achieve maximum level of function.  No complaints or adverse reactions observed following tx.   -MARLEN        PT Plan    PT Plan Comments  Continue with PT's POC and progress tx as tolerated by patient.   -MARLEN       User Key  (r) = Recorded By, (t) = Taken By, (c) = Cosigned By    Initials Name Provider Type    Wendy Barbosa PTA Physical Therapy Assistant          Modalities     Row Name 02/08/19 1200             Moist Heat    MH Applied  Yes no redness observed following MH  -MARLEN      Location  low back, bilateral shoulders  -MARLEN      Rx Minutes  15 mins  -MARLEN      MH Prior to Rx  Yes w/ estim to low back in seated position  -MARLEN         Ultrasound 56951    Location  Right shoulder no skin irritation observed followin g  -MARLEN      Duty Cycle  100  -MARLEN      Frequency  -- 3.3MHz  -MARLEN      Intensity - Wts/cm  1.2  -MARLEN      36144 - PT Ultrasound Minutes  8  -MARLEN         ELECTRICAL STIMULATION    Attended/Unattended  Unattended no skin irritation observed following estim  -MARLEN      Stimulation Type  IFC  -MARLEN      Max mAmp  -- as to pt's tolerance  -MARLEN      Location/Electrode Placement/Other  Lumbar  -MARLEN       PT E-Stim Unattended (Manual) Minutes  15  -MARLEN        User Key  (r) = Recorded By, (t) = Taken By, (c) = Cosigned By    Initials Name Provider Type    Wendy Barbosa PTA Physical Therapy Assistant     "      Exercises     Row Name 02/08/19 1200             Subjective Comments    Subjective Comments  Patient arrives to therapy w/ reports of 8/10 bilateral shoulder and low back pain.  Pt states he experienced increased soreness following previous session.   -MARLEN         Subjective Pain    Able to rate subjective pain?  yes  -MARLEN      Pre-Treatment Pain Level  8  -MARLEN      Post-Treatment Pain Level  4  -MARLEN         Total Minutes    09600 - PT Therapeutic Exercise Minutes  35  -MARLEN         Exercise 1    Exercise Name 1  LTR 10x2, SKTC 3x20\", piriformis stretch 3x20\", PPT 10x2, SAQ 10x2, step ups 6\" 10x2, walk aways x10, table slides (flex) 10x2, scap squeeze 10x2, clams (sidelying) x15  -MARLEN      Cueing 1  Tactile;Verbal;Demo  -MARLEN      Time 1  35 min  -MARLEN        User Key  (r) = Recorded By, (t) = Taken By, (c) = Cosigned By    Initials Name Provider Type    Wendy Barbosa, PTA Physical Therapy Assistant                             Therapy Education  Given: HEP, Symptoms/condition management, Pain management, Posture/body mechanics  Program: Reinforced  How Provided: Verbal, Demonstration  Provided to: Patient  Level of Understanding: Verbalized, Demonstrated, Teach back education performed              Time Calculation:   Start Time: 1250  Stop Time: 1351  Time Calculation (min): 61 min  Therapy Suggested Charges     Code   Minutes Charges    25699 (CPT®) Hc Pt Neuromusc Re Education Ea 15 Min      71809 (CPT®) Hc Pt Ther Proc Ea 15 Min 35 2    59181 (CPT®) Hc Gait Training Ea 15 Min      09786 (CPT®) Hc Pt Therapeutic Act Ea 15 Min      09788 (CPT®) Hc Pt Manual Therapy Ea 15 Min      77391 (CPT®) Hc Pt Ther Massage- Per 15 Min      31419 (CPT®) Hc Pt Iontophoresis Ea 15 Min      09542 (CPT®) Hc Pt Elec Stim Ea-Per 15 Min      52858 (CPT®) Hc Pt Ultrasound Ea 15 Min 8 1    76797 (CPT®) Hc Pt Self Care/Mgmt/Train Ea 15 Min      28168 (CPT®) Hc Pt Prosthetic (S) Train Initial Encounter, Each 15 Min      39260 " (CPT®) Hc Orthotic(S) Mgmt/Train Initial Encounter, Each 15min      92704 (CPT®) Hc Pt Aquatic Therapy Ea 15 Min      58288 (CPT®) Hc Pt Orthotic(S)/Prosthetic(S) Encounter, Each 15 Min       (CPT®) Hc Pt Electrical Stim Unattended 15 1    Total  58 4        Therapy Charges for Today     Code Description Service Date Service Provider Modifiers Qty    27132904025 HC PT THER PROC EA 15 MIN 2/8/2019 Wendy Ward, PTA GP 2    78934486782 HC PT ULTRASOUND EA 15 MIN 2/8/2019 Wendy Ward, PTA GP 1    04880991182 HC PT ELECTRICAL STIM UNATTENDED 2/8/2019 Wendy Ward, PTA  1                    Wendy Mcgee. OMID Ward  2/8/2019

## 2019-02-12 ENCOUNTER — HOSPITAL ENCOUNTER (OUTPATIENT)
Dept: PHYSICAL THERAPY | Facility: HOSPITAL | Age: 69
Setting detail: THERAPIES SERIES
Discharge: HOME | End: 2019-02-12

## 2019-02-12 DIAGNOSIS — M25.512 CHRONIC LEFT SHOULDER PAIN: ICD-10-CM

## 2019-02-12 DIAGNOSIS — M25.511 CHRONIC RIGHT SHOULDER PAIN: ICD-10-CM

## 2019-02-12 DIAGNOSIS — G89.29 CHRONIC LEFT SHOULDER PAIN: ICD-10-CM

## 2019-02-12 DIAGNOSIS — M54.5 LOW BACK PAIN, UNSPECIFIED BACK PAIN LATERALITY, UNSPECIFIED CHRONICITY, WITH SCIATICA PRESENCE UNSPECIFIED: Primary | ICD-10-CM

## 2019-02-12 DIAGNOSIS — G89.29 CHRONIC RIGHT SHOULDER PAIN: ICD-10-CM

## 2019-02-12 PROCEDURE — 97110 THERAPEUTIC EXERCISES: CPT | Performed by: PHYSICAL THERAPIST

## 2019-02-12 PROCEDURE — 97035 APP MDLTY 1+ULTRASOUND EA 15: CPT | Performed by: PHYSICAL THERAPIST

## 2019-02-12 PROCEDURE — G0283 ELEC STIM OTHER THAN WOUND: HCPCS | Performed by: PHYSICAL THERAPIST

## 2019-02-12 NOTE — THERAPY TREATMENT NOTE
Outpatient Physical Therapy Ortho Treatment Note   [facility]     Patient Name: Alexander Zamora  : 1950  MRN: [id number]  Today's Date: 2019      Visit Date: 2019    Visit Dx:    ICD-10-CM ICD-9-CM   1. Low back pain, unspecified back pain laterality, unspecified chronicity, with sciatica presence unspecified M54.5 724.2   2. Chronic right shoulder pain M25.511 719.41    G89.29 338.29   3. Chronic left shoulder pain M25.512 719.41    G89.29 338.29       Patient Active Problem List   Diagnosis   • Prostate cancer (CMS/HCC)   • Erectile dysfunction following radical prostatectomy        Past Medical History:   Diagnosis Date   • Cancer (CMS/HCC) 2015    Postrate   • Depression    • Elevated prostate specific antigen (PSA)    • Erectile dysfunction    • Hypercholesterolemia    • Hypercholesterolemia    • Prostatitis         Past Surgical History:   Procedure Laterality Date   • HEMORRHOIDECTOMY     • OTHER SURGICAL HISTORY      destruction of hemorrhoids, Rubber band ligatioin of hemmorrhoids   • OTHER SURGICAL HISTORY      surgery for an ulcer, PNEUMOTHORAX AND ULCER THERAPY       PT Ortho     Row Name 19 1500       Subjective Comments    Subjective Comments  Pt arrived to today's session with reports of 7/10 low back and bilateral shoulder pain.  -AD       Subjective Pain    Able to rate subjective pain?  yes  -AD    Pre-Treatment Pain Level  7  -AD    Post-Treatment Pain Level  3  -AD      User Key  (r) = Recorded By, (t) = Taken By, (c) = Cosigned By    Initials Name Provider Type    AD Dalton, Ashley Claudene, PT Physical Therapist                      PT Assessment/Plan     Row Name 19 1539          PT Assessment    Assessment Comments  Today's session was initiated with moist heat to the lumbar and cervical region, combined with IFC to the lumbar region. Therapeutic exercises were then performed to address cervical and lumbar stability and ROM, as well as bilateral upper and lower  extremity strength. Tactile and verbal cues were required for proper form. The session concluded with therapeutic ultrasound to bilateral lumbar paraspinals in the right sidelying position. No skin irritation was observed following modalities. He will be progressed as tolerated.  -AD        PT Plan    PT Plan Comments  Progress as tolerated per POC.  -AD       User Key  (r) = Recorded By, (t) = Taken By, (c) = Cosigned By    Initials Name Provider Type    AD Dalton, Ashley Claudene, PT Physical Therapist          Modalities     Row Name 02/12/19 1500             Moist Heat    MH Applied  Yes With IFC to lumbar region, no skin irritation observed.  -AD      Location  low back, bilateral shoulders  -AD      Rx Minutes  15 mins  -AD      MH Prior to Rx  Yes w/ estim to low back in seated position  -AD         Ultrasound 24627    Location  Lumbar paraspinals No skin irritation observed.  -AD      Duty Cycle  100  -AD      Intensity - Wts/cm  1.2  -AD      15386 - PT Ultrasound Minutes  8  -AD         ELECTRICAL STIMULATION    Attended/Unattended  Unattended With MH, no skin irritation observed following estim  -AD      Stimulation Type  IFC  -AD      Max mAmp  -- as to pt's tolerance  -AD      Location/Electrode Placement/Other  Lumbar  -AD       PT E-Stim Unattended (Manual) Minutes  15  -AD        User Key  (r) = Recorded By, (t) = Taken By, (c) = Cosigned By    Initials Name Provider Type    AD Dalton, Ashley Claudene, PT Physical Therapist          Exercises     Row Name 02/12/19 1500             Subjective Comments    Subjective Comments  Pt arrived to today's session with reports of 7/10 low back and bilateral shoulder pain.  -AD         Subjective Pain    Able to rate subjective pain?  yes  -AD      Pre-Treatment Pain Level  7  -AD      Post-Treatment Pain Level  3  -AD         Total Minutes    28046 - PT Therapeutic Exercise Minutes  32  -AD         Exercise 1    Exercise Name 1  seated march, supine praful  SLR, supine ball squeeze, scapular squeeze, LAQ, LTR, supine march, SKTC, piriformis stretch, SAQ.  -AD      Cueing 1  Verbal;Tactile;Demo  -AD      Time 1  32 minutes  -AD        User Key  (r) = Recorded By, (t) = Taken By, (c) = Cosigned By    Initials Name Provider Type    AD Dalton, Ashley Claudene, PT Physical Therapist                             Therapy Education  Given: HEP, Symptoms/condition management, Pain management, Posture/body mechanics  Program: Reinforced  How Provided: Verbal, Demonstration  Provided to: Patient  Level of Understanding: Verbalized, Demonstrated, Teach back education performed              Time Calculation:   Start Time: 1350  Stop Time: 1451  Time Calculation (min): 61 min  Therapy Suggested Charges     Code   Minutes Charges    46151 (CPT®) Hc Pt Neuromusc Re Education Ea 15 Min      88825 (CPT®) Hc Pt Ther Proc Ea 15 Min 32 2    50349 (CPT®) Hc Gait Training Ea 15 Min      22451 (CPT®) Hc Pt Therapeutic Act Ea 15 Min      25751 (CPT®) Hc Pt Manual Therapy Ea 15 Min      24096 (CPT®) Hc Pt Ther Massage- Per 15 Min      35909 (CPT®) Hc Pt Iontophoresis Ea 15 Min      35385 (CPT®) Hc Pt Elec Stim Ea-Per 15 Min      32852 (CPT®) Hc Pt Ultrasound Ea 15 Min 8 1    20891 (CPT®) Hc Pt Self Care/Mgmt/Train Ea 15 Min      26812 (CPT®) Hc Pt Prosthetic (S) Train Initial Encounter, Each 15 Min      73969 (CPT®) Hc Orthotic(S) Mgmt/Train Initial Encounter, Each 15min      20784 (CPT®) Hc Pt Aquatic Therapy Ea 15 Min      54416 (CPT®) Hc Pt Orthotic(S)/Prosthetic(S) Encounter, Each 15 Min       (CPT®) Hc Pt Electrical Stim Unattended 15 1    Total  55 4        Therapy Charges for Today     Code Description Service Date Service Provider Modifiers Qty    42137214351 HC PT THER PROC EA 15 MIN 2/12/2019 Dalton, Ashley Claudene, PT GP 2    40744301841 HC PT ULTRASOUND EA 15 MIN 2/12/2019 Dalton, Ashley Claudene, PT GP 1    50172665908 HC PT ELECTRICAL STIM UNATTENDED 2/12/2019 Angelica Bender  Claudene, PT  1                    Ashley Claudene Dalton, PT  2/12/2019

## 2019-02-18 ENCOUNTER — TRANSCRIBE ORDERS (OUTPATIENT)
Dept: PHYSICAL THERAPY | Facility: HOSPITAL | Age: 69
End: 2019-02-18

## 2019-02-18 DIAGNOSIS — M54.2 CERVICALGIA: ICD-10-CM

## 2019-02-18 DIAGNOSIS — M54.16 LUMBAR RADICULOPATHY: Primary | ICD-10-CM

## 2019-02-19 ENCOUNTER — HOSPITAL ENCOUNTER (OUTPATIENT)
Dept: PHYSICAL THERAPY | Facility: HOSPITAL | Age: 69
Setting detail: THERAPIES SERIES
Discharge: HOME | End: 2019-02-19

## 2019-02-19 DIAGNOSIS — M25.511 CHRONIC RIGHT SHOULDER PAIN: ICD-10-CM

## 2019-02-19 DIAGNOSIS — G89.29 CHRONIC RIGHT SHOULDER PAIN: ICD-10-CM

## 2019-02-19 DIAGNOSIS — M54.5 LOW BACK PAIN, UNSPECIFIED BACK PAIN LATERALITY, UNSPECIFIED CHRONICITY, WITH SCIATICA PRESENCE UNSPECIFIED: Primary | ICD-10-CM

## 2019-02-19 DIAGNOSIS — G89.29 CHRONIC LEFT SHOULDER PAIN: ICD-10-CM

## 2019-02-19 DIAGNOSIS — M54.2 CERVICAL PAIN: ICD-10-CM

## 2019-02-19 DIAGNOSIS — M25.512 CHRONIC LEFT SHOULDER PAIN: ICD-10-CM

## 2019-02-19 PROCEDURE — G0283 ELEC STIM OTHER THAN WOUND: HCPCS | Performed by: PHYSICAL THERAPIST

## 2019-02-19 PROCEDURE — 97140 MANUAL THERAPY 1/> REGIONS: CPT | Performed by: PHYSICAL THERAPIST

## 2019-02-19 PROCEDURE — 97110 THERAPEUTIC EXERCISES: CPT | Performed by: PHYSICAL THERAPIST

## 2019-02-19 NOTE — THERAPY RE-EVALUATION
Outpatient Physical Therapy Ortho Re-Assessment  APRIL Vogt     Patient Name: Alexander Zamora  : 1950  MRN: [id number]  Today's Date: 2019      Visit Date: 2019    Patient Active Problem List   Diagnosis   • Prostate cancer (CMS/HCC)   • Erectile dysfunction following radical prostatectomy        Past Medical History:   Diagnosis Date   • Cancer (CMS/HCC) 2015    Postrate   • Depression    • Elevated prostate specific antigen (PSA)    • Erectile dysfunction    • Hypercholesterolemia    • Hypercholesterolemia    • Prostatitis         Past Surgical History:   Procedure Laterality Date   • HEMORRHOIDECTOMY     • OTHER SURGICAL HISTORY      destruction of hemorrhoids, Rubber band ligatioin of hemmorrhoids   • OTHER SURGICAL HISTORY      surgery for an ulcer, PNEUMOTHORAX AND ULCER THERAPY       Visit Dx:     ICD-10-CM ICD-9-CM   1. Low back pain, unspecified back pain laterality, unspecified chronicity, with sciatica presence unspecified M54.5 724.2   2. Chronic right shoulder pain M25.511 719.41    G89.29 338.29   3. Chronic left shoulder pain M25.512 719.41    G89.29 338.29   4. Cervical pain M54.2 723.1           PT Ortho     Row Name 19 1400       Subjective Comments    Subjective Comments  Pt arrives today with new order to add therapy for cervical spine pain as well.  He is currently being treated as well for back pain and shoulder pain bilaterally as well.  He reports his back pain is worst than shoulder or neck at this time.   -AT       Subjective Pain    Subjective Pain Comment  back:  today 6/10, worst 10/10, best 6/10.  Cervical now 4/10, worst 5/10, best 4/10, shoulder now 2/10, worst 9/10, best 2/10  -AT       Posture/Observations    Posture/Observations Comments  forward flexed posture, slight forward head, rounded shoulders  -AT       Sensory Screen for Light Touch- Upper Quarter Clearing    C4 (posterior shoulder)  Intact  -AT    C5 (lateral upper arm)  Intact  -AT    C6 (tip  of thumb)  Intact  -AT    C7 (tip of 3rd finger)  Intact  -AT    C8 (tip of 5th finger)  Intact  -AT    T1 (medial lower arm)  Intact  -AT       Myotomal Screen- Upper Quarter Clearing    Shoulder flexion (C5)  4 (Good);Bilateral:  -AT    Elbow flexion/wrist extension (C6)  Bilateral:;4 (Good)  -AT    Elbow extension/wrist flexion (C7)  Bilateral:;4 (Good)  -AT      -- average 50 pounds left, 40 pounds right   -AT       Cervical/Shoulder ROM Screen    Cervical flexion  -- 35  -AT    Cervical extension  -- 45  -AT    Cervical lateral flexion  -- right 40, left 35  -AT    Cervical rotation  -- bilateral 45 degrees   -AT       Lumbar ROM Screen- Lower Quarter Clearing    Lumbar Flexion  Impaired 50  -AT    Lumbar Extension  Impaired 75  -AT    Lumbar Lateral Flexion  Impaired 75 right 50 left  -AT    Lumbar Rotation  Impaired 75 right 50% left   -AT       Special Tests/Palpation    Special Tests/Palpation  Cervical/Thoracic  -AT       Cervical Palpation    Spinous Process  Tender  -AT    Levator Scapula  Tender  -AT    Upper Traps  Tender  -AT       Cervical/Thoracic Special Tests    Spurlings (Foraminal Compression)  -- increases pain  -AT    Cervical Compression (Forarminal Compression vs. Facet Pain)  -- increases pain  -AT    Cervical Distraction (Foraminal Compression vs. Facet Pain)  -- decreases pain   -AT    Cervical/Thoracic Special Tests Comments  pt reports increases pain with compression and decreases with distraction  Repots radicular sx down right UE with CROM 2x/day  -AT       Right Upper Ext    Rt Shoulder Abduction AROM  130  -AT    Rt Shoulder Flexion AROM  136  -AT       Left Upper Ext    Lt Shoulder Abduction AROM  120  -AT    Lt Shoulder Flexion AROM  155  -AT       MMT Right Upper Ext    Rt Shoulder Flexion MMT, Gross Movement  (4-/5) good minus  -AT    Rt Shoulder ABduction MMT, Gross Movement  (4/5) good  -AT       MMT Left Upper Ext    Lt Shoulder Flexion MMT, Gross Movement  (4-/5) good  minus  -AT    Lt Shoulder ABduction MMT, Gross Movement  (4/5) good  -AT      User Key  (r) = Recorded By, (t) = Taken By, (c) = Cosigned By    Initials Name Provider Type    AT Caryl Costello PT Physical Therapist                            PT OP Goals     Row Name 02/19/19 1400          PT Short Term Goals    STG Date to Achieve  02/15/19  -AT     STG 1  Patient will be instructed in HEP for improved independence.  -AT     STG 1 Progress  Met  -AT     STG 2  Pt will report less than 55% impairment on the Modified Oswestry for improved independence.  -AT     STG 2 Progress  Met  -AT     STG 2 Progress Comments  52%  -AT     STG 3  Pt will report no more than 7/10 low back pain with daily activities.  -AT     STG 3 Progress  Ongoing  -AT     STG 3 Progress Comments  cont complaints of back pain up to 10/10 at times   -AT     STG 4  Shoulder ROM will improve by at least 10 degrees to allow for greater ease with ADLs.  -AT     STG 4 Progress  Ongoing  -AT     STG 4 Progress Comments  no singificant change in flexion, improved abduction   -AT     STG 5  Patient will report shoulder pain no greater than 7/10 when performing self-care activities.  -AT     STG 5 Progress  Ongoing  -AT     STG 5 Progress Comments  cont to report 9/10 shoulder at times   -AT        Long Term Goals    LTG Date to Achieve  07/11/18  -AT     LTG 1  Pt will report 4+/5 bilateral lower extremity strength for improved functional independence.  -AT     LTG 1 Progress  Ongoing  -AT     LTG 1 Progress Comments  4/5   -AT     LTG 2  Pt will demonstrate 75% lumbar ROM for improved function.  -AT     LTG 2 Progress  Ongoing  -AT     LTG 2 Progress Comments  cont 50% with right rotation and lateral flexion and flexion  -AT     LTG 3  Pt will report less than 50% impairment on the Modified Oswestry for improved function.  -AT     LTG 3 Progress  Ongoing  -AT     LTG 3 Progress Comments  cont to have 52%  -AT     LTG 4  Pt will report a maximum  of 5/10 low back pain with daily and household activities.  -AT     LTG 4 Progress  Ongoing  -AT     LTG 5  Patient will report shoulder pain no greater than 4/10 when carrying groceries.  -AT     LTG 5 Progress  Ongoing  -AT     LTG 6  Shoulder flexion/abduction ROM will improve to at least 165 to allow for greater ease with ADLs.  -AT     LTG 6 Progress  Ongoing  -AT     LTG 6 Progress Comments  136  -AT     LTG 7  UE strength will improve to at least 4/5 to prevent reinjury.  -AT     LTG 7 Progress  Ongoing  -AT     LTG 8  [employer] will improve by at least 10% to show improved functional mobility.  -AT     LTG 8 Progress  Ongoing  -AT     LTG 9  Pt will improve cervical ROM by 10 degrees in all planes.  -AT     LTG 9 Progress  New  -AT     LTG 10  Pt will report decerased radicular symptoms by 0% with CROM   -AT     LTG 10 Progress  New  -AT       User Key  (r) = Recorded By, (t) = Taken By, (c) = Cosigned By    Initials Name Provider Type    AT Caryl Costello, PT Physical Therapist          PT Assessment/Plan     Row Name 02/19/19 1434          PT Assessment    Assessment Comments  Pt seen for reassessment as well as evaluation of cervical spine due to cervical pain and new order to add this to POC.  He reports radicular sx 2x per day with cervical ROM at times with increase in pain with compression and decreased pain with distraction.  He presents with decreased CROM and UE strength, decreased lumbar and shoulder rom and strength, radicular sx and increased pain.  He will benefit from cont skilled PT services to reach max functional level.   -AT     Rehab Potential  Good  -AT     Patient would benefit from skilled therapy intervention  Yes  -AT        PT Plan    PT Frequency  2x/week  -AT     Predicted Duration of Therapy Intervention (Therapy Eval)  4 weeks   -AT     Planned CPT's?  PT RE-EVAL: 33902;PT MANUAL THERAPY EA 15 MIN: 05248;PT THER PROC EA 15 MIN: 34921;PT NEUROMUSC RE-EDUCATION EA 15  MIN: 97133;PT THER ACT EA 15 MIN: 34684;PT GAIT TRAINING EA 15 MIN: 10973;PT ELECTRICAL STIM UNATTEND: ;PT TRACTION CERVICAL: 58835;PT TRACTION LUMBAR: 48872;PT HOT OR COLD PACK TREAT MCARE;PT ULTRASOUND EA 15 MIN: 34448  -AT     Physical Therapy Interventions (Optional Details)  gait training;neuromuscular re-education;swiss ball techniques;stretching;strengthening;motor coordination training;modalities;stair training;ROM (Range of Motion);manual therapy techniques;postural re-education;joint mobilization;lumbar stabilization;home exercise program;taping;transfer training;patient/family education;balance training  -AT     PT Plan Comments  Pt will benefit from continued skilled PT services to reach max functional level.   -AT       User Key  (r) = Recorded By, (t) = Taken By, (c) = Cosigned By    Initials Name Provider Type    AT Caryl Costello, PT Physical Therapist          Modalities     Row Name 02/19/19 1400             Moist Heat    MH Applied  Yes  -AT      Location  low back, bilateral shoulders  -AT      Rx Minutes  15 mins  -AT      MH Prior to Rx  Yes  -AT         ELECTRICAL STIMULATION    Attended/Unattended  Unattended  -AT      Stimulation Type  IFC  -AT      Location/Electrode Placement/Other  Lumbar  -AT       PT E-Stim Unattended (Manual) Minutes  15  -AT        User Key  (r) = Recorded By, (t) = Taken By, (c) = Cosigned By    Initials Name Provider Type    AT Caryl Costello, PT Physical Therapist        Exercises     Row Name 02/19/19 1400             Subjective Comments    Subjective Comments  Pt arrives today with new order to add therapy for cervical spine pain as well.  He is currently being treated as well for back pain and shoulder pain bilaterally as well.  He reports his back pain is worst than shoulder or neck at this time.   -AT         Subjective Pain    Subjective Pain Comment  back:  today 6/10, worst 10/10, best 6/10.  Cervical now 4/10, worst 5/10, best  4/10, shoulder now 2/10, worst 9/10, best 2/10  -AT        User Key  (r) = Recorded By, (t) = Taken By, (c) = Cosigned By    Initials Name Provider Type    AT Caryl Costello PT Physical Therapist           Manual Rx (last 36 hours)      Manual Treatments     Row Name 02/19/19 1300             Manual Rx 1    Manual Rx 1 Location  STM to bilateral UT musculature   -AT      Manual Rx 1 Type  to tolerance   -AT      Manual Rx 1 Duration  10  -AT        User Key  (r) = Recorded By, (t) = Taken By, (c) = Cosigned By    Initials Name Provider Type    AT Caryl Costello PT Physical Therapist                                Time Calculation:     Therapy Suggested Charges     Code   Minutes Charges    07053 (CPT®) Hc Pt Neuromusc Re Education Ea 15 Min      23593 (CPT®) Hc Pt Ther Proc Ea 15 Min      81622 (CPT®) Hc Gait Training Ea 15 Min      20276 (CPT®) Hc Pt Therapeutic Act Ea 15 Min      22986 (CPT®) Hc Pt Manual Therapy Ea 15 Min      08233 (CPT®) Hc Pt Ther Massage- Per 15 Min      37352 (CPT®) Hc Pt Iontophoresis Ea 15 Min      88535 (CPT®) Hc Pt Elec Stim Ea-Per 15 Min      01993 (CPT®) Hc Pt Ultrasound Ea 15 Min      60532 (CPT®) Hc Pt Self Care/Mgmt/Train Ea 15 Min      81965 (CPT®) Hc Pt Prosthetic (S) Train Initial Encounter, Each 15 Min      68249 (CPT®) Hc Orthotic(S) Mgmt/Train Initial Encounter, Each 15min      96396 (CPT®) Hc Pt Aquatic Therapy Ea 15 Min      63958 (CPT®) Hc Pt Orthotic(S)/Prosthetic(S) Encounter, Each 15 Min       (CPT®) Hc Pt Electrical Stim Unattended 15 1    Total  15 1          Start Time: 1400  Stop Time: 1500  Time Calculation (min): 60 min     Therapy Charges for Today     Code Description Service Date Service Provider Modifiers Qty    15823034508 HC PT THER PROC EA 15 MIN 2/19/2019 Caryl Costello, PT GP 2    95147047575 HC PT ELECTRICAL STIM UNATTENDED 2/19/2019 Caryl Costello, PT  1    23039762152 HC PT MANUAL THERAPY EA 15 MIN  2/19/2019 Caryl Costello, PT GP 1                    Caryl Costello, PT  2/19/2019

## 2019-02-22 ENCOUNTER — HOSPITAL ENCOUNTER (OUTPATIENT)
Dept: PHYSICAL THERAPY | Facility: HOSPITAL | Age: 69
Setting detail: THERAPIES SERIES
Discharge: HOME | End: 2019-02-22

## 2019-02-22 DIAGNOSIS — G89.29 CHRONIC RIGHT SHOULDER PAIN: ICD-10-CM

## 2019-02-22 DIAGNOSIS — M54.5 LOW BACK PAIN, UNSPECIFIED BACK PAIN LATERALITY, UNSPECIFIED CHRONICITY, WITH SCIATICA PRESENCE UNSPECIFIED: Primary | ICD-10-CM

## 2019-02-22 DIAGNOSIS — M54.2 CERVICAL PAIN: ICD-10-CM

## 2019-02-22 DIAGNOSIS — G89.29 CHRONIC LEFT SHOULDER PAIN: ICD-10-CM

## 2019-02-22 DIAGNOSIS — M25.511 CHRONIC RIGHT SHOULDER PAIN: ICD-10-CM

## 2019-02-22 DIAGNOSIS — M25.512 CHRONIC LEFT SHOULDER PAIN: ICD-10-CM

## 2019-02-22 PROCEDURE — G0283 ELEC STIM OTHER THAN WOUND: HCPCS

## 2019-02-22 PROCEDURE — 97140 MANUAL THERAPY 1/> REGIONS: CPT

## 2019-02-22 PROCEDURE — 97110 THERAPEUTIC EXERCISES: CPT

## 2019-02-22 NOTE — THERAPY TREATMENT NOTE
Outpatient Physical Therapy Ortho Treatment Note   [place]     Patient Name: Alexander Zamora  : 1950  MRN: [id number]  Today's Date: 2019      Visit Date: 2019    Visit Dx:    ICD-10-CM ICD-9-CM   1. Low back pain, unspecified back pain laterality, unspecified chronicity, with sciatica presence unspecified M54.5 724.2   2. Chronic right shoulder pain M25.511 719.41    G89.29 338.29   3. Chronic left shoulder pain M25.512 719.41    G89.29 338.29   4. Cervical pain M54.2 723.1       Patient Active Problem List   Diagnosis   • Prostate cancer (CMS/HCC)   • Erectile dysfunction following radical prostatectomy        Past Medical History:   Diagnosis Date   • Cancer (CMS/HCC)     Postrate   • Depression    • Elevated prostate specific antigen (PSA)    • Erectile dysfunction    • Hypercholesterolemia    • Hypercholesterolemia    • Prostatitis         Past Surgical History:   Procedure Laterality Date   • HEMORRHOIDECTOMY     • OTHER SURGICAL HISTORY      destruction of hemorrhoids, Rubber band ligatioin of hemmorrhoids   • OTHER SURGICAL HISTORY      surgery for an ulcer, PNEUMOTHORAX AND ULCER THERAPY       PT Ortho     Row Name 19 1100       Subjective Comments    Subjective Comments  Patient states he tolerated previous session well w/ no complaints.  Pt states his neck is feeling some better.  Pt reports 5/10 pain prior to today's tx.   -MARLEN       Subjective Pain    Able to rate subjective pain?  yes  -MARLEN    Pre-Treatment Pain Level  5  -MARLEN    Post-Treatment Pain Level  1  -MARLEN    Row Name 19 1400       Subjective Comments    Subjective Comments  Pt arrives today with new order to add therapy for cervical spine pain as well.  He is currently being treated as well for back pain and shoulder pain bilaterally as well.  He reports his back pain is worst than shoulder or neck at this time.   -AT       Subjective Pain    Subjective Pain Comment  back:  today 6/10, worst 10/10, best 6/10.   Cervical now 4/10, worst 5/10, best 4/10, shoulder now 2/10, worst 9/10, best 2/10  -AT       Posture/Observations    Posture/Observations Comments  forward flexed posture, slight forward head, rounded shoulders  -AT       Sensory Screen for Light Touch- Upper Quarter Clearing    C4 (posterior shoulder)  Intact  -AT    C5 (lateral upper arm)  Intact  -AT    C6 (tip of thumb)  Intact  -AT    C7 (tip of 3rd finger)  Intact  -AT    C8 (tip of 5th finger)  Intact  -AT    T1 (medial lower arm)  Intact  -AT       Myotomal Screen- Upper Quarter Clearing    Shoulder flexion (C5)  4 (Good);Bilateral:  -AT    Elbow flexion/wrist extension (C6)  Bilateral:;4 (Good)  -AT    Elbow extension/wrist flexion (C7)  Bilateral:;4 (Good)  -AT      -- average 50 pounds left, 40 pounds right   -AT       Cervical/Shoulder ROM Screen    Cervical flexion  -- 35  -AT    Cervical extension  -- 45  -AT    Cervical lateral flexion  -- right 40, left 35  -AT    Cervical rotation  -- bilateral 45 degrees   -AT       Lumbar ROM Screen- Lower Quarter Clearing    Lumbar Flexion  Impaired 50  -AT    Lumbar Extension  Impaired 75  -AT    Lumbar Lateral Flexion  Impaired 75 right 50 left  -AT    Lumbar Rotation  Impaired 75 right 50% left   -AT       Special Tests/Palpation    Special Tests/Palpation  Cervical/Thoracic  -AT       Cervical Palpation    Spinous Process  Tender  -AT    Levator Scapula  Tender  -AT    Upper Traps  Tender  -AT       Cervical/Thoracic Special Tests    Spurlings (Foraminal Compression)  -- increases pain  -AT    Cervical Compression (Forarminal Compression vs. Facet Pain)  -- increases pain  -AT    Cervical Distraction (Foraminal Compression vs. Facet Pain)  -- decreases pain   -AT    Cervical/Thoracic Special Tests Comments  pt reports increases pain with compression and decreases with distraction  Repots radicular sx down right UE with CROM 2x/day  -AT       Right Upper Ext    Rt Shoulder Abduction AROM  130  -AT    Rt  Shoulder Flexion AROM  136  -AT       Left Upper Ext    Lt Shoulder Abduction AROM  120  -AT    Lt Shoulder Flexion AROM  155  -AT       MMT Right Upper Ext    Rt Shoulder Flexion MMT, Gross Movement  (4-/5) good minus  -AT    Rt Shoulder ABduction MMT, Gross Movement  (4/5) good  -AT       MMT Left Upper Ext    Lt Shoulder Flexion MMT, Gross Movement  (4-/5) good minus  -AT    Lt Shoulder ABduction MMT, Gross Movement  (4/5) good  -AT      User Key  (r) = Recorded By, (t) = Taken By, (c) = Cosigned By    Initials Name Provider Type    AT Caryl Costello, PT Physical Therapist    Wendy Barbosa PTA Physical Therapy Assistant                      PT Assessment/Plan     Row Name 02/22/19 1117          PT Assessment    Assessment Comments  Patient tolerated treatment well today w/ reports of decreased pain following session, 1/10.  Additional cervical stretches and range of motion activities added to program w/ good response.  Pt required cues throughout session for improved mechanics and for max benefit w/ activities.  Pt continues to progress as tolerated to address goals, and acheive maximum level of function.  No adverse reactions observed following modalities.   -MARLEN        PT Plan    PT Plan Comments  Continue with PT's POC and progress tx as tolerated by patient.   -MARLEN       User Key  (r) = Recorded By, (t) = Taken By, (c) = Cosigned By    Initials Name Provider Type    Wendy Barbosa PTA Physical Therapy Assistant          Modalities     Row Name 02/22/19 1100             Moist Heat    MH Applied  Yes no redness observed following MH  -MARLEN      Location  lumbar, cervical, bilateral shoulders  -MARLEN      Rx Minutes  15 mins  -MARLEN      MH Prior to Rx  Yes  -MARLEN         ELECTRICAL STIMULATION    Attended/Unattended  Unattended no skin irritation observed following estim  -MARLEN      Stimulation Type  Pre-Mod  -MARLEN      Max mAmp  -- as to pt's tolerance  -MARLEN      Location/Electrode  "Placement/Other  R) UT, R) lower lumbar  -MARLEN       PT E-Stim Unattended (Manual) Minutes  15  -MARLEN        User Key  (r) = Recorded By, (t) = Taken By, (c) = Cosigned By    Initials Name Provider Type    Wendy Barbosa PTA Physical Therapy Assistant          Exercises     Row Name 02/22/19 1100             Subjective Comments    Subjective Comments  Patient states he tolerated previous session well w/ no complaints.  Pt states his neck is feeling some better.  Pt reports 5/10 pain prior to today's tx.   -MARLEN         Subjective Pain    Able to rate subjective pain?  yes  -MARLEN      Pre-Treatment Pain Level  5  -MARLEN      Post-Treatment Pain Level  1  -MARLEN         Total Minutes    01633 - PT Therapeutic Exercise Minutes  35  -MARLEN      55149 - PT Manual Therapy Minutes  10  -MARLEN         Exercise 1    Exercise Name 1  LTR 15x2, SKTC 3x20\", piriformis stretch 3x20\", PPT 15x2, bridge x10, scap squeeze 10x2, UT stretch 3x20\", lev scap stretch 3x20\", cervical AROM (ext, rotation) x10, cervical retraction x10, midrow w/ tband (yellow) 10x2, low row w/ tband (yellow) 10x2  -MARLEN      Cueing 1  Verbal;Tactile;Demo  -MARLEN      Time 1  35 min  -MARLEN        User Key  (r) = Recorded By, (t) = Taken By, (c) = Cosigned By    Initials Name Provider Type    Wendy Barbosa PTA Physical Therapy Assistant                        Manual Rx (last 36 hours)      Manual Treatments     Row Name 02/22/19 1100             Total Minutes    80142 - PT Manual Therapy Minutes  10  -MARLEN         Manual Rx 1    Manual Rx 1 Location  B) UT, cervical region  -MARLEN      Manual Rx 1 Type  STM   -MARLEN      Manual Rx 1 Grade  as to pt's tolerance  -MARLEN      Manual Rx 1 Duration  10 min  -MARLEN        User Key  (r) = Recorded By, (t) = Taken By, (c) = Cosigned By    Initials Name Provider Type    Wendy Barbosa PTA Physical Therapy Assistant              Therapy Education  Given: HEP, Symptoms/condition management, Pain management, " Posture/body mechanics  Program: Reinforced  How Provided: Verbal, Demonstration  Provided to: Patient  Level of Understanding: Verbalized, Demonstrated, Teach back education performed              Time Calculation:   Start Time: 0953  Stop Time: 1100  Time Calculation (min): 67 min  Therapy Suggested Charges     Code   Minutes Charges    71047 (CPT®) Hc Pt Neuromusc Re Education Ea 15 Min      07885 (CPT®) Hc Pt Ther Proc Ea 15 Min 35 2    91686 (CPT®) Hc Gait Training Ea 15 Min      72259 (CPT®) Hc Pt Therapeutic Act Ea 15 Min      73725 (CPT®) Hc Pt Manual Therapy Ea 15 Min 10 1    95655 (CPT®) Hc Pt Ther Massage- Per 15 Min      78348 (CPT®) Hc Pt Iontophoresis Ea 15 Min      90584 (CPT®) Hc Pt Elec Stim Ea-Per 15 Min      43605 (CPT®) Hc Pt Ultrasound Ea 15 Min      39301 (CPT®) Hc Pt Self Care/Mgmt/Train Ea 15 Min      86849 (CPT®) Hc Pt Prosthetic (S) Train Initial Encounter, Each 15 Min      88433 (CPT®) Hc Orthotic(S) Mgmt/Train Initial Encounter, Each 15min      51925 (CPT®) Hc Pt Aquatic Therapy Ea 15 Min      46634 (CPT®) Hc Pt Orthotic(S)/Prosthetic(S) Encounter, Each 15 Min       (CPT®) Hc Pt Electrical Stim Unattended 15 1    Total  60 4        Therapy Charges for Today     Code Description Service Date Service Provider Modifiers Qty    30645031901 HC PT THER PROC EA 15 MIN 2/22/2019 Wendy Ward, PTA GP 2    07654822698 HC PT MANUAL THERAPY EA 15 MIN 2/22/2019 Wendy Ward, PTA GP 1    35421232641 HC PT ELECTRICAL STIM UNATTENDED 2/22/2019 Wendy Ward, PTA  1                    Wendy Mcgee. OMID Ward  2/22/2019

## 2019-02-26 ENCOUNTER — HOSPITAL ENCOUNTER (OUTPATIENT)
Dept: PHYSICAL THERAPY | Facility: HOSPITAL | Age: 69
Setting detail: THERAPIES SERIES
Discharge: HOME | End: 2019-02-26

## 2019-02-26 DIAGNOSIS — M54.2 CERVICAL PAIN: ICD-10-CM

## 2019-02-26 DIAGNOSIS — G89.29 CHRONIC LEFT SHOULDER PAIN: ICD-10-CM

## 2019-02-26 DIAGNOSIS — G89.29 CHRONIC RIGHT SHOULDER PAIN: ICD-10-CM

## 2019-02-26 DIAGNOSIS — M54.5 LOW BACK PAIN, UNSPECIFIED BACK PAIN LATERALITY, UNSPECIFIED CHRONICITY, WITH SCIATICA PRESENCE UNSPECIFIED: Primary | ICD-10-CM

## 2019-02-26 DIAGNOSIS — M25.511 CHRONIC RIGHT SHOULDER PAIN: ICD-10-CM

## 2019-02-26 DIAGNOSIS — M25.512 CHRONIC LEFT SHOULDER PAIN: ICD-10-CM

## 2019-02-26 PROCEDURE — 97110 THERAPEUTIC EXERCISES: CPT

## 2019-02-26 PROCEDURE — 97140 MANUAL THERAPY 1/> REGIONS: CPT

## 2019-02-26 PROCEDURE — G0283 ELEC STIM OTHER THAN WOUND: HCPCS

## 2019-02-26 NOTE — THERAPY TREATMENT NOTE
Outpatient Physical Therapy Ortho Treatment Note   [facility]     Patient Name: Alexander Zamora  : 1950  MRN: [id number]  Today's Date: 2019      Visit Date: 2019    Visit Dx:    ICD-10-CM ICD-9-CM   1. Low back pain, unspecified back pain laterality, unspecified chronicity, with sciatica presence unspecified M54.5 724.2   2. Chronic left shoulder pain M25.512 719.41    G89.29 338.29   3. Cervical pain M54.2 723.1   4. Chronic right shoulder pain M25.511 719.41    G89.29 338.29       Patient Active Problem List   Diagnosis   • Prostate cancer (CMS/HCC)   • Erectile dysfunction following radical prostatectomy        Past Medical History:   Diagnosis Date   • Cancer (CMS/HCC)     Postrate   • Depression    • Elevated prostate specific antigen (PSA)    • Erectile dysfunction    • Hypercholesterolemia    • Hypercholesterolemia    • Prostatitis         Past Surgical History:   Procedure Laterality Date   • HEMORRHOIDECTOMY     • OTHER SURGICAL HISTORY      destruction of hemorrhoids, Rubber band ligatioin of hemmorrhoids   • OTHER SURGICAL HISTORY      surgery for an ulcer, PNEUMOTHORAX AND ULCER THERAPY       PT Ortho     Row Name 19 1400       Subjective Comments    Subjective Comments  Patient reports his shoulders and neck are feeling better today.  Pt reports 5/10 low back pain prior to tx.   -MARLEN       Subjective Pain    Able to rate subjective pain?  yes  -MARLEN    Pre-Treatment Pain Level  5  -MARLEN    Post-Treatment Pain Level  0  -MARLEN      User Key  (r) = Recorded By, (t) = Taken By, (c) = Cosigned By    Initials Name Provider Type    Wendy Barbosa, PTA Physical Therapy Assistant                      PT Assessment/Plan     Row Name 19 1525          PT Assessment    Assessment Comments  Patient tolerated treatment well today with no complaints of pain noted at conclusion of session.  Pt received MH to involved areas, estim to low back region, for pain control f/b therex as  "listed.  Resistance of tband postural strengthening activities increased from yellow to red w/ good response.  Treatment concluded with manual STM to address muscular tightness noted in B) UT region.  Pt will be progressed as tolerated.  No adverse reactions observed following modalities.   -MARLEN        PT Plan    PT Plan Comments  Continue with PT's POC and will progress tx as tolerated by patient.   -MARLEN       User Key  (r) = Recorded By, (t) = Taken By, (c) = Cosigned By    Initials Name Provider Type    Wendy Barbosa PTA Physical Therapy Assistant          Modalities     Row Name 02/26/19 1400             Moist Heat    MH Applied  Yes no redness observed following MH  -MARLEN      Location  lumbar, cervical, bilateral shoulders  -MARLEN      Rx Minutes  15 mins  -MARLEN      MH Prior to Rx  Yes w/ estim to lumbar region in seated position  -MARLEN         ELECTRICAL STIMULATION    Attended/Unattended  Unattended no skin irritation observed following estim  -MARLEN      Stimulation Type  IFC  -MARLEN      Max mAmp  -- as to pt's tolerance  -MARLEN      Location/Electrode Placement/Other  lumbar region  -MARLEN       PT E-Stim Unattended (Manual) Minutes  15  -MARLEN        User Key  (r) = Recorded By, (t) = Taken By, (c) = Cosigned By    Initials Name Provider Type    Wnedy Barbosa PTA Physical Therapy Assistant          Exercises     Row Name 02/26/19 1500 02/26/19 1400          Subjective Comments    Subjective Comments  --  Patient reports his shoulders and neck are feeling better today.  Pt reports 5/10 low back pain prior to tx.   -MARLEN        Subjective Pain    Able to rate subjective pain?  --  yes  -MARLEN     Pre-Treatment Pain Level  --  5  -MARLEN     Post-Treatment Pain Level  --  0  -MARLEN        Total Minutes    82626 - PT Therapeutic Exercise Minutes  --  35  -MARLEN     17937 - PT Manual Therapy Minutes  10  -MARLEN  --        Exercise 1    Exercise Name 1  --  LTR 10x2, SKTC 3x20\", piriformis stretch 3x20\", PPT 15x2, bridge " "10x2, scap squeeze 15x2, UT stretch 3x20\", lev scap stretch 3x20\", cervical AROM (ext, rotation) x15, cervical retraction 10x2, midrow w/ tband (red) 10x2, low row w/ tband (red) 10x2  -MARLEN     Cueing 1  --  Verbal;Tactile;Demo  -MARLEN     Time 1  --  35 min  -MARLEN       User Key  (r) = Recorded By, (t) = Taken By, (c) = Cosigned By    Initials Name Provider Type    Wendy Barbosa PTA Physical Therapy Assistant                        Manual Rx (last 36 hours)      Manual Treatments     Row Name 02/26/19 1500             Total Minutes    07404 - PT Manual Therapy Minutes  10  -MARLEN         Manual Rx 1    Manual Rx 1 Location  B) UT, cervical region  -MARLEN      Manual Rx 1 Type  STM   -MARLEN      Manual Rx 1 Grade  as to pt's tolerance  -MARLEN      Manual Rx 1 Duration  10 min  -MARLEN        User Key  (r) = Recorded By, (t) = Taken By, (c) = Cosigned By    Initials Name Provider Type    Wendy Barbosa PTA Physical Therapy Assistant              Therapy Education  Given: HEP, Symptoms/condition management, Pain management, Posture/body mechanics  Program: Reinforced  How Provided: Verbal, Demonstration  Provided to: Patient  Level of Understanding: Verbalized, Demonstrated, Teach back education performed              Time Calculation:   Start Time: 1400  Stop Time: 1510  Time Calculation (min): 70 min  Therapy Suggested Charges     Code   Minutes Charges    67472 (CPT®) Hc Pt Neuromusc Re Education Ea 15 Min      57904 (CPT®) Hc Pt Ther Proc Ea 15 Min 35 2    22948 (CPT®) Hc Gait Training Ea 15 Min      81350 (CPT®) Hc Pt Therapeutic Act Ea 15 Min      20248 (CPT®) Hc Pt Manual Therapy Ea 15 Min 10 1    17853 (CPT®) Hc Pt Ther Massage- Per 15 Min      56281 (CPT®) Hc Pt Iontophoresis Ea 15 Min      86147 (CPT®) Hc Pt Elec Stim Ea-Per 15 Min      75180 (CPT®) Hc Pt Ultrasound Ea 15 Min      27997 (CPT®) Hc Pt Self Care/Mgmt/Train Ea 15 Min      01428 (CPT®) Hc Pt Prosthetic (S) Train Initial Encounter, Each 15 Min  "     65956 (CPT®) Hc Orthotic(S) Mgmt/Train Initial Encounter, Each 15min      53185 (CPT®) Hc Pt Aquatic Therapy Ea 15 Min      61513 (CPT®) Hc Pt Orthotic(S)/Prosthetic(S) Encounter, Each 15 Min       (CPT®) Hc Pt Electrical Stim Unattended 15 1    Total  60 4        Therapy Charges for Today     Code Description Service Date Service Provider Modifiers Qty    89858411361 HC PT THER PROC EA 15 MIN 2/26/2019 Wendy Ward, PTA GP 2    68211246349 HC PT MANUAL THERAPY EA 15 MIN 2/26/2019 Wendy Ward, PTA GP 1    96244685987 HC PT ELECTRICAL STIM UNATTENDED 2/26/2019 Wendy Ward, PTA  1                    Wendy Mcgee. OMID Ward  2/26/2019

## 2019-03-01 ENCOUNTER — HOSPITAL ENCOUNTER (OUTPATIENT)
Dept: PHYSICAL THERAPY | Facility: HOSPITAL | Age: 69
Setting detail: THERAPIES SERIES
Discharge: HOME | End: 2019-03-01

## 2019-03-01 DIAGNOSIS — M25.511 CHRONIC RIGHT SHOULDER PAIN: ICD-10-CM

## 2019-03-01 DIAGNOSIS — M54.5 LOW BACK PAIN, UNSPECIFIED BACK PAIN LATERALITY, UNSPECIFIED CHRONICITY, WITH SCIATICA PRESENCE UNSPECIFIED: Primary | ICD-10-CM

## 2019-03-01 DIAGNOSIS — G89.29 CHRONIC LEFT SHOULDER PAIN: ICD-10-CM

## 2019-03-01 DIAGNOSIS — G89.29 CHRONIC RIGHT SHOULDER PAIN: ICD-10-CM

## 2019-03-01 DIAGNOSIS — M25.512 CHRONIC LEFT SHOULDER PAIN: ICD-10-CM

## 2019-03-01 DIAGNOSIS — M54.2 CERVICAL PAIN: ICD-10-CM

## 2019-03-01 PROCEDURE — 97110 THERAPEUTIC EXERCISES: CPT

## 2019-03-01 PROCEDURE — 97140 MANUAL THERAPY 1/> REGIONS: CPT

## 2019-03-01 PROCEDURE — G0283 ELEC STIM OTHER THAN WOUND: HCPCS

## 2019-03-01 NOTE — THERAPY TREATMENT NOTE
Outpatient Physical Therapy Ortho Treatment Note   [place]     Patient Name: Alexander Zamora  : 1950  MRN: [id number]  Today's Date: 3/1/2019      Visit Date: 2019    Visit Dx:    ICD-10-CM ICD-9-CM   1. Low back pain, unspecified back pain laterality, unspecified chronicity, with sciatica presence unspecified M54.5 724.2   2. Chronic left shoulder pain M25.512 719.41    G89.29 338.29   3. Cervical pain M54.2 723.1   4. Chronic right shoulder pain M25.511 719.41    G89.29 338.29       Patient Active Problem List   Diagnosis   • Prostate cancer (CMS/HCC)   • Erectile dysfunction following radical prostatectomy        Past Medical History:   Diagnosis Date   • Cancer (CMS/HCC)     Postrate   • Depression    • Elevated prostate specific antigen (PSA)    • Erectile dysfunction    • Hypercholesterolemia    • Hypercholesterolemia    • Prostatitis         Past Surgical History:   Procedure Laterality Date   • HEMORRHOIDECTOMY     • OTHER SURGICAL HISTORY      destruction of hemorrhoids, Rubber band ligatioin of hemmorrhoids   • OTHER SURGICAL HISTORY      surgery for an ulcer, PNEUMOTHORAX AND ULCER THERAPY       PT Ortho     Row Name 19 1300       Subjective Comments    Subjective Comments  Patient arrives to therapy w/ continued reports of low back and neck pain.  Pt states his pain improves following therapy, however reports the pain returns the next day.   -MARLEN       Subjective Pain    Able to rate subjective pain?  yes  -MARLEN    Pre-Treatment Pain Level  8  -MARLEN    Post-Treatment Pain Level  3  -MARLEN      User Key  (r) = Recorded By, (t) = Taken By, (c) = Cosigned By    Initials Name Provider Type    Wendy Barbosa PTA Physical Therapy Assistant                      PT Assessment/Plan     Row Name 19 1421          PT Assessment    Assessment Comments  Patient completed today's session w/ decreased pain following.  Treatment initiated w/ MH and Estim to involved areas f/b TE for  "improved cervical/ lumbar ROM, cervical and lumbar stability.  Pt continues to require cues throughout session for improved mechanics and for max benefit w/ activities.  Resistance of midrow w/ tband increased red to green w/ good response.  Pt continues to progress as tolerated.  No adverse reactions observed following modalities.   -MARLEN        PT Plan    PT Plan Comments  Continue with PTs' POC and progress tx as tolerated by patient.   -MARLEN       User Key  (r) = Recorded By, (t) = Taken By, (c) = Cosigned By    Initials Name Provider Type    Wendy Barbosa PTA Physical Therapy Assistant          Modalities     Row Name 03/01/19 1300             Moist Heat    MH Applied  Yes no redness observed following MH  -MARLEN      Location  lumbar, cervical, bilateral shoulders  -MARLEN      Rx Minutes  15 mins  -MARLEN      MH Prior to Rx  Yes  -MARLEN         ELECTRICAL STIMULATION    Attended/Unattended  Unattended no skin irritation observed following estim  -MARLEN      Stimulation Type  Pre-Mod  -MARLEN      Max mAmp  -- as to pt's tolerance  -MARLEN      Location/Electrode Placement/Other  lumbar/ cervical region   -MARLEN       PT E-Stim Unattended (Manual) Minutes  15  -MARLEN        User Key  (r) = Recorded By, (t) = Taken By, (c) = Cosigned By    Initials Name Provider Type    Wendy Barbosa PTA Physical Therapy Assistant          Exercises     Row Name 03/01/19 1300             Subjective Comments    Subjective Comments  Patient arrives to therapy w/ continued reports of low back and neck pain.  Pt states his pain improves following therapy, however reports the pain returns the next day.   -MARLEN         Subjective Pain    Able to rate subjective pain?  yes  -MARLEN      Pre-Treatment Pain Level  8  -MARLEN      Post-Treatment Pain Level  3  -MARLEN         Total Minutes    47105 - PT Therapeutic Exercise Minutes  35  -MARLEN      25977 - PT Manual Therapy Minutes  10  -MARLEN         Exercise 1    Exercise Name 1  UT stretch 3x20\", lev scap " "stretch 3x20\", cervical AROM (ext, rot) x15, cervical retraction 10x2, scap squeeze 15x2, midrow w/ tband (green) 10x2, low row w/ tband (red) 15X2, SLR x10 B), LTR 15x2, SKTC 3x20\", PPT 15x2, bridge 10x2  -MARLEN      Cueing 1  Verbal;Tactile;Demo  -MARLEN      Time 1  35 min  -MARLEN        User Key  (r) = Recorded By, (t) = Taken By, (c) = Cosigned By    Initials Name Provider Type    Wendy Barbosa, OMDI Physical Therapy Assistant                        Manual Rx (last 36 hours)      Manual Treatments     Row Name 03/01/19 1300             Total Minutes    16582 - PT Manual Therapy Minutes  10  -MARLEN         Manual Rx 1    Manual Rx 1 Location  lumbar paraspinals pt L) sidelying with pillow between knees  -MARLEN      Manual Rx 1 Type  STM  -MARLEN      Manual Rx 1 Grade  as to pt's tolerance  -MARLEN      Manual Rx 1 Duration  10 min  -MARLEN        User Key  (r) = Recorded By, (t) = Taken By, (c) = Cosigned By    Initials Name Provider Type    Wendy Barbosa, OMID Physical Therapy Assistant              Therapy Education  Given: HEP, Symptoms/condition management, Pain management, Posture/body mechanics  Program: Reinforced  How Provided: Verbal, Demonstration  Provided to: Patient  Level of Understanding: Verbalized, Demonstrated, Teach back education performed              Time Calculation:   Start Time: 1300  Stop Time: 1410  Time Calculation (min): 70 min  Therapy Suggested Charges     Code   Minutes Charges    88811 (CPT®) Hc Pt Neuromusc Re Education Ea 15 Min      17317 (CPT®) Hc Pt Ther Proc Ea 15 Min 35 2    12548 (CPT®) Hc Gait Training Ea 15 Min      94261 (CPT®) Hc Pt Therapeutic Act Ea 15 Min      00475 (CPT®) Hc Pt Manual Therapy Ea 15 Min 10 1    01502 (CPT®) Hc Pt Ther Massage- Per 15 Min      51718 (CPT®) Hc Pt Iontophoresis Ea 15 Min      01318 (CPT®) Hc Pt Elec Stim Ea-Per 15 Min      98441 (CPT®) Hc Pt Ultrasound Ea 15 Min      48446 (CPT®) Hc Pt Self Care/Mgmt/Train Ea 15 Min      85688 (CPT®) Hc Pt " Prosthetic (S) Train Initial Encounter, Each 15 Min      17667 (CPT®) Hc Orthotic(S) Mgmt/Train Initial Encounter, Each 15min      67618 (CPT®) Hc Pt Aquatic Therapy Ea 15 Min      19534 (CPT®) Hc Pt Orthotic(S)/Prosthetic(S) Encounter, Each 15 Min       (CPT®) Hc Pt Electrical Stim Unattended 15 1    Total  60 4        Therapy Charges for Today     Code Description Service Date Service Provider Modifiers Qty    56425175829 HC PT THER PROC EA 15 MIN 3/1/2019 Wendy Ward, PTA GP 2    13516140128 HC PT MANUAL THERAPY EA 15 MIN 3/1/2019 Wendy Ward, PTA GP 1    12180566225 HC PT ELECTRICAL STIM UNATTENDED 3/1/2019 Wendy Ward, PTA  1                    Wendy Mcgee. Sylvia, OMID  3/1/2019

## 2019-03-05 ENCOUNTER — HOSPITAL ENCOUNTER (OUTPATIENT)
Dept: PHYSICAL THERAPY | Facility: HOSPITAL | Age: 69
Setting detail: THERAPIES SERIES
Discharge: HOME | End: 2019-03-05

## 2019-03-05 DIAGNOSIS — M54.5 LOW BACK PAIN, UNSPECIFIED BACK PAIN LATERALITY, UNSPECIFIED CHRONICITY, WITH SCIATICA PRESENCE UNSPECIFIED: Primary | ICD-10-CM

## 2019-03-05 DIAGNOSIS — G89.29 CHRONIC RIGHT SHOULDER PAIN: ICD-10-CM

## 2019-03-05 DIAGNOSIS — M25.511 CHRONIC RIGHT SHOULDER PAIN: ICD-10-CM

## 2019-03-05 DIAGNOSIS — M54.2 CERVICAL PAIN: ICD-10-CM

## 2019-03-05 DIAGNOSIS — M25.512 CHRONIC LEFT SHOULDER PAIN: ICD-10-CM

## 2019-03-05 DIAGNOSIS — G89.29 CHRONIC LEFT SHOULDER PAIN: ICD-10-CM

## 2019-03-05 PROCEDURE — 97140 MANUAL THERAPY 1/> REGIONS: CPT

## 2019-03-05 PROCEDURE — 97110 THERAPEUTIC EXERCISES: CPT

## 2019-03-05 PROCEDURE — G0283 ELEC STIM OTHER THAN WOUND: HCPCS

## 2019-03-05 NOTE — THERAPY TREATMENT NOTE
Outpatient Physical Therapy Ortho Treatment Note   Sin     Patient Name: Alexander Zamora  : 1950  MRN: [id number]  Today's Date: 3/5/2019      Visit Date: 2019    Visit Dx:    ICD-10-CM ICD-9-CM   1. Low back pain, unspecified back pain laterality, unspecified chronicity, with sciatica presence unspecified M54.5 724.2   2. Chronic left shoulder pain M25.512 719.41    G89.29 338.29   3. Cervical pain M54.2 723.1   4. Chronic right shoulder pain M25.511 719.41    G89.29 338.29       Patient Active Problem List   Diagnosis   • Prostate cancer (CMS/HCC)   • Erectile dysfunction following radical prostatectomy        Past Medical History:   Diagnosis Date   • Cancer (CMS/HCC)     Postrate   • Depression    • Elevated prostate specific antigen (PSA)    • Erectile dysfunction    • Hypercholesterolemia    • Hypercholesterolemia    • Prostatitis         Past Surgical History:   Procedure Laterality Date   • HEMORRHOIDECTOMY     • OTHER SURGICAL HISTORY      destruction of hemorrhoids, Rubber band ligatioin of hemmorrhoids   • OTHER SURGICAL HISTORY      surgery for an ulcer, PNEUMOTHORAX AND ULCER THERAPY       PT Ortho     Row Name 19 1500       Subjective Comments    Subjective Comments  Patient reports of having more pain in both of his shoulders and neck. Patient states that he is having tightness in his neck and right shoulder.  -AC       Subjective Pain    Able to rate subjective pain?  yes  -AC    Pre-Treatment Pain Level  5  -AC    Post-Treatment Pain Level  1  -AC      User Key  (r) = Recorded By, (t) = Taken By, (c) = Cosigned By    Initials Name Provider Type    Angelica Rodarte PTA Physical Therapy Assistant                      PT Assessment/Plan     Row Name 19 1524          PT Assessment    Assessment Comments  Patient tolerated treatment session well with rest breaks taken as needed by the patient. New shoulder exercises added per the patient's tolerance,  patient demonstrated and understood new ther-ex with no increase in pain noted. STM performed to help decrease tightness and pain in the B) cervical and UT musculature. Decrease in pain noted following treatment session.  -AC        PT Plan    PT Plan Comments  Continue per PT's POC, progress per the patient's tolerance.  -AC       User Key  (r) = Recorded By, (t) = Taken By, (c) = Cosigned By    Initials Name Provider Type     Angelica Mccarty PTA Physical Therapy Assistant          Modalities     Row Name 03/05/19 1500             Moist Heat    MH Applied  Yes No redness noted following moist heat  -AC      Location  lumbar, cervical, bilateral shoulders  -AC      Rx Minutes  15 mins  -AC      MH Prior to Rx  Yes  -AC         ELECTRICAL STIMULATION    Attended/Unattended  Unattended No irritation noted following estim  -AC      Stimulation Type  Pre-Mod  -AC      Max mAmp  -- per the patient's tolerance, 15 minutes with MH  -AC      Location/Electrode Placement/Other  lumbar/ cervical region   -       PT E-Stim Unattended (Manual) Minutes  15  -AC        User Key  (r) = Recorded By, (t) = Taken By, (c) = Cosigned By    Initials Name Provider Type     Angelica Mccarty PTA Physical Therapy Assistant          Exercises     Row Name 03/05/19 1500             Subjective Comments    Subjective Comments  Patient reports of having more pain in both of his shoulders and neck. Patient states that he is having tightness in his neck and right shoulder.  -AC         Subjective Pain    Able to rate subjective pain?  yes  -AC      Pre-Treatment Pain Level  5  -AC      Post-Treatment Pain Level  1  -AC         Total Minutes    49444 - PT Therapeutic Exercise Minutes  35  -AC      89685 - PT Manual Therapy Minutes  15  -AC         Exercise 1    Exercise Name 1  UT stretch 20 sec hold x3, levator scap stretch 20 sec hold x3, CROM (ext, rot) x15 each, chin tucks 10x2, table slides x15 each, scap squeeze  15x2, mid row with GTB 15x2, low row with RTB 15x2, LTR 15x2, SKTC 20 sec hold x3, piriformis stretch 20 sec hold x3, PPT 15x2, bridge 10x2  -AC      Cueing 1  Verbal;Tactile;Demo  -AC      Time 1  35 min  -AC        User Key  (r) = Recorded By, (t) = Taken By, (c) = Cosigned By    Initials Name Provider Type    Angelica Rodarte, PTA Physical Therapy Assistant                        Manual Rx (last 36 hours)      Manual Treatments     Row Name 03/05/19 1500             Total Minutes    43898 - PT Manual Therapy Minutes  15  -AC         Manual Rx 1    Manual Rx 1 Location  B) UT musculature, cervical region  -AC      Manual Rx 1 Type  STM  -AC      Manual Rx 1 Grade  as to pt's tolerance  -AC      Manual Rx 1 Duration  15 minutes  -AC        User Key  (r) = Recorded By, (t) = Taken By, (c) = Cosigned By    Initials Name Provider Type    AC Angelica Mccarty, PTA Physical Therapy Assistant              Therapy Education  Given: HEP, Symptoms/condition management, Pain management, Posture/body mechanics  Program: Reinforced, New  How Provided: Verbal  Provided to: Patient  Level of Understanding: Verbalized, Demonstrated              Time Calculation:   Start Time: 1355  Stop Time: 1502  Time Calculation (min): 67 min  Therapy Suggested Charges     Code   Minutes Charges    42030 (CPT®) Hc Pt Neuromusc Re Education Ea 15 Min      85402 (CPT®) Hc Pt Ther Proc Ea 15 Min 35 2    05417 (CPT®) Hc Gait Training Ea 15 Min      77332 (CPT®) Hc Pt Therapeutic Act Ea 15 Min      21533 (CPT®) Hc Pt Manual Therapy Ea 15 Min 15 1    74619 (CPT®) Hc Pt Ther Massage- Per 15 Min      80807 (CPT®) Hc Pt Iontophoresis Ea 15 Min      62826 (CPT®) Hc Pt Elec Stim Ea-Per 15 Min      42250 (CPT®) Hc Pt Ultrasound Ea 15 Min      41782 (CPT®) Hc Pt Self Care/Mgmt/Train Ea 15 Min      47199 (CPT®) Hc Pt Prosthetic (S) Train Initial Encounter, Each 15 Min      69992 (CPT®) Hc Orthotic(S) Mgmt/Train Initial Encounter, Each 15min       62303 (CPT®) Hc Pt Aquatic Therapy Ea 15 Min      87784 (CPT®) Hc Pt Orthotic(S)/Prosthetic(S) Encounter, Each 15 Min       (CPT®) Hc Pt Electrical Stim Unattended 15 1    Total  65 4        Therapy Charges for Today     Code Description Service Date Service Provider Modifiers Qty    25694717067 HC PT THER PROC EA 15 MIN 3/5/2019 Angelica Mccarty, PTA GP 2    84343906177 HC PT MANUAL THERAPY EA 15 MIN 3/5/2019 Angelica Mccarty, PTA GP 1    59901835619 HC PT ELECTRICAL STIM UNATTENDED 3/5/2019 Angelica Mccarty, PTA  1                    Angelica Mccarty PTA  3/5/2019

## 2019-03-08 ENCOUNTER — HOSPITAL ENCOUNTER (OUTPATIENT)
Dept: PHYSICAL THERAPY | Facility: HOSPITAL | Age: 69
Setting detail: THERAPIES SERIES
Discharge: HOME | End: 2019-03-08

## 2019-03-08 DIAGNOSIS — G89.29 CHRONIC RIGHT SHOULDER PAIN: ICD-10-CM

## 2019-03-08 DIAGNOSIS — M54.2 CERVICAL PAIN: ICD-10-CM

## 2019-03-08 DIAGNOSIS — M25.512 CHRONIC LEFT SHOULDER PAIN: ICD-10-CM

## 2019-03-08 DIAGNOSIS — M25.511 CHRONIC RIGHT SHOULDER PAIN: ICD-10-CM

## 2019-03-08 DIAGNOSIS — M54.5 LOW BACK PAIN, UNSPECIFIED BACK PAIN LATERALITY, UNSPECIFIED CHRONICITY, WITH SCIATICA PRESENCE UNSPECIFIED: Primary | ICD-10-CM

## 2019-03-08 DIAGNOSIS — G89.29 CHRONIC LEFT SHOULDER PAIN: ICD-10-CM

## 2019-03-08 PROCEDURE — G0283 ELEC STIM OTHER THAN WOUND: HCPCS | Performed by: PHYSICAL THERAPIST

## 2019-03-08 PROCEDURE — 97110 THERAPEUTIC EXERCISES: CPT | Performed by: PHYSICAL THERAPIST

## 2019-03-08 NOTE — THERAPY TREATMENT NOTE
Outpatient Physical Therapy Ortho Treatment Note   Sin     Patient Name: Alexander Zamora  : 1950  MRN: [id number]  Today's Date: 3/8/2019      Visit Date: 2019    Visit Dx:    ICD-10-CM ICD-9-CM   1. Low back pain, unspecified back pain laterality, unspecified chronicity, with sciatica presence unspecified M54.5 724.2   2. Chronic left shoulder pain M25.512 719.41    G89.29 338.29   3. Cervical pain M54.2 723.1   4. Chronic right shoulder pain M25.511 719.41    G89.29 338.29       Patient Active Problem List   Diagnosis   • Prostate cancer (CMS/HCC)   • Erectile dysfunction following radical prostatectomy        Past Medical History:   Diagnosis Date   • Cancer (CMS/HCC)     Postrate   • Depression    • Elevated prostate specific antigen (PSA)    • Erectile dysfunction    • Hypercholesterolemia    • Hypercholesterolemia    • Prostatitis         Past Surgical History:   Procedure Laterality Date   • HEMORRHOIDECTOMY     • OTHER SURGICAL HISTORY      destruction of hemorrhoids, Rubber band ligatioin of hemmorrhoids   • OTHER SURGICAL HISTORY      surgery for an ulcer, PNEUMOTHORAX AND ULCER THERAPY       PT Ortho     Row Name 19 1000       Subjective Comments    Subjective Comments  Patient reports that he has 2/10 pain today.  He notes that his right shoulder bothers him the most.  -BE       Subjective Pain    Able to rate subjective pain?  yes  -BE    Pre-Treatment Pain Level  2  -BE    Post-Treatment Pain Level  0  -BE    Row Name 19 1500       Subjective Comments    Subjective Comments  Patient reports of having more pain in both of his shoulders and neck. Patient states that he is having tightness in his neck and right shoulder.  -AC       Subjective Pain    Able to rate subjective pain?  yes  -AC    Pre-Treatment Pain Level  5  -AC    Post-Treatment Pain Level  1  -AC      User Key  (r) = Recorded By, (t) = Taken By, (c) = Cosigned By    Initials Name Provider Type    AC  Angelica Mccarty, PTA Physical Therapy Assistant    BE Christen Mon, PT Physical Therapist                      PT Assessment/Plan     Row Name 03/08/19 1319          PT Assessment    Assessment Comments  Michael tolerated today's session well, with reports of decreased pain following session.  He noted 2/10 pain pre-tx and 0/10 pain post-tx.  Therapy session consisted of MH, ESTIM, ther ex, and pulsed US.  No adverse reactions were noted with modalities.  Patient progressed in ther ex to include lawnmowers and sternal lift.  Patient will continue to be progressed per his tolerance and POC.  -BE        PT Plan    PT Plan Comments  Progress per patient's tolerance and POC.  -BE       User Key  (r) = Recorded By, (t) = Taken By, (c) = Cosigned By    Initials Name Provider Type    BE Christen Mon, PT Physical Therapist          Modalities     Row Name 03/08/19 0900             Moist Heat    MH Applied  Yes no skin irritation following MH  -BE      Location  lumbar, cervical, bilateral shoulders  -BE      Rx Minutes  15 mins  -BE      MH Prior to Rx  Yes with ESTIM in seated position  -BE         Ultrasound 64544    Location  Right shoulder no skin irritation following US  -BE      Duty Cycle  50  -BE      Frequency  -- 3.3 MHz  -BE      Intensity - Wts/cm  1.2  -BE      41372 - PT Ultrasound Minutes  8  -BE         ELECTRICAL STIMULATION    Attended/Unattended  Unattended No irritation noted following estim  -BE      Stimulation Type  Pre-Mod  -BE      Max mAmp  -- per the patient's tolerance, 15 minutes with MH  -BE      Location/Electrode Placement/Other  cervical region   -BE       PT E-Stim Unattended (Manual) Minutes  15  -BE        User Key  (r) = Recorded By, (t) = Taken By, (c) = Cosigned By    Initials Name Provider Type    BE Christen Mon, PT Physical Therapist          Exercises     Row Name 03/08/19 1000             Subjective Comments    Subjective Comments  Patient reports  that he has 2/10 pain today.  He notes that his right shoulder bothers him the most.  -BE         Subjective Pain    Able to rate subjective pain?  yes  -BE      Pre-Treatment Pain Level  2  -BE      Post-Treatment Pain Level  0  -BE         Total Minutes    94211 - PT Therapeutic Exercise Minutes  40  -BE         Exercise 1    Exercise Name 1  UT stretch 20 sec hold x3, levator scap stretch 20 sec hold x3, CROM (flex, ext, rot, lat flex) x15 each, chin tucks 10x2, table slides 2x10 each, scap squeeze 15x2, mid row with GTB 15x2, low row with GTB 15x2, LTR 15x2, SKTC 20 sec hold x3, piriformis stretch 20 sec hold x3, PPT 15x2, bridge 15x2, lawnmowers 15x, sternal lift 15x  -BE      Cueing 1  Verbal;Tactile;Demo  -BE      Time 1  40 min  -BE        User Key  (r) = Recorded By, (t) = Taken By, (c) = Cosigned By    Initials Name Provider Type    BE Christen Mon, PT Physical Therapist                             Therapy Education  Given: HEP, Symptoms/condition management, Pain management, Posture/body mechanics  Program: Reinforced, Progressed  How Provided: Verbal  Provided to: Patient  Level of Understanding: Verbalized, Demonstrated              Time Calculation:   Start Time: 0958  Stop Time: 1104  Time Calculation (min): 66 min  Therapy Suggested Charges     Code   Minutes Charges    58578 (CPT®) Hc Pt Neuromusc Re Education Ea 15 Min      13376 (CPT®) Hc Pt Ther Proc Ea 15 Min 40 3    45798 (CPT®) Hc Gait Training Ea 15 Min      01604 (CPT®) Hc Pt Therapeutic Act Ea 15 Min      48269 (CPT®) Hc Pt Manual Therapy Ea 15 Min      16763 (CPT®) Hc Pt Ther Massage- Per 15 Min      52600 (CPT®) Hc Pt Iontophoresis Ea 15 Min      52257 (CPT®) Hc Pt Elec Stim Ea-Per 15 Min      21001 (CPT®) Hc Pt Ultrasound Ea 15 Min 8     04078 (CPT®) Hc Pt Self Care/Mgmt/Train Ea 15 Min      09102 (CPT®) Hc Pt Prosthetic (S) Train Initial Encounter, Each 15 Min      18705 (CPT®) Hc Orthotic(S) Mgmt/Train Initial Encounter, Each  15min      24753 (CPT®) Hc Pt Aquatic Therapy Ea 15 Min      59140 (CPT®) Hc Pt Orthotic(S)/Prosthetic(S) Encounter, Each 15 Min       (CPT®) Hc Pt Electrical Stim Unattended 15 1    Total  63 4        Therapy Charges for Today     Code Description Service Date Service Provider Modifiers Qty    95048535472 HC PT THER PROC EA 15 MIN 3/8/2019 Christen Mon, PT GP 3    45919156991 HC PT ELECTRICAL STIM UNATTENDED 3/8/2019 Christen Mon, PT  1                    Christen Mon, PT  3/8/2019

## 2019-03-12 ENCOUNTER — HOSPITAL ENCOUNTER (OUTPATIENT)
Dept: PHYSICAL THERAPY | Facility: HOSPITAL | Age: 69
Setting detail: THERAPIES SERIES
Discharge: HOME | End: 2019-03-12

## 2019-03-12 DIAGNOSIS — M25.511 CHRONIC RIGHT SHOULDER PAIN: ICD-10-CM

## 2019-03-12 DIAGNOSIS — M54.5 LOW BACK PAIN, UNSPECIFIED BACK PAIN LATERALITY, UNSPECIFIED CHRONICITY, WITH SCIATICA PRESENCE UNSPECIFIED: Primary | ICD-10-CM

## 2019-03-12 DIAGNOSIS — G89.29 CHRONIC LEFT SHOULDER PAIN: ICD-10-CM

## 2019-03-12 DIAGNOSIS — M25.512 CHRONIC LEFT SHOULDER PAIN: ICD-10-CM

## 2019-03-12 DIAGNOSIS — M54.2 CERVICAL PAIN: ICD-10-CM

## 2019-03-12 DIAGNOSIS — G89.29 CHRONIC RIGHT SHOULDER PAIN: ICD-10-CM

## 2019-03-12 PROCEDURE — 97140 MANUAL THERAPY 1/> REGIONS: CPT

## 2019-03-12 PROCEDURE — G0283 ELEC STIM OTHER THAN WOUND: HCPCS

## 2019-03-12 PROCEDURE — 97110 THERAPEUTIC EXERCISES: CPT

## 2019-03-12 NOTE — THERAPY TREATMENT NOTE
Outpatient Physical Therapy Ortho Treatment Note   Sin     Patient Name: Alexander Zamora  : 1950  MRN: [id number]  Today's Date: 3/12/2019      Visit Date: 2019    Visit Dx:    ICD-10-CM ICD-9-CM   1. Low back pain, unspecified back pain laterality, unspecified chronicity, with sciatica presence unspecified M54.5 724.2   2. Chronic left shoulder pain M25.512 719.41    G89.29 338.29   3. Cervical pain M54.2 723.1   4. Chronic right shoulder pain M25.511 719.41    G89.29 338.29       Patient Active Problem List   Diagnosis   • Prostate cancer (CMS/HCC)   • Erectile dysfunction following radical prostatectomy        Past Medical History:   Diagnosis Date   • Cancer (CMS/HCC)     Postrate   • Depression    • Elevated prostate specific antigen (PSA)    • Erectile dysfunction    • Hypercholesterolemia    • Hypercholesterolemia    • Prostatitis         Past Surgical History:   Procedure Laterality Date   • HEMORRHOIDECTOMY     • OTHER SURGICAL HISTORY      destruction of hemorrhoids, Rubber band ligatioin of hemmorrhoids   • OTHER SURGICAL HISTORY      surgery for an ulcer, PNEUMOTHORAX AND ULCER THERAPY       PT Ortho     Row Name 19 1100       Subjective Comments    Subjective Comments  Patient states his back bothered him over the weekend, with reports of 7/10 pain prior to tx.   -MARLEN       Subjective Pain    Able to rate subjective pain?  yes  -MARLEN    Pre-Treatment Pain Level  7  -MARLEN    Post-Treatment Pain Level  0  -MARLEN      User Key  (r) = Recorded By, (t) = Taken By, (c) = Cosigned By    Initials Name Provider Type    Wendy Barbosa, PTA Physical Therapy Assistant                      PT Assessment/Plan     Row Name 19 1200          PT Assessment    Assessment Comments  Patient responded well to today's session with no reports of pain noted at conclusion.  Pt initiated additional standing LE strengthening activities w/ good tolerance noted.  Pt received MH and Estim to  "involved areas f/b therex as listed.  Treatment concluded with manual STM to cervical and B) UT region.  Pt continues to progress w/ therapy as tolerated to address goals, and achieve maximum level of function.  No adverse reactions observed following modalities.   -MARLEN        PT Plan    PT Plan Comments  Continue with PT's POC and progress tx as tolerated by patient.   -MARLEN       User Key  (r) = Recorded By, (t) = Taken By, (c) = Cosigned By    Initials Name Provider Type    Wendy Barbosa PTA Physical Therapy Assistant          Modalities     Row Name 03/12/19 1100             Moist Heat    MH Applied  Yes no redness observed following MH  -MARLEN      Location  lumbar, cervical, bilateral shoulders  -MARLEN      Rx Minutes  15 mins  -MARLEN      MH Prior to Rx  Yes w/ estim in seated position  -MARLEN         ELECTRICAL STIMULATION    Attended/Unattended  Unattended no skin irritation observed following estim  -MARLEN      Stimulation Type  Pre-Mod  -MARLEN      Max mAmp  -- as to pt's tolerance  -MARLEN      Location/Electrode Placement/Other  cervical/ lumbar region  -MARLEN       PT E-Stim Unattended (Manual) Minutes  15  -MARLEN        User Key  (r) = Recorded By, (t) = Taken By, (c) = Cosigned By    Initials Name Provider Type    Wendy Barbosa PTA Physical Therapy Assistant          Exercises     Row Name 03/12/19 1100             Subjective Comments    Subjective Comments  Patient states his back bothered him over the weekend, with reports of 7/10 pain prior to tx.   -MARLEN         Subjective Pain    Able to rate subjective pain?  yes  -MARLEN      Pre-Treatment Pain Level  7  -MARLEN      Post-Treatment Pain Level  0  -MARLEN         Total Minutes    36461 - PT Therapeutic Exercise Minutes  35  -MARLEN      94516 - PT Manual Therapy Minutes  10  -MARLEN         Exercise 1    Exercise Name 1  UT stretch 3x20\", lev scap stretch 3x20\", cervical AROM (ext, rot) x10, cervical retraction 10x3, scap squeeze 15x2, midrow w/ tband (green) 15x2, " "low row w/ tband (green) 15x2, step ups 6\" 10x2, TG squats LV 16 10x2  -MARLEN      Cueing 1  Verbal;Tactile;Demo  -MARLEN      Time 1  35 min  -MARLEN        User Key  (r) = Recorded By, (t) = Taken By, (c) = Cosigned By    Initials Name Provider Type    MARLEN GregoryensWendy, [facility] Physical Therapy Assistant                        Manual Rx (last 36 hours)      Manual Treatments     Row Name 03/12/19 1100             Total Minutes    06311 - PT Manual Therapy Minutes  10  -MARLEN         Manual Rx 1    Manual Rx 1 Location  B) UT musculature, cervical region  -MARLEN      Manual Rx 1 Type  STM  -MARLEN      Manual Rx 1 Grade  as to pt's tolerance  -MARLEN      Manual Rx 1 Duration  10 min  -MARLEN        User Key  (r) = Recorded By, (t) = Taken By, (c) = Cosigned By    Initials Name Provider Type    MARLEN WardWendy, [facility] Physical Therapy Assistant              Therapy Education  Given: HEP, Symptoms/condition management, Pain management, Posture/body mechanics  Program: Reinforced  How Provided: Verbal, Demonstration  Provided to: Patient  Level of Understanding: Verbalized, Demonstrated              Time Calculation:   Start Time: 1055  Stop Time: 1158  Time Calculation (min): 63 min  Therapy Suggested Charges     Code   Minutes Charges    57155 (CPT®) Hc Pt Neuromusc Re Education Ea 15 Min      37664 (CPT®) Hc Pt Ther Proc Ea 15 Min 35 2    68025 (CPT®) Hc Gait Training Ea 15 Min      32508 (CPT®) Hc Pt Therapeutic Act Ea 15 Min      83000 (CPT®) Hc Pt Manual Therapy Ea 15 Min 10 1    70141 (CPT®) Hc Pt Ther Massage- Per 15 Min      71775 (CPT®) Hc Pt Iontophoresis Ea 15 Min      31768 (CPT®) Hc Pt Elec Stim Ea-Per 15 Min      81704 (CPT®) Hc Pt Ultrasound Ea 15 Min      90774 (CPT®) Hc Pt Self Care/Mgmt/Train Ea 15 Min      89824 (CPT®) Hc Pt Prosthetic (S) Train Initial Encounter, Each 15 Min      73527 (CPT®) Hc Orthotic(S) Mgmt/Train Initial Encounter, Each 15min      95036 (CPT®) Hc Pt Aquatic Therapy Ea 15 Min      44620 " (CPT®) Hc Pt Orthotic(S)/Prosthetic(S) Encounter, Each 15 Min       (CPT®) Hc Pt Electrical Stim Unattended 15 1    Total  60 4        Therapy Charges for Today     Code Description Service Date Service Provider Modifiers Qty    30637858383 HC PT THER PROC EA 15 MIN 3/12/2019 Wendy Ward, [facility] GP 2    02250019457 HC PT MANUAL THERAPY EA 15 MIN 3/12/2019 Wendy Ward, [facility] GP 1    61345109491 HC PT ELECTRICAL STIM UNATTENDED 3/12/2019 Wendy Ward, PTA  1                    Wendy Mcgee. [facility] Ward  3/12/2019

## 2019-03-15 ENCOUNTER — HOSPITAL ENCOUNTER (OUTPATIENT)
Dept: PHYSICAL THERAPY | Facility: HOSPITAL | Age: 69
Setting detail: THERAPIES SERIES
Discharge: HOME | End: 2019-03-15

## 2019-03-15 DIAGNOSIS — M25.511 CHRONIC RIGHT SHOULDER PAIN: ICD-10-CM

## 2019-03-15 DIAGNOSIS — M25.512 CHRONIC LEFT SHOULDER PAIN: ICD-10-CM

## 2019-03-15 DIAGNOSIS — G89.29 CHRONIC LEFT SHOULDER PAIN: ICD-10-CM

## 2019-03-15 DIAGNOSIS — M54.5 LOW BACK PAIN, UNSPECIFIED BACK PAIN LATERALITY, UNSPECIFIED CHRONICITY, WITH SCIATICA PRESENCE UNSPECIFIED: Primary | ICD-10-CM

## 2019-03-15 DIAGNOSIS — M54.2 CERVICAL PAIN: ICD-10-CM

## 2019-03-15 DIAGNOSIS — G89.29 CHRONIC RIGHT SHOULDER PAIN: ICD-10-CM

## 2019-03-15 PROCEDURE — G0283 ELEC STIM OTHER THAN WOUND: HCPCS

## 2019-03-15 PROCEDURE — 97140 MANUAL THERAPY 1/> REGIONS: CPT

## 2019-03-15 PROCEDURE — 97110 THERAPEUTIC EXERCISES: CPT

## 2019-03-15 NOTE — THERAPY TREATMENT NOTE
Outpatient Physical Therapy Ortho Treatment Note   Sin      Patient Name: Alexander Zamora  : 1950  MRN: [id number]  Today's Date: 3/15/2019      Visit Date: 03/15/2019    Visit Dx:    ICD-10-CM ICD-9-CM   1. Low back pain, unspecified back pain laterality, unspecified chronicity, with sciatica presence unspecified M54.5 724.2   2. Chronic left shoulder pain M25.512 719.41    G89.29 338.29   3. Cervical pain M54.2 723.1   4. Chronic right shoulder pain M25.511 719.41    G89.29 338.29       Patient Active Problem List   Diagnosis   • Prostate cancer (CMS/HCC)   • Erectile dysfunction following radical prostatectomy        Past Medical History:   Diagnosis Date   • Cancer (CMS/HCC)     Postrate   • Depression    • Elevated prostate specific antigen (PSA)    • Erectile dysfunction    • Hypercholesterolemia    • Hypercholesterolemia    • Prostatitis         Past Surgical History:   Procedure Laterality Date   • HEMORRHOIDECTOMY     • OTHER SURGICAL HISTORY      destruction of hemorrhoids, Rubber band ligatioin of hemmorrhoids   • OTHER SURGICAL HISTORY      surgery for an ulcer, PNEUMOTHORAX AND ULCER THERAPY       PT Ortho     Row Name 03/15/19 1300       Subjective Comments    Subjective Comments  Patient states he feels his overall pain has improved since beginning therapy.  Pt reports a great relief in his shoulders.  Pt reports 3/10 pain prior to tx.   -MARLEN       Subjective Pain    Able to rate subjective pain?  yes  -MARLEN    Pre-Treatment Pain Level  3  -MARLEN    Post-Treatment Pain Level  0  -MARLEN      User Key  (r) = Recorded By, (t) = Taken By, (c) = Cosigned By    Initials Name Provider Type    Wendy Barbosa, PTA Physical Therapy Assistant                      PT Assessment/Plan     Row Name 03/15/19 1410          PT Assessment    Assessment Comments  Patient tolerated treatment well today and subjectively reported overall improvement in pain w/ therapy.  Pt continues to progress with  therex to address range of motion, strength, and functional mobility deficits.  Pt received modalities to assist w/ pain control as well as manual STM.  Pt received cues as needed throughout session for improved feedback w/ therex and for max benefit w/ activities.  No adverse reactions observed following modalities.   -MARLEN        PT Plan    PT Plan Comments  Continue with PT's POC and will progress tx as tolerated by patient.   -MARLEN       User Key  (r) = Recorded By, (t) = Taken By, (c) = Cosigned By    Initials Name Provider Type    Wendy Barbosa PTA Physical Therapy Assistant          Modalities     Row Name 03/15/19 1300             Moist Heat    MH Applied  Yes no redness observed following MH  -MARLEN      Location  lumbar, cervical, bilateral shoulders  -MARLEN      Rx Minutes  15 mins  -MARLEN      MH Prior to Rx  Yes w/ estim to cervical, lumbar region in seated position  -MARLEN         ELECTRICAL STIMULATION    Attended/Unattended  Unattended no skin irritation observed following estim  -MARLEN      Stimulation Type  Pre-Mod  -MARLEN      Max mAmp  -- as to pt's tolerance  -MARLEN      Location/Electrode Placement/Other  cervical/ lumbar region  -MARLEN       PT E-Stim Unattended (Manual) Minutes  15  -MARLEN        User Key  (r) = Recorded By, (t) = Taken By, (c) = Cosigned By    Initials Name Provider Type    Wendy Barbosa PTA Physical Therapy Assistant          Exercises     Row Name 03/15/19 1300             Subjective Comments    Subjective Comments  Patient states he feels his overall pain has improved since beginning therapy.  Pt reports a great relief in his shoulders.  Pt reports 3/10 pain prior to tx.   -MARLEN         Subjective Pain    Able to rate subjective pain?  yes  -MARLEN      Pre-Treatment Pain Level  3  -MARLEN      Post-Treatment Pain Level  0  -MARLEN         Total Minutes    55724 - PT Therapeutic Exercise Minutes  35  -MARLEN      43967 - PT Manual Therapy Minutes  10  -MARLEN         Exercise 1    Exercise  "Name 1  UT stretch 3x20\", lev scap stretch 3x20\", cervical AROM (ext, rot) 10x2, cervical retraction 10x3, scap squeeze 15x2, step ups 6\" 10x2, TG squats LV 16 10x2, LTR 15x2, SKTC 3x20\", piriformis stretch 3x20\", PPT 15x2  -MARLEN      Cueing 1  Verbal;Tactile;Demo  -MARLEN      Time 1  35 min  -MARLEN        User Key  (r) = Recorded By, (t) = Taken By, (c) = Cosigned By    Initials Name Provider Type    Wendy Barbosa, OM[facility] Physical Therapy Assistant                        Manual Rx (last 36 hours)      Manual Treatments     Row Name 03/15/19 1300             Total Minutes    88009 - PT Manual Therapy Minutes  10  -MARLEN         Manual Rx 1    Manual Rx 1 Location  B) UT musculature, cervical region  -MARLEN      Manual Rx 1 Type  STM  -MARLEN      Manual Rx 1 Grade  as to pt's tolerance  -MARLEN      Manual Rx 1 Duration  10 min  -MARLEN        User Key  (r) = Recorded By, (t) = Taken By, (c) = Cosigned By    Initials Name Provider Type    Wendy Barbosa, [facility] Physical Therapy Assistant              Therapy Education  Given: HEP, Symptoms/condition management, Pain management, Posture/body mechanics  Program: Reinforced  How Provided: Verbal, Demonstration  Provided to: Patient  Level of Understanding: Verbalized, Demonstrated              Time Calculation:   Start Time: 1300  Stop Time: 1403  Time Calculation (min): 63 min  Therapy Suggested Charges     Code   Minutes Charges    83075 (CPT®) Hc Pt Neuromusc Re Education Ea 15 Min      48370 (CPT®) Hc Pt Ther Proc Ea 15 Min 35 2    93431 (CPT®) Hc Gait Training Ea 15 Min      80827 (CPT®) Hc Pt Therapeutic Act Ea 15 Min      87177 (CPT®) Hc Pt Manual Therapy Ea 15 Min 10 1    74657 (CPT®) Hc Pt Ther Massage- Per 15 Min      86964 (CPT®) Hc Pt Iontophoresis Ea 15 Min      10899 (CPT®) Hc Pt Elec Stim Ea-Per 15 Min      90080 (CPT®) Hc Pt Ultrasound Ea 15 Min      70827 (CPT®) Hc Pt Self Care/Mgmt/Train Ea 15 Min      69847 (CPT®) Hc Pt Prosthetic (S) Train Initial Encounter, " Each 15 Min      54201 (CPT®) Hc Orthotic(S) Mgmt/Train Initial Encounter, Each 15min      16039 (CPT®) Hc Pt Aquatic Therapy Ea 15 Min      74271 (CPT®) Hc Pt Orthotic(S)/Prosthetic(S) Encounter, Each 15 Min       (CPT®) Hc Pt Electrical Stim Unattended 15 1    Total  60 4        Therapy Charges for Today     Code Description Service Date Service Provider Modifiers Qty    79114036292 HC PT THER PROC EA 15 MIN 3/15/2019 Wendy Ward, PTA GP 2    33384825931 HC PT MANUAL THERAPY EA 15 MIN 3/15/2019 Wendy Ward, PTA GP 1    81871258705 HC PT ELECTRICAL STIM UNATTENDED 3/15/2019 Wendy Ward, PTA  1                    Wendy Mcgee. OM[facility] Ward  3/15/2019

## 2019-03-19 ENCOUNTER — HOSPITAL ENCOUNTER (OUTPATIENT)
Dept: PHYSICAL THERAPY | Facility: HOSPITAL | Age: 69
Setting detail: THERAPIES SERIES
Discharge: HOME | End: 2019-03-19

## 2019-03-19 DIAGNOSIS — M54.2 CERVICAL PAIN: ICD-10-CM

## 2019-03-19 DIAGNOSIS — M25.511 CHRONIC RIGHT SHOULDER PAIN: ICD-10-CM

## 2019-03-19 DIAGNOSIS — G89.29 CHRONIC RIGHT SHOULDER PAIN: ICD-10-CM

## 2019-03-19 DIAGNOSIS — M54.5 LOW BACK PAIN, UNSPECIFIED BACK PAIN LATERALITY, UNSPECIFIED CHRONICITY, WITH SCIATICA PRESENCE UNSPECIFIED: Primary | ICD-10-CM

## 2019-03-19 DIAGNOSIS — M25.512 CHRONIC LEFT SHOULDER PAIN: ICD-10-CM

## 2019-03-19 DIAGNOSIS — G89.29 CHRONIC LEFT SHOULDER PAIN: ICD-10-CM

## 2019-03-19 PROCEDURE — G0283 ELEC STIM OTHER THAN WOUND: HCPCS | Performed by: PHYSICAL THERAPIST

## 2019-03-19 NOTE — THERAPY DISCHARGE NOTE
Outpatient Physical Therapy Ortho Treatment Note/Discharge Summary   Sin     Patient Name: Alexander Zamora  : 1950  MRN: [id number]  Today's Date: 3/19/2019      Visit Date: 2019    Visit Dx:    ICD-10-CM ICD-9-CM   1. Low back pain, unspecified back pain laterality, unspecified chronicity, with sciatica presence unspecified M54.5 724.2   2. Chronic left shoulder pain M25.512 719.41    G89.29 338.29   3. Cervical pain M54.2 723.1   4. Chronic right shoulder pain M25.511 719.41    G89.29 338.29       Patient Active Problem List   Diagnosis   • Prostate cancer (CMS/HCC)   • Erectile dysfunction following radical prostatectomy        Past Medical History:   Diagnosis Date   • Cancer (CMS/HCC)     Postrate   • Depression    • Elevated prostate specific antigen (PSA)    • Erectile dysfunction    • Hypercholesterolemia    • Hypercholesterolemia    • Prostatitis         Past Surgical History:   Procedure Laterality Date   • HEMORRHOIDECTOMY     • OTHER SURGICAL HISTORY      destruction of hemorrhoids, Rubber band ligatioin of hemmorrhoids   • OTHER SURGICAL HISTORY      surgery for an ulcer, PNEUMOTHORAX AND ULCER THERAPY       PT Ortho     Row Name 19 1400       Subjective Comments    Subjective Comments  Pt reports mild back and shoulder pain prior to today's session.  -AD       Subjective Pain    Able to rate subjective pain?  yes  -AD    Pre-Treatment Pain Level  1  -AD    Post-Treatment Pain Level  1  -AD       Myotomal Screen- Upper Quarter Clearing    Shoulder flexion (C5)  Bilateral:;5 (Normal)  -AD    Elbow flexion/wrist extension (C6)  Bilateral:;5 (Normal)  -AD    Elbow extension/wrist flexion (C7)  Bilateral:;5 (Normal)  -AD       Myotomal Screen- Lower Quarter Clearing    Hip flexion (L2)  Bilateral:;5 (Normal)  -AD    Knee extension (L3)  Bilateral:;5 (Normal)  -AD    Ankle DF (L4)  Bilateral:;5 (Normal)  -AD    Knee flexion (S2)  Bilateral:;5 (Normal)  -AD       Lumbar ROM  Screen- Lower Quarter Clearing    Lumbar Flexion  Impaired 75%  -AD    Lumbar Extension  Impaired 75%  -AD    Lumbar Lateral Flexion  Impaired 100%  -AD    Lumbar Rotation  Impaired 100%  -AD       Right Upper Ext    Rt Shoulder Abduction AROM  130  -AD    Rt Shoulder Flexion AROM  160  -AD    Rt Shoulder External Rotation AROM  70  -AD    Rt Shoulder Internal Rotation AROM  80  -AD       Left Upper Ext    Lt Shoulder Abduction AROM  135  -AD    Lt Shoulder Flexion AROM  160  -AD    Lt Shoulder External Rotation AROM  72  -AD    Lt Shoulder Internal Rotation AROM  80  -AD       MMT Right Upper Ext    Rt Shoulder Flexion MMT, Gross Movement  (5/5) normal  -AD    Rt Shoulder ABduction MMT, Gross Movement  (5/5) normal  -AD    Rt Shoulder Internal Rotation MMT, Gross Movement  (5/5) normal  -AD    Rt Shoulder External Rotation MMT, Gross Movement  (5/5) normal  -AD       MMT Left Upper Ext    Lt Shoulder Flexion MMT, Gross Movement  (5/5) normal  -AD    Lt Shoulder ABduction MMT, Gross Movement  (5/5) normal  -AD    Lt Shoulder Internal Rotation MMT, Gross Movement  (5/5) normal  -AD    Lt Shoulder External Rotation MMT, Gross Movement  (5/5) normal  -AD      User Key  (r) = Recorded By, (t) = Taken By, (c) = Cosigned By    Initials Name Provider Type    AD Dalton, Ashley Claudene, PT Physical Therapist                      PT Assessment/Plan     Row Name 03/19/19 1506          PT Assessment    Assessment Comments  A re-evaluation was performed during today's session, with patient demonstrating improvements in bilateral shoulder and lumbar ROM, strength, and function. He reported 36.36% impairment on the Quick Dash, improving from 88.64% last reported. In addition, he reported 36% impairment on the Modified Oswestry which has improved from 64% impairment reported at last assessment. Due to progress made and achieving all established physical therapy goals, he is being discharged at this time. Discharge was discussed  with the patient and he was agreeable to discharge at this time. No skin irritation was observed following modalities. A HEP was reviewed with the patient.  -AD        PT Plan    PT Plan Comments  Patient is being discharged at this time.  -AD       User Key  (r) = Recorded By, (t) = Taken By, (c) = Cosigned By    Initials Name Provider Type    AD Dalton, Ashley Claudene, PT Physical Therapist          Modalities     Row Name 03/19/19 1400             Moist Heat    MH Applied  Yes With premod, no skin irritation observed.  -AD      Location  lumbar, cervical, bilateral shoulders  -AD      Rx Minutes  15 mins  -AD      MH Prior to Rx  Yes w/ estim to cervical, lumbar region in seated position  -AD         ELECTRICAL STIMULATION    Attended/Unattended  Unattended With MH, no skin irritation observedl.  -AD      Stimulation Type  Pre-Mod  -AD      Max mAmp  -- as to pt's tolerance  -AD      Location/Electrode Placement/Other  cervical/ lumbar region  -AD       PT E-Stim Unattended (Manual) Minutes  15  -AD        User Key  (r) = Recorded By, (t) = Taken By, (c) = Cosigned By    Initials Name Provider Type    AD Dalton, Ashley Claudene, PT Physical Therapist          Exercises     Row Name 03/19/19 1400             Subjective Comments    Subjective Comments  Pt reports mild back and shoulder pain prior to today's session.  -AD         Subjective Pain    Able to rate subjective pain?  yes  -AD      Pre-Treatment Pain Level  1  -AD      Post-Treatment Pain Level  1  -AD        User Key  (r) = Recorded By, (t) = Taken By, (c) = Cosigned By    Initials Name Provider Type    AD Dalton, Ashley Claudene, PT Physical Therapist                         PT OP Goals     Row Name 03/19/19 1500          PT Short Term Goals    STG 1  Patient will be instructed in HEP for improved independence.  -AD     STG 1 Progress  Met  -AD     STG 2  Pt will report less than 55% impairment on the Modified Oswestry for improved independence.   -AD     STG 2 Progress  Met  -AD     STG 2 Progress Comments  36%  -AD     STG 3  Pt will report no more than 7/10 low back pain with daily activities.  -AD     STG 3 Progress  Met  -AD     STG 3 Progress Comments  2-3/10, per pt report  -AD     STG 4  Shoulder ROM will improve by at least 10 degrees to allow for greater ease with ADLs.  -AD     STG 4 Progress  Met  -AD     STG 5  Patient will report shoulder pain no greater than 7/10 when performing self-care activities.  -AD     STG 5 Progress  Met  -AD     STG 5 Progress Comments  0/10, per pt report  -AD        Long Term Goals    LTG 1  Pt will report 4+/5 bilateral lower extremity strength for improved functional independence.  -AD     LTG 1 Progress  Met  -AD     LTG 2  Pt will demonstrate 75% lumbar ROM for improved function.  -AD     LTG 2 Progress  Met  -AD     LTG 3  Pt will report less than 50% impairment on the Modified Oswestry for improved function.  -AD     LTG 3 Progress  Met  -AD     LTG 3 Progress Comments  36%  -AD     LTG 4  Pt will report a maximum of 5/10 low back pain with daily and household activities.  -AD     LTG 4 Progress  Met  -AD     LTG 4 Progress Comments  3/10, per pt report.  -AD     LTG 5  Patient will report shoulder pain no greater than 4/10 when carrying groceries.  -AD     LTG 5 Progress  Met  -AD     LTG 5 Progress Comments  3/10, per pt report.  -AD     LTG 6  Shoulder flexion/abduction ROM will improve to at least 165 to allow for greater ease with ADLs.  -AD     LTG 6 Progress  Progressing  -AD     LTG 6 Progress Comments  135 degrees abduction, 160 degrees flexion  -AD     LTG 7  UE strength will improve to at least 4/5 to prevent reinjury.  -AD     LTG 7 Progress  Met  -AD     LTG 8  QuickDash will improve by at least 10% to show improved functional mobility.  -AD     LTG 8 Progress  Met  -AD     LTG 8 Progress Comments  Improved from 88.64% impairment to 36.36% impairment.  -AD     LTG 9  Pt will improve cervical ROM by  10 degrees in all planes.  -AD     LTG 9 Progress  Met  -AD     LTG 10  Pt will report decerased radicular symptoms by 10% with CROM   -AD     LTG 10 Progress  Met  -AD       User Key  (r) = Recorded By, (t) = Taken By, (c) = Cosigned By    Initials Name Provider Type    AD Dalton, Ashley Claudene, PT Physical Therapist          Therapy Education  Given: HEP, Symptoms/condition management, Pain management, Posture/body mechanics  Program: Reinforced  How Provided: Verbal, Demonstration  Provided to: Patient  Level of Understanding: Verbalized, Demonstrated    Outcome Measure Options: Quick DASH  Quick DASH  Open a tight or new jar.: Mild Difficulty  Do heavy household chores (e.g., wash walls, wash floors): Mild Difficulty  Carry a shopping bag or briefcase: Mild Difficulty  Wash your back: Moderate Difficulty  Use a knife to cut food: Mild Difficulty  Recreational activities in which you take some force or impact through your arm, should or hand (e.g. golf, hammering, tennis, etc.): Mild Difficulty  During the past week, to what extent has your arm, shoulder, or hand problem interfered with your normal social activites with family, friends, neighbors or groups?: Moderately  During the past week, were you limited in your work or other regular daily activities as a result of your arm, shoulder or hand problem?: Moderately Limited  Arm, Shoulder, or hand pain: Moderate  Tingling (pins and needles) in your arm, shoulder, or hand: Mild  During the past week, how much difficulty have you had sleeping because of the pain in your arm, shoulder or hand?: Moderate Difficiculty  Number of Questions Answered: 11  Quick DASH Score: 36.36  Modified Oswestry  Modified Oswestry Score/Comments: 18/50 = 36% impairment      Time Calculation:   Start Time: 1400  Stop Time: 1445  Time Calculation (min): 45 min  Therapy Charges for Today     Code Description Service Date Service Provider Modifiers Qty    95958484333  PT ELECTRICAL  STIM UNATTENDED 3/19/2019 Dalton, Ashley Claudene, PT  1    09828436825 HC PT CARE PLAN EACH 15 MIN 3/19/2019 Dalton, Ashley Claudene, PT GP 2          PT G-Codes  Outcome Measure Options: Quick DASH  Quick DASH Score: 36.36  Modified Oswestry Score/Comments: 18/50 = 36% impairment     OP PT Discharge Summary  Date of Discharge: 03/19/19  Reason for Discharge: All goals achieved  Outcomes Achieved: Able to achieve all goals within established timeline  Discharge Destination: Home with home program  Discharge Instructions/Additional Comments: The patient was evaluated on 2/1/19 and attended 12 follow-up appointments. Due to progress made, he is being discharged at this time. The patient was agreeable to discharge and demonstrated understanding of HEP. Thank you for the referral.      Ashley Claudene Dalton, PT  3/19/2019

## 2019-04-27 ENCOUNTER — APPOINTMENT (OUTPATIENT)
Dept: CT IMAGING | Facility: HOSPITAL | Age: 69
End: 2019-04-27

## 2019-04-27 ENCOUNTER — HOSPITAL ENCOUNTER (EMERGENCY)
Facility: HOSPITAL | Age: 69
Discharge: HOME | End: 2019-04-27
Attending: EMERGENCY MEDICINE | Admitting: EMERGENCY MEDICINE

## 2019-04-27 VITALS
TEMPERATURE: 98.6 F | HEART RATE: 63 BPM | DIASTOLIC BLOOD PRESSURE: 78 MMHG | SYSTOLIC BLOOD PRESSURE: 148 MMHG | WEIGHT: 164 LBS | HEIGHT: 66 IN | BODY MASS INDEX: 26.36 KG/M2 | RESPIRATION RATE: 18 BRPM | OXYGEN SATURATION: 99 %

## 2019-04-27 DIAGNOSIS — N28.1 ACQUIRED RENAL CYST OF LEFT KIDNEY: ICD-10-CM

## 2019-04-27 DIAGNOSIS — K63.89 MASS OF CECUM: ICD-10-CM

## 2019-04-27 DIAGNOSIS — K57.32 DIVERTICULITIS OF SIGMOID COLON: Primary | ICD-10-CM

## 2019-04-27 LAB
ALBUMIN SERPL-MCNC: 3.76 G/DL (ref 3.5–5.2)
ALBUMIN/GLOB SERPL: 1.2 G/DL
ALP SERPL-CCNC: 120 U/L (ref 39–117)
ALT SERPL W P-5'-P-CCNC: 13 U/L (ref 1–41)
ANION GAP SERPL CALCULATED.3IONS-SCNC: 11.4 MMOL/L
AST SERPL-CCNC: 16 U/L (ref 1–40)
BASOPHILS # BLD AUTO: 0.02 10*3/MM3 (ref 0–0.2)
BASOPHILS NFR BLD AUTO: 0.2 % (ref 0–1.5)
BILIRUB SERPL-MCNC: 0.7 MG/DL (ref 0.2–1.2)
BILIRUB UR QL STRIP: NEGATIVE
BUN BLD-MCNC: 13 MG/DL (ref 8–23)
BUN/CREAT SERPL: 14 (ref 7–25)
CALCIUM SPEC-SCNC: 10.6 MG/DL (ref 8.6–10.5)
CHLORIDE SERPL-SCNC: 101 MMOL/L (ref 98–107)
CLARITY UR: CLEAR
CO2 SERPL-SCNC: 26.6 MMOL/L (ref 22–29)
COLOR UR: ABNORMAL
CREAT BLD-MCNC: 0.93 MG/DL (ref 0.76–1.27)
DEPRECATED RDW RBC AUTO: 40.4 FL (ref 37–54)
EOSINOPHIL # BLD AUTO: 0.12 10*3/MM3 (ref 0–0.4)
EOSINOPHIL NFR BLD AUTO: 1.4 % (ref 0.3–6.2)
ERYTHROCYTE [DISTWIDTH] IN BLOOD BY AUTOMATED COUNT: 13.1 % (ref 12.3–15.4)
GFR SERPL CREATININE-BSD FRML MDRD: 81 ML/MIN/1.73
GLOBULIN UR ELPH-MCNC: 3.1 GM/DL
GLUCOSE BLD-MCNC: 112 MG/DL (ref 65–99)
GLUCOSE UR STRIP-MCNC: NEGATIVE MG/DL
HCT VFR BLD AUTO: 38.8 % (ref 37.5–51)
HGB BLD-MCNC: 13.2 G/DL (ref 13–17.7)
HGB UR QL STRIP.AUTO: NEGATIVE
IMM GRANULOCYTES # BLD AUTO: 0.01 10*3/MM3 (ref 0–0.05)
IMM GRANULOCYTES NFR BLD AUTO: 0.1 % (ref 0–0.5)
KETONES UR QL STRIP: NEGATIVE
LEUKOCYTE ESTERASE UR QL STRIP.AUTO: NEGATIVE
LYMPHOCYTES # BLD AUTO: 1.61 10*3/MM3 (ref 0.7–3.1)
LYMPHOCYTES NFR BLD AUTO: 18.7 % (ref 19.6–45.3)
MCH RBC QN AUTO: 29.6 PG (ref 26.6–33)
MCHC RBC AUTO-ENTMCNC: 34 G/DL (ref 31.5–35.7)
MCV RBC AUTO: 87 FL (ref 79–97)
MONOCYTES # BLD AUTO: 0.86 10*3/MM3 (ref 0.1–0.9)
MONOCYTES NFR BLD AUTO: 10 % (ref 5–12)
NEUTROPHILS # BLD AUTO: 5.97 10*3/MM3 (ref 1.7–7)
NEUTROPHILS NFR BLD AUTO: 69.6 % (ref 42.7–76)
NITRITE UR QL STRIP: NEGATIVE
PH UR STRIP.AUTO: 5.5 [PH] (ref 5–8)
PLATELET # BLD AUTO: 320 10*3/MM3 (ref 140–450)
PMV BLD AUTO: 10.6 FL (ref 6–12)
POTASSIUM BLD-SCNC: 3.8 MMOL/L (ref 3.5–5.2)
PROT SERPL-MCNC: 6.9 G/DL (ref 6–8.5)
PROT UR QL STRIP: ABNORMAL
RBC # BLD AUTO: 4.46 10*6/MM3 (ref 4.14–5.8)
SODIUM BLD-SCNC: 139 MMOL/L (ref 136–145)
SP GR UR STRIP: 1.02 (ref 1–1.03)
UROBILINOGEN UR QL STRIP: ABNORMAL
WBC NRBC COR # BLD: 8.59 10*3/MM3 (ref 3.4–10.8)

## 2019-04-27 PROCEDURE — 99284 EMERGENCY DEPT VISIT MOD MDM: CPT

## 2019-04-27 PROCEDURE — 85025 COMPLETE CBC W/AUTO DIFF WBC: CPT | Performed by: EMERGENCY MEDICINE

## 2019-04-27 PROCEDURE — 74176 CT ABD & PELVIS W/O CONTRAST: CPT

## 2019-04-27 PROCEDURE — 80053 COMPREHEN METABOLIC PANEL: CPT | Performed by: EMERGENCY MEDICINE

## 2019-04-27 PROCEDURE — 81003 URINALYSIS AUTO W/O SCOPE: CPT | Performed by: EMERGENCY MEDICINE

## 2019-04-27 PROCEDURE — 74176 CT ABD & PELVIS W/O CONTRAST: CPT | Performed by: RADIOLOGY

## 2019-04-27 RX ORDER — DOXYCYCLINE HYCLATE 100 MG/1
100 CAPSULE ORAL 2 TIMES DAILY
COMMUNITY
End: 2019-04-27

## 2019-04-27 RX ORDER — CIPROFLOXACIN 500 MG/1
500 TABLET, FILM COATED ORAL 2 TIMES DAILY
Qty: 20 TABLET | Refills: 0 | Status: SHIPPED | OUTPATIENT
Start: 2019-04-27 | End: 2019-12-04

## 2019-04-27 RX ORDER — SODIUM CHLORIDE 0.9 % (FLUSH) 0.9 %
10 SYRINGE (ML) INJECTION AS NEEDED
Status: DISCONTINUED | OUTPATIENT
Start: 2019-04-27 | End: 2019-04-27 | Stop reason: HOSPADM

## 2019-04-27 RX ORDER — METRONIDAZOLE 500 MG/1
500 TABLET ORAL 3 TIMES DAILY
Qty: 30 TABLET | Refills: 0 | Status: SHIPPED | OUTPATIENT
Start: 2019-04-27 | End: 2019-12-04

## 2019-04-27 RX ORDER — CEFDINIR 250 MG/5ML
POWDER, FOR SUSPENSION ORAL 2 TIMES DAILY
COMMUNITY
End: 2019-04-27

## 2019-06-12 ENCOUNTER — TRANSCRIBE ORDERS (OUTPATIENT)
Dept: ADMINISTRATIVE | Facility: HOSPITAL | Age: 69
End: 2019-06-12

## 2019-06-12 DIAGNOSIS — N28.1 RENAL CYST: Primary | ICD-10-CM

## 2019-06-19 ENCOUNTER — HOSPITAL ENCOUNTER (OUTPATIENT)
Dept: CT IMAGING | Facility: HOSPITAL | Age: 69
Discharge: HOME | End: 2019-06-19
Admitting: INTERNAL MEDICINE

## 2019-06-19 DIAGNOSIS — N28.1 RENAL CYST: ICD-10-CM

## 2019-06-19 LAB — CREAT BLDA-MCNC: 0.7 MG/DL (ref 0.6–1.3)

## 2019-06-19 PROCEDURE — 74178 CT ABD&PLV WO CNTR FLWD CNTR: CPT

## 2019-06-19 PROCEDURE — 0 IOVERSOL 68 % SOLUTION: Performed by: INTERNAL MEDICINE

## 2019-06-19 PROCEDURE — 74178 CT ABD&PLV WO CNTR FLWD CNTR: CPT | Performed by: RADIOLOGY

## 2019-06-19 PROCEDURE — 82565 ASSAY OF CREATININE: CPT

## 2019-06-19 RX ADMIN — IOVERSOL 100 ML: 678 INJECTION INTRA-ARTERIAL; INTRAVENOUS at 14:28

## 2019-11-26 ENCOUNTER — LAB (OUTPATIENT)
Dept: UROLOGY | Facility: CLINIC | Age: 69
End: 2019-11-26

## 2019-11-26 DIAGNOSIS — R97.20 ELEVATED PROSTATE SPECIFIC ANTIGEN (PSA): Primary | ICD-10-CM

## 2019-11-26 LAB — PSA SERPL-MCNC: <0.014 NG/ML (ref 0–4)

## 2019-11-26 PROCEDURE — 36415 COLL VENOUS BLD VENIPUNCTURE: CPT | Performed by: UROLOGY

## 2019-11-26 PROCEDURE — 84153 ASSAY OF PSA TOTAL: CPT | Performed by: UROLOGY

## 2019-12-04 ENCOUNTER — OFFICE VISIT (OUTPATIENT)
Dept: UROLOGY | Facility: CLINIC | Age: 69
End: 2019-12-04

## 2019-12-04 VITALS
HEIGHT: 66 IN | SYSTOLIC BLOOD PRESSURE: 142 MMHG | DIASTOLIC BLOOD PRESSURE: 74 MMHG | WEIGHT: 164 LBS | BODY MASS INDEX: 26.36 KG/M2

## 2019-12-04 DIAGNOSIS — C61 PROSTATE CANCER (HCC): Primary | ICD-10-CM

## 2019-12-04 PROCEDURE — 99213 OFFICE O/P EST LOW 20 MIN: CPT | Performed by: UROLOGY

## 2019-12-04 RX ORDER — MONTELUKAST SODIUM 10 MG/1
TABLET ORAL
Refills: 3 | COMMUNITY
Start: 2019-10-23 | End: 2022-04-08 | Stop reason: SDUPTHER

## 2019-12-04 RX ORDER — CETIRIZINE HYDROCHLORIDE 10 MG/1
TABLET ORAL
Refills: 0 | COMMUNITY
Start: 2019-10-23 | End: 2022-04-08 | Stop reason: SDUPTHER

## 2019-12-04 NOTE — PROGRESS NOTES
Chief Complaint:          Prostate cancer and ED.    HPI:   69 y.o. male.    HPI  Pt had a robotic radical prostatectomy and his psa this month is less than 0.014  Pt had the surgery 5 years ago.  Pt has been using 0.2 of triple    Past Medical History:        Past Medical History:   Diagnosis Date   • Arthritis    • Cancer (CMS/HCC) 2015    Postrate   • Depression    • Elevated prostate specific antigen (PSA)    • Erectile dysfunction    • Hypercholesterolemia    • Hypercholesterolemia    • Prostatitis          Current Meds:     Current Outpatient Medications   Medication Sig Dispense Refill   • amLODIPine-atorvastatin (CADUET) 5-10 MG per tablet Take 1 tablet by mouth daily.     • cetirizine (zyrTEC) 10 MG tablet   0   • montelukast (SINGULAIR) 10 MG tablet   3   • Multiple Vitamin (MULTI VITAMIN DAILY) tablet Take 1 tablet by mouth daily.     • SILDENAFIL CITRATE PO Take 20 mg by mouth. Take 3-5 tablets as needed       No current facility-administered medications for this visit.         Allergies:      Allergies   Allergen Reactions   • Amoxicillin Rash        Past Surgical History:     Past Surgical History:   Procedure Laterality Date   • HEMORRHOIDECTOMY     • OTHER SURGICAL HISTORY      destruction of hemorrhoids, Rubber band ligatioin of hemmorrhoids   • OTHER SURGICAL HISTORY      surgery for an ulcer, PNEUMOTHORAX AND ULCER THERAPY         Social History:     Social History     Socioeconomic History   • Marital status:      Spouse name: Not on file   • Number of children: Not on file   • Years of education: Not on file   • Highest education level: Not on file   Tobacco Use   • Smoking status: Former Smoker   • Smokeless tobacco: Never Used   • Tobacco comment: 3 packs per day for 20 yrs with a duration of 21 years of non smoking   Substance and Sexual Activity   • Alcohol use: No   • Drug use: No       Family History:     Family History   Problem Relation Age of Onset   • Nephrolithiasis Father    •  Heart disease Sister    • Diabetes Other        Review of Systems:     Review of Systems   Constitutional: Negative for chills and fever.   HENT: Negative for sinus pressure and sinus pain.    Respiratory: Negative for cough.    Cardiovascular: Negative for leg swelling.   Gastrointestinal: Negative for abdominal pain.   Genitourinary: Positive for frequency. Negative for dysuria and urgency.   Musculoskeletal: Negative for back pain.   Neurological: Negative for headaches.   Psychiatric/Behavioral: The patient is not nervous/anxious.        IPSS Questionnaire (AUA-7):  Over the past month…    1)  Incomplete Emptying  How often have you had a sensation of not emptying your bladder?  0 - Not at all   2)  Frequency  How often have you had to urinate less than every two hours? 2 - Less than half the time   3)  Intermittency  How often have you found you stopped and started again several times when you urinated?  0 - Not at all   4) Urgency  How often have you found it difficult to postpone urination?  0 - Not at all   5) Weak Stream  How often have you had a weak urinary stream?  0 - Not at all   6) Straining  How often have you had to push or strain to begin urination?  0 - Not at all   7) Nocturia  How many times did you typically get up at night to urinate?  3 - 3 times   Total Score:  5       Quality of life due to urinary symptoms:  If you were to spend the rest of your life with your urinary condition the way it is now, how would you feel about that? 2-Mostly Satisfied   Urine Leakage (Incontinence) 0-No Leakage       I have reviewed the follow portions of the patient's history and confirmed they are accurate today:  allergies, current medications, past family history, past medical history, past social history, past surgical history, problem list and ROS  Physical Exam:     Physical Exam   Constitutional: He is oriented to person, place, and time.   HENT:   Head: Normocephalic and atraumatic.   Right Ear:  "External ear normal.   Left Ear: External ear normal.   Nose: Nose normal.   Mouth/Throat: Oropharynx is clear and moist.   Eyes: Conjunctivae and EOM are normal. Pupils are equal, round, and reactive to light.   Neck: Normal range of motion. Neck supple. No thyromegaly present.   Cardiovascular: Normal rate, regular rhythm, normal heart sounds and intact distal pulses.   No murmur heard.  Pulmonary/Chest: Effort normal and breath sounds normal. No respiratory distress. He has no wheezes. He has no rales. He exhibits no tenderness.   Abdominal: Soft. Bowel sounds are normal. He exhibits no distension and no mass. There is no tenderness. No hernia.   Genitourinary: Penis normal.   Genitourinary Comments: absent   Musculoskeletal: Normal range of motion. He exhibits no edema or tenderness.   Lymphadenopathy:     He has no cervical adenopathy.   Neurological: He is alert and oriented to person, place, and time. No cranial nerve deficit. He exhibits normal muscle tone. Coordination normal.   Skin: Skin is warm. No rash noted.   Psychiatric: He has a normal mood and affect. His behavior is normal. Judgment and thought content normal.   Nursing note and vitals reviewed.      /74   Ht 167.6 cm (66\")   Wt 74.4 kg (164 lb)   BMI 26.47 kg/m²    Procedure:         Assessment:     Encounter Diagnosis   Name Primary?   • Prostate cancer (CMS/[facility]) Yes       No orders of the defined types were placed in this encounter.      Patient reports that he is not currently experiencing any symptoms of urinary incontinence.      Plan:   Will refill his triple and will see him next year with psa.    Patient's Body mass index is 26.47 kg/m². BMI is within normal parameters. No follow-up required..      Smoking Cessation Counseling:  Never a smoker.  Patient does not currently use any tobacco products.     Counseling was given to patient for the following topics instructions for management as follows: prostate cancer. The interim " medical history and current results were reviewed.  A treatment plan with follow-up was made for Prostate cancer (CMS/HCC) [C61].     This document has been electronically signed by Shaw Sebastian MD December 4, 2019 1:17 PM

## 2020-04-28 ENCOUNTER — OFFICE VISIT (OUTPATIENT)
Dept: UROLOGY | Facility: CLINIC | Age: 70
End: 2020-04-28

## 2020-04-28 VITALS — TEMPERATURE: 98.1 F | HEIGHT: 66 IN | WEIGHT: 164 LBS | BODY MASS INDEX: 26.36 KG/M2

## 2020-04-28 DIAGNOSIS — C61 PROSTATE CANCER (HCC): Primary | ICD-10-CM

## 2020-04-28 DIAGNOSIS — N52.31 ERECTILE DYSFUNCTION AFTER RADICAL PROSTATECTOMY: ICD-10-CM

## 2020-04-28 PROCEDURE — 84153 ASSAY OF PSA TOTAL: CPT | Performed by: UROLOGY

## 2020-04-28 PROCEDURE — 36415 COLL VENOUS BLD VENIPUNCTURE: CPT | Performed by: UROLOGY

## 2020-04-28 PROCEDURE — 99214 OFFICE O/P EST MOD 30 MIN: CPT | Performed by: UROLOGY

## 2020-04-28 NOTE — PROGRESS NOTES
Chief Complaint:          Prostate cancer.    HPI:   69 y.o. male.  The patient's current dose of triple P is 0.2 with a recent shipment has not been responsive and is even repeated the injection on the same day per the pharmacy's request.  The patient has brought his medication with him we can see how it works in the office.  He recently had a skin lesion that spontaneously resolved at the penoscrotal angle and sounds like an infected sebaceous cyst but currently he has no evidence of it.  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     • cetirizine (zyrTEC) 10 MG tablet   0   • montelukast (SINGULAIR) 10 MG tablet   3   • Multiple Vitamin (MULTI VITAMIN DAILY) tablet Take 1 tablet by mouth daily.     • SILDENAFIL CITRATE PO Take 20 mg by mouth. Take 3-5 tablets as needed       No current facility-administered medications for this visit.         Allergies:      Allergies   Allergen Reactions   • Amoxicillin Rash        Past Surgical History:     Past Surgical History:   Procedure Laterality Date   • HEMORRHOIDECTOMY     • OTHER SURGICAL HISTORY      destruction of hemorrhoids, Rubber band ligatioin of hemmorrhoids   • OTHER SURGICAL HISTORY      surgery for an ulcer, PNEUMOTHORAX AND ULCER THERAPY         Social History:     Social History     Socioeconomic History   • Marital status:      Spouse name: Not on file   • Number of children: Not on file   • Years of education: Not on file   • Highest education level: Not on file   Tobacco Use   • Smoking status: Former Smoker   • Smokeless tobacco:  Never Used   • Tobacco comment: 3 packs per day for 20 yrs with a duration of 21 years of non smoking   Substance and Sexual Activity   • Alcohol use: No   • Drug use: No       Family History:     Family History   Problem Relation Age of Onset   • Nephrolithiasis Father    • Heart disease Sister    • Diabetes Other    • No Known Problems Mother        Review of Systems:     Review of Systems   Constitutional: Negative for chills and fever.   HENT: Negative for sinus pain.    Respiratory: Negative for cough.    Cardiovascular: Negative for leg swelling.   Gastrointestinal: Negative for abdominal pain.   Genitourinary: Positive for penile pain. Negative for dysuria, frequency and urgency.   Musculoskeletal: Negative for back pain.   Neurological: Negative for headaches.   Psychiatric/Behavioral: The patient is not nervous/anxious.          Physical Exam:     Physical Exam   Constitutional: He is oriented to person, place, and time.   HENT:   Head: Normocephalic and atraumatic.   Right Ear: External ear normal.   Left Ear: External ear normal.   Nose: Nose normal.   Mouth/Throat: Oropharynx is clear and moist.   Eyes: Pupils are equal, round, and reactive to light. Conjunctivae and EOM are normal.   Neck: Normal range of motion. Neck supple. No thyromegaly present.   Cardiovascular: Normal rate, regular rhythm, normal heart sounds and intact distal pulses.   No murmur heard.  Pulmonary/Chest: Effort normal and breath sounds normal. No respiratory distress. He has no wheezes. He has no rales. He exhibits no tenderness.   Abdominal: Soft. Bowel sounds are normal. He exhibits no distension and no mass. There is no tenderness. No hernia.   Genitourinary: Rectum normal and penis normal.   Genitourinary Comments: Absent state   Musculoskeletal: Normal range of motion. He exhibits no edema or tenderness.   Lymphadenopathy:     He has no cervical adenopathy.   Neurological: He is alert and oriented to person, place, and time.  "No cranial nerve deficit. He exhibits normal muscle tone. Coordination normal.   Skin: Skin is warm. No rash noted.   Psychiatric: He has a normal mood and affect. His behavior is normal. Judgment and thought content normal.   Nursing note and vitals reviewed.      Temp 98.1 °F (36.7 °C)   Ht 167.6 cm (66\")   Wt 74.4 kg (164 lb)   BMI 26.47 kg/m²    Procedure:   The patient was injected with his own triple pain medication 0.4 cc and after 25 minutes he had a good erection.        Assessment:     Encounter Diagnoses   Name Primary?   • Prostate cancer (CMS/HCC) Yes   • Erectile dysfunction after radical prostatectomy        Orders Placed This Encounter   Procedures   • PSA DIAGNOSTIC       Patient reports that he is not currently experiencing any symptoms of urinary incontinence.      Plan:   I think the patient did not inject the triple pain in the proper location and since he is done it several times without effect I think we should teach him how to properly do the injection.  We went over the guidelines for triple P.  He could never do more than 1 injection in a 24-hour.  And no more than 3 a week and he can not increase the dose without discussion with myself  We will have the patient return for proper instruction on injection technique  We have drawn a PSA as well today    Patient's Body mass index is 26.47 kg/m². BMI is within normal parameters. No follow-up required..      Smoking Cessation Counseling:  Former smoker.  Patient does not currently use any tobacco products.   Counseling was given to patient for the following topics instructions for management as follows: Erectile dysfunction and triple P as well as his prostate cancer. The interim medical history and current results were reviewed.  A treatment plan with follow-up was made for Prostate cancer (CMS/[facility]) [C61]. I spent 24 minutes face to face with Alexander Zamora and 80 percentage was spent in counseling.       This document has been " electronically signed by Shaw Sebastian MD April 28, 2020 15:57

## 2020-04-29 ENCOUNTER — NURSE TRIAGE (OUTPATIENT)
Dept: CALL CENTER | Facility: HOSPITAL | Age: 70
End: 2020-04-29

## 2020-04-29 ENCOUNTER — HOSPITAL ENCOUNTER (EMERGENCY)
Facility: HOSPITAL | Age: 70
Discharge: HOME | End: 2020-04-29
Attending: FAMILY MEDICINE | Admitting: FAMILY MEDICINE

## 2020-04-29 VITALS
RESPIRATION RATE: 16 BRPM | HEIGHT: 69 IN | WEIGHT: 160 LBS | SYSTOLIC BLOOD PRESSURE: 145 MMHG | BODY MASS INDEX: 23.7 KG/M2 | DIASTOLIC BLOOD PRESSURE: 60 MMHG | TEMPERATURE: 98.4 F | OXYGEN SATURATION: 97 % | HEART RATE: 66 BPM

## 2020-04-29 DIAGNOSIS — N48.30 PRIAPISM: Primary | ICD-10-CM

## 2020-04-29 LAB — PSA SERPL-MCNC: <0.014 NG/ML (ref 0–4)

## 2020-04-29 PROCEDURE — 99282 EMERGENCY DEPT VISIT SF MDM: CPT

## 2020-04-29 NOTE — TELEPHONE ENCOUNTER
"Reviewed guideline with caller, advises He be evaluated in ED for erection lasting longer than 4 hours. Caller agrees to follow care advice.     Reason for Disposition  • [1] Erection AND [2] present > 4 hours    Additional Information  • Negative: Followed a genital area injury  • Negative: Pain or burning with passing urine is main symptom  • Negative: Pain in scrotum or testicle is main symptom  • Negative: Swollen scrotum OR lump in the scrotum/groin area  • Negative: Pubic lice suspected  • Negative: [1] Blood from end of penis AND [2] large amount  • Negative: [1] Not circumcised AND [2] foreskin pulled back and stuck  • Negative: [1] Looks infected (e.g., draining sore, ulcer, rash is painful to touch) AND [2] fever > 100.5 F (38.1 C)  • Negative: [1] Unable to urinate (or only a few drops) > 4 hours AND     [2] bladder feels very full (e.g., palpable bladder or strong urge to urinate)    Answer Assessment - Initial Assessment Questions  1. SYMPTOM: \"What's the main symptom you're concerned about?\" (e.g., discharge from penis, rash, pain, itching, swelling)      Pain, erect  2. LOCATION: \"Where is the pain located?\"      penis  3. ONSET: \"When did pain  start?\"      3:30 pm yesterday  4. PAIN: \"Is there any pain?\" If so, ask: \"How bad is it?\"  (Scale 1-10; or mild, moderate, severe)      Yes 10/10  5. URINE: \"Any difficulty passing urine?\" If so, ask: \"When was the last time?\"      Just barely  6. CAUSE: \"What do you think is causing the symptoms?\"      Had an injection in his penis yesterday   7. OTHER SYMPTOMS: \"Do you have any other symptoms?\" (e.g., fever, abdominal pain, blood in urine)      no    Protocols used: PENIS AND SCROTUM SYMPTOMS-ADULT-AH      "

## 2020-04-30 ENCOUNTER — TELEPHONE (OUTPATIENT)
Dept: UROLOGY | Facility: CLINIC | Age: 70
End: 2020-04-30

## 2020-04-30 DIAGNOSIS — N52.31 ERECTILE DYSFUNCTION AFTER RADICAL PROSTATECTOMY: Primary | ICD-10-CM

## 2020-04-30 NOTE — TELEPHONE ENCOUNTER
Tried calling patient to let him know that he will need to bring phenylephrine kit with him to next appt. Patients phone just rang.

## 2021-01-06 ENCOUNTER — OFFICE VISIT (OUTPATIENT)
Dept: UROLOGY | Facility: CLINIC | Age: 71
End: 2021-01-06

## 2021-01-06 DIAGNOSIS — C61 PROSTATE CANCER (HCC): Primary | ICD-10-CM

## 2021-01-06 PROCEDURE — 99212 OFFICE O/P EST SF 10 MIN: CPT | Performed by: UROLOGY

## 2021-01-06 NOTE — PROGRESS NOTES
Chief Complaint:          Prostate cancer.    HPI:   70 y.o. male.    HPI  Pt had a radical prostatectomy robotically 6 years ago.  His last psa was less than o.014.    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     • cetirizine (zyrTEC) 10 MG tablet   0   • montelukast (SINGULAIR) 10 MG tablet   3   • Multiple Vitamin (MULTI VITAMIN DAILY) tablet Take 1 tablet by mouth daily.     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 Take 3-5 tablets as needed       No current facility-administered medications for this visit.         Allergies:      Allergies   Allergen Reactions   • Amoxicillin Rash        Past Surgical History:     Past Surgical History:   Procedure Laterality Date   • HEMORRHOIDECTOMY     • OTHER SURGICAL HISTORY      destruction of hemorrhoids, Rubber band ligatioin of hemmorrhoids   • OTHER SURGICAL HISTORY      surgery for an ulcer, PNEUMOTHORAX AND ULCER THERAPY         Social History:     Social History     Socioeconomic History   • Marital status:      Spouse name: Not on file   • Number of children: Not on file   • Years of education: Not on file   • Highest education level: Not on file   Tobacco Use   • Smoking status: Former Smoker   • Smokeless tobacco: Never Used   • Tobacco comment: 3 packs per day for 20 yrs with a duration of 21 years of non smoking   Substance and Sexual Activity   • Alcohol use: No   • Drug use: No        Family History:     Family History   Problem Relation Age of Onset   • Nephrolithiasis Father    • Heart disease Sister    • Diabetes Other    • No Known Problems Mother        Review of Systems:     Review of Systems    I have reviewed the follow portions of the patient's history and confirmed they are accurate today:  allergies, current medications, past family history, past medical history, past social history, past surgical history, problem list and ROS  Physical Exam:     Physical Exam  Vitals signs and nursing note reviewed.   HENT:      Head: Normocephalic and atraumatic.      Right Ear: External ear normal.      Left Ear: External ear normal.      Nose: Nose normal.   Eyes:      Conjunctiva/sclera: Conjunctivae normal.      Pupils: Pupils are equal, round, and reactive to light.   Neck:      Musculoskeletal: Normal range of motion and neck supple.      Thyroid: No thyromegaly.   Cardiovascular:      Rate and Rhythm: Normal rate and regular rhythm.      Heart sounds: Normal heart sounds. No murmur.   Pulmonary:      Effort: Pulmonary effort is normal. No respiratory distress.      Breath sounds: Normal breath sounds. No wheezing or rales.   Chest:      Chest wall: No tenderness.   Abdominal:      General: Bowel sounds are normal. There is no distension.      Palpations: Abdomen is soft. There is no mass.      Tenderness: There is no abdominal tenderness.      Hernia: No hernia is present.   Genitourinary:     Penis: Normal.       Prostate: Normal.      Rectum: Normal.   Musculoskeletal: Normal range of motion.         General: No tenderness.   Lymphadenopathy:      Cervical: No cervical adenopathy.   Skin:     General: Skin is warm.      Findings: No rash.   Neurological:      Mental Status: He is alert and oriented to person, place, and time.      Cranial Nerves: No cranial nerve deficit.      Motor: No abnormal muscle tone.      Coordination: Coordination normal.   Psychiatric:         Behavior:  Behavior normal.         Thought Content: Thought content normal.         Judgment: Judgment normal.         There were no vitals taken for this visit.   Procedure:         Assessment:     Encounter Diagnosis   Name Primary?   • Prostate cancer (CMS/HCC) Yes       No orders of the defined types were placed in this encounter.      Patient reports that he is not currently experiencing any symptoms of urinary incontinence.      Plan:   Will see pt in 6 months with psa prior.  Smoking Cessation Counseling:  Former smoker.  Patient does not currently use any tobacco products.     Counseling was given to patient for the following topics instructions for management as follows: prostate cancer.. The interim medical history and current results were reviewed.  A treatment plan with follow-up was made for Prostate cancer (CMS/HCC) [C61]. I spent 21 minutes face to face with Alexander Zamora and 80 percentage was spent in counseling.       This document has been electronically signed by Shaw Sebastian MD January 6, 2021 13:04 EST

## 2021-02-01 ENCOUNTER — NURSE TRIAGE (OUTPATIENT)
Dept: CALL CENTER | Facility: HOSPITAL | Age: 71
End: 2021-02-01

## 2021-02-01 NOTE — TELEPHONE ENCOUNTER
Callwalker has an appt for in the morning for his first covid vaccine and he is wanting to make sure that it is ok for him to take it with his medical hx.  He has a hx of HTN and prostate cancer surgery.  Discussed his concerns and questions answered.  He states that he did talk to his doctor about it and he recommended that he take it.  Reason for Disposition  • COVID-19 vaccine, Frequently Asked Questions (FAQs)    Additional Information  • Negative: [1] Difficulty breathing or swallowing AND [2] starts within 2 hours after injection  • Negative: Sounds like a life-threatening emergency to the triager  • Negative: [1] COVID-19 exposure AND [2] no symptoms  • Negative: [1] Typical COVID-19 symptoms AND [2] symptoms that are NOT expected from vaccine (e.g., cough, difficulty breathing, loss of taste or smell, runny nose, sore throat)  • Negative: [1] Typical COVID-19 symptoms AND [2] started > 3 days after getting vaccine  • Negative: Fever > 104 F (40 C)  • Negative: Sounds like a severe, unusual reaction to the triager  • Negative: [1] Redness or red streak around the injection site AND [2] started > 48 hours after getting vaccine AND [3] fever  • Negative: [1] Fever > 101 F (38.3 C) AND [2] age > 60 AND [3] started > 48 hours after getting vaccine  • Negative: [1] Fever > 100.0 F (37.8 C) AND [2] bedridden (e.g., nursing home patient, CVA, chronic illness, recovering from surgery) AND [3] started > 48 hours after getting vaccine  • Negative: [1] Fever > 100.0 F (37.8 C) AND [2] diabetes mellitus or weak immune system (e.g., HIV positive, cancer chemo, splenectomy, organ transplant, chronic steroids) AND [3] started > 48 hours after getting vaccine  • Negative: [1] Redness or red streak around the injection site AND [2] started > 48 hours after getting vaccine AND [3] no fever  (Exception: red area < 1 inch or 2.5 cm wide)  • Negative: [1] Pain, tenderness, or swelling at the injection site AND [2] over 3 days (72  "hours) since vaccine AND [3] getting worse  • Negative: Fever > 100.0 F (37.8 C) present > 3 days (72 hours)  • Negative: [1] Fever > 100.0 F (37.8 C) AND [2] healthcare worker  • Negative: [1] Pain, tenderness, or swelling at the injection site AND [2] lasts > 7 days  • Negative: [1] Requesting COVID-19 vaccine AND [2] healthcare worker (e.g., EMS first responders, doctors, nurses)  • Negative: [1] Requesting COVID-19 vaccine AND [2] resident of a long-term care facility (e.g., nursing home)  • Negative: [1] Requesting COVID-19 vaccine AND [2] vaccine available in the community for this patient group  • Negative: COVID-19 vaccine, injection site reaction (e.g., pain, redness, swelling), question about  • Negative: COVID-19 vaccine, systemic reactions (e.g., fatigue, fever, muscle aches), questions about    Answer Assessment - Initial Assessment Questions  1. MAIN CONCERN OR SYMPTOM:  \"What is your main concern right now?\" \"What question do you have?\" \"What's the main symptom you're worried about?\" (e.g., fever, pain, redness, swelling)      I want to know if there is anything in the shot that will hurt me.  2. VACCINE: \"What vaccination did you receive?\" \"Is this your first or second shot?\" (e.g., none; Moderna, Pfizer, other)      na  3. SYMPTOM ONSET: \"When did the na begin?\" (e.g., not relevant; hours, days)       na  4. SYMPTOM SEVERITY: \"How bad is it?\"       na  5. FEVER: \"Is there a fever?\" If so, ask: \"What is it, how was it measured, and when did it start?\"       na  6. PAST REACTIONS: \"Have you reacted to immunizations before?\" If so, ask: \"What happened?\"      na  7. OTHER SYMPTOMS: \"Do you have any other symptoms?\"      na    Protocols used: CORONAVIRUS (COVID-19) VACCINE QUESTIONS AND REACTIONS-ADULT-AH    "

## 2021-02-02 ENCOUNTER — IMMUNIZATION (OUTPATIENT)
Dept: VACCINE CLINIC | Facility: HOSPITAL | Age: 71
End: 2021-02-02

## 2021-02-02 PROCEDURE — 91300 HC SARSCOV02 VAC 30MCG/0.3ML IM: CPT | Performed by: FAMILY MEDICINE

## 2021-02-02 PROCEDURE — 0001A: CPT | Performed by: FAMILY MEDICINE

## 2021-02-23 ENCOUNTER — IMMUNIZATION (OUTPATIENT)
Dept: VACCINE CLINIC | Facility: HOSPITAL | Age: 71
End: 2021-02-23

## 2021-02-23 PROCEDURE — 91300 HC SARSCOV02 VAC 30MCG/0.3ML IM: CPT | Performed by: INTERNAL MEDICINE

## 2021-02-23 PROCEDURE — 0002A: CPT | Performed by: INTERNAL MEDICINE

## 2021-07-01 ENCOUNTER — HOSPITAL ENCOUNTER (OUTPATIENT)
Dept: HOSPITAL 79 - EXRD | Age: 71
End: 2021-07-01
Attending: FAMILY MEDICINE
Payer: MEDICARE

## 2021-07-01 DIAGNOSIS — M85.88: ICD-10-CM

## 2021-07-01 DIAGNOSIS — Z13.6: Primary | ICD-10-CM

## 2021-07-01 DIAGNOSIS — M81.0: ICD-10-CM

## 2021-07-02 ENCOUNTER — LAB (OUTPATIENT)
Dept: UROLOGY | Facility: CLINIC | Age: 71
End: 2021-07-02

## 2021-07-02 DIAGNOSIS — C61 PROSTATE CANCER (HCC): Primary | ICD-10-CM

## 2021-07-02 PROCEDURE — 84153 ASSAY OF PSA TOTAL: CPT | Performed by: UROLOGY

## 2021-07-02 PROCEDURE — 36415 COLL VENOUS BLD VENIPUNCTURE: CPT | Performed by: UROLOGY

## 2021-07-03 LAB — PSA SERPL-MCNC: <0.014 NG/ML (ref 0–4)

## 2021-07-09 ENCOUNTER — OFFICE VISIT (OUTPATIENT)
Dept: UROLOGY | Facility: CLINIC | Age: 71
End: 2021-07-09

## 2021-07-09 VITALS — BODY MASS INDEX: 24.88 KG/M2 | WEIGHT: 168 LBS | HEIGHT: 69 IN

## 2021-07-09 DIAGNOSIS — N52.31 ERECTILE DYSFUNCTION FOLLOWING RADICAL PROSTATECTOMY: ICD-10-CM

## 2021-07-09 DIAGNOSIS — N39.3 SUI (STRESS URINARY INCONTINENCE), MALE: ICD-10-CM

## 2021-07-09 DIAGNOSIS — C61 PROSTATE CANCER (HCC): Primary | ICD-10-CM

## 2021-07-09 PROCEDURE — 99213 OFFICE O/P EST LOW 20 MIN: CPT | Performed by: UROLOGY

## 2021-07-09 NOTE — PROGRESS NOTES
Chief Complaint:          Chief Complaint   Patient presents with   • Prostate Cancer       HPI:   71 y.o. male returns today status post a robotic prostatectomy by Dr. Shaw Sebastian 5 years ago his PSA is castrate but he has terrible stress incontinence and cyst total erectile dysfunction we discussed the sphincter apparently he was told by Dr. 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     • cetirizine (zyrTEC) 10 MG tablet   0   • montelukast (SINGULAIR) 10 MG tablet   3   • Multiple Vitamin (MULTI VITAMIN DAILY) tablet Take 1 tablet by mouth daily.     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 Take 3-5 tablets as needed       No current facility-administered medications for this visit.        Allergies:      Allergies   Allergen Reactions   • Amoxicillin Rash        Past Surgical History:     Past Surgical History:   Procedure Laterality Date   • HEMORRHOIDECTOMY     • OTHER SURGICAL HISTORY      destruction of hemorrhoids, Rubber band ligatioin of hemmorrhoids   • OTHER SURGICAL HISTORY      surgery for an ulcer, PNEUMOTHORAX AND ULCER THERAPY         Social History:     Social History     Socioeconomic History   • Marital status:      Spouse name: Not on file   • Number of children: Not on file   •  Years of education: Not on file   • Highest education level: Not on file   Tobacco Use   • Smoking status: Former Smoker   • Smokeless tobacco: Never Used   • Tobacco comment: 3 packs per day for 20 yrs with a duration of 21 years of non smoking   Substance and Sexual Activity   • Alcohol use: No   • Drug use: No       Family History:     Family History   Problem Relation Age of Onset   • Nephrolithiasis Father    • Heart disease Sister    • Diabetes Other    • No Known Problems Mother        Review of Systems:     Review of Systems   Constitutional: Negative.    HENT: Negative.    Eyes: Negative.    Respiratory: Negative.    Cardiovascular: Negative.    Gastrointestinal: Negative.    Endocrine: Negative.    Genitourinary: Positive for difficulty urinating.   Musculoskeletal: Negative.    Allergic/Immunologic: Negative.    Neurological: Negative.    Hematological: Negative.    Psychiatric/Behavioral: Negative.        Physical Exam:     Physical Exam  Vitals and nursing note reviewed.   Constitutional:       Appearance: He is well-developed.   HENT:      Head: Normocephalic and atraumatic.   Eyes:      Conjunctiva/sclera: Conjunctivae normal.      Pupils: Pupils are equal, round, and reactive to light.   Cardiovascular:      Rate and Rhythm: Normal rate and regular rhythm.      Heart sounds: Normal heart sounds.   Pulmonary:      Effort: Pulmonary effort is normal.      Breath sounds: Normal breath sounds.   Abdominal:      General: Bowel sounds are normal.      Palpations: Abdomen is soft.   Musculoskeletal:         General: Normal range of motion.      Cervical back: Normal range of motion.   Skin:     General: Skin is warm and dry.   Neurological:      Mental Status: He is alert and oriented to person, place, and time.      Deep Tendon Reflexes: Reflexes are normal and symmetric.   Psychiatric:         Behavior: Behavior normal.         Thought Content: Thought content normal.         Judgment: Judgment normal.          I have reviewed the following portions of the patient's history: allergies, current medications, past family history, past medical history, past social history, past surgical history, problem list and ROS and confirm it's accurate.      Procedure:       Assessment/Plan:   Prostate cancer:  He returns today status post biopsy for extensive discussion of prostate cancer.  We discussed staging, and grading of the disease.  I described with a Franklin system being from a 2-10 scale with the most common low-grade pattern being a Franklin 6.  I discussed the staging workup including a total body bone scan and CT scan especially with a PSA is greater than 10.  We discussed the various options at length I discussed a radical retropubic prostatectomy done in the traditional fashion and using the robotic technique.  I then discussed radiation treatment both seed therapy and external beam therapy using Gold fiduciary markers.  We talked about some of the other alternatives such as a cryosurgical ablation at high intensity focused ultrasound as being viable alternatives but not recommended at this time.  He focused on aggressive watchful waiting and explained that currently low literature it's been discovered that a lot of men can actually observe it especially with numerous other comorbidities cannot have problems from the cancer by itself.  Talked about hormonal ablation.  In the side effects of creation of insulin resistance.  Overall, the patient was given appropriate literature is going to think about it and I'm going to revisit the topic with them after her next office visit  He had a radical prostatectomy done by Dr. Shaw Sebastian.  His PSA is castrate  Stress urinary incontinence-male severe discussed sphincter  Erectile dysfunction-we discussed the anatomy and physiology of the penis and the endothelium.  We discussed the various forms of erectile dysfunction including peripheral vascular occlusive disease,  postoperative, secondary to radiation treatments of the prostate, and arterial inflow.  We discussed the various treatment options available including oral medication and its various forms.  We discussed the use of both generic and non-generic Viagra.  We discussed Cialis and a longer half-life of 17 hours as well as the other 2 medications.  We discussed cost involved with this including the fact that the generic is much cheaper but is taken has multiple pills because they are 20 mg dosages.  We did discuss the other alternatives including Penile injections, vacuum erection devices and surgical intervention reserved for only the most severe cases.  We discussed the need for testosterone in about 20% of cases of erectile dysfunction.  He has total erectile dysfunction from his prostatectomy                This document has been electronically signed by ANNABELLE ESTEVEZ MD July 9, 2021 14:54 EDT

## 2021-07-11 PROBLEM — N39.3 SUI (STRESS URINARY INCONTINENCE), MALE: Status: ACTIVE | Noted: 2021-07-11

## 2021-07-21 ENCOUNTER — HOSPITAL ENCOUNTER (OUTPATIENT)
Dept: HOSPITAL 79 - OR | Age: 71
Discharge: HOME | End: 2021-07-21
Payer: MEDICARE

## 2021-07-21 DIAGNOSIS — Z86.010: ICD-10-CM

## 2021-07-21 DIAGNOSIS — D12.0: ICD-10-CM

## 2021-07-21 DIAGNOSIS — K57.30: ICD-10-CM

## 2021-07-21 DIAGNOSIS — M19.90: ICD-10-CM

## 2021-07-21 DIAGNOSIS — I10: ICD-10-CM

## 2021-07-21 DIAGNOSIS — Z88.1: ICD-10-CM

## 2021-07-21 DIAGNOSIS — M81.0: ICD-10-CM

## 2021-07-21 DIAGNOSIS — Z79.899: ICD-10-CM

## 2021-07-21 DIAGNOSIS — Z12.11: Primary | ICD-10-CM

## 2021-07-21 DIAGNOSIS — Z87.891: ICD-10-CM

## 2021-07-21 DIAGNOSIS — E78.5: ICD-10-CM

## 2022-02-01 ENCOUNTER — OFFICE VISIT (OUTPATIENT)
Dept: UROLOGY | Facility: CLINIC | Age: 72
End: 2022-02-01

## 2022-02-01 VITALS — BODY MASS INDEX: 24.88 KG/M2 | WEIGHT: 168 LBS | HEIGHT: 69 IN

## 2022-02-01 DIAGNOSIS — N52.31 ERECTILE DYSFUNCTION FOLLOWING RADICAL PROSTATECTOMY: ICD-10-CM

## 2022-02-01 DIAGNOSIS — N39.3 SUI (STRESS URINARY INCONTINENCE), MALE: ICD-10-CM

## 2022-02-01 DIAGNOSIS — C61 PROSTATE CANCER: Primary | ICD-10-CM

## 2022-02-01 PROCEDURE — 84153 ASSAY OF PSA TOTAL: CPT | Performed by: UROLOGY

## 2022-02-01 PROCEDURE — 99213 OFFICE O/P EST LOW 20 MIN: CPT | Performed by: UROLOGY

## 2022-02-01 NOTE — PROGRESS NOTES
Chief Complaint:          Chief Complaint   Patient presents with   • Prostate Cancer     6 month follow up        HPI:   71 y.o. male returns today.  He had a radical prostatectomy.  He has serious stress incontinence.  He has intermittent left neck adenopathy is nonmalignant.  He is not interested in the sphincter at this time.  PSA is pending I will see him back in 6 months      Past Medical History:        Past Medical History:   Diagnosis Date   • Arthritis    • 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   • Depression    • Elevated prostate specific antigen (PSA)    • Erectile dysfunction    • Hypercholesterolemia    • Hypercholesterolemia    • Prostatitis          Current Meds:     Current Outpatient Medications   Medication Sig Dispense Refill   • amLODIPine-atorvastatin (CADUET) 5-10 MG per tablet Take 1 tablet by mouth daily.     • cetirizine (zyrTEC) 10 MG tablet   0   • montelukast (SINGULAIR) 10 MG tablet   3   • Multiple Vitamin (MULTI VITAMIN DAILY) tablet Take 1 tablet by mouth daily.     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 Take 3-5 tablets as needed       No current facility-administered medications for this visit.        Allergies:      Allergies   Allergen Reactions   • Amoxicillin Rash        Past Surgical History:     Past Surgical History:   Procedure Laterality Date   • HEMORRHOIDECTOMY     • OTHER SURGICAL HISTORY      destruction of hemorrhoids, Rubber band ligatioin of hemmorrhoids   • OTHER SURGICAL HISTORY      surgery for an ulcer, PNEUMOTHORAX AND ULCER THERAPY         Social History:     Social History     Socioeconomic History   • Marital status:    Tobacco Use   • Smoking status: Former Smoker   • Smokeless tobacco: Never Used   • Tobacco comment: 3 packs per day for 20 yrs with a duration of 21 years of non smoking   Vaping  Use   • Vaping Use: Never used   Substance and Sexual Activity   • Alcohol use: No   • Drug use: No       Family History:     Family History   Problem Relation Age of Onset   • Nephrolithiasis Father    • Heart disease Sister    • Diabetes Other    • No Known Problems Mother        Review of Systems:     Review of Systems   Constitutional: Negative.    HENT: Negative.    Eyes: Negative.    Respiratory: Negative.    Cardiovascular: Negative.    Gastrointestinal: Negative.    Endocrine: Negative.    Genitourinary: Positive for difficulty urinating.   Musculoskeletal: Negative.    Allergic/Immunologic: Negative.    Neurological: Negative.    Hematological: Negative.    Psychiatric/Behavioral: Negative.        Physical Exam:     Physical Exam  Vitals and nursing note reviewed.   Constitutional:       Appearance: He is well-developed.   HENT:      Head: Normocephalic and atraumatic.   Eyes:      Conjunctiva/sclera: Conjunctivae normal.      Pupils: Pupils are equal, round, and reactive to light.   Cardiovascular:      Rate and Rhythm: Normal rate and regular rhythm.      Heart sounds: Normal heart sounds.   Pulmonary:      Effort: Pulmonary effort is normal.      Breath sounds: Normal breath sounds.   Abdominal:      General: Bowel sounds are normal.      Palpations: Abdomen is soft.   Musculoskeletal:         General: Normal range of motion.      Cervical back: Normal range of motion.   Skin:     General: Skin is warm and dry.   Neurological:      Mental Status: He is alert and oriented to person, place, and time.      Deep Tendon Reflexes: Reflexes are normal and symmetric.   Psychiatric:         Behavior: Behavior normal.         Thought Content: Thought content normal.         Judgment: Judgment normal.         I have reviewed the following portions of the patient's history: Allergies, current medications, past family history, past medical history, past social history, past surgical history, problem list, and ROS and  confirm it is accurate.      Procedure:       Assessment/Plan:   Prostate cancer:  He returns today status post biopsy for extensive discussion of prostate cancer.  We discussed staging and grading of the disease.  I described with a [place] system being from a 2 to10 scale with the most common low-grade pattern being a Nora 6.  I discussed the staging workup including a total body bone scan and CT scan especially with a PSA greater than 10.  We discussed the various options at length. I discussed a radical retropubic prostatectomy done in the traditional fashion and using the robotic technique.  I then discussed radiation treatment both seed therapy and external beam therapy using Gold fiduciary markers.  We talked about some of the other alternatives such as a cryosurgical ablation at high intensity focused ultrasound as being viable alternatives but not recommended at this time.  He focused on aggressive watchful waiting and explained that currently low literature it's been discovered that a lot of men can actually observe it especially with numerous other comorbidities cannot have problems from the cancer by itself.  Talked about hormonal ablation and the side effects of creation of insulin resistance.  Overall, the patient was given appropriate literature, is going to think about it, and I'm going to revisit the topic with them after the next office visit.  He remains in remission he has a PSA pending  Stress urinary incontinence-post prostatectomy severe                  This document has been electronically signed by ANNABELLE ESTEVEZ MD February 1, 2022 14:02 EST

## 2022-02-02 LAB — PSA SERPL-MCNC: <0.014 NG/ML (ref 0–4)

## 2022-03-07 ENCOUNTER — OFFICE VISIT (OUTPATIENT)
Dept: FAMILY MEDICINE CLINIC | Facility: CLINIC | Age: 72
End: 2022-03-07

## 2022-03-07 VITALS
TEMPERATURE: 97.7 F | DIASTOLIC BLOOD PRESSURE: 68 MMHG | SYSTOLIC BLOOD PRESSURE: 168 MMHG | WEIGHT: 158.2 LBS | HEART RATE: 40 BPM | BODY MASS INDEX: 25.43 KG/M2 | HEIGHT: 66 IN | OXYGEN SATURATION: 99 %

## 2022-03-07 DIAGNOSIS — I10 ESSENTIAL HYPERTENSION: ICD-10-CM

## 2022-03-07 DIAGNOSIS — Z13.220 ENCOUNTER FOR LIPID SCREENING FOR CARDIOVASCULAR DISEASE: ICD-10-CM

## 2022-03-07 DIAGNOSIS — Z13.6 ENCOUNTER FOR LIPID SCREENING FOR CARDIOVASCULAR DISEASE: ICD-10-CM

## 2022-03-07 DIAGNOSIS — R00.1 BRADYCARDIA: ICD-10-CM

## 2022-03-07 DIAGNOSIS — C61 PROSTATE CANCER: ICD-10-CM

## 2022-03-07 DIAGNOSIS — R59.1 LYMPHADENOPATHY: Primary | ICD-10-CM

## 2022-03-07 DIAGNOSIS — R41.3 MEMORY LOSS: ICD-10-CM

## 2022-03-07 PROCEDURE — 93000 ELECTROCARDIOGRAM COMPLETE: CPT | Performed by: FAMILY MEDICINE

## 2022-03-07 PROCEDURE — 99204 OFFICE O/P NEW MOD 45 MIN: CPT | Performed by: FAMILY MEDICINE

## 2022-03-07 RX ORDER — VALACYCLOVIR HYDROCHLORIDE 500 MG/1
500 TABLET, FILM COATED ORAL DAILY
COMMUNITY
End: 2022-04-08

## 2022-03-07 RX ORDER — DILTIAZEM HYDROCHLORIDE 240 MG/1
240 CAPSULE, COATED, EXTENDED RELEASE ORAL DAILY
COMMUNITY

## 2022-03-07 RX ORDER — CHLORHEXIDINE GLUCONATE 0.12 MG/ML
15 RINSE ORAL 2 TIMES DAILY
COMMUNITY
End: 2022-12-15

## 2022-03-08 PROBLEM — I10 PRIMARY HYPERTENSION: Status: ACTIVE | Noted: 2022-03-08

## 2022-03-08 PROBLEM — E78.00 HYPERCHOLESTEROLEMIA: Status: ACTIVE | Noted: 2022-03-08

## 2022-03-08 PROBLEM — Z87.19 HISTORY OF ORAL APHTHOUS ULCERS: Status: ACTIVE | Noted: 2022-03-08

## 2022-03-08 PROBLEM — G89.29 CHRONIC BACK PAIN: Status: ACTIVE | Noted: 2022-03-08

## 2022-03-08 PROBLEM — M19.90 ARTHRITIS: Status: ACTIVE | Noted: 2022-03-08

## 2022-03-08 PROBLEM — M54.9 CHRONIC BACK PAIN: Status: ACTIVE | Noted: 2022-03-08

## 2022-03-08 PROBLEM — J30.9 ALLERGIC RHINITIS: Status: ACTIVE | Noted: 2022-03-08

## 2022-03-08 RX ORDER — CHLORHEXIDINE GLUCONATE 20 %
15 SOLUTION, NON-ORAL MISCELLANEOUS 2 TIMES DAILY
Qty: 500 ML | Refills: 0
Start: 2022-03-08 | End: 2022-04-08 | Stop reason: SDUPTHER

## 2022-03-08 RX ORDER — DIPHENHYDRAMINE, LIDOCAINE, NYSTATIN
5 KIT ORAL 4 TIMES DAILY
Qty: 60 ML | Refills: 0
Start: 2022-03-08 | End: 2022-04-08

## 2022-03-08 NOTE — PROGRESS NOTES
"Alexander Zamora     VITALS: Blood pressure 168/68, pulse (!) 40, temperature 97.7 °F (36.5 °C), height 167.6 cm (66\"), weight 71.8 kg (158 lb 3.2 oz), SpO2 99 %.    Subjective  Chief Complaint  Adenopathy    Subjective          History of Present Illness:  Patient is a 71 y.o.  male  with medical conditions significant for CAD, urinary incontinence, and cancer who presents to clinic secondary to establishment of care. He is complaining about adenopathy.    The patient states that he has a lymph node that is swollen. He reports that he does not have cancer now. He reports that he had prostate cancer and his urine is still leaking. He reports that he had the prostate removed by Dr. Sebastian approximately 2 years ago. He reports that he has started taking his medication secondary to forgetting things. He reports that he goes in the room and turns around because he does not remember when he went in there for, but it has not been going on long. He reports that it has been helping. He states that he takes 1 pill daily. He reports that he has to use a nostril spray when his nose gets stopped up, occasionally. He reports that he used to see Dr. Caal. He reports that he is taking his blood pressure medication. He communicates that could get his medical records from [facility] in [place] and some from Dr. Sebastian.     The patient denies anything wrong with his heart. He reports that he had to see a cardiologist prior to he had his prostate removed. He reports that he was told that his heart was okay. He denies having any dizziness. He denies feeling like his heart is trying to beat faster. He reports that he gets up and walks around. He denies having any shortness of breath. He communicates that his throat is swollen. He states that it has been going on for awhile. He reports that he went to [facility] and the doctor there gave him a liquid mixed medication, it was filled and it went away fast. He communicates " that the next time that he had it filled, the drug store did not put everything in it; therefore it did not go away. He denies having any swollen lymph nodes in his armpits. He states that the lymph node hurts when you push over it and it runs up into his ear. He states that he had blood drawn and it came back clean. He states that when the lymph nodes appeared, it was hard to drink water, and now it does not bother him at all.     He reports that he has a dentist appointment next week.    He reports that he has had both doses of the COVID-19 vaccine and the booster.     The patient states that his mother had to have a pacemaker due to her blood pressure not leveling out.     No complaints about any of the medications.    The following portions of the patient's history were reviewed and updated as appropriate: allergies, current medications, past family history, past medical history, past social history, past surgical history and problem list.    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   • Chronic back pain    • Depression    • Elevated prostate specific antigen (PSA)    • Erectile dysfunction    • Hypercholesterolemia    • Primary hypertension    • Prostatitis        Surgical History  Past Surgical History:   Procedure Laterality Date   • OTHER SURGICAL HISTORY      surgery for an ulcer, PNEUMOTHORAX AND ULCER THERAPY       Family History  Family History   Problem Relation Age of Onset   • Bradycardia Mother         Had pacemaker   • Nephrolithiasis Father    • Heart disease Sister    • Diabetes Other        Social History  Social History     Socioeconomic History   • Marital status:    Tobacco Use   • Smoking status: Former Smoker     Packs/day: 2.00     Years: 5.00     Pack years: 10.00     Types: Cigarettes     Quit date: 1970     Years since quittin.2   • Smokeless tobacco: Never Used   Vaping Use   • Vaping Use: Never used  "  Substance and Sexual Activity   • Alcohol use: No   • Drug use: No       Objective   Vital Signs:   /68 (BP Location: Right arm, Patient Position: Sitting, Cuff Size: Adult)   Pulse (!) 40   Temp 97.7 °F (36.5 °C)   Ht 167.6 cm (66\")   Wt 71.8 kg (158 lb 3.2 oz)   SpO2 99%   BMI 25.53 kg/m²     Physical Exam     Gen: Patient in NAD. Pleasant and answers appropriately. A&Ox3.  Mini-Mental status exam 21/30.    Skin: Warm and dry with normal turgor. No purpura, rashes, or unusual pigmentation noted. Hair is normal in appearance and distribution.    HEENT: NC/AT. No lesions noted. Conjunctiva clear, sclera nonicteric. PERRL. EOMI without nystagmus or strabismus. Fundi appear benign. No hemorrhages or exudates of eyes. Auditory canals are patent bilaterally without lesions. TMs intact,  nonerythematous, slightly bulging without lesions. Nasal mucosa pink, nonerythematous, and nonedematous. Frontal and maxillary sinuses are nontender. O/P nonerythematous and moist without exudate.    Neck: Supple with lymph nodes palpated. FROM.     Lungs: Decreased B/L without rales, rhonchi, crackles, or wheezes.    Heart: RRR. S1 and S2 normal. No S3 or S4. No MRGT.    Abd: Soft, nontender,nondistended. (+)BSx4 quadrants.     Extrem: No CC.  Trace edema bilateral lower extremities.  Radial pulses 2+/4 and equal B/L. FROMx4.     Neuro: No focal motor/sensory deficits.      ECG 12 Lead    Date/Time: 3/7/2022 4:06 PM  Performed by: Bhavna Vaughn MD  Authorized by: Bhavna Vaughn MD   Comparison: not compared with previous ECG   Rhythm: sinus bradycardia  Rate: bradycardic  Conduction: conduction normal  ST Segments: ST segments normal  T Waves: T waves normal  QRS axis: left  Other findings: left ventricular hypertrophy    Clinical impression: non-specific ECG  Comments: Sinus bradycardia without any ST or T acute changes.  LVH.                 Assessment and Plan    Alexander HILTON Noah is a 71 y.o. here for medical " followup.    Problem List Items Addressed This Visit        Hematology and Neoplasia    Prostate cancer (HCC)    Relevant Orders    CBC Auto Differential    Comprehensive Metabolic Panel      Other Visit Diagnoses     Bradycardia    -  Primary    Relevant Orders    CBC Auto Differential    Comprehensive Metabolic Panel    Magnesium    TSH    Memory loss        Relevant Orders    CBC Auto Differential    Comprehensive Metabolic Panel    Essential hypertension        Relevant Medications    dilTIAZem CD (CARDIZEM CD) 240 MG 24 hr capsule    Other Relevant Orders    CBC Auto Differential    Comprehensive Metabolic Panel    Encounter for lipid screening for cardiovascular disease        Relevant Orders    Lipid Panel        Swollen lymph nodes.  - Discussed with the patient that it could possibly mean a new form of cancer.  - Checked patient's ear.  - Will obtain labs.  - Will order CT scan.    Patient's Body mass index is 25.53 kg/m². indicating that he is overweight (BMI 25-29.9). Patient's (Body mass index is 25.53 kg/m².) indicates that they are overweight with health conditions that include hypertension . Weight is newly identified. BMI is is above average; BMI management plan is completed. We discussed portion control and increasing exercise. .              Follow Up {Instructions Charge Capture  Follow-up Communications :23}  Return in about 4 weeks (around 4/4/2022).  Findings and plans discussed with patient who verbalizes understanding and agreement. Will followup with patient once results are in. Patient was given instructions and counseling regarding his condition or for health maintenance advice. Please see specific information pulled into the AVS if appropriate.       Transcribed from ambient dictation for Bhavna Vaughn MD by Cee Narayanan.  03/08/22   17:16 EST    Patient verbalized consent to the visit recording.  I have personally performed the services described in this document as transcribed  by the above individual, and it is both accurate and complete.  Bhavna Vaughn MD  3/28/2022  06:38 EDT

## 2022-03-11 ENCOUNTER — LAB (OUTPATIENT)
Dept: FAMILY MEDICINE CLINIC | Facility: CLINIC | Age: 72
End: 2022-03-11

## 2022-03-11 DIAGNOSIS — C61 PROSTATE CANCER: ICD-10-CM

## 2022-03-11 DIAGNOSIS — Z13.220 ENCOUNTER FOR LIPID SCREENING FOR CARDIOVASCULAR DISEASE: ICD-10-CM

## 2022-03-11 DIAGNOSIS — I10 ESSENTIAL HYPERTENSION: ICD-10-CM

## 2022-03-11 DIAGNOSIS — R00.1 BRADYCARDIA: ICD-10-CM

## 2022-03-11 DIAGNOSIS — R41.3 MEMORY LOSS: ICD-10-CM

## 2022-03-11 DIAGNOSIS — Z13.6 ENCOUNTER FOR LIPID SCREENING FOR CARDIOVASCULAR DISEASE: ICD-10-CM

## 2022-03-11 LAB
ALBUMIN SERPL-MCNC: 4.2 G/DL (ref 3.5–5.2)
ALBUMIN/GLOB SERPL: 1.5 G/DL
ALP SERPL-CCNC: 96 U/L (ref 39–117)
ALT SERPL W P-5'-P-CCNC: 15 U/L (ref 1–41)
ANION GAP SERPL CALCULATED.3IONS-SCNC: 9.6 MMOL/L (ref 5–15)
AST SERPL-CCNC: 18 U/L (ref 1–40)
BASOPHILS # BLD AUTO: 0.05 10*3/MM3 (ref 0–0.2)
BASOPHILS NFR BLD AUTO: 0.6 % (ref 0–1.5)
BILIRUB SERPL-MCNC: 0.6 MG/DL (ref 0–1.2)
BUN SERPL-MCNC: 12 MG/DL (ref 8–23)
BUN/CREAT SERPL: 12.4 (ref 7–25)
CALCIUM SPEC-SCNC: 10.4 MG/DL (ref 8.6–10.5)
CHLORIDE SERPL-SCNC: 102 MMOL/L (ref 98–107)
CHOLEST SERPL-MCNC: 264 MG/DL (ref 0–200)
CO2 SERPL-SCNC: 29.4 MMOL/L (ref 22–29)
CREAT SERPL-MCNC: 0.97 MG/DL (ref 0.76–1.27)
DEPRECATED RDW RBC AUTO: 41 FL (ref 37–54)
EGFRCR SERPLBLD CKD-EPI 2021: 82.9 ML/MIN/1.73
EOSINOPHIL # BLD AUTO: 0.07 10*3/MM3 (ref 0–0.4)
EOSINOPHIL NFR BLD AUTO: 0.9 % (ref 0.3–6.2)
ERYTHROCYTE [DISTWIDTH] IN BLOOD BY AUTOMATED COUNT: 12.6 % (ref 12.3–15.4)
GLOBULIN UR ELPH-MCNC: 2.8 GM/DL
GLUCOSE SERPL-MCNC: 84 MG/DL (ref 65–99)
HCT VFR BLD AUTO: 40.9 % (ref 37.5–51)
HDLC SERPL-MCNC: 72 MG/DL (ref 40–60)
HGB BLD-MCNC: 13.9 G/DL (ref 13–17.7)
IMM GRANULOCYTES # BLD AUTO: 0.01 10*3/MM3 (ref 0–0.05)
IMM GRANULOCYTES NFR BLD AUTO: 0.1 % (ref 0–0.5)
LDLC SERPL CALC-MCNC: 183 MG/DL (ref 0–100)
LDLC/HDLC SERPL: 2.5 {RATIO}
LYMPHOCYTES # BLD AUTO: 2.39 10*3/MM3 (ref 0.7–3.1)
LYMPHOCYTES NFR BLD AUTO: 30.6 % (ref 19.6–45.3)
MAGNESIUM SERPL-MCNC: 2 MG/DL (ref 1.6–2.4)
MCH RBC QN AUTO: 30.2 PG (ref 26.6–33)
MCHC RBC AUTO-ENTMCNC: 34 G/DL (ref 31.5–35.7)
MCV RBC AUTO: 88.7 FL (ref 79–97)
MONOCYTES # BLD AUTO: 0.34 10*3/MM3 (ref 0.1–0.9)
MONOCYTES NFR BLD AUTO: 4.4 % (ref 5–12)
NEUTROPHILS NFR BLD AUTO: 4.94 10*3/MM3 (ref 1.7–7)
NEUTROPHILS NFR BLD AUTO: 63.4 % (ref 42.7–76)
NRBC BLD AUTO-RTO: 0 /100 WBC (ref 0–0.2)
PLATELET # BLD AUTO: 247 10*3/MM3 (ref 140–450)
PMV BLD AUTO: 11.8 FL (ref 6–12)
POTASSIUM SERPL-SCNC: 3.8 MMOL/L (ref 3.5–5.2)
PROT SERPL-MCNC: 7 G/DL (ref 6–8.5)
RBC # BLD AUTO: 4.61 10*6/MM3 (ref 4.14–5.8)
SODIUM SERPL-SCNC: 141 MMOL/L (ref 136–145)
TRIGL SERPL-MCNC: 60 MG/DL (ref 0–150)
TSH SERPL DL<=0.05 MIU/L-ACNC: 3.46 UIU/ML (ref 0.27–4.2)
VLDLC SERPL-MCNC: 9 MG/DL (ref 5–40)
WBC NRBC COR # BLD: 7.8 10*3/MM3 (ref 3.4–10.8)

## 2022-03-11 PROCEDURE — 85025 COMPLETE CBC W/AUTO DIFF WBC: CPT | Performed by: FAMILY MEDICINE

## 2022-03-11 PROCEDURE — 36415 COLL VENOUS BLD VENIPUNCTURE: CPT

## 2022-03-11 PROCEDURE — 80061 LIPID PANEL: CPT | Performed by: FAMILY MEDICINE

## 2022-03-11 PROCEDURE — 80053 COMPREHEN METABOLIC PANEL: CPT | Performed by: FAMILY MEDICINE

## 2022-03-11 PROCEDURE — 84443 ASSAY THYROID STIM HORMONE: CPT | Performed by: FAMILY MEDICINE

## 2022-03-11 PROCEDURE — 83735 ASSAY OF MAGNESIUM: CPT | Performed by: FAMILY MEDICINE

## 2022-03-14 ENCOUNTER — TELEPHONE (OUTPATIENT)
Dept: FAMILY MEDICINE CLINIC | Facility: CLINIC | Age: 72
End: 2022-03-14

## 2022-03-14 NOTE — TELEPHONE ENCOUNTER
Caller: joe muñozte    Relationship: Emergency Contact    Best call back number: [phone]   Caller requesting test results: THE PATIENT'S DAUGHTER ANGY (ON  [place])    What test was performed: LABS AND EKG    When was the test performed: LABS AT [facility] ON 03/11/2022 AND EKG AT THE [facility] ON 03/07/2022    Where was the test performed:03/11/2022 (LABS) & 03/07/2022 (EKG)    Additional notes: PLEASE CALL AND ADVISE THE PATIENT'S DAUGHTER ANGY

## 2022-03-15 NOTE — TELEPHONE ENCOUNTER
"You can let her know:  1) The labs were okay with the exception of the cholesterol. But I'm not sure if he was fasting. Was he?  2) The EKG is slow and has some abnormalities. He was referred to cardiology for further workup.  3) He is a poor historian and I am trying to put his health puzzle pieces together. It would help if she came with him at his next visit. We do not have a cardiac history and not sure where to get it from.   4) How long has his memory been like this? Does he live alone? Is he driving himself?    Spoke with Daughter she reports he lives alone,still drives, drove to [place] a month ago to visit her she does not see any changes in his Memory he still controls his own finances,she reports he was fasting but has tried Cholesterol med's in the past & gets leg cramps,reports he walks daily & is very active for his age.she reports he just picks a doctor & goes to whomever he thinks can \"fix him\" at that time,she knows he has has a stress test in the past & she will try to track that down,[facility] she reports has recurrent blisters in his mouth that he has been told is a Herpes virus that has been treated there many times,she knows he went to a clinic in [place] for awhile but is unsure of the name of it & has seen a DR. Prince in the past?I do not see a Cardiology referral?  "

## 2022-03-15 NOTE — TELEPHONE ENCOUNTER
You can let her know:  1) The labs were okay with the exception of the cholesterol. But I'm not sure if he was fasting. Was he?  2) The EKG is slow and has some abnormalities. He was referred to cardiology for further workup.  3) He is a poor historian and I am trying to put his health puzzle pieces together. It would help if she came with him at his next visit. We do not have a cardiac history and not sure where to get it from.   4) How long has his memory been like this? Does he live alone? Is he driving himself?

## 2022-04-08 ENCOUNTER — OFFICE VISIT (OUTPATIENT)
Dept: FAMILY MEDICINE CLINIC | Facility: CLINIC | Age: 72
End: 2022-04-08

## 2022-04-08 VITALS
SYSTOLIC BLOOD PRESSURE: 146 MMHG | HEART RATE: 56 BPM | WEIGHT: 158.2 LBS | TEMPERATURE: 97.5 F | DIASTOLIC BLOOD PRESSURE: 68 MMHG | HEIGHT: 66 IN | BODY MASS INDEX: 25.43 KG/M2 | OXYGEN SATURATION: 99 %

## 2022-04-08 DIAGNOSIS — J30.89 SEASONAL ALLERGIC RHINITIS DUE TO OTHER ALLERGIC TRIGGER: ICD-10-CM

## 2022-04-08 DIAGNOSIS — G89.29 CHRONIC RIGHT SHOULDER PAIN: ICD-10-CM

## 2022-04-08 DIAGNOSIS — Z00.00 ENCOUNTER FOR SUBSEQUENT ANNUAL WELLNESS VISIT (AWV) IN MEDICARE PATIENT: Primary | ICD-10-CM

## 2022-04-08 DIAGNOSIS — I10 ESSENTIAL HYPERTENSION: ICD-10-CM

## 2022-04-08 DIAGNOSIS — M25.511 CHRONIC RIGHT SHOULDER PAIN: ICD-10-CM

## 2022-04-08 PROCEDURE — 1159F MED LIST DOCD IN RCRD: CPT | Performed by: FAMILY MEDICINE

## 2022-04-08 PROCEDURE — G0439 PPPS, SUBSEQ VISIT: HCPCS | Performed by: FAMILY MEDICINE

## 2022-04-08 PROCEDURE — 1170F FXNL STATUS ASSESSED: CPT | Performed by: FAMILY MEDICINE

## 2022-04-08 RX ORDER — CHLORHEXIDINE GLUCONATE 20 %
15 SOLUTION, NON-ORAL MISCELLANEOUS 2 TIMES DAILY
Qty: 500 ML | Refills: 5 | Status: SHIPPED | OUTPATIENT
Start: 2022-04-08 | End: 2022-12-15

## 2022-04-08 RX ORDER — CETIRIZINE HYDROCHLORIDE 10 MG/1
10 TABLET ORAL DAILY
Qty: 90 TABLET | Refills: 1 | Status: SHIPPED | OUTPATIENT
Start: 2022-04-08 | End: 2023-01-30 | Stop reason: SDUPTHER

## 2022-04-08 RX ORDER — AMLODIPINE BESYLATE 5 MG/1
5 TABLET ORAL DAILY
Qty: 30 TABLET | Refills: 2 | Status: SHIPPED | OUTPATIENT
Start: 2022-04-08 | End: 2022-06-10 | Stop reason: DRUGHIGH

## 2022-04-08 RX ORDER — MONTELUKAST SODIUM 10 MG/1
10 TABLET ORAL NIGHTLY
Qty: 90 TABLET | Refills: 1 | Status: SHIPPED | OUTPATIENT
Start: 2022-04-08 | End: 2022-10-05 | Stop reason: SDUPTHER

## 2022-04-08 NOTE — PROGRESS NOTES
The 10-year ASCVD risk score (Gailnisha MOORE Jr., et al., 2013) is: 28.4%    Values used to calculate the score:      Age: 71 years      Sex: Male      Is Non- : No      Diabetic: No      Tobacco smoker: No      Systolic Blood Pressure: 146 mmHg      Is BP treated: Yes      HDL Cholesterol: 72 mg/dL      Total Cholesterol: 264 mg/dL  The ABCs of the Annual Wellness Visit  Subsequent Medicare Wellness Visit    Chief Complaint   Patient presents with   • Shoulder Pain      Subjective    History of Present Illness:  Alexander Zamora is a 71 y.o. male who presents for a Subsequent Medicare Wellness Visit.    The patient states that he has a problem with his right shoulder and neck. He denies any injury or trauma to the shoulder. He reports that he has had this problem in the past and he went for therapy and they put pins in his shoulder, which alleviated the pain. He states that he would like to go back to physical therapy.    He reports that he is checking his blood pressure at home. He states that he was taking his blood pressure medication, but he thinks it is not as good as his Caduet. He states that he had to quit taking his cholesterol medication due to his cholesterol. He states that he takes fish oil.    No complaints about any of the medications.    The following portions of the patient's history were reviewed and   updated as appropriate: allergies, current medications, past family history, past medical history, past social history, past surgical history and problem list.    Compared to one year ago, the patient feels his physical   health is the same.    Compared to one year ago, the patient feels his mental   health is the same.    Recent Hospitalizations:  He was not admitted to the hospital during the last year.       Current Medical Providers:  Patient Care Team:  Bhavna Vaughn MD as PCP - General (Family Medicine)    Outpatient Medications Prior to Visit   Medication Sig Dispense  "Refill   • Apoaequorin (Prevagen) 10 MG capsule Take 1 capsule by mouth Daily. 90 capsule 3   • chlorhexidine (PERIDEX) 0.12 % solution Apply 15 mL to the mouth or throat 2 (Two) Times a Day.     • dilTIAZem CD (CARDIZEM CD) 240 MG 24 hr capsule Take 240 mg by mouth Daily.     • Multiple Vitamin (MULTI VITAMIN DAILY) tablet Take 1 tablet by mouth daily.     • cetirizine (zyrTEC) 10 MG tablet   0   • montelukast (SINGULAIR) 10 MG tablet   3   • Chlorhexidine Gluconate solution 15 mL 2 (Two) Times a Day. 500 mL 0   • nystatin susp + lidocaine viscous (MAGIC MOUTHWASH) oral suspension Swish and swallow 5 mL 4 (Four) Times a Day. 60 mL 0   • valACYclovir (VALTREX) 500 MG tablet Take 500 mg by mouth Daily.       No facility-administered medications prior to visit.       No opioid medication identified on active medication list. I have reviewed chart for other potential  high risk medication/s and harmful drug interactions in the elderly.          Aspirin is not on active medication list.  Aspirin use is not indicated based on review of current medical condition/s. Risk of harm outweighs potential benefits.  .    Patient Active Problem List   Diagnosis   • Prostate cancer (HCC)   • Erectile dysfunction following radical prostatectomy   • BEULAH (stress urinary incontinence), male   • Arthritis   • Chronic back pain   • Hypercholesterolemia   • Primary hypertension   • Allergic rhinitis   • History of oral aphthous ulcers     Advance Care Planning  Advance Directive is not on file.  ACP discussion was held with the patient during this visit. Patient does not have an advance directive, information provided.          Objective    Vitals:    04/08/22 1417 04/08/22 1424   BP: 150/68 146/68   BP Location: Right arm Right arm   Patient Position: Sitting Sitting   Cuff Size: Adult Adult   Pulse: 56    Temp: 97.5 °F (36.4 °C)    SpO2: 99%    Weight: 71.8 kg (158 lb 3.2 oz)    Height: 167.6 cm (66\")      BMI Readings from Last 1 " Encounters:   22 25.53 kg/m²   BMI is above normal parameters. Recommendations include: exercise counseling and nutrition counseling    Does the patient have evidence of cognitive impairment? No    Physical Exam  Lab Results   Component Value Date    TRIG 60 2022    HDL 72 (H) 2022     (H) 2022    VLDL 9 2022        Gen: Patient in NAD. Pleasant and answers appropriately. A&Ox3.    Skin: Warm and dry with normal turgor. No purpura, rashes, or unusual pigmentation noted. Hair is normal in appearance and distribution.    HEENT: NC/AT. No lesions noted. Conjunctiva clear, sclera nonicteric. PERRL. EOMI without nystagmus or strabismus. Fundi appear benign. No hemorrhages or exudates of eyes. Auditory canals are patent bilaterally without lesions. TMs intact,  nonerythematous, nonbulging without lesions. Nasal mucosa pink, nonerythematous, and nonedematous. Frontal and maxillary sinuses are nontender. O/P nonerythematous and moist without exudate.    Neck: Supple without lymph nodes palpated. FROM.     Lungs: CTA B/L without rales, rhonchi, crackles, or wheezes.    Heart: RRR. S1 and S2 normal. No S3 or S4. No MRGT.    Abd: Soft, nontender,nondistended. (+)BSx4 quadrants.     Extrem: No CCE. Radial pulses 2+/4 and equal B/L. Decreased right shoulder mobility.    Neuro: No focal motor/sensory deficits.      HEALTH RISK ASSESSMENT    Smoking Status:  Social History     Tobacco Use   Smoking Status Former Smoker   • Packs/day: 2.00   • Years: 5.00   • Pack years: 10.00   • Types: Cigarettes   • Quit date:    • Years since quittin.3   Smokeless Tobacco Never Used     Alcohol Consumption:  Social History     Substance and Sexual Activity   Alcohol Use No     Fall Risk Screen:    STEADI Fall Risk Assessment was completed, and patient is at LOW risk for falls.Assessment completed on:3/7/2022    Depression Screening:  PHQ-2/PHQ-9 Depression Screening 2022   Little Interest or  Pleasure in Doing Things 0-->not at all   Feeling Down, Depressed or Hopeless 0-->not at all   PHQ-9: Brief Depression Severity Measure Score 0       Health Habits and Functional and Cognitive Screening:  Functional & Cognitive Status 4/8/2022   Do you have difficulty preparing food and eating? No   Do you have difficulty bathing yourself, getting dressed or grooming yourself? No   Do you have difficulty using the toilet? No   Do you have difficulty moving around from place to place? No   Do you have trouble with steps or getting out of a bed or a chair? No   Current Diet Well Balanced Diet   Dental Exam Up to date   Eye Exam Not up to date   Exercise (times per week) 0 times per week   Current Exercises Include No Regular Exercise   Do you need help using the phone?  No   Are you deaf or do you have serious difficulty hearing?  No   Do you need help with transportation? No   Do you need help shopping? No   Do you need help preparing meals?  No   Do you need help with housework?  No   Do you need help with laundry? No   Do you need help taking your medications? No   Do you need help managing money? No   Do you ever drive or ride in a car without wearing a seat belt? No   Have you felt unusual stress, anger or loneliness in the last month? No   If you need help, do you have trouble finding someone available to you? No   Do you have difficulty concentrating, remembering or making decisions? No       Age-appropriate Screening Schedule:  Refer to the list below for future screening recommendations based on patient's age, sex and/or medical conditions. Orders for these recommended tests are listed in the plan section. The patient has been provided with a written plan.    Health Maintenance   Topic Date Due   • TDAP/TD VACCINES (1 - Tdap) Never done   • INFLUENZA VACCINE  08/01/2022   • LIPID PANEL  03/11/2023   • ZOSTER VACCINE  Completed              Assessment/Plan   CMS Preventative Services Quick Reference  Risk  Factors Identified During Encounter  Chronic Pain   Fall Risk-High or Moderate  The above risks/problems have been discussed with the patient.  Follow up actions/plans if indicated are seen below in the Assessment/Plan Section.  Pertinent information has been shared with the patient in the After Visit Summary.    Diagnoses and all orders for this visit:    1. Encounter for subsequent annual wellness visit (AWV) in Medicare patient (Primary)    2. Seasonal allergic rhinitis due to other allergic trigger  -     cetirizine (zyrTEC) 10 MG tablet; Take 1 tablet by mouth Daily.  Dispense: 90 tablet; Refill: 1  -     montelukast (SINGULAIR) 10 MG tablet; Take 1 tablet by mouth Every Night.  Dispense: 90 tablet; Refill: 1    3. Chronic right shoulder pain  -     Ambulatory Referral to Physical Therapy Evaluate and treat    4. Essential hypertension  -     amLODIPine (NORVASC) 5 MG tablet; Take 1 tablet by mouth Daily.  Dispense: 30 tablet; Refill: 2    Other orders  -     Chlorhexidine Gluconate solution; Take 15 mL by mouth 2 (Two) Times a Day.  Dispense: 500 mL; Refill: 5        Follow Up:   Return in about 6 weeks (around 5/20/2022).     An After Visit Summary and PPPS were made available to the patient.               Transcribed from ambient dictation for Bhavna Vaughn MD by Antonia William.  04/08/22   21:26 EDT    Patient verbalized consent to the visit recording.  I have personally performed the services described in this document as transcribed by the above individual, and it is both accurate and complete.  Bhavna Vaughn MD  4/25/2022  07:16 EDT

## 2022-04-13 ENCOUNTER — TELEPHONE (OUTPATIENT)
Dept: FAMILY MEDICINE CLINIC | Facility: CLINIC | Age: 72
End: 2022-04-13

## 2022-04-13 RX ORDER — PRAVASTATIN SODIUM 20 MG
20 TABLET ORAL NIGHTLY
Qty: 30 TABLET | Refills: 2 | Status: SHIPPED | OUTPATIENT
Start: 2022-04-13 | End: 2022-05-23 | Stop reason: SDUPTHER

## 2022-04-13 NOTE — TELEPHONE ENCOUNTER
Yes, his cholesterol is atrocious. Can I start him on a weaker one? If he is okay with it, please send in pravastatin 20 mg daily, #30 with 2 refills and see if that treats him better. Thanks.

## 2022-04-13 NOTE — TELEPHONE ENCOUNTER
Pt came in and brought his prescription for Rosuvastatin and said he has quit taking them because it is causing cramps in his legs/feet. Pt would like to know if he needs to start a new medication. Please advise. Thank you.

## 2022-04-13 NOTE — TELEPHONE ENCOUNTER
Yes, his cholesterol is atrocious. Can I start him on a weaker one? If he is okay with it, please send in pravastatin 20 mg daily, #30 with 2 refills and see if that treats him better. Thanks.      Left a message to return call.    Spoke with patient & he was agreeable,sent per orders.

## 2022-04-15 ENCOUNTER — TREATMENT (OUTPATIENT)
Dept: PHYSICAL THERAPY | Facility: CLINIC | Age: 72
End: 2022-04-15

## 2022-04-15 DIAGNOSIS — M25.511 CHRONIC RIGHT SHOULDER PAIN: Primary | ICD-10-CM

## 2022-04-15 DIAGNOSIS — G89.29 CHRONIC RIGHT SHOULDER PAIN: Primary | ICD-10-CM

## 2022-04-15 DIAGNOSIS — M54.2 PAIN, NECK: ICD-10-CM

## 2022-04-15 PROCEDURE — 97162 PT EVAL MOD COMPLEX 30 MIN: CPT | Performed by: PHYSICAL THERAPIST

## 2022-04-15 PROCEDURE — G0283 ELEC STIM OTHER THAN WOUND: HCPCS | Performed by: PHYSICAL THERAPIST

## 2022-04-15 PROCEDURE — 97110 THERAPEUTIC EXERCISES: CPT | Performed by: PHYSICAL THERAPIST

## 2022-04-15 NOTE — PROGRESS NOTES
Physical Therapy Initial Evaluation and Plan of Care    Patient: Alexander Zamora   : 1950  Diagnosis/ICD-10 Code:  Chronic right shoulder pain [M25.511, G89.29]  Referring practitioner: Bhavna Vaughn MD  Date of Initial Visit: 4/15/2022  Today's Date: 4/15/2022  Patient seen for 1 session         Visit Diagnoses:    ICD-10-CM ICD-9-CM   1. Chronic right shoulder pain  M25.511 719.41    G89.29 338.29   2. Pain, neck  M54.2 723.1         Subjective Questionnaire: QuickDASH: 36      Subjective Evaluation    History of Present Illness  Onset date: one month.  Mechanism of injury: Pt has suffered from increased right UT and right shoulder pain.  Pt reports having pain with neck movements and heavier lifting.  Pt was evaluated by his PCP last Friday and was diagnosed with right shoulder pain.  The patient has now been referred to therapy for treatment of current symptoms.      Patient Occupation: retired Quality of life: good    Pain  Current pain ratin  At best pain ratin  At worst pain rating: 10  Location: right UT and right shoulder  Quality: sharp, tight and pulling  Relieving factors: rest and change in position  Aggravating factors: lifting, keyboarding, movement and repetitive movement  Progression: worsening    Hand dominance: right    Patient Goals  Patient goals for therapy: decreased pain, increased motion, increased strength, independence with ADLs/IADLs and return to sport/leisure activities             Objective          Postural Observations    Additional Postural Observation Details  Right UE guarding; fwd posture; rounded thoracic kyphosis    Neurological Testing     Sensation   Cervical/Thoracic   Left   Intact: light touch    Right   Intact: light touch    Active Range of Motion   Cervical/Thoracic Spine   Cervical    Flexion: 30 degrees   Extension: 35 degrees   Left lateral flexion: 20 degrees   Right lateral flexion: 40 degrees   Left rotation: 65 degrees   Right rotation: 50  degrees   Left Shoulder   Flexion: WFL  Abduction: WFL  External rotation 90°: WFL  Internal rotation 90°: WFL    Right Shoulder   Flexion: WFL  Abduction: WFL  External rotation 90°: WFL  Internal rotation 90°: WFL    Strength/Myotome Testing     Left Shoulder     Planes of Motion   Flexion: 4+   Abduction: 4+   External rotation at 0°: 4+   Internal rotation at 0°: 4+     Right Shoulder     Planes of Motion   Flexion: 4+   Abduction: 4+   External rotation at 0°: 4+   Internal rotation at 0°: 4+     Left Elbow   Flexion: 4+  Extension: 4+    Right Elbow   Flexion: 4+  Extension: 4+    Tests   Cervical   Positive repeated extension and repeated flexion.    Left   Negative Spurling's sign.     Right   Positive Spurling's sign.           Assessment & Plan     Assessment  Impairments: abnormal muscle firing, abnormal or restricted ROM, activity intolerance, impaired physical strength, lacks appropriate home exercise program, pain with function and weight-bearing intolerance  Functional Limitations: carrying objects, lifting, sleeping, pulling, pushing, uncomfortable because of pain and unable to perform repetitive tasks  Assessment details: Pt is a 72 y/o male referred to therapy for treatment of cervical and right shoulder pain. Pt noted to have good shoulder ROM and strength, yet reported increased pain along the right upper and middle trap region with arm and cervical movements.  Therapy will follow for improved postural awareness and decreased pain with ADLs.  Prognosis: good    Goals  Plan Goals: STG 6 weeks    1 Pt will be instructed in a HEP.  2 Pt will improve cervical rotation to 65 degrees bilaterally to improve driving.  3 Pt will report worst pain no greater than 6/10 with light lifting.    LTG 12 weeks    Pt will improve his LEFs to less than 15%.  2 Pt will report pain no greater than 3/10 with moderate lifting.  3 Pt will demonstrate improved postural stability by demonstrated increased reps and  intensity.    Plan  Therapy options: will be seen for skilled therapy services  Planned modality interventions: cryotherapy, dry needling, TENS, thermotherapy (hydrocollator packs), traction and ultrasound  Planned therapy interventions: abdominal trunk stabilization, ADL retraining, balance/weight-bearing training, body mechanics training, flexibility, functional ROM exercises, home exercise program, IADL retraining, joint mobilization, manual therapy, motor coordination training, neuromuscular re-education, postural training, spinal/joint mobilization, soft tissue mobilization, strengthening, stretching and therapeutic activities  Frequency: 2x week  Duration in weeks: 12  Treatment plan discussed with: patient  Plan details: Will follow for optimal gains.  Moderate Evaluation  27332  Re-evaluation   42477    Therapeutic exercise  75882  Therapeutic activity    48814  Neuromuscular re-education   49352  Manual therapy   92184  Unattended e-stim (Medicaid/Medicare)     Moist heat/cryotherapy 01077   Ultrasound   12665  Mechanical traction 03517            Timed:         Manual Therapy:         mins  71963;     Therapeutic Exercise:    10     mins  76676;     Neuromuscular Rosalie:        mins  03218;    Therapeutic Activity:          mins  77423;     Gait Training:           mins  81363;     Ultrasound:          mins  30490;    Ionto                                   mins   42795  Self Care                            mins   74523  Canalith Repos         mins 38757      Un-Timed:  Electrical Stimulation:    10     mins  77693 ( );  Dry Needling          mins self-pay  Traction          mins 98883  Low Eval          Mins  58614  Mod Eval     30     Mins  81440  High Eval                            Mins  03394        Timed Treatment:   10   mins   Total Treatment:     50   mins          PT: Austin Costello, PT     License Number: [id number]  Electronically signed by Austin Costello PT, 04/15/22, 8:51  AM EDT    Certification Period: 4/15/2022 thru 7/13/2022  I certify that the therapy services are furnished while this patient is under my care.  The services outlined above are required by this patient, and will be reviewed every 90 days.         Physician Signature:__________________________________________________    PHYSICIAN: Bhavna Vaughn MD  NPI: [id number]                                      DATE:      Please sign and return via fax to [email or web address] . Thank you, [facility].

## 2022-04-19 ENCOUNTER — TREATMENT (OUTPATIENT)
Dept: PHYSICAL THERAPY | Facility: CLINIC | Age: 72
End: 2022-04-19

## 2022-04-19 DIAGNOSIS — M54.2 PAIN, NECK: ICD-10-CM

## 2022-04-19 DIAGNOSIS — M25.511 CHRONIC RIGHT SHOULDER PAIN: Primary | ICD-10-CM

## 2022-04-19 DIAGNOSIS — G89.29 CHRONIC RIGHT SHOULDER PAIN: Primary | ICD-10-CM

## 2022-04-19 PROCEDURE — 97110 THERAPEUTIC EXERCISES: CPT | Performed by: PHYSICAL THERAPIST

## 2022-04-19 PROCEDURE — G0283 ELEC STIM OTHER THAN WOUND: HCPCS | Performed by: PHYSICAL THERAPIST

## 2022-04-19 PROCEDURE — 97140 MANUAL THERAPY 1/> REGIONS: CPT | Performed by: PHYSICAL THERAPIST

## 2022-04-19 NOTE — PROGRESS NOTES
Physical Therapy Daily Treatment Note      Patient: Alexander Zamora   : 1950  Referring practitioner: Bhavna Vaughn MD  Date of Initial Visit: Type: THERAPY  Noted: 4/15/2022  Today's Date: 2022  Patient seen for 2 sessions       Visit Diagnoses:    ICD-10-CM ICD-9-CM   1. Chronic right shoulder pain  M25.511 719.41    G89.29 338.29   2. Pain, neck  M54.2 723.1       Subjective:  Patient arrives to therapy w/ reports of 1-2/10 neck pain.  Pt verbalizes compliance of initiating home program w/ good tolerance.      Objective   See Exercise, Manual, and Modality Logs for complete treatment.       Assessment/Plan:  Patient responded well to today's session w/ reports of decreased pain following, 0/10.  Treatment initiated with manual rx including soft tissue mobilization to cervical and B) UT region to address muscular tightness f/b therex as listed and modalities following.  Therex performed w/ focus on improved cervical ROM, improved cervical stability and postural awareness, as tolerated.  Pt was provided w/ cues and demonstration during exercise for good form, and for max benefit.   w/ Estim applied at conclusion to assist w/ pain control.  No adverse reactions observed during, and/ or following tx.  Pt continues to benefit from therapy services, and will be progressed as tolerated to restore function, address goals, and reduce pain.  Continue w/ PT's POC.       Timed:         Manual Therapy:    15     mins  70365;     Therapeutic Exercise:     29    mins  56615;     Neuromuscular Rosalie:        mins  15609;    Therapeutic Activity:          mins  66674;     Gait Training:           mins  94689;     Ultrasound:          mins  26431;    Ionto                                   mins   49846  Self Care                            mins   19089  Canalith Repos         mins 21852      Un-Timed:  Electrical Stimulation:  10       mins  90990 ( );  Dry Needling          mins self-pay  Traction           mins 74030      Timed Treatment:  44    mins   Total Treatment:    54    mins    Wendy Mcgee. OMID Ward  [place] License: [id number]

## 2022-04-21 ENCOUNTER — TREATMENT (OUTPATIENT)
Dept: PHYSICAL THERAPY | Facility: CLINIC | Age: 72
End: 2022-04-21

## 2022-04-21 DIAGNOSIS — M54.2 PAIN, NECK: ICD-10-CM

## 2022-04-21 DIAGNOSIS — M25.511 CHRONIC RIGHT SHOULDER PAIN: Primary | ICD-10-CM

## 2022-04-21 DIAGNOSIS — G89.29 CHRONIC RIGHT SHOULDER PAIN: Primary | ICD-10-CM

## 2022-04-21 PROCEDURE — G0283 ELEC STIM OTHER THAN WOUND: HCPCS | Performed by: PHYSICAL THERAPIST

## 2022-04-21 PROCEDURE — 97110 THERAPEUTIC EXERCISES: CPT | Performed by: PHYSICAL THERAPIST

## 2022-04-21 PROCEDURE — 97140 MANUAL THERAPY 1/> REGIONS: CPT | Performed by: PHYSICAL THERAPIST

## 2022-04-21 NOTE — PROGRESS NOTES
Physical Therapy Daily Treatment Note      Patient: Alexander Zamora   : 1950  Referring practitioner: Bhavna Vaughn MD  Date of Initial Visit: Type: THERAPY  Noted: 4/15/2022  Today's Date: 2022  Patient seen for 3 sessions       Visit Diagnoses:    ICD-10-CM ICD-9-CM   1. Chronic right shoulder pain  M25.511 719.41    G89.29 338.29   2. Pain, neck  M54.2 723.1       Subjective   Patient reports that he is having no pain in his neck prior to treatment session. Patient states that he is working on his home exercises.     Objective   See Exercise, Manual, and Modality Logs for complete treatment.       Assessment/Plan  Patient tolerated treatment session good with rest breaks taken as needed by the patient. Educated patient to perform therex per his tolerance, patient verbalized understanding. Verbal and tactile cues needed on the proper technique of exercises. No adverse reactions with modalities or treatment session. Reps increased on several exercises with no increase in pain noted. New therex added per the patient's tolerance, patient demonstrated and understood new therex with no increase in pain noted. Estim and moist heat applied to the B) upper trap/cervical region post treatment session. No pain noted following treatment session. Continue per PT's POC, progress exercises per the patient's tolerance.    Timed:         Manual Therapy:    14     mins  75195;     Therapeutic Exercise:    31     mins  30429;     Neuromuscular Rosalie:        mins  76681;    Therapeutic Activity:          mins  06562;     Gait Training:           mins  97030;     Ultrasound:          mins  92880;    Ionto                                   mins   24366  Self Care                            mins   61895  Canalith Repos         mins 32948      Un-Timed:  Electrical Stimulation:    10     mins  58672 ( );  Dry Needling          mins self-pay  Traction          mins 77515      Timed Treatment:   45   mins   Total  Treatment:     55   mins    Angelica Mccarty, PTA  [place] License: [id number]

## 2022-04-26 ENCOUNTER — TREATMENT (OUTPATIENT)
Dept: PHYSICAL THERAPY | Facility: CLINIC | Age: 72
End: 2022-04-26

## 2022-04-26 DIAGNOSIS — G89.29 CHRONIC RIGHT SHOULDER PAIN: Primary | ICD-10-CM

## 2022-04-26 DIAGNOSIS — M54.2 PAIN, NECK: ICD-10-CM

## 2022-04-26 DIAGNOSIS — M25.511 CHRONIC RIGHT SHOULDER PAIN: Primary | ICD-10-CM

## 2022-04-26 PROCEDURE — G0283 ELEC STIM OTHER THAN WOUND: HCPCS | Performed by: PHYSICAL THERAPIST

## 2022-04-26 PROCEDURE — 97110 THERAPEUTIC EXERCISES: CPT | Performed by: PHYSICAL THERAPIST

## 2022-04-26 PROCEDURE — 97140 MANUAL THERAPY 1/> REGIONS: CPT | Performed by: PHYSICAL THERAPIST

## 2022-04-26 NOTE — PROGRESS NOTES
Physical Therapy Daily Treatment Note      Patient: Alexander Zamora   : 1950  Referring practitioner: Bhavna Vaughn MD  Date of Initial Visit: Type: THERAPY  Noted: 4/15/2022  Today's Date: 2022  Patient seen for 4 sessions       Visit Diagnoses:    ICD-10-CM ICD-9-CM   1. Chronic right shoulder pain  M25.511 719.41    G89.29 338.29   2. Pain, neck  M54.2 723.1       Subjective:  Patient arrives to therapy w/ reports of 5/10 neck and bilateral shoulder pain.  Pt states he had difficulty resting last night due to discomfort.      Objective   See Exercise, Manual, and Modality Logs for complete treatment.       Assessment/Plan:  Patient responded well to today's session w/ no reports of pain noted following, 010.  Treatment initiated w/ therex as listed f/b manual rx and modalities following.  Therex completed w/ focus on improved cervical, right shoulder ROM, improved cervical stability, and postural strength, as tolerated.  Exercise progressed w/ resistance of tband increased from red to green with mid and low rows, and additional shoulder strengthening activities added to program; shoulder flexion, scaption AROM to 90.  Pt observed w/ good tolerance, however continues to require frequent cues to maintain form, and for max benefit.  Pt noted with increased tightness in cervical, and B) UT region, however left greater than right.  Mh w/ Estim applied at conclusion.  No signs of distress or adverse reactions observed during, and/ or following tx.  Pt continues to benefit from therapy services, and will be progressed as tolerated to address goals, and reduce pain.  Continue with PT's POC.       Timed:         Manual Therapy:    14     mins  15874;     Therapeutic Exercise:    31     mins  53011;     Neuromuscular Rosalie:        mins  63981;    Therapeutic Activity:          mins  05659;     Gait Training:           mins  94739;     Ultrasound:          mins  50243;    Ionto                                    mins   17838  Self Care                            mins   29647  Canalith Repos         mins 55966      Un-Timed:  Electrical Stimulation:    10     mins  74261 ( );  Dry Needling          mins self-pay  Traction          mins 78300      Timed Treatment:  45    mins   Total Treatment:    55    mins    Wendy Mcgee. OMID Ward  [place] License: [id number]

## 2022-04-27 ENCOUNTER — TELEPHONE (OUTPATIENT)
Dept: UROLOGY | Facility: CLINIC | Age: 72
End: 2022-04-27

## 2022-04-28 ENCOUNTER — TREATMENT (OUTPATIENT)
Dept: PHYSICAL THERAPY | Facility: CLINIC | Age: 72
End: 2022-04-28

## 2022-04-28 DIAGNOSIS — M25.511 CHRONIC RIGHT SHOULDER PAIN: Primary | ICD-10-CM

## 2022-04-28 DIAGNOSIS — M54.2 PAIN, NECK: ICD-10-CM

## 2022-04-28 DIAGNOSIS — G89.29 CHRONIC RIGHT SHOULDER PAIN: Primary | ICD-10-CM

## 2022-04-28 PROCEDURE — 97110 THERAPEUTIC EXERCISES: CPT | Performed by: PHYSICAL THERAPIST

## 2022-04-28 PROCEDURE — G0283 ELEC STIM OTHER THAN WOUND: HCPCS | Performed by: PHYSICAL THERAPIST

## 2022-04-28 PROCEDURE — 97140 MANUAL THERAPY 1/> REGIONS: CPT | Performed by: PHYSICAL THERAPIST

## 2022-04-28 NOTE — PROGRESS NOTES
Physical Therapy Re Certification Of Plan of Care  Patient: Alexander Zamora   : 1950  Diagnosis/ICD-10 Code:  Chronic right shoulder pain [M25.511, G89.29]  Referring practitioner: Bhavna Vaughn MD  Date of Initial Visit: Type: THERAPY  Noted: 4/15/2022  Today's Date: 2022  Patient seen for 5 sessions         Visit Diagnoses:    ICD-10-CM ICD-9-CM   1. Chronic right shoulder pain  M25.511 719.41    G89.29 338.29   2. Pain, neck  M54.2 723.1         Alexander Zamora reports:   Subjective Questionnaire: QuickDASH: 36%  Clinical Progress: improved  Home Program Compliance: Yes  Treatment has included: therapeutic exercise, neuromuscular re-education, manual therapy, therapeutic activity, electrical stimulation, ultrasound, moist heat and cryotherapy      Subjective Evaluation    History of Present Illness    Subjective comment: Pt reports while eating he began to have increased right hand yesterday.Pain  Current pain ratin  At best pain ratin  At worst pain ratin  Location: neck and right shoulder             Objective          Active Range of Motion   Cervical/Thoracic Spine   Cervical    Flexion: 40 degrees   Extension: 42 degrees   Left lateral flexion: 40 degrees   Right lateral flexion: 45 degrees   Left rotation: 70 degrees   Right rotation: 65 degrees     Right Shoulder   Flexion: WFL  Abduction: WFL  External rotation 0°: WFL  Internal rotation 0°: WFL    Strength/Myotome Testing     Left Shoulder     Planes of Motion   Flexion: 4+   Abduction: 4+   External rotation at 0°: 4+   Internal rotation at 0°: 4+     Right Shoulder     Planes of Motion   Flexion: 4+   Abduction: 4+   External rotation at 0°: 4+   Internal rotation at 0°: 4+     Left Elbow   Flexion: 4+  Extension: 4+    Right Elbow   Flexion: 4+  Extension: 4+          Assessment & Plan     Assessment  Impairments: abnormal muscle firing, abnormal or restricted ROM, activity intolerance, impaired physical strength, lacks  appropriate home exercise program, pain with function and weight-bearing intolerance  Functional Limitations: carrying objects, lifting, sleeping, pulling, pushing, uncomfortable because of pain and unable to perform repetitive tasks  Assessment details: Pt is a 70 y/o male referred to therapy for treatment of cervical and right shoulder pain. Pt has attended 5 sessions with noted good effort and improved cervical ROM and decreased pain.  Pt scored a 36% on his Quick Dash and is motivated to cont tx for max gains.  Pt reported no pain following session today.  Prognosis: good    Goals  Plan Goals: STG 6 weeks    1 Pt will be instructed in a HEP.met  2 Pt will improve cervical rotation to 65 degrees bilaterally to improve driving.met  3 Pt will report worst pain no greater than 6/10 with light lifting.progressing    LTG 12 weeks    Pt will improve his LEFs to less than 15%.not met  2 Pt will report pain no greater than 3/10 with moderate lifting.not met  3 Pt will demonstrate improved postural stability by demonstrated increased reps and intensity.progressing    Plan  Therapy options: will be seen for skilled therapy services  Planned modality interventions: cryotherapy, dry needling, TENS, thermotherapy (hydrocollator packs), traction and ultrasound  Planned therapy interventions: abdominal trunk stabilization, ADL retraining, balance/weight-bearing training, body mechanics training, flexibility, functional ROM exercises, home exercise program, IADL retraining, joint mobilization, manual therapy, motor coordination training, neuromuscular re-education, postural training, spinal/joint mobilization, soft tissue mobilization, strengthening, stretching and therapeutic activities  Frequency: 2x week  Duration in weeks: 8  Treatment plan discussed with: patient  Plan details: Will follow for optimal gains.  Moderate Evaluation  68087  Re-evaluation   58320    Therapeutic exercise  92403  Therapeutic activity     53741  Neuromuscular re-education   61649  Manual therapy   10967  Unattended e-stim (Medicaid/Medicare)     Moist heat/cryotherapy 55869   Ultrasound   12662  Mechanical traction 40905             Recommendations: Continue as planned  Timeframe: 2 months  Prognosis to achieve goals: good      Timed:         Manual Therapy:    14     mins  01900;     Therapeutic Exercise:    31     mins  57430;     Neuromuscular Rosalie:        mins  63137;    Therapeutic Activity:          mins  98000;     Gait Training:           mins  02427;     Ultrasound:          mins  74734;    Ionto                                   mins   25662  Self Care                            mins   39489  Canalith Repos         mins 16100      Un-Timed:  Electrical Stimulation:    15     mins  54715 ( );  Dry Needling          mins self-pay  Traction          mins 40598  Re-Eval                               mins  26933      Timed Treatment:   45   mins   Total Treatment:     60   mins          PT: Austin Costello PT     [place] License:  [id number]    Electronically signed by Austin Costello PT, 04/28/22, 10:43 AM EDT    Certification Period: 4/28/2022 thru 7/26/2022  I certify that the therapy services are furnished while this patient is under my care.  The services outlined above are required by this patient, and will be reviewed every 90 days.         Physician Signature:__________________________________________________    PHYSICIAN: Bhavna Vaughn MD  NPI: [id number]                                      DATE:  :     Please sign and return via fax to [email or web address] . Thank you, [facility]

## 2022-04-29 ENCOUNTER — TELEPHONE (OUTPATIENT)
Dept: UROLOGY | Facility: CLINIC | Age: 72
End: 2022-04-29

## 2022-04-29 NOTE — TELEPHONE ENCOUNTER
Pt called asking for the trimix be resent to the [facility] and we filled out the form and faxed it over.

## 2022-05-03 ENCOUNTER — TREATMENT (OUTPATIENT)
Dept: PHYSICAL THERAPY | Facility: CLINIC | Age: 72
End: 2022-05-03

## 2022-05-03 DIAGNOSIS — M25.511 CHRONIC RIGHT SHOULDER PAIN: Primary | ICD-10-CM

## 2022-05-03 DIAGNOSIS — G89.29 CHRONIC RIGHT SHOULDER PAIN: Primary | ICD-10-CM

## 2022-05-03 DIAGNOSIS — M54.2 PAIN, NECK: ICD-10-CM

## 2022-05-03 PROCEDURE — G0283 ELEC STIM OTHER THAN WOUND: HCPCS | Performed by: PHYSICAL THERAPIST

## 2022-05-03 PROCEDURE — 97110 THERAPEUTIC EXERCISES: CPT | Performed by: PHYSICAL THERAPIST

## 2022-05-03 PROCEDURE — 97140 MANUAL THERAPY 1/> REGIONS: CPT | Performed by: PHYSICAL THERAPIST

## 2022-05-03 NOTE — PROGRESS NOTES
Physical Therapy Daily Treatment Note      Patient: Alexander Zamora   : 1950  Referring practitioner: Bhavna Vaughn MD  Date of Initial Visit: Type: THERAPY  Noted: 4/15/2022  Today's Date: 5/3/2022  Patient seen for 6 sessions       Visit Diagnoses:    ICD-10-CM ICD-9-CM   1. Chronic right shoulder pain  M25.511 719.41    G89.29 338.29   2. Pain, neck  M54.2 723.1       Subjective:  Patient states of no pain this morning, however he reports of tightness in his neck and shoulders.       Objective   See Exercise, Manual, and Modality Logs for complete treatment.       Assessment/Plan:  Patient responded well to today's session w/ no reports of pain noted at conclusion.  Pt completed therex as listed w/ continued focus on improved cervical, shoulder ROM, improved UE strength, and postural awareness f/b manual rx and modalities at conclusion.  Exercise progressed w/ free weight increased from one to two pounds with sternal lifts, and lawnmowers, and addition of one pound weight added to shoulder flexion and scaption AROM. Pt observed w/ good tolerance, however continues to require frequent cues to maintain form and for max benefit.  Manual soft tissue mobilization performed to cervical, and B) UT region to address tightness, and assist w/ improved mobility; tightness noted bilaterally, however left greater than right. MH w/ Estim applied at conclusion.  No signs of distress or adverse reactions observed during, and/ or following tx.  Pt continues to benefit from therapy services, and will be progressed as tolerated to address goals, reduce pain, and improve function.  Continue w/ PT's POC.       Timed:         Manual Therapy:    15     mins  21676;     Therapeutic Exercise:   36      mins  07398;     Neuromuscular Rosalie:        mins  98883;    Therapeutic Activity:          mins  67537;     Gait Training:           mins  75142;     Ultrasound:          mins  40471;    Ionto                                    mins   32777  Self Care                            mins   83153  Canalith Repos         mins 10143      Un-Timed:  Electrical Stimulation:    10     mins  40714 ( );  Dry Needling          mins self-pay  Traction          mins 69448      Timed Treatment:   51   mins   Total Treatment:    61    mins    Wendy Mcgee. OMID Ward  [place] License: [id number]

## 2022-05-05 ENCOUNTER — TREATMENT (OUTPATIENT)
Dept: PHYSICAL THERAPY | Facility: CLINIC | Age: 72
End: 2022-05-05

## 2022-05-05 DIAGNOSIS — G89.29 CHRONIC RIGHT SHOULDER PAIN: Primary | ICD-10-CM

## 2022-05-05 DIAGNOSIS — M54.2 PAIN, NECK: ICD-10-CM

## 2022-05-05 DIAGNOSIS — M25.511 CHRONIC RIGHT SHOULDER PAIN: Primary | ICD-10-CM

## 2022-05-05 PROCEDURE — 97110 THERAPEUTIC EXERCISES: CPT | Performed by: PHYSICAL THERAPIST

## 2022-05-05 PROCEDURE — G0283 ELEC STIM OTHER THAN WOUND: HCPCS | Performed by: PHYSICAL THERAPIST

## 2022-05-05 PROCEDURE — 97140 MANUAL THERAPY 1/> REGIONS: CPT | Performed by: PHYSICAL THERAPIST

## 2022-05-05 NOTE — PROGRESS NOTES
Physical Therapy Daily Treatment Note      Patient: Alexander Zamora   : 1950  Referring practitioner: Bhavna Vaughn MD  Date of Initial Visit: Type: THERAPY  Noted: 4/15/2022  Today's Date: 2022  Patient seen for 7 sessions       Visit Diagnoses:    ICD-10-CM ICD-9-CM   1. Chronic right shoulder pain  M25.511 719.41    G89.29 338.29   2. Pain, neck  M54.2 723.1       Subjective Evaluation    History of Present Illness    Subjective comment: Patient arrives to therapy with no complaints; he notes 0/10 pain today.Pain  Current pain ratin           Objective   See Exercise, Manual, and Modality Logs for complete treatment.       Assessment & Plan     Assessment    Assessment details: Therapy session initiated with MH/TANJA to the cervical region, with no adverse reactions following modalities.  Manual therapy consisted of STM.  Muscle guarding was noted at bilateral UTs, but patient denied tenderness.  There ex was performed per flow sheet, with lawnmowers and sternal lifts progressed to include increased repetitions.  Patient was provided with verbal cues for proper form with theraband exercises.  He reported no increase in pain at conclusion of session.  Patient will continue to be progressed per his tolerance and POC.          Timed:         Manual Therapy:    12     mins  22450;     Therapeutic Exercise:    35     mins  84551;     Neuromuscular Rosalie:        mins  66606;    Therapeutic Activity:          mins  84168;     Gait Training:           mins  01807;     Ultrasound:          mins  36280;    Ionto                                   mins   93489  Self Care                            mins   52939  Canalith Repos         mins 11877      Un-Timed:  Electrical Stimulation:    10     mins  36741 ( );  Dry Needling          mins self-pay  Traction          mins 65999      Timed Treatment:   47   mins   Total Treatment:     57   mins    Christen Mon, PT  [place] License:  [id number]

## 2022-05-10 ENCOUNTER — TREATMENT (OUTPATIENT)
Dept: PHYSICAL THERAPY | Facility: CLINIC | Age: 72
End: 2022-05-10

## 2022-05-10 DIAGNOSIS — M54.2 PAIN, NECK: ICD-10-CM

## 2022-05-10 DIAGNOSIS — M25.511 CHRONIC RIGHT SHOULDER PAIN: Primary | ICD-10-CM

## 2022-05-10 DIAGNOSIS — G89.29 CHRONIC RIGHT SHOULDER PAIN: Primary | ICD-10-CM

## 2022-05-10 PROCEDURE — 97110 THERAPEUTIC EXERCISES: CPT | Performed by: PHYSICAL THERAPIST

## 2022-05-10 PROCEDURE — 97140 MANUAL THERAPY 1/> REGIONS: CPT | Performed by: PHYSICAL THERAPIST

## 2022-05-10 PROCEDURE — G0283 ELEC STIM OTHER THAN WOUND: HCPCS | Performed by: PHYSICAL THERAPIST

## 2022-05-10 NOTE — PROGRESS NOTES
Physical Therapy Daily Treatment Note      Patient: Alexander Zamora   : 1950  Referring practitioner: Bhavna Vaughn MD  Date of Initial Visit: Type: THERAPY  Noted: 4/15/2022  Today's Date: 5/10/2022  Patient seen for 8 sessions       Visit Diagnoses:    ICD-10-CM ICD-9-CM   1. Chronic right shoulder pain  M25.511 719.41    G89.29 338.29   2. Pain, neck  M54.2 723.1       Subjective Evaluation    History of Present Illness    Subjective comment: Pt reports he was weed eating yesterday with pain as bad as 10/10.  Pt has 2/10 pain today.       Objective   See Exercise, Manual, and Modality Logs for complete treatment.       Assessment & Plan     Assessment    Assessment details: Tx today consisted of exercises for improved cervical moibilty and postural stability; followed by stm to cervical region and ended with estim and ice for decreased pain.  Pt demonstrated good mech and effort with tx today.  Pt denied pain following session.    Plan  Plan details: Will follow progressing core stability and independence with HEP.            Timed:         Manual Therapy:    12     mins  01502;     Therapeutic Exercise:    29     mins  54668;     Neuromuscular Rosalie:        mins  34417;    Therapeutic Activity:          mins  51717;     Gait Training:           mins  25063;     Ultrasound:          mins  98523;    Ionto                                   mins   17878  Self Care                            mins   96933  Canalith Repos         mins 52094      Un-Timed:  Electrical Stimulation:    10     mins  83824 ( );  Dry Needling          mins self-pay  Traction          mins 74415      Timed Treatment:   41   mins   Total Treatment:     51   mins    Austin Costello PT  [place] License: [id number]      Electronically signed by Austin Costello PT, 05/10/22, 10:58 AM EDT

## 2022-05-12 ENCOUNTER — TREATMENT (OUTPATIENT)
Dept: PHYSICAL THERAPY | Facility: CLINIC | Age: 72
End: 2022-05-12

## 2022-05-12 DIAGNOSIS — M25.511 CHRONIC RIGHT SHOULDER PAIN: Primary | ICD-10-CM

## 2022-05-12 DIAGNOSIS — M54.2 PAIN, NECK: ICD-10-CM

## 2022-05-12 DIAGNOSIS — G89.29 CHRONIC RIGHT SHOULDER PAIN: Primary | ICD-10-CM

## 2022-05-12 PROCEDURE — 97140 MANUAL THERAPY 1/> REGIONS: CPT | Performed by: PHYSICAL THERAPIST

## 2022-05-12 PROCEDURE — 97110 THERAPEUTIC EXERCISES: CPT | Performed by: PHYSICAL THERAPIST

## 2022-05-12 PROCEDURE — G0283 ELEC STIM OTHER THAN WOUND: HCPCS | Performed by: PHYSICAL THERAPIST

## 2022-05-12 NOTE — PROGRESS NOTES
Physical Therapy Daily Treatment Note/Discharge      Patient: Alexander Zamora   : 1950  Referring practitioner: Bhavna Vaughn MD  Date of Initial Visit: Type: THERAPY  Noted: 4/15/2022  Today's Date: 2022  Patient seen for 9 sessions       Visit Diagnoses:    ICD-10-CM ICD-9-CM   1. Chronic right shoulder pain  M25.511 719.41    G89.29 338.29   2. Pain, neck  M54.2 723.1       Subjective Evaluation    History of Present Illness    Subjective comment: Pt reports having no pain today.  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       Objective   See Exercise, Manual, and Modality Logs for complete treatment.       Assessment & Plan     Assessment    Assessment details: Tx today consisted of HEP review followed by stm to right UT for decreased pain and improved mobility and ice and estim to right shoulder for decreased pain.  Pt demonstrated good understanding of his home program and patient reported no pain following session.  Prognosis: good    Goals  Plan Goals: Goals  Plan Goals: STG 6 weeks    1 Pt will be instructed in a HEP.met  2 Pt will improve cervical rotation to 65 degrees bilaterally to improve driving.met  3 Pt will report worst pain no greater than 6/10 with light lifting.met    LTG 12 weeks    Pt will improve his LEFs to less than 15%.not met  2 Pt will report pain no greater than 3/10 with moderate lifting.met  3 Pt will demonstrate improved postural stability by demonstrated increased reps and intensity.met        Plan  Plan details: Pt has met max level at this time and will be discharged with a HEP.           Timed:         Manual Therapy:    14     mins  39686;     Therapeutic Exercise:    28     mins  02003;     Neuromuscular Rosalie:        mins  23562;    Therapeutic Activity:          mins  32079;     Gait Training:           mins  84140;     Ultrasound:          mins  51137;    Ionto                                   mins   35107  Self Care                             mins   93432  [facility]         mins 11862      Un-Timed:  Electrical Stimulation:    10     mins  15368 ( );  Dry Needling          mins self-pay  Traction          mins 49023      Timed Treatment:   42   mins   Total Treatment:     52   mins    Austin Costello PT  [place] License: [id number]      Electronically signed by Austin Costello PT, 05/12/22, 10:48 AM EDT

## 2022-05-23 ENCOUNTER — OFFICE VISIT (OUTPATIENT)
Dept: FAMILY MEDICINE CLINIC | Facility: CLINIC | Age: 72
End: 2022-05-23

## 2022-05-23 VITALS
SYSTOLIC BLOOD PRESSURE: 130 MMHG | DIASTOLIC BLOOD PRESSURE: 64 MMHG | HEART RATE: 53 BPM | HEIGHT: 66 IN | OXYGEN SATURATION: 99 % | WEIGHT: 155.8 LBS | BODY MASS INDEX: 25.04 KG/M2 | TEMPERATURE: 97.7 F

## 2022-05-23 DIAGNOSIS — C61 PROSTATE CANCER: ICD-10-CM

## 2022-05-23 DIAGNOSIS — J30.89 SEASONAL ALLERGIC RHINITIS DUE TO OTHER ALLERGIC TRIGGER: ICD-10-CM

## 2022-05-23 DIAGNOSIS — I10 ESSENTIAL HYPERTENSION: Primary | ICD-10-CM

## 2022-05-23 PROCEDURE — 99214 OFFICE O/P EST MOD 30 MIN: CPT | Performed by: FAMILY MEDICINE

## 2022-05-23 RX ORDER — PRAVASTATIN SODIUM 20 MG
20 TABLET ORAL NIGHTLY
Qty: 30 TABLET | Refills: 2 | Status: SHIPPED | OUTPATIENT
Start: 2022-05-23 | End: 2022-08-25

## 2022-06-08 DIAGNOSIS — I10 ESSENTIAL HYPERTENSION: ICD-10-CM

## 2022-06-08 NOTE — TELEPHONE ENCOUNTER
Caller: Alexander Zamora    Relationship: Self    Best call back number: [phone]    Requested Prescriptions:   Requested Prescriptions     Pending Prescriptions Disp Refills   • amLODIPine (NORVASC) 5 MG tablet 30 tablet 2     Sig: Take 1 tablet by mouth Daily.        Pharmacy where request should be sent: [address] - [phone] [facility] [phone] FX     Additional details provided by patient:   ALSO PT STATED THAT HE WAS TOLD THAT RX WAS GONG TO BE INCREASED TO 10MG, PT IS OUT OF RX.    Does the patient have less than a 3 day supply:  [x] Yes  [] No    Chelsey Bloom   06/08/22 09:18 EDT

## 2022-06-10 ENCOUNTER — IMMUNIZATION (OUTPATIENT)
Dept: FAMILY MEDICINE CLINIC | Facility: CLINIC | Age: 72
End: 2022-06-10

## 2022-06-10 PROCEDURE — 91305 COVID-19 (PFIZER) 12+ YRS: CPT | Performed by: FAMILY MEDICINE

## 2022-06-10 PROCEDURE — 0054A COVID-19 (PFIZER) 12+ YRS: CPT | Performed by: FAMILY MEDICINE

## 2022-06-10 RX ORDER — AMLODIPINE BESYLATE 10 MG/1
10 TABLET ORAL DAILY
Qty: 30 TABLET | Refills: 2 | Status: SHIPPED | OUTPATIENT
Start: 2022-06-10 | End: 2022-12-15

## 2022-06-10 RX ORDER — AMLODIPINE BESYLATE 5 MG/1
5 TABLET ORAL DAILY
Qty: 30 TABLET | Refills: 2 | OUTPATIENT
Start: 2022-06-10

## 2022-08-25 ENCOUNTER — OFFICE VISIT (OUTPATIENT)
Dept: FAMILY MEDICINE CLINIC | Facility: CLINIC | Age: 72
End: 2022-08-25

## 2022-08-25 VITALS
OXYGEN SATURATION: 100 % | DIASTOLIC BLOOD PRESSURE: 76 MMHG | TEMPERATURE: 97.1 F | SYSTOLIC BLOOD PRESSURE: 128 MMHG | BODY MASS INDEX: 24.72 KG/M2 | HEART RATE: 56 BPM | WEIGHT: 153.8 LBS | HEIGHT: 66 IN

## 2022-08-25 DIAGNOSIS — I10 ESSENTIAL HYPERTENSION: Primary | ICD-10-CM

## 2022-08-25 DIAGNOSIS — J30.89 SEASONAL ALLERGIC RHINITIS DUE TO OTHER ALLERGIC TRIGGER: ICD-10-CM

## 2022-08-25 DIAGNOSIS — E78.49 OTHER HYPERLIPIDEMIA: ICD-10-CM

## 2022-08-25 PROCEDURE — 80053 COMPREHEN METABOLIC PANEL: CPT | Performed by: FAMILY MEDICINE

## 2022-08-25 PROCEDURE — 99214 OFFICE O/P EST MOD 30 MIN: CPT | Performed by: FAMILY MEDICINE

## 2022-08-25 RX ORDER — EZETIMIBE 10 MG/1
10 TABLET ORAL DAILY
Qty: 30 TABLET | Refills: 2 | Status: SHIPPED | OUTPATIENT
Start: 2022-08-25

## 2022-08-26 LAB
ALBUMIN SERPL-MCNC: 4.4 G/DL (ref 3.5–5.2)
ALBUMIN/GLOB SERPL: 1.5 G/DL
ALP SERPL-CCNC: 97 U/L (ref 39–117)
ALT SERPL W P-5'-P-CCNC: 16 U/L (ref 1–41)
ANION GAP SERPL CALCULATED.3IONS-SCNC: 8.7 MMOL/L (ref 5–15)
AST SERPL-CCNC: 22 U/L (ref 1–40)
BILIRUB SERPL-MCNC: 0.8 MG/DL (ref 0–1.2)
BUN SERPL-MCNC: 13 MG/DL (ref 8–23)
BUN/CREAT SERPL: 14.9 (ref 7–25)
CALCIUM SPEC-SCNC: 10.5 MG/DL (ref 8.6–10.5)
CHLORIDE SERPL-SCNC: 102 MMOL/L (ref 98–107)
CO2 SERPL-SCNC: 29.3 MMOL/L (ref 22–29)
CREAT SERPL-MCNC: 0.87 MG/DL (ref 0.76–1.27)
EGFRCR SERPLBLD CKD-EPI 2021: 91.1 ML/MIN/1.73
GLOBULIN UR ELPH-MCNC: 2.9 GM/DL
GLUCOSE SERPL-MCNC: 75 MG/DL (ref 65–99)
POTASSIUM SERPL-SCNC: 4.3 MMOL/L (ref 3.5–5.2)
PROT SERPL-MCNC: 7.3 G/DL (ref 6–8.5)
SODIUM SERPL-SCNC: 140 MMOL/L (ref 136–145)

## 2022-09-01 ENCOUNTER — TELEPHONE (OUTPATIENT)
Dept: FAMILY MEDICINE CLINIC | Facility: CLINIC | Age: 72
End: 2022-09-01

## 2022-09-01 NOTE — TELEPHONE ENCOUNTER
----- Message from Bhavna Vaughn MD sent at 9/1/2022  1:38 AM EDT -----  Labs stable. Okay to either call or send letter to patient. Thanks.    Letter mailed.

## 2022-09-19 ENCOUNTER — LAB (OUTPATIENT)
Dept: UROLOGY | Facility: CLINIC | Age: 72
End: 2022-09-19

## 2022-09-19 DIAGNOSIS — C61 PROSTATE CANCER: Primary | ICD-10-CM

## 2022-09-19 LAB — PSA SERPL-MCNC: <0.014 NG/ML (ref 0–4)

## 2022-09-19 PROCEDURE — 84153 ASSAY OF PSA TOTAL: CPT | Performed by: UROLOGY

## 2022-09-22 ENCOUNTER — OFFICE VISIT (OUTPATIENT)
Dept: UROLOGY | Facility: CLINIC | Age: 72
End: 2022-09-22

## 2022-09-22 VITALS — HEIGHT: 66 IN | BODY MASS INDEX: 24.73 KG/M2 | WEIGHT: 153.88 LBS

## 2022-09-22 DIAGNOSIS — C61 PROSTATE CANCER: Primary | ICD-10-CM

## 2022-09-22 DIAGNOSIS — N52.31 ERECTILE DYSFUNCTION FOLLOWING RADICAL PROSTATECTOMY: ICD-10-CM

## 2022-09-22 DIAGNOSIS — N39.3 SUI (STRESS URINARY INCONTINENCE), MALE: ICD-10-CM

## 2022-09-22 PROCEDURE — 99213 OFFICE O/P EST LOW 20 MIN: CPT | Performed by: UROLOGY

## 2022-09-22 NOTE — PROGRESS NOTES
Chief Complaint:      Chief Complaint   Patient presents with   • Prostate Cancer       HPI:   72 y.o. male   He had a radical prostatectomy.  He has serious stress incontinence.  He has intermittent left neck adenopathy is nonmalignant.  He is not interested in the sphincter at this time.  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 30 tablet 2   • cetirizine (zyrTEC) 10 MG tablet Take 1 tablet by mouth Daily. 90 tablet 1   • chlorhexidine (PERIDEX) 0.12 % solution Apply 15 mL to the mouth or throat 2 (Two) Times a Day.     • Chlorhexidine Gluconate solution Take 15 mL by mouth 2 (Two) Times a Day. 500 mL 5   • dilTIAZem CD (CARDIZEM CD) 240 MG 24 hr capsule Take 240 mg by mouth Daily.     • ezetimibe (ZETIA) 10 MG tablet Take 1 tablet by mouth Daily. 30 tablet 2   • montelukast (SINGULAIR) 10 MG tablet Take 1 tablet by mouth Every Night. 90 tablet 1   • Multiple Vitamin (MULTI VITAMIN DAILY) tablet Take 1 tablet by mouth daily.       No current facility-administered medications for this visit.        Allergies:      Allergies   Allergen Reactions   • Crestor [Rosuvastatin Calcium] Myalgia   • Amoxicillin Rash        Past Surgical History:     Past Surgical History:   Procedure Laterality Date   • OTHER SURGICAL HISTORY      surgery for an ulcer, PNEUMOTHORAX AND ULCER  THERAPY       Social History:     Social History     Socioeconomic History   • Marital status:    Tobacco Use   • Smoking status: Former Smoker     Packs/day: 2.00     Years: 5.00     Pack years: 10.00     Types: Cigarettes     Quit date: 1970     Years since quittin.7   • Smokeless tobacco: Never Used   Vaping Use   • Vaping Use: Never used   Substance and Sexual Activity   • Alcohol use: No   • Drug use: No       Family History:     Family History   Problem Relation Age of Onset   • Bradycardia Mother         Had pacemaker   • Nephrolithiasis Father    • Heart disease Sister    • Diabetes Other        Review of Systems:     Review of Systems   Constitutional: Negative.    HENT: Negative.    Eyes: Negative.    Respiratory: Negative.    Cardiovascular: Negative.    Gastrointestinal: Negative.    Endocrine: Negative.    Musculoskeletal: Negative.    Allergic/Immunologic: Negative.    Neurological: Negative.    Hematological: Negative.    Psychiatric/Behavioral: Negative.        Physical Exam:     Physical Exam  Vitals and nursing note reviewed.   Constitutional:       Appearance: He is well-developed.   HENT:      Head: Normocephalic and atraumatic.   Eyes:      Conjunctiva/sclera: Conjunctivae normal.      Pupils: Pupils are equal, round, and reactive to light.   Cardiovascular:      Rate and Rhythm: Normal rate and regular rhythm.      Heart sounds: Normal heart sounds.   Pulmonary:      Effort: Pulmonary effort is normal.      Breath sounds: Normal breath sounds.   Abdominal:      General: Bowel sounds are normal.      Palpations: Abdomen is soft.   Musculoskeletal:         General: Normal range of motion.      Cervical back: Normal range of motion.   Skin:     General: Skin is warm and dry.   Neurological:      Mental Status: He is alert and oriented to person, place, and time.      Deep Tendon Reflexes: Reflexes are normal and symmetric.   Psychiatric:         Behavior: Behavior normal.          Thought Content: Thought content normal.         Judgment: Judgment normal.         I have reviewed the following portions of the patient's history: Allergies, current medications, past family history, past medical history, past social history, past surgical history, problem list, and ROS and confirm it is accurate.    Recent Image (CT and/or KUB):      CT Abdomen and Pelvis: Results for orders placed during the hospital encounter of 06/19/19    CT Abdomen Pelvis With & Without Contrast    Narrative  CT ABDOMEN PELVIS W WO CONTRAST-    CLINICAL INDICATION: N28.1; N28.1-Cyst of kidney, acquired      COMPARISON: None available    TECHNIQUE: Axial images were acquired from the lung bases through the  pubic symphysis without any IV and then after IV contrast.  Reformatted images were created in both the coronal and sagittal planes.    Radiation dose reduction techniques were utilized per ALARA protocol.  Automated exposure control was initiated through either or NuPathe or  DoseRight software packages by  protocol.      FINDINGS:  The lung bases are clear. There are no pleural effusions.    The liver is homogeneous. There is no evidence of focal hepatic mass    The spleen is homogeneous    There is no peripancreatic stranding or pancreatic head mass.    There is no adrenal enlargement.    There is a left renal cyst. No solid mass or hydronephrosis..    Otherwise I do not see any free fluid or walled off fluid collections.    There are sigmoid diverticuli. I cannot exclude a very mild degree of  diverticulitis in the sigmoid region. No abscess or free fluid    Right inguinal hernia containing bowel but no evidence of bowel  obstruction    There is no evidence of mesenteric or retroperitoneal adenopathy    Impression  : There are sigmoid diverticuli. I cannot exclude a very mild degree of  diverticulitis in the sigmoid region. No abscess or free fluid    Right inguinal hernia containing bowel but no evidence  of bowel  obstruction          This report was finalized on 6/19/2019 2:48 PM by Dr. Ralph Kohli MD.       CT Stone Protocol: No results found for this or any previous visit.       KUB: No results found for this or any previous visit.       Labs (past 3 months):      Lab on 09/19/2022   Component Date Value Ref Range Status   • PSA 09/19/2022 <0.014  0.000 - 4.000 ng/mL Final   Office Visit on 08/25/2022   Component Date Value Ref Range Status   • Glucose 08/25/2022 75  65 - 99 mg/dL Final   • BUN 08/25/2022 13  8 - 23 mg/dL Final   • Creatinine 08/25/2022 0.87  0.76 - 1.27 mg/dL Final   • Sodium 08/25/2022 140  136 - 145 mmol/L Final   • Potassium 08/25/2022 4.3  3.5 - 5.2 mmol/L Final   • Chloride 08/25/2022 102  98 - 107 mmol/L Final   • CO2 08/25/2022 29.3 (A) 22.0 - 29.0 mmol/L Final   • Calcium 08/25/2022 10.5  8.6 - 10.5 mg/dL Final   • Total Protein 08/25/2022 7.3  6.0 - 8.5 g/dL Final   • Albumin 08/25/2022 4.40  3.50 - 5.20 g/dL Final   • ALT (SGPT) 08/25/2022 16  1 - 41 U/L Final   • AST (SGOT) 08/25/2022 22  1 - 40 U/L Final   • Alkaline Phosphatase 08/25/2022 97  39 - 117 U/L Final   • Total Bilirubin 08/25/2022 0.8  0.0 - 1.2 mg/dL Final   • Globulin 08/25/2022 2.9  gm/dL Final   • A/G Ratio 08/25/2022 1.5  g/dL Final   • BUN/Creatinine Ratio 08/25/2022 14.9  7.0 - 25.0 Final   • Anion Gap 08/25/2022 8.7  5.0 - 15.0 mmol/L Final   • eGFR 08/25/2022 91.1  >60.0 mL/min/1.73 Final    National Kidney Foundation and American Society of Nephrology (ASN) Task Force recommended calculation based on the Chronic Kidney Disease Epidemiology Collaboration (CKD-EPI) equation refit without adjustment for race.        Procedure:       Assessment/Plan:   Prostate cancer:  He returns today status post biopsy for extensive discussion of prostate cancer.  We discussed staging and grading of the disease.  I described with a [place] system being from a 2 to10 scale with the most common low-grade pattern being a Nora  6.  I discussed the staging workup including a total body bone scan and CT scan especially with a PSA greater than 10.  We discussed the various options at length. I discussed a radical retropubic prostatectomy done in the traditional fashion and using the robotic technique.  I then discussed radiation treatment both seed therapy and external beam therapy using Gold fiduciary markers.  We talked about some of the other alternatives such as a cryosurgical ablation at high intensity focused ultrasound as being viable alternatives but not recommended at this time.  He focused on aggressive watchful waiting and explained that currently low literature it's been discovered that a lot of men can actually observe it especially with numerous other comorbidities cannot have problems from the cancer by itself.  Talked about hormonal ablation and the side effects of creation of insulin resistance.  Overall, the patient was given appropriate literature, is going to think about it, and I'm going to revisit the topic with them after the next office visit.  He is status post a radical prostatectomy with a 0 PSA and the number of years he is out he has less than 5% chance of recurrence  Urinary incontinence:  Patient was diagnosed with urinary incontinence.  We discussed treatable and non-treatable causes of both stress and urge urinary incontinence.  With regards to stress urinary incontinence, we discussed its relationship to childbirth and pelvic health.  We discussed the grading of stress incontinence with trying to quantitate the number of pads used.  We talked about leaking urine with laughing, lifting, coughing, and sexual intercourse.  Talked about the urge component and the concept of mixed incontinence where upon the stress treatable at the urge may exist and that 50% of the time the urge will resolve with treatment of the stress incontinence.  We talked with the diagnostic workup including a postvoid residual urine and  even a simple cystometrogram.  I discussed the findings that may be neurologically related including commonly seen with multiple sclerosis, Parkinson's disease, and stroke.  I talked about the various therapeutic options including anticholinergics, beta 3 agonists, and alpha blockade if there is a component of obstruction.  I discussed the side effects of anticholinergic including dry mouth, double vision, etc. he has post prostatectomy incontinence the neck step is a sphincter is not interested at this time              This document has been electronically signed by ANNABELLE ESTEVEZ MD September 22, 2022 10:07 EDT    Dictated Utilizing Dragon Dictation: Part of this note may be an electronic transcription/translation of spoken language to printed text using the Dragon Dictation System.

## 2022-10-05 DIAGNOSIS — J30.89 SEASONAL ALLERGIC RHINITIS DUE TO OTHER ALLERGIC TRIGGER: ICD-10-CM

## 2022-10-05 RX ORDER — MONTELUKAST SODIUM 10 MG/1
10 TABLET ORAL NIGHTLY
Qty: 90 TABLET | Refills: 1 | Status: SHIPPED | OUTPATIENT
Start: 2022-10-05 | End: 2023-01-30 | Stop reason: SDUPTHER

## 2022-12-02 ENCOUNTER — OFFICE VISIT (OUTPATIENT)
Dept: FAMILY MEDICINE CLINIC | Facility: CLINIC | Age: 72
End: 2022-12-02

## 2022-12-02 VITALS
SYSTOLIC BLOOD PRESSURE: 130 MMHG | OXYGEN SATURATION: 98 % | DIASTOLIC BLOOD PRESSURE: 72 MMHG | HEIGHT: 66 IN | TEMPERATURE: 97.9 F | BODY MASS INDEX: 26.07 KG/M2 | WEIGHT: 162.2 LBS | HEART RATE: 48 BPM

## 2022-12-02 DIAGNOSIS — R00.1 BRADYCARDIA: Primary | ICD-10-CM

## 2022-12-02 DIAGNOSIS — E78.49 OTHER HYPERLIPIDEMIA: ICD-10-CM

## 2022-12-02 DIAGNOSIS — I10 ESSENTIAL HYPERTENSION: ICD-10-CM

## 2022-12-02 PROCEDURE — 99214 OFFICE O/P EST MOD 30 MIN: CPT | Performed by: FAMILY MEDICINE

## 2022-12-09 ENCOUNTER — TELEPHONE (OUTPATIENT)
Dept: FAMILY MEDICINE CLINIC | Facility: CLINIC | Age: 72
End: 2022-12-09

## 2022-12-09 NOTE — TELEPHONE ENCOUNTER
PT CALLED TO LET DR PHELAN KNOW HE HAS AN APPOINTMENT W/ [facility] ON Thursday 12/15 AT 12:30PM

## 2022-12-15 ENCOUNTER — OFFICE VISIT (OUTPATIENT)
Dept: CARDIOLOGY | Facility: CLINIC | Age: 72
End: 2022-12-15

## 2022-12-15 VITALS
HEART RATE: 53 BPM | OXYGEN SATURATION: 95 % | HEIGHT: 66 IN | DIASTOLIC BLOOD PRESSURE: 65 MMHG | WEIGHT: 165 LBS | SYSTOLIC BLOOD PRESSURE: 166 MMHG | BODY MASS INDEX: 26.52 KG/M2

## 2022-12-15 DIAGNOSIS — I10 ESSENTIAL HYPERTENSION: ICD-10-CM

## 2022-12-15 DIAGNOSIS — R07.2 PRECORDIAL PAIN: Primary | ICD-10-CM

## 2022-12-15 PROCEDURE — 93000 ELECTROCARDIOGRAM COMPLETE: CPT | Performed by: INTERNAL MEDICINE

## 2022-12-15 PROCEDURE — 99204 OFFICE O/P NEW MOD 45 MIN: CPT | Performed by: INTERNAL MEDICINE

## 2022-12-15 NOTE — PROGRESS NOTES
Bhavna Vaughn MD  Alexander Zamora  1950  12/15/2022    Patient Active Problem List   Diagnosis   • Prostate cancer (HCC)   • Erectile dysfunction following radical prostatectomy   • BEULAH (stress urinary incontinence), male   • Arthritis   • Chronic back pain   • Hypercholesterolemia   • Primary hypertension   • Allergic rhinitis   • History of oral aphthous ulcers       Dear Bhavna Vaughn MD:    Subjective     Alexander Zamora is a 72 y.o. male with the problems as listed above, presents    Chief complaint: Recurrent chest pains.    History of Present Illness: Mr. Zamora is a pleasant 72-year-old  male with no previous history of known heart disease or coronary artery disease, has history of hypertension, nondiabetic and non-smoker, presents with having some intermittent chest pains over the last few weeks.  He describes these pains as being felt as a mild dull pains on the left side of his chest which seem to occur at random and resolve spontaneously.  They usually last for a few minutes.  There is no severe shortness of breath or sweating.  He denies any significant dyspnea, PND, orthopnea or pedal edema.  He denies any palpitations, dizziness or syncope.  States his blood pressure at home runs usually around 120s to 130s on the top and 70s to 80s on the bottom.    Cardiac risk factors:hypertension and Age and male gender    Allergies   Allergen Reactions   • Crestor [Rosuvastatin Calcium] Myalgia   • Amoxicillin Rash       Current Outpatient Medications:   •  cetirizine (zyrTEC) 10 MG tablet, Take 1 tablet by mouth Daily., Disp: 90 tablet, Rfl: 1  •  dilTIAZem CD (CARDIZEM CD) 240 MG 24 hr capsule, Take 240 mg by mouth Daily., Disp: , Rfl:   •  ezetimibe (ZETIA) 10 MG tablet, Take 1 tablet by mouth Daily., Disp: 30 tablet, Rfl: 2  •  montelukast (SINGULAIR) 10 MG tablet, Take 1 tablet by mouth Every Night., Disp: 90 tablet, Rfl: 1  •  Multiple Vitamin (MULTI VITAMIN DAILY) tablet, Take 1  "tablet by mouth daily., Disp: , Rfl:     Past Medical History:   Diagnosis Date   • Arthritis    • 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   • Chronic back pain    • Depression    • Elevated prostate specific antigen (PSA)    • Erectile dysfunction    • Hypercholesterolemia    • Primary hypertension    • Prostatitis      Past Surgical History:   Procedure Laterality Date   • OTHER SURGICAL HISTORY      surgery for an ulcer, PNEUMOTHORAX AND ULCER THERAPY     Family History   Problem Relation Age of Onset   • Bradycardia Mother         Had pacemaker   • Nephrolithiasis Father    • Heart disease Sister    • Diabetes Other      Social History     Tobacco Use   • Smoking status: Former     Packs/day: 2.00     Years: 5.00     Pack years: 10.00     Types: Cigarettes     Quit date:      Years since quittin.9   • Smokeless tobacco: Never   Vaping Use   • Vaping Use: Never used   Substance Use Topics   • Alcohol use: No   • Drug use: No     Review of Systems   Constitutional: Negative. Negative for chills, fever, malaise/fatigue, weight gain and weight loss.   HENT: Negative for congestion and nosebleeds.    Cardiovascular: Negative.  Negative for chest pain, irregular heartbeat, leg swelling and orthopnea.   Respiratory: Negative.  Negative for cough, hemoptysis and shortness of breath.    Endocrine: Negative.    Hematologic/Lymphatic: Negative.    Skin: Negative.    Musculoskeletal: Negative.    Gastrointestinal: Negative.  Negative for abdominal pain, change in bowel habit, hematemesis, melena and vomiting.   Genitourinary: Negative for dysuria, frequency and hematuria.   Neurological: Negative.  Negative for focal weakness, headaches, light-headedness and weakness.   Psychiatric/Behavioral: Negative.      Objective   Blood pressure 166/65, pulse 53, height 167.6 cm (66\"), weight 74.8 kg (165 lb), SpO2 95 %.  Body mass index is 26.63 kg/m².      Vitals reviewed.   Constitutional:       Appearance: Well-developed.   Eyes: "      Conjunctiva/sclera: Conjunctivae normal.   HENT:      Head: Normocephalic.   Neck:      Thyroid: No thyromegaly.      Vascular: No JVD.      Trachea: No tracheal deviation.   Pulmonary:      Effort: No respiratory distress.      Breath sounds: Normal breath sounds. No wheezing. No rales.   Cardiovascular:      PMI at left midclavicular line. Normal rate. Regular rhythm. Normal S1. Normal S2.      Murmurs: There is no murmur.      No gallop. No click. No rub.   Pulses:     Intact distal pulses.   Edema:     Pretibial: bilateral 2+ edema of the pretibial area.     Ankle: bilateral 2+ edema of the ankle.     Feet: bilateral 2+ edema of the feet.  Abdominal:      General: Bowel sounds are normal.      Palpations: Abdomen is soft. There is no abdominal mass.      Tenderness: There is no abdominal tenderness.   Musculoskeletal:      Cervical back: Normal range of motion and neck supple. Skin:     General: Skin is warm and dry.   Neurological:      Mental Status: Alert and oriented to person, place, and time.      Cranial Nerves: No cranial nerve deficit.       Lab Results   Component Value Date     08/25/2022    K 4.3 08/25/2022     08/25/2022    CO2 29.3 (H) 08/25/2022    BUN 13 08/25/2022    CREATININE 0.87 08/25/2022    GLUCOSE 75 08/25/2022    CALCIUM 10.5 08/25/2022    AST 22 08/25/2022    ALT 16 08/25/2022    ALKPHOS 97 08/25/2022    LABIL2 1.2 (L) 09/08/2015     Lab Results   Component Value Date    CKTOTAL 49 09/22/2015     Lab Results   Component Value Date    WBC 7.80 03/11/2022    HGB 13.9 03/11/2022    HCT 40.9 03/11/2022     03/11/2022     No results found for: INR  Lab Results   Component Value Date    MG 2.0 03/11/2022     Lab Results   Component Value Date    TSH 3.460 03/11/2022    PSA <0.014 09/19/2022    TRIG 60 03/11/2022    HDL 72 (H) 03/11/2022     (H) 03/11/2022        ECG 12 Lead    Date/Time: 12/15/2022 1:12 PM  Performed by: Kyler Reese MD  Authorized by:  Kyler Reese MD   Comparison: compared with previous ECG from 3/7/2022  Similar to previous ECG  Rhythm: sinus bradycardia  Other findings: non-specific ST-T wave changes              Assessment & Plan :   Diagnosis Plan   1. Precordial pain     2. Essential hypertension             Recommendations:   We will evaluate his chest pains with a stress echo study.    Return in about 3 weeks (around 1/5/2023).    As always, Bhavna Vaughn MD  I appreciate very much the opportunity to participate in the cardiovascular care of your patients. Please do not hesitate to call me with any questions with regards to Alexander Zamora's evaluation and management.       With Best Regards,        Kyler Reese MD, [facility]    Please note that portions of this note were completed with a voice recognition program.

## 2022-12-15 NOTE — PROGRESS NOTES
Bhavna Vaughn MD  Alexander Zamora  1950  12/15/2022    Patient Active Problem List   Diagnosis   • Prostate cancer (HCC)   • Erectile dysfunction following radical prostatectomy   • BEULAH (stress urinary incontinence), male   • Arthritis   • Chronic back pain   • Hypercholesterolemia   • Primary hypertension   • Allergic rhinitis   • History of oral aphthous ulcers       Dear Bhavna Vaughn MD:    Subjective     Chief Complaint   Patient presents with   • Slow Heart Rate           History of Present Illness:    Alexander Zamora is a 72 y.o. male with a past medical history of          Allergies   Allergen Reactions   • Crestor [Rosuvastatin Calcium] Myalgia   • Amoxicillin Rash   :      Current Outpatient Medications:   •  cetirizine (zyrTEC) 10 MG tablet, Take 1 tablet by mouth Daily., Disp: 90 tablet, Rfl: 1  •  dilTIAZem CD (CARDIZEM CD) 240 MG 24 hr capsule, Take 240 mg by mouth Daily., Disp: , Rfl:   •  ezetimibe (ZETIA) 10 MG tablet, Take 1 tablet by mouth Daily., Disp: 30 tablet, Rfl: 2  •  montelukast (SINGULAIR) 10 MG tablet, Take 1 tablet by mouth Every Night., Disp: 90 tablet, Rfl: 1  •  Multiple Vitamin (MULTI VITAMIN DAILY) tablet, Take 1 tablet by mouth daily., Disp: , Rfl:   •  amLODIPine (NORVASC) 10 MG tablet, Take 1 tablet by mouth Daily., Disp: 30 tablet, Rfl: 2  •  chlorhexidine (PERIDEX) 0.12 % solution, Apply 15 mL to the mouth or throat 2 (Two) Times a Day., Disp: , Rfl:   •  Chlorhexidine Gluconate solution, Take 15 mL by mouth 2 (Two) Times a Day., Disp: 500 mL, Rfl: 5      The following portions of the patient's history were reviewed and updated as appropriate: allergies, current medications, past family history, past medical history, past social history, past surgical history and problem list.    Social History     Tobacco Use   • Smoking status: Former     Packs/day: 2.00     Years: 5.00     Pack years: 10.00     Types: Cigarettes     Quit date: 1970     Years since quitting:  "52.9   • Smokeless tobacco: Never   Vaping Use   • Vaping Use: Never used   Substance Use Topics   • Alcohol use: No   • Drug use: No       Review of Systems   Eyes: Negative.    Cardiovascular: Negative.    Respiratory: Negative.    Gastrointestinal: Negative.        Objective   Vitals:    12/15/22 1205   BP: 166/65   BP Location: Left arm   Patient Position: Sitting   Cuff Size: Adult   Pulse: 53   SpO2: 95%   Weight: 74.8 kg (165 lb)   Height: 167.6 cm (66\")     Body mass index is 26.63 kg/m².        Physical Exam    Lab Results   Component Value Date     08/25/2022    K 4.3 08/25/2022     08/25/2022    CO2 29.3 (H) 08/25/2022    BUN 13 08/25/2022    CREATININE 0.87 08/25/2022    GLUCOSE 75 08/25/2022    CALCIUM 10.5 08/25/2022    AST 22 08/25/2022    ALT 16 08/25/2022    ALKPHOS 97 08/25/2022    LABIL2 1.2 (L) 09/08/2015     Lab Results   Component Value Date    CKTOTAL 49 09/22/2015     Lab Results   Component Value Date    WBC 7.80 03/11/2022    HGB 13.9 03/11/2022    HCT 40.9 03/11/2022     03/11/2022     No results found for: INR  Lab Results   Component Value Date    MG 2.0 03/11/2022     Lab Results   Component Value Date    TSH 3.460 03/11/2022    PSA <0.014 09/19/2022    TRIG 60 03/11/2022    HDL 72 (H) 03/11/2022     (H) 03/11/2022      No results found for: BNP        Procedures      Assessment & Plan   No diagnosis found.             Recommendations:    1.         No follow-ups on file.    As always, I appreciate very much the opportunity to participate in the cardiovascular care of your patients.      With Best Regards,    FELISHA Null            "

## 2022-12-19 NOTE — PROGRESS NOTES
"Hardemanwalker Zamora     VITALS: Blood pressure 130/72, pulse (!) 48, temperature 97.9 °F (36.6 °C), height 167.6 cm (65.98\"), weight 73.6 kg (162 lb 3.2 oz), SpO2 98 %.    Subjective  Chief Complaint  Hypertension    Subjective          History of Present Illness:  Patient is a 72 y.o.  male with medical conditions significant for hypertension and hyperlipidemia who presents to clinic secondary to medical followup.  No new or acute concerns.  Patient is doing well.    No complaints about any of the medications.    The following portions of the patient's history were reviewed and updated as appropriate: allergies, current medications, past family history, past medical history, past social history, past surgical history and problem list.    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   • Chronic back pain    • Depression    • Elevated prostate specific antigen (PSA)    • Erectile dysfunction    • Hypercholesterolemia    • Primary hypertension    • Prostatitis        Surgical History  Past Surgical History:   Procedure Laterality Date   • OTHER SURGICAL HISTORY      surgery for an ulcer, PNEUMOTHORAX AND ULCER THERAPY       Family History  Family History   Problem Relation Age of Onset   • Bradycardia Mother         Had pacemaker   • Nephrolithiasis Father    • Heart disease Sister    • Diabetes Other        Social History  Social History     Socioeconomic History   • Marital status:    Tobacco Use   • Smoking status: Former     Packs/day: 2.00     Years: 5.00     Pack years: 10.00     Types: Cigarettes     Quit date: 1970     Years since quittin.0   • Smokeless tobacco: Never   Vaping Use   • Vaping Use: Never used   Substance and Sexual Activity   • Alcohol use: No   • Drug use: No       Objective   Vital Signs:   /72 (BP Location: Left arm, Patient Position: Sitting)   Pulse (!) 48   Temp 97.9 °F (36.6 °C)   Ht 167.6 cm (65.98\")   Wt " 73.6 kg (162 lb 3.2 oz)   SpO2 98%   BMI 26.19 kg/m²     Physical Exam     Gen: Patient in NAD. Pleasant and answers appropriately. A&Ox3.    Skin: Warm and dry with normal turgor. No purpura, rashes, or unusual pigmentation noted. Hair is normal in appearance and distribution.    HEENT: NC/AT. No lesions noted. Conjunctiva clear, sclera nonicteric. PERRL. EOMI without nystagmus or strabismus. Fundi appear benign. No hemorrhages or exudates of eyes. Auditory canals are patent bilaterally without lesions. TMs intact,  nonerythematous, nonbulging without lesions. Nasal mucosa pink, nonerythematous, and nonedematous. Frontal and maxillary sinuses are nontender. O/P nonerythematous and moist without exudate.    Neck: Supple without lymph nodes palpated. FROM.     Lungs: Decreased B/L without rales, rhonchi, crackles, or wheezes.    Heart: RRR. S1 and S2 normal. No S3 or S4. No MRGT.    Abd: Soft, nontender,nondistended. (+)BSx4 quadrants.     Extrem: No CCE. Radial pulses 2+/4 and equal B/L. FROMx4.  Positive joint tenderness noted.    Neuro: No focal motor/sensory deficits.    Procedures    Result Review :   The following data was reviewed by: Bhavna Vaughn MD on 12/02/2022:                Assessment and Plan    Alexander Zamora is a 72 y.o. here for medical followup.    Problem List Items Addressed This Visit    None  Visit Diagnoses     Bradycardia    -  Primary    Relevant Orders    Ambulatory Referral to Cardiology    Other hyperlipidemia      Currently on Zetia 10 mg orally daily.    Essential hypertension      Within normal limits today.  Currently on diltiazem CD2 140 mg orally daily.  May need to decrease this secondary to the bradycardia.            BMI is >= 25 and <30. (Overweight) The following options were offered after discussion;: exercise counseling/recommendations and nutrition counseling/recommendations              Follow Up   Return in about 6 months (around 6/2/2023).  Findings and plans  discussed with patient who verbalizes understanding and agreement. Will followup with patient once results are in. Patient was given instructions and counseling regarding his condition or for health maintenance advice. Please see specific information pulled into the AVS if appropriate.       Bhavna Vaughn MD

## 2023-01-05 ENCOUNTER — HOSPITAL ENCOUNTER (OUTPATIENT)
Dept: CARDIOLOGY | Facility: HOSPITAL | Age: 73
Discharge: HOME | End: 2023-01-05
Admitting: INTERNAL MEDICINE
Payer: MEDICARE

## 2023-01-05 DIAGNOSIS — R07.2 PRECORDIAL PAIN: ICD-10-CM

## 2023-01-05 PROCEDURE — 93351 STRESS TTE COMPLETE: CPT

## 2023-01-05 PROCEDURE — 93351 STRESS TTE COMPLETE: CPT | Performed by: INTERNAL MEDICINE

## 2023-01-05 PROCEDURE — 93320 DOPPLER ECHO COMPLETE: CPT | Performed by: INTERNAL MEDICINE

## 2023-01-05 PROCEDURE — 93320 DOPPLER ECHO COMPLETE: CPT

## 2023-01-05 PROCEDURE — 93325 DOPPLER ECHO COLOR FLOW MAPG: CPT

## 2023-01-05 PROCEDURE — 93325 DOPPLER ECHO COLOR FLOW MAPG: CPT | Performed by: INTERNAL MEDICINE

## 2023-01-11 LAB
BH CV ECHO MEAS - ACS: 2.1 CM
BH CV ECHO MEAS - AO MAX PG: 15.5 MMHG
BH CV ECHO MEAS - AO MEAN PG: 9 MMHG
BH CV ECHO MEAS - AO ROOT DIAM: 3 CM
BH CV ECHO MEAS - AO V2 MAX: 197 CM/SEC
BH CV ECHO MEAS - AO V2 VTI: 52.8 CM
BH CV ECHO MEAS - EDV(CUBED): 103.2 ML
BH CV ECHO MEAS - EDV(MOD-SP4): 76.5 ML
BH CV ECHO MEAS - EF(MOD-SP4): 74.8 %
BH CV ECHO MEAS - ESV(CUBED): 38.6 ML
BH CV ECHO MEAS - ESV(MOD-SP4): 19.3 ML
BH CV ECHO MEAS - FS: 27.9 %
BH CV ECHO MEAS - IVS/LVPW: 0.95 CM
BH CV ECHO MEAS - IVSD: 1.02 CM
BH CV ECHO MEAS - LA DIMENSION: 3.2 CM
BH CV ECHO MEAS - LAT PEAK E' VEL: 9.9 CM/SEC
BH CV ECHO MEAS - LV DIASTOLIC VOL/BSA (35-75): 41.5 CM2
BH CV ECHO MEAS - LV MASS(C)D: 173.6 GRAMS
BH CV ECHO MEAS - LV SYSTOLIC VOL/BSA (12-30): 10.5 CM2
BH CV ECHO MEAS - LVIDD: 4.7 CM
BH CV ECHO MEAS - LVIDS: 3.4 CM
BH CV ECHO MEAS - LVOT AREA: 3.5 CM2
BH CV ECHO MEAS - LVOT DIAM: 2.1 CM
BH CV ECHO MEAS - LVPWD: 1.07 CM
BH CV ECHO MEAS - MED PEAK E' VEL: 6.3 CM/SEC
BH CV ECHO MEAS - MV A MAX VEL: 93.4 CM/SEC
BH CV ECHO MEAS - MV E MAX VEL: 77.1 CM/SEC
BH CV ECHO MEAS - MV E/A: 0.83
BH CV ECHO MEAS - PA ACC TIME: 0.08 SEC
BH CV ECHO MEAS - PA PR(ACCEL): 42.6 MMHG
BH CV ECHO MEAS - RAP SYSTOLE: 10 MMHG
BH CV ECHO MEAS - RVSP: 37.2 MMHG
BH CV ECHO MEAS - SI(MOD-SP4): 31 ML/M2
BH CV ECHO MEAS - SV(MOD-SP4): 57.2 ML
BH CV ECHO MEAS - TAPSE (>1.6): 2.8 CM
BH CV ECHO MEAS - TR MAX PG: 27.2 MMHG
BH CV ECHO MEAS - TR MAX VEL: 261 CM/SEC
BH CV ECHO MEASUREMENTS AVERAGE E/E' RATIO: 9.52
BH CV STRESS BP STAGE 1: NORMAL
BH CV STRESS BP STAGE 2: NORMAL
BH CV STRESS DURATION MIN STAGE 1: 3
BH CV STRESS DURATION MIN STAGE 2: 3
BH CV STRESS DURATION MIN STAGE 3: 3
BH CV STRESS DURATION SEC STAGE 1: 0
BH CV STRESS DURATION SEC STAGE 2: 0
BH CV STRESS DURATION SEC STAGE 3: 0
BH CV STRESS ECHO POST STRESS EJECTION FRACTION EF: 70 %
BH CV STRESS GRADE STAGE 1: 10
BH CV STRESS GRADE STAGE 2: 12
BH CV STRESS GRADE STAGE 3: 14
BH CV STRESS HR STAGE 1: 80
BH CV STRESS HR STAGE 2: 109
BH CV STRESS HR STAGE 3: 126
BH CV STRESS METS STAGE 1: 5
BH CV STRESS METS STAGE 2: 7.5
BH CV STRESS METS STAGE 3: 10
BH CV STRESS PROTOCOL 1: NORMAL
BH CV STRESS RECOVERY BP: NORMAL MMHG
BH CV STRESS RECOVERY HR: 75 BPM
BH CV STRESS SPEED STAGE 1: 1.7
BH CV STRESS SPEED STAGE 2: 2.5
BH CV STRESS SPEED STAGE 3: 3.4
BH CV STRESS STAGE 1: 1
BH CV STRESS STAGE 2: 2
BH CV STRESS STAGE 3: 3
MAXIMAL PREDICTED HEART RATE: 147 BPM
PERCENT MAX PREDICTED HR: 85.71 %
STRESS BASELINE BP: NORMAL MMHG
STRESS BASELINE HR: 53 BPM
STRESS PERCENT HR: 101 %
STRESS POST ESTIMATED WORKLOAD: 10.1 METS
STRESS POST EXERCISE DUR MIN: 8 MIN
STRESS POST PEAK BP: NORMAL MMHG
STRESS POST PEAK HR: 126 BPM
STRESS TARGET HR: 125 BPM

## 2023-01-17 ENCOUNTER — TELEPHONE (OUTPATIENT)
Dept: FAMILY MEDICINE CLINIC | Facility: CLINIC | Age: 73
End: 2023-01-17
Payer: MEDICARE

## 2023-01-17 ENCOUNTER — TELEPHONE (OUTPATIENT)
Dept: FAMILY MEDICINE CLINIC | Facility: CLINIC | Age: 73
End: 2023-01-17

## 2023-01-17 NOTE — TELEPHONE ENCOUNTER
"Patient called reports he has a lot of dental problems,has a spacer & brace on his plate it has caused a lot of irritation to his tongue as well,he has had \"Numbing lollipops \"in the past that helped him,the name of them are Petracaine Lollipops 0.5%,uses PRN,was initially given by ENT but he has ran out.  "

## 2023-01-17 NOTE — TELEPHONE ENCOUNTER
Yeah, I realize, but if the spacer and brace is what the irritation is from, he needs to talk to his dentist or oral surgeon or whoever put that in so they can fix it, and in the meantime, give him the lollipops until it is fixed. Otherwise, this is going to be a forever petracaine lollipop prescription and that's not appropriate.      Patient notified & verbalized understanding.

## 2023-01-17 NOTE — TELEPHONE ENCOUNTER
Yeah, I realize, but if the spacer and brace is what the irritation is from, he needs to talk to his dentist or oral surgeon or whoever put that in so they can fix it, and in the meantime, give him the lollipops until it is fixed. Otherwise, this is going to be a forever petracaine lollipop prescription and that's not appropriate.

## 2023-01-17 NOTE — TELEPHONE ENCOUNTER
Who is his dentist? They should be the ones prescribing him that.     Mail box is full at this time.    Patient returned call reports it was the ENT he saw awhile back & he does not see them any longer?

## 2023-01-19 ENCOUNTER — TELEPHONE (OUTPATIENT)
Dept: FAMILY MEDICINE CLINIC | Facility: CLINIC | Age: 73
End: 2023-01-19
Payer: MEDICARE

## 2023-01-30 ENCOUNTER — LAB (OUTPATIENT)
Dept: FAMILY MEDICINE CLINIC | Facility: CLINIC | Age: 73
End: 2023-01-30
Payer: MEDICARE

## 2023-01-30 DIAGNOSIS — J30.89 SEASONAL ALLERGIC RHINITIS DUE TO OTHER ALLERGIC TRIGGER: ICD-10-CM

## 2023-01-31 ENCOUNTER — OFFICE VISIT (OUTPATIENT)
Dept: CARDIOLOGY | Facility: CLINIC | Age: 73
End: 2023-01-31
Payer: MEDICARE

## 2023-01-31 VITALS
SYSTOLIC BLOOD PRESSURE: 169 MMHG | OXYGEN SATURATION: 97 % | DIASTOLIC BLOOD PRESSURE: 65 MMHG | BODY MASS INDEX: 26.39 KG/M2 | HEART RATE: 51 BPM | HEIGHT: 66 IN | WEIGHT: 164.2 LBS

## 2023-01-31 DIAGNOSIS — E78.00 HYPERCHOLESTEROLEMIA: ICD-10-CM

## 2023-01-31 DIAGNOSIS — I10 PRIMARY HYPERTENSION: ICD-10-CM

## 2023-01-31 DIAGNOSIS — R94.39 ABNORMAL STRESS ECHO: Primary | ICD-10-CM

## 2023-01-31 PROCEDURE — 99214 OFFICE O/P EST MOD 30 MIN: CPT | Performed by: NURSE PRACTITIONER

## 2023-01-31 RX ORDER — ASPIRIN 81 MG/1
81 TABLET ORAL DAILY
Qty: 30 TABLET | Refills: 3 | Status: SHIPPED | OUTPATIENT
Start: 2023-01-31 | End: 2023-02-28 | Stop reason: SDUPTHER

## 2023-01-31 RX ORDER — MULTIVIT WITH MINERALS/LUTEIN
250 TABLET ORAL DAILY
COMMUNITY

## 2023-01-31 NOTE — PROGRESS NOTES
[facility]             [facility] FELISHA Bhatia Cherie O, MD Chestwalker Zamora  1950 01/31/2023    Patient Active Problem List   Diagnosis   • Prostate cancer (HCC)   • Erectile dysfunction following radical prostatectomy   • BEULAH (stress urinary incontinence), male   • Arthritis   • Chronic back pain   • Hypercholesterolemia   • Primary hypertension   • Allergic rhinitis   • History of oral aphthous ulcers       Dear Bhavna aVughn MD:    Subjective     Chief Complaint   Patient presents with   • Follow-up     STRESS AND ECHO RESULT       HPI:     This is a 72 y.o. male with known past medical history of essential hypertension.    Alexander Zamora presents today for routine cardiology follow up.  Patient said since his last visit he has felt overall well.  States his chest pain has resolved.  Denies any shortness of breath, palpitations or syncope.  He did undergo stress echo which showed normal LV function with myocardial ischemia in the inferior wall.  No recent lab work on file.  Patient states he has taken cholesterol medicine in the past however he has been unable to tolerate statins secondary to myalgias.    • Diagnostic Testing  • Stress echo 1/2023: Abnormal stress echo with echocardiographic evidence for myocardial ischemia located in the inferior wall.     All other systems were reviewed and were negative.    Patient Active Problem List   Diagnosis   • Prostate cancer (HCC)   • Erectile dysfunction following radical prostatectomy   • BEULAH (stress urinary incontinence), male   • Arthritis   • Chronic back pain   • Hypercholesterolemia   • Primary hypertension   • Allergic rhinitis   • History of oral aphthous ulcers       family history includes Bradycardia in his mother; Diabetes in an other family member; Heart disease in his sister; Nephrolithiasis in his father.     reports that he quit smoking about 53 years ago. His smoking use included  cigarettes. He has a 10.00 pack-year smoking history. He has never used smokeless tobacco. He reports that he does not drink alcohol and does not use drugs.    Allergies   Allergen Reactions   • Crestor [Rosuvastatin Calcium] Myalgia   • Amoxicillin Rash         Current Outpatient Medications:   •  cetirizine (zyrTEC) 10 MG tablet, Take 1 tablet by mouth Daily., Disp: 90 tablet, Rfl: 1  •  dilTIAZem CD (CARDIZEM CD) 240 MG 24 hr capsule, Take 240 mg by mouth Daily., Disp: , Rfl:   •  ezetimibe (ZETIA) 10 MG tablet, Take 1 tablet by mouth Daily., Disp: 30 tablet, Rfl: 2  •  montelukast (SINGULAIR) 10 MG tablet, Take 1 tablet by mouth Every Night., Disp: 90 tablet, Rfl: 1  •  Multiple Vitamin (MULTI VITAMIN DAILY) tablet, Take 1 tablet by mouth daily., Disp: , Rfl:   •  vitamin C (ASCORBIC ACID) 250 MG tablet, Take 250 mg by mouth Daily., Disp: , Rfl:   •  Zinc 50 MG capsule, Take  by mouth., Disp: , Rfl:   •  aspirin 81 MG EC tablet, Take 1 tablet by mouth Daily., Disp: 30 tablet, Rfl: 3      Physical Exam:  I have reviewed the patient's current vital signs as documented in the patient's EMR.   Vitals:    01/31/23 1357   BP: 169/65   Pulse: 51   SpO2: 97%     Body mass index is 26.5 kg/m².       01/31/23  1357   Weight: 74.5 kg (164 lb 3.2 oz)      Physical Exam  Constitutional:       General: He is not in acute distress.     Appearance: Normal appearance. He is well-developed.   HENT:      Head: Normocephalic and atraumatic.      Mouth/Throat:      Mouth: Mucous membranes are moist.   Eyes:      Extraocular Movements: Extraocular movements intact.      Pupils: Pupils are equal, round, and reactive to light.   Neck:      Vascular: No JVD.   Cardiovascular:      Rate and Rhythm: Normal rate and regular rhythm.      Heart sounds: Normal heart sounds. No murmur heard.    No S3 or S4 sounds.   Pulmonary:      Effort: Pulmonary effort is normal. No respiratory distress.      Breath sounds: Normal breath sounds. No  wheezing.   Abdominal:      General: Bowel sounds are normal. There is no distension.      Palpations: Abdomen is soft. There is no hepatomegaly.      Tenderness: There is no abdominal tenderness.   Musculoskeletal:         General: Normal range of motion.      Cervical back: Normal range of motion and neck supple.   Skin:     General: Skin is warm and dry.      Coloration: Skin is not jaundiced or pale.   Neurological:      General: No focal deficit present.      Mental Status: He is alert and oriented to person, place, and time. Mental status is at baseline.   Psychiatric:         Mood and Affect: Mood normal.         Behavior: Behavior normal.         Thought Content: Thought content normal.         Judgment: Judgment normal.            DATA REVIEWED:     TTE/CAROLIN:  Results for orders placed during the hospital encounter of 01/05/23    Adult Stress Echo W/ Cont or Stress Agent if Necessary Per Protocol    Interpretation Summary  •  Echocardiogram Findings:  •  Normal left ventricular cavity size and wall thickness noted. All left ventricular wall segments contract normally.  •  Left ventricular ejection fraction appears to be 61 - 65%.  •  Left ventricular diastolic function is consistent with (grade I) impaired relaxation.  •  The mitral valve is structurally normal with no significant stenosis present. Mild mitral valve regurgitation is present.  •  The aortic valve is structurally normal with no regurgitation or stenosis present.  •  There is no evidence of pericardial effusion. .  •  Stress Procedure  •  A stress test was performed following the Humberto protocol.  •  Exercise duration (min) 8 min Estimated workload 10.1 METS  •  Baseline Vitals Baseline HR 53 bpm Baseline /66 mmHg Peak Stress Vitals Peak  bpm Peak /60 mmHg Recovery Vitals Recovery HR 75 bpm Recovery /71 mmHg Exercise Data Target HR (85%) 125 bpm Max. Pred. HR (100%) 147 bpm Percent Max Pred HR 85.71 %  •  Arrhythmias  during stress: rare PVCs, bigeminy.  •  Findings consistent with an indeterminate ECG stress test.  •  Stress Echo Findings  •  Segment augmentation had an abnormal response to stress. Left ventricular function is normal  •  The following left ventricular wall segments are hypokinetic: basal inferolateral, basal inferoseptal, mid inferoseptal, basal inferior and basal inferoseptal.  •  Abnormal stress echo with echocardiographic evidence for myocardial ischemia located in the inferior wall.      Laboratory evaluations:    Lab Results   Component Value Date    GLUCOSE 75 08/25/2022    BUN 13 08/25/2022    CREATININE 0.87 08/25/2022    EGFRIFNONA 81 04/27/2019    BCR 14.9 08/25/2022    K 4.3 08/25/2022    CO2 29.3 (H) 08/25/2022    CALCIUM 10.5 08/25/2022    ALBUMIN 4.40 08/25/2022    LABIL2 1.2 (L) 09/08/2015    AST 22 08/25/2022    ALT 16 08/25/2022     Lab Results   Component Value Date    WBC 7.80 03/11/2022    HGB 13.9 03/11/2022    HCT 40.9 03/11/2022    MCV 88.7 03/11/2022     03/11/2022     Lab Results   Component Value Date    CHOL 264 (H) 03/11/2022    TRIG 60 03/11/2022    HDL 72 (H) 03/11/2022     (H) 03/11/2022     Lab Results   Component Value Date    TSH 3.460 03/11/2022     No results found for: HGBA1C  Lab Results   Component Value Date    ALT 16 08/25/2022     No results found for: HGBA1C  Lab Results   Component Value Date    CREATININE 0.87 08/25/2022     No results found for: IRON, TIBC, FERRITIN  No results found for: INR, PROTIME        --------------------------------------------------------------------------------------------------------------------------    ASSESSMENT/PLAN:      Diagnosis Plan   1. Abnormal stress echo  Lipid Panel    CT Angiogram Coronary    aspirin 81 MG EC tablet      2. Primary hypertension  Lipid Panel    Basic Metabolic Panel      3. Hypercholesterolemia            1. Abnormal stress echo  • Stress echo showed normal LV function with evidence of myocardial  ischemia located in the inferior wall.  Will order CT coronary angiogram to evaluate further.  Continue with aspirin and Zetia.  Patient unable to tolerate statins secondary to myalgias.    2. Hypertension  • Blood pressure controlled.  Continue with current management.  Labs requested from PCP.    3.  Hyperlipidemia  · Lipid panel 6 months ago showed LDL of 183.  Patient unable to tolerate statins secondary to myalgias.  We will recheck lipid panel and if still elevated can consider PCSK9 however patient is reluctant to this at this time.      This document has been @Electronically signed by FELISHA Torrez, 01/31/23, 2:49 PM EST.       Dictated Utilizing Dragon Dictation: Part of this note may be an electronic transcription/translation of spoken language to printed text using the Dragon Dictation System.    Follow-up appointment and medication changes provided in hand delivered After Visit Summary as well as reviewed in the room.

## 2023-02-03 ENCOUNTER — TELEPHONE (OUTPATIENT)
Dept: FAMILY MEDICINE CLINIC | Facility: CLINIC | Age: 73
End: 2023-02-03
Payer: MEDICARE

## 2023-02-03 RX ORDER — CETIRIZINE HYDROCHLORIDE 10 MG/1
10 TABLET ORAL DAILY
Qty: 90 TABLET | Refills: 1 | Status: SHIPPED | OUTPATIENT
Start: 2023-02-03

## 2023-02-03 RX ORDER — MONTELUKAST SODIUM 10 MG/1
10 TABLET ORAL NIGHTLY
Qty: 90 TABLET | Refills: 1 | Status: SHIPPED | OUTPATIENT
Start: 2023-02-03

## 2023-02-03 NOTE — TELEPHONE ENCOUNTER
Patient came in for labs today and was again inquiring about magic mouthwash request as well as pended allergy medication.     He also came in for labs and there was no orders.  Was there something you wanted done?  He said he would return at a later time...    No labs needed at this time, and as to the magic mouthwash, it is recommended that he follow up with his dentist or oral surgeon for that.        Patient notified & verbalized understanding.

## 2023-02-03 NOTE — TELEPHONE ENCOUNTER
PT CALLED IN UPSET THAT HIS MAGIC MOUTHWASH HAD STILL NOT BEEN CALLED IN AND HE WAS REFERRED TO A DENTIST. HE STATED IF THE DOCTOR WAS NOT GOING TO CALL IN THE MEDICATION HE NEEDS HE HAS A DOCTOR THAT WILL. HE WAS VERY UPSET AND HUNG UP ON ME WHEN I OFFERED TO LET HIM SPEAK TO SOMEONE ELSE.

## 2023-02-03 NOTE — TELEPHONE ENCOUNTER
PT CALLED IN UPSET THAT HIS MAGIC MOUTHWASH HAD STILL NOT BEEN CALLED IN AND HE WAS REFERRED TO A DENTIST. HE STATED IF THE DOCTOR WAS NOT GOING TO CALL IN THE MEDICATION HE NEEDS HE HAS A DOCTOR THAT WILL. HE WAS VERY UPSET AND HUNG UP ON ME WHEN I OFFERED TO LET HIM SPEAK TO SOMEONE ELSE.    When I called him earlier he just said OK?

## 2023-02-03 NOTE — TELEPHONE ENCOUNTER
HUB AND FRONT TO READ IF HE CALLS BACK.    That's fine. This isn't [employer] where you can order whatever you want. I have no idea what's going on with the plate and the bridge in his mouth. I didn't put them there, and I'm not going to just give him a band-aid to cover the pain when I don't even know what's going on. I didn't go to school for that, and he needs to see either a dentist or an oral surgeon for appropriate care.    You don't need to call him back.

## 2023-02-17 ENCOUNTER — TELEPHONE (OUTPATIENT)
Dept: CARDIOLOGY | Facility: CLINIC | Age: 73
End: 2023-02-17
Payer: MEDICARE

## 2023-02-17 NOTE — TELEPHONE ENCOUNTER
Caller: angy muñoz    Relationship: Emergency Contact    Best call back number: [phone]    What is the best time to reach you: ANY    Who are you requesting to speak with (clinical staff, provider,  specific staff member): ANYONE    What was the call regarding: PT'S DAUGHTER WOULD LIKE TO DISCUSS FATHER'S CARE PLAN    Do you require a callback: YES

## 2023-02-20 ENCOUNTER — HOSPITAL ENCOUNTER (OUTPATIENT)
Dept: CT IMAGING | Facility: HOSPITAL | Age: 73
Discharge: HOME | End: 2023-02-20
Admitting: NURSE PRACTITIONER
Payer: MEDICARE

## 2023-02-20 VITALS
HEART RATE: 52 BPM | DIASTOLIC BLOOD PRESSURE: 70 MMHG | OXYGEN SATURATION: 98 % | SYSTOLIC BLOOD PRESSURE: 170 MMHG | RESPIRATION RATE: 16 BRPM

## 2023-02-20 DIAGNOSIS — R94.39 ABNORMAL STRESS ECHO: ICD-10-CM

## 2023-02-20 LAB — CREAT BLDA-MCNC: 1 MG/DL (ref 0.6–1.3)

## 2023-02-20 PROCEDURE — 63710000001 NITROGLYCERIN 0.4 MG SUBLINGUAL TABLET: Performed by: RADIOLOGY

## 2023-02-20 PROCEDURE — 82565 ASSAY OF CREATININE: CPT

## 2023-02-20 PROCEDURE — 0 IOPAMIDOL PER 1 ML: Performed by: NURSE PRACTITIONER

## 2023-02-20 PROCEDURE — 75574 CT ANGIO HRT W/3D IMAGE: CPT

## 2023-02-20 PROCEDURE — A9270 NON-COVERED ITEM OR SERVICE: HCPCS | Performed by: RADIOLOGY

## 2023-02-20 PROCEDURE — 75574 CT ANGIO HRT W/3D IMAGE: CPT | Performed by: RADIOLOGY

## 2023-02-20 RX ORDER — METOPROLOL TARTRATE 50 MG/1
50 TABLET, FILM COATED ORAL ONCE AS NEEDED
Status: DISCONTINUED | OUTPATIENT
Start: 2023-02-20 | End: 2023-02-21 | Stop reason: HOSPADM

## 2023-02-20 RX ORDER — SODIUM CHLORIDE 0.9 % (FLUSH) 0.9 %
10 SYRINGE (ML) INJECTION AS NEEDED
Status: DISCONTINUED | OUTPATIENT
Start: 2023-02-20 | End: 2023-02-21 | Stop reason: HOSPADM

## 2023-02-20 RX ORDER — NITROGLYCERIN 0.4 MG/1
0.4 TABLET SUBLINGUAL
Status: COMPLETED | OUTPATIENT
Start: 2023-02-20 | End: 2023-02-20

## 2023-02-20 RX ORDER — METOPROLOL TARTRATE 100 MG/1
100 TABLET ORAL ONCE AS NEEDED
Status: DISCONTINUED | OUTPATIENT
Start: 2023-02-20 | End: 2023-02-21 | Stop reason: HOSPADM

## 2023-02-20 RX ADMIN — NITROGLYCERIN 0.4 MG: 0.4 TABLET SUBLINGUAL at 08:47

## 2023-02-20 RX ADMIN — IOPAMIDOL 89 ML: 755 INJECTION, SOLUTION INTRAVENOUS at 08:47

## 2023-02-21 NOTE — TELEPHONE ENCOUNTER
Called pt and got a verbal permission to speak Brenda. He stated it was okay. Called Brenda and spoke with her. She expressed understanding.

## 2023-02-28 ENCOUNTER — OFFICE VISIT (OUTPATIENT)
Dept: CARDIOLOGY | Facility: CLINIC | Age: 73
End: 2023-02-28
Payer: MEDICARE

## 2023-02-28 VITALS
DIASTOLIC BLOOD PRESSURE: 66 MMHG | WEIGHT: 162.4 LBS | BODY MASS INDEX: 27.06 KG/M2 | OXYGEN SATURATION: 97 % | RESPIRATION RATE: 16 BRPM | HEART RATE: 55 BPM | SYSTOLIC BLOOD PRESSURE: 145 MMHG | HEIGHT: 65 IN

## 2023-02-28 DIAGNOSIS — I10 PRIMARY HYPERTENSION: ICD-10-CM

## 2023-02-28 DIAGNOSIS — E78.00 HYPERCHOLESTEROLEMIA: ICD-10-CM

## 2023-02-28 DIAGNOSIS — Q23.1 BICUSPID AORTIC VALVE: Primary | ICD-10-CM

## 2023-02-28 DIAGNOSIS — R94.39 ABNORMAL STRESS ECHO: ICD-10-CM

## 2023-02-28 PROBLEM — R07.2 PRECORDIAL PAIN: Status: ACTIVE | Noted: 2023-02-28

## 2023-02-28 PROCEDURE — 99214 OFFICE O/P EST MOD 30 MIN: CPT | Performed by: NURSE PRACTITIONER

## 2023-02-28 RX ORDER — CHLORHEXIDINE GLUCONATE 0.12 MG/ML
15 RINSE ORAL 2 TIMES DAILY
COMMUNITY

## 2023-02-28 RX ORDER — IBUPROFEN 600 MG/1
600 TABLET ORAL 3 TIMES DAILY
COMMUNITY

## 2023-02-28 RX ORDER — AMLODIPINE BESYLATE 10 MG/1
10 TABLET ORAL DAILY
COMMUNITY
End: 2023-03-31 | Stop reason: SDUPTHER

## 2023-02-28 RX ORDER — ASPIRIN 81 MG/1
81 TABLET ORAL DAILY
Qty: 30 TABLET | Refills: 3 | Status: SHIPPED | OUTPATIENT
Start: 2023-02-28

## 2023-02-28 NOTE — PROGRESS NOTES
[facility]             Yseda FELISHA Bhtaia Cherie O, MD [facility] Zamora  1950 02/28/2023    Patient Active Problem List   Diagnosis   • Prostate cancer (HCC)   • Erectile dysfunction following radical prostatectomy   • BEULAH (stress urinary incontinence), male   • Arthritis   • Chronic back pain   • Hypercholesterolemia   • Primary hypertension   • Allergic rhinitis   • History of oral aphthous ulcers   • Precordial pain       Dear Bhavna Vaughn MD:    Subjective     Chief Complaint   Patient presents with   • Follow-up     CT angio   • Med Management     presented       HPI:     This is a 72 y.o. male with known past medical history of essential hypertension and hyperlipidemia.    Alexander Zamora presents today for routine cardiology follow up.  Patient states he has been doing overall well since his last visit.  He was brought in today to discuss CT coronary angiogram which showed a calcium score of 175 with multifocal stenotic lesions involving the distal left circumflex and proximal PDA with approximately 50% as well as nonoccluding involving the proximal third of the LAD with bicuspid aortic valve. Denies any chest pain or shortness of breath.  Did not have his lab work done since last visit.  Blood pressure mildly elevated in office today but overall stable.    • Diagnostic Testing  • Stress echo 1/2023: Abnormal stress echo with echocardiographic evidence for myocardial ischemia located in the inferior wall.  • CT coronary angiogram 2/2023: Total calcium score 175, multifocal stenotic lesions involving the distal left circumflex and proximal PDA with approximately 50% as well as stenotic lesion involving the proximal third of the LAD, bicuspid aortic valve      All other systems were reviewed and were negative.    Patient Active Problem List   Diagnosis   • Prostate cancer (HCC)   • Erectile dysfunction following radical prostatectomy   • BEULAH  (stress urinary incontinence), male   • Arthritis   • Chronic back pain   • Hypercholesterolemia   • Primary hypertension   • Allergic rhinitis   • History of oral aphthous ulcers   • Precordial pain       family history includes Bradycardia in his mother; Diabetes in an other family member; Heart disease in his sister; Nephrolithiasis in his father.     reports that he quit smoking about 53 years ago. His smoking use included cigarettes. He has a 10.00 pack-year smoking history. He has never used smokeless tobacco. He reports that he does not drink alcohol and does not use drugs.    Allergies   Allergen Reactions   • Crestor [Rosuvastatin Calcium] Myalgia   • Amoxicillin Rash         Current Outpatient Medications:   •  amLODIPine (NORVASC) 10 MG tablet, Take 1 tablet by mouth Daily., Disp: , Rfl:   •  aspirin 81 MG EC tablet, Take 1 tablet by mouth Daily., Disp: 30 tablet, Rfl: 3  •  cetirizine (zyrTEC) 10 MG tablet, Take 1 tablet by mouth Daily., Disp: 90 tablet, Rfl: 1  •  chlorhexidine (PERIDEX) 0.12 % solution, Apply 15 mL to the mouth or throat 2 (Two) Times a Day., Disp: , Rfl:   •  diphenhydrAMINE 12.5 MG/5ML elixir 20 mL, aluminum-magnesium hydroxide-simethicone 400-400-40 MG/5ML suspension 20 mL, Lidocaine Viscous HCl 2 % solution 20 mL, Swish and spit Every 4 (Four) Hours As Needed., Disp: , Rfl:   •  ibuprofen (ADVIL,MOTRIN) 600 MG tablet, Take 1 tablet by mouth 3 (Three) Times a Day., Disp: , Rfl:   •  Misc Natural Products (NEURIVA PO), Take  by mouth Daily., Disp: , Rfl:   •  montelukast (SINGULAIR) 10 MG tablet, Take 1 tablet by mouth Every Night., Disp: 90 tablet, Rfl: 1  •  Multiple Vitamin (MULTI VITAMIN DAILY) tablet, Take 1 tablet by mouth Daily., Disp: , Rfl:   •  Omega-3 Fatty Acids (OMEGA 3 500 PO), Take 500 mg by mouth As Needed., Disp: , Rfl:   •  Zinc 50 MG capsule, Take  by mouth., Disp: , Rfl:   •  dilTIAZem CD (CARDIZEM CD) 240 MG 24 hr capsule, Take 1 capsule by mouth Daily., Disp: ,  Rfl:   •  ezetimibe (ZETIA) 10 MG tablet, Take 1 tablet by mouth Daily., Disp: 30 tablet, Rfl: 2  •  vitamin C (ASCORBIC ACID) 250 MG tablet, Take 1 tablet by mouth Daily., Disp: , Rfl:       Physical Exam:  I have reviewed the patient's current vital signs as documented in the patient's EMR.   Vitals:    02/28/23 1036   BP: 145/66   Pulse: 55   Resp: 16   SpO2: 97%     Body mass index is 27.02 kg/m².       02/28/23  1036   Weight: 73.7 kg (162 lb 6.4 oz)      Physical Exam  Constitutional:       General: He is not in acute distress.     Appearance: Normal appearance. He is well-developed.   HENT:      Head: Normocephalic and atraumatic.      Mouth/Throat:      Mouth: Mucous membranes are moist.   Eyes:      Extraocular Movements: Extraocular movements intact.      Pupils: Pupils are equal, round, and reactive to light.   Neck:      Vascular: No JVD.   Cardiovascular:      Rate and Rhythm: Normal rate and regular rhythm.      Heart sounds: Normal heart sounds. No murmur heard.    No S3 or S4 sounds.   Pulmonary:      Effort: Pulmonary effort is normal. No respiratory distress.      Breath sounds: Normal breath sounds. No wheezing.   Abdominal:      General: Bowel sounds are normal. There is no distension.      Palpations: Abdomen is soft. There is no hepatomegaly.      Tenderness: There is no abdominal tenderness.   Musculoskeletal:         General: Normal range of motion.      Cervical back: Normal range of motion and neck supple.   Skin:     General: Skin is warm and dry.      Coloration: Skin is not jaundiced or pale.   Neurological:      General: No focal deficit present.      Mental Status: He is alert and oriented to person, place, and time. Mental status is at baseline.   Psychiatric:         Mood and Affect: Mood normal.         Behavior: Behavior normal.         Thought Content: Thought content normal.         Judgment: Judgment normal.            DATA REVIEWED:     TTE/CAROLIN:  Results for orders placed  during the hospital encounter of 01/05/23    Adult Stress Echo W/ Cont or Stress Agent if Necessary Per Protocol    Interpretation Summary  •  Echocardiogram Findings:  •  Normal left ventricular cavity size and wall thickness noted. All left ventricular wall segments contract normally.  •  Left ventricular ejection fraction appears to be 61 - 65%.  •  Left ventricular diastolic function is consistent with (grade I) impaired relaxation.  •  The mitral valve is structurally normal with no significant stenosis present. Mild mitral valve regurgitation is present.  •  The aortic valve is structurally normal with no regurgitation or stenosis present.  •  There is no evidence of pericardial effusion. .  •  Stress Procedure  •  A stress test was performed following the Humberto protocol.  •  Exercise duration (min) 8 min Estimated workload 10.1 METS  •  Baseline Vitals Baseline HR 53 bpm Baseline /66 mmHg Peak Stress Vitals Peak  bpm Peak /60 mmHg Recovery Vitals Recovery HR 75 bpm Recovery /71 mmHg Exercise Data Target HR (85%) 125 bpm Max. Pred. HR (100%) 147 bpm Percent Max Pred HR 85.71 %  •  Arrhythmias during stress: rare PVCs, bigeminy.  •  Findings consistent with an indeterminate ECG stress test.  •  Stress Echo Findings  •  Segment augmentation had an abnormal response to stress. Left ventricular function is normal  •  The following left ventricular wall segments are hypokinetic: basal inferolateral, basal inferoseptal, mid inferoseptal, basal inferior and basal inferoseptal.  •  Abnormal stress echo with echocardiographic evidence for myocardial ischemia located in the inferior wall.      Laboratory evaluations:    Lab Results   Component Value Date    GLUCOSE 75 08/25/2022    BUN 13 08/25/2022    CREATININE 1.00 02/20/2023    EGFRIFNONA 81 04/27/2019    BCR 14.9 08/25/2022    K 4.3 08/25/2022    CO2 29.3 (H) 08/25/2022    CALCIUM 10.5 08/25/2022    ALBUMIN 4.40 08/25/2022    LABIL2 1.2 (L)  09/08/2015    AST 22 08/25/2022    ALT 16 08/25/2022     Lab Results   Component Value Date    WBC 7.80 03/11/2022    HGB 13.9 03/11/2022    HCT 40.9 03/11/2022    MCV 88.7 03/11/2022     03/11/2022     Lab Results   Component Value Date    CHOL 264 (H) 03/11/2022    TRIG 60 03/11/2022    HDL 72 (H) 03/11/2022     (H) 03/11/2022     Lab Results   Component Value Date    TSH 3.460 03/11/2022     No results found for: HGBA1C  Lab Results   Component Value Date    ALT 16 08/25/2022     No results found for: HGBA1C  Lab Results   Component Value Date    CREATININE 1.00 02/20/2023     No results found for: IRON, TIBC, FERRITIN  No results found for: INR, PROTIME        --------------------------------------------------------------------------------------------------------------------------    ASSESSMENT/PLAN:      Diagnosis Plan   1. Bicuspid aortic valve  CT angiogram aorta      2. Primary hypertension        3. Abnormal stress echo  aspirin 81 MG EC tablet      4. Hypercholesterolemia            1. Abnormal stress echo  • Patient underwent CT coronary angiogram which showed a 50% stenosis in the distal left circumflex and proximal PDA with stenotic lesion involving the proximal third of the LAD with bicuspid aortic valve.  Patient is currently asymptomatic.  We will continue with medical management for now.  Add low-dose aspirin.    2. Hypercholesterolemia  • Patient tolerated statins.  Lipid panel ordered at last visit however patient has not obtained yet.  Advised him to obtain lipid panel and we can consider initiating PCSK9 if patient agreeable.      This document has been @Electronically signed by FELISHA Torrez, 02/28/23, 10:33 AM EST.       Dictated Utilizing Dragon Dictation: Part of this note may be an electronic transcription/translation of spoken language to printed text using the Dragon Dictation System.    Follow-up appointment and medication changes provided in hand delivered After  Visit Summary as well as reviewed in the room.

## 2023-02-28 NOTE — PROGRESS NOTES
[facility]             [facility] FELISHA Bhatia Cherie O, MD [facility] CANELA Noah  1950 02/28/2023    Patient Active Problem List   Diagnosis   • Prostate cancer (HCC)   • Erectile dysfunction following radical prostatectomy   • BEULAH (stress urinary incontinence), male   • Arthritis   • Chronic back pain   • Hypercholesterolemia   • Primary hypertension   • Allergic rhinitis   • History of oral aphthous ulcers       Dear Bhavna Vaughn MD:    Subjective     Chief Complaint   Patient presents with   • Follow-up     CT angio   • Med Management     presented       HPI:     This is a 72 y.o. male with known past medical history of ***:    Alexander Zamora presents today for routine cardiology follow up.     • Diagnostic Testing  1.        All other systems were reviewed and were negative.    Patient Active Problem List   Diagnosis   • Prostate cancer (HCC)   • Erectile dysfunction following radical prostatectomy   • BEULAH (stress urinary incontinence), male   • Arthritis   • Chronic back pain   • Hypercholesterolemia   • Primary hypertension   • Allergic rhinitis   • History of oral aphthous ulcers       family history includes Bradycardia in his mother; Diabetes in an other family member; Heart disease in his sister; Nephrolithiasis in his father.     reports that he quit smoking about 53 years ago. His smoking use included cigarettes. He has a 10.00 pack-year smoking history. He has never used smokeless tobacco. He reports that he does not drink alcohol and does not use drugs.    Allergies   Allergen Reactions   • Crestor [Rosuvastatin Calcium] Myalgia   • Amoxicillin Rash         Current Outpatient Medications:   •  amLODIPine (NORVASC) 10 MG tablet, Take 1 tablet by mouth Daily., Disp: , Rfl:   •  cetirizine (zyrTEC) 10 MG tablet, Take 1 tablet by mouth Daily., Disp: 90 tablet, Rfl: 1  •  chlorhexidine (PERIDEX) 0.12 % solution, Apply 15 mL to the mouth or throat  2 (Two) Times a Day., Disp: , Rfl:   •  diphenhydrAMINE 12.5 MG/5ML elixir 20 mL, aluminum-magnesium hydroxide-simethicone 400-400-40 MG/5ML suspension 20 mL, Lidocaine Viscous HCl 2 % solution 20 mL, Swish and spit Every 4 (Four) Hours As Needed., Disp: , Rfl:   •  ibuprofen (ADVIL,MOTRIN) 600 MG tablet, Take 1 tablet by mouth 3 (Three) Times a Day., Disp: , Rfl:   •  Misc Natural Products (NEURIVA PO), Take  by mouth Daily., Disp: , Rfl:   •  montelukast (SINGULAIR) 10 MG tablet, Take 1 tablet by mouth Every Night., Disp: 90 tablet, Rfl: 1  •  Multiple Vitamin (MULTI VITAMIN DAILY) tablet, Take 1 tablet by mouth Daily., Disp: , Rfl:   •  Omega-3 Fatty Acids (OMEGA 3 500 PO), Take 500 mg by mouth As Needed., Disp: , Rfl:   •  Zinc 50 MG capsule, Take  by mouth., Disp: , Rfl:   •  aspirin 81 MG EC tablet, Take 1 tablet by mouth Daily., Disp: 30 tablet, Rfl: 3  •  dilTIAZem CD (CARDIZEM CD) 240 MG 24 hr capsule, Take 1 capsule by mouth Daily., Disp: , Rfl:   •  ezetimibe (ZETIA) 10 MG tablet, Take 1 tablet by mouth Daily., Disp: 30 tablet, Rfl: 2  •  vitamin C (ASCORBIC ACID) 250 MG tablet, Take 1 tablet by mouth Daily., Disp: , Rfl:       Physical Exam:  I have reviewed the patient's current vital signs as documented in the patient's EMR.   There were no vitals filed for this visit.  There is no height or weight on file to calculate BMI.   There were no vitals filed for this visit.   Physical Exam ***      DATA REVIEWED:     TTE/CAROLIN:  Results for orders placed during the hospital encounter of 01/05/23    Adult Stress Echo W/ Cont or Stress Agent if Necessary Per Protocol    Interpretation Summary  •  Echocardiogram Findings:  •  Normal left ventricular cavity size and wall thickness noted. All left ventricular wall segments contract normally.  •  Left ventricular ejection fraction appears to be 61 - 65%.  •  Left ventricular diastolic function is consistent with (grade I) impaired relaxation.  •  The mitral valve is  structurally normal with no significant stenosis present. Mild mitral valve regurgitation is present.  •  The aortic valve is structurally normal with no regurgitation or stenosis present.  •  There is no evidence of pericardial effusion. .  •  Stress Procedure  •  A stress test was performed following the Humberto protocol.  •  Exercise duration (min) 8 min Estimated workload 10.1 METS  •  Baseline Vitals Baseline HR 53 bpm Baseline /66 mmHg Peak Stress Vitals Peak  bpm Peak /60 mmHg Recovery Vitals Recovery HR 75 bpm Recovery /71 mmHg Exercise Data Target HR (85%) 125 bpm Max. Pred. HR (100%) 147 bpm Percent Max Pred HR 85.71 %  •  Arrhythmias during stress: rare PVCs, bigeminy.  •  Findings consistent with an indeterminate ECG stress test.  •  Stress Echo Findings  •  Segment augmentation had an abnormal response to stress. Left ventricular function is normal  •  The following left ventricular wall segments are hypokinetic: basal inferolateral, basal inferoseptal, mid inferoseptal, basal inferior and basal inferoseptal.  •  Abnormal stress echo with echocardiographic evidence for myocardial ischemia located in the inferior wall.      Laboratory evaluations:    Lab Results   Component Value Date    GLUCOSE 75 08/25/2022    BUN 13 08/25/2022    CREATININE 1.00 02/20/2023    EGFRIFNONA 81 04/27/2019    BCR 14.9 08/25/2022    K 4.3 08/25/2022    CO2 29.3 (H) 08/25/2022    CALCIUM 10.5 08/25/2022    ALBUMIN 4.40 08/25/2022    LABIL2 1.2 (L) 09/08/2015    AST 22 08/25/2022    ALT 16 08/25/2022     Lab Results   Component Value Date    WBC 7.80 03/11/2022    HGB 13.9 03/11/2022    HCT 40.9 03/11/2022    MCV 88.7 03/11/2022     03/11/2022     Lab Results   Component Value Date    CHOL 264 (H) 03/11/2022    TRIG 60 03/11/2022    HDL 72 (H) 03/11/2022     (H) 03/11/2022     Lab Results   Component Value Date    TSH 3.460 03/11/2022     No results found for: HGBA1C  Lab Results   Component  Value Date    ALT 16 08/25/2022     No results found for: HGBA1C  Lab Results   Component Value Date    CREATININE 1.00 02/20/2023     No results found for: IRON, TIBC, FERRITIN  No results found for: INR, PROTIME        --------------------------------------------------------------------------------------------------------------------------    ASSESSMENT/PLAN:     No diagnosis found.    1. ..  •     2. ...  •       This document has been @Electronically signed by Karon Gillette MA, 02/28/23, 10:36 AM EST.       Dictated Utilizing Dragon Dictation: Part of this note may be an electronic transcription/translation of spoken language to printed text using the Dragon Dictation System.    Follow-up appointment and medication changes provided in hand delivered After Visit Summary as well as reviewed in the room.

## 2023-03-20 ENCOUNTER — HOSPITAL ENCOUNTER (OUTPATIENT)
Dept: CT IMAGING | Facility: HOSPITAL | Age: 73
Discharge: HOME | End: 2023-03-20
Admitting: NURSE PRACTITIONER
Payer: MEDICARE

## 2023-03-20 DIAGNOSIS — Q23.1 BICUSPID AORTIC VALVE: ICD-10-CM

## 2023-03-20 LAB — CREAT BLDA-MCNC: 0.9 MG/DL (ref 0.6–1.3)

## 2023-03-20 PROCEDURE — 82565 ASSAY OF CREATININE: CPT

## 2023-03-20 PROCEDURE — 25510000001 IOPAMIDOL 61 % SOLUTION: Performed by: NURSE PRACTITIONER

## 2023-03-20 PROCEDURE — 74175 CTA ABDOMEN W/CONTRAST: CPT

## 2023-03-20 PROCEDURE — 74175 CTA ABDOMEN W/CONTRAST: CPT | Performed by: RADIOLOGY

## 2023-03-20 RX ADMIN — IOPAMIDOL 100 ML: 612 INJECTION, SOLUTION INTRAVENOUS at 12:37

## 2023-03-24 ENCOUNTER — TELEPHONE (OUTPATIENT)
Dept: FAMILY MEDICINE CLINIC | Facility: CLINIC | Age: 73
End: 2023-03-24
Payer: MEDICARE

## 2023-03-24 NOTE — TELEPHONE ENCOUNTER
NP from [facility] care called regarding patient ,she reports he has had 5 visits in the past few months regarding his throat,has has several rounds of antibiotics,came in today again his right tonsil is ulcerated,looks rough,concerning for malignancy & has enlarged lymph nodes on that side as well.he told her he has been seeing you,has not been seen since December,told her he had an appointment with you the end of this month,does not,FU is scheduled in June,? Confusion,[facility] records requested.

## 2023-03-28 ENCOUNTER — OFFICE VISIT (OUTPATIENT)
Dept: FAMILY MEDICINE CLINIC | Facility: CLINIC | Age: 73
End: 2023-03-28
Payer: MEDICARE

## 2023-03-28 VITALS
BODY MASS INDEX: 26.86 KG/M2 | DIASTOLIC BLOOD PRESSURE: 74 MMHG | SYSTOLIC BLOOD PRESSURE: 138 MMHG | WEIGHT: 161.2 LBS | HEIGHT: 65 IN | HEART RATE: 54 BPM | OXYGEN SATURATION: 95 % | TEMPERATURE: 97 F

## 2023-03-28 DIAGNOSIS — I10 ESSENTIAL HYPERTENSION: ICD-10-CM

## 2023-03-28 DIAGNOSIS — J02.9 SORE THROAT: ICD-10-CM

## 2023-03-28 DIAGNOSIS — E78.49 OTHER HYPERLIPIDEMIA: Primary | ICD-10-CM

## 2023-03-28 PROCEDURE — 1160F RVW MEDS BY RX/DR IN RCRD: CPT | Performed by: FAMILY MEDICINE

## 2023-03-28 PROCEDURE — 80053 COMPREHEN METABOLIC PANEL: CPT | Performed by: FAMILY MEDICINE

## 2023-03-28 PROCEDURE — 36415 COLL VENOUS BLD VENIPUNCTURE: CPT | Performed by: FAMILY MEDICINE

## 2023-03-28 PROCEDURE — 3075F SYST BP GE 130 - 139MM HG: CPT | Performed by: FAMILY MEDICINE

## 2023-03-28 PROCEDURE — 3078F DIAST BP <80 MM HG: CPT | Performed by: FAMILY MEDICINE

## 2023-03-28 PROCEDURE — 1159F MED LIST DOCD IN RCRD: CPT | Performed by: FAMILY MEDICINE

## 2023-03-28 PROCEDURE — 80061 LIPID PANEL: CPT | Performed by: FAMILY MEDICINE

## 2023-03-28 PROCEDURE — 99214 OFFICE O/P EST MOD 30 MIN: CPT | Performed by: FAMILY MEDICINE

## 2023-03-28 NOTE — PROGRESS NOTES
"Alexander Zamora     VITALS: Blood pressure 138/74, pulse 54, temperature 97 °F (36.1 °C), height 165.1 cm (65\"), weight 73.1 kg (161 lb 3.2 oz), SpO2 95 %.    Subjective  Chief Complaint  essential hypertension    Subjective          History of Present Illness:  Patient is a 72 y.o.  male with medical conditions significant for dementia, allergic rhinitis, and hyperlipidemia who presents to clinic secondary to medical follow-up. He is having some throat problems.    The patient notes he had originally made the appointment because he was complaining of a sore throat. He has since been gargling with salt water and using magic mouthwash which have improved his symptoms. The patient denies any pain with swallowing, dyspnea, or chest pain. He does not sleep well during the night because he has to get up often to urinate throughout the night. The patient denies a history of cardiac catheterization in the past. He takes multivitamins to keep his immune system working well and he notes he did not eat today.    No complaints about any of the medications.    The following portions of the patient's history were reviewed and updated as appropriate: allergies, current medications, past family history, past medical history, past social history, past surgical history and problem list.    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   • Chronic back pain    • Depression    • Elevated prostate specific antigen (PSA)    • Erectile dysfunction    • Hypercholesterolemia    • Primary hypertension    • Prostatitis        Surgical History  Past Surgical History:   Procedure Laterality Date   • OTHER SURGICAL HISTORY      surgery for an ulcer, PNEUMOTHORAX AND ULCER THERAPY       Family History  Family History   Problem Relation Age of Onset   • Bradycardia Mother         Had pacemaker   • Nephrolithiasis Father    • Heart disease Sister    • Diabetes Other        Social " "History  Social History     Socioeconomic History   • Marital status:    Tobacco Use   • Smoking status: Former     Packs/day: 2.00     Years: 5.00     Pack years: 10.00     Types: Cigarettes     Quit date: 1970     Years since quittin.2   • Smokeless tobacco: Never   Vaping Use   • Vaping Use: Never used   Substance and Sexual Activity   • Alcohol use: No   • Drug use: No       Objective   Vital Signs:   /74 (BP Location: Right arm, Patient Position: Sitting, Cuff Size: Adult)   Pulse 54   Temp 97 °F (36.1 °C)   Ht 165.1 cm (65\")   Wt 73.1 kg (161 lb 3.2 oz)   SpO2 95%   BMI 26.83 kg/m²     Physical Exam     Gen: Patient in NAD. Pleasant and answers appropriately. A&Ox3.    Skin: Warm and dry with normal turgor. No purpura, rashes, or unusual pigmentation noted. Hair is normal in appearance and distribution.    HEENT: NC/AT. No lesions noted. Conjunctiva clear, sclera nonicteric. PERRL. EOMI without nystagmus or strabismus. Fundi appear benign. No hemorrhages or exudates of eyes. Auditory canals are patent bilaterally without lesions. TMs intact,  nonerythematous, nonbulging without lesions. Nasal mucosa erythematous, and nonedematous. Frontal and maxillary sinuses are nontender. O/P nonerythematous and moist without exudate.    Neck: Supple without lymph nodes palpated. FROM.     Lungs: Decreased B/L without rales, rhonchi, crackles, or wheezes.    Heart: RRR. S1 and S2 normal. No S3 or S4. No MRGT.    Abd: Soft, nontender,nondistended. (+)BSx4 quadrants.     Extrem: No CC.  Trace edema bilateral lower extremities.  Radial pulses 2+/4 and equal B/L. FROMx4. No bone, joint, or muscle tenderness noted.    Neuro: No focal motor/sensory deficits.    Procedures    Result Review :   The following data was reviewed by: Bhavna Vaughn MD on 2023:                Assessment and Plan    Alexander Zamora is a 72 y.o. here for medical follow-up.  Diagnoses and all orders for this visit:    1. " Other hyperlipidemia (Primary)  -     Comprehensive Metabolic Panel; Future  -     Lipid Panel; Future  -     Comprehensive Metabolic Panel  -     Lipid Panel    2. Sore throat  -     Ambulatory Referral to ENT (Otolaryngology)    3. Essential hypertension  Elevated today.  Will need to monitor.        BMI is >= 25 and <30. (Overweight) The following options were offered after discussion;: exercise counseling/recommendations and nutrition counseling/recommendations              Follow Up   Return (already has appt)LABS.  Findings and plans discussed with patient who verbalizes understanding and agreement. Will followup with patient once results are in. Patient was given instructions and counseling regarding his condition or for health maintenance advice. Please see specific information pulled into the AVS if appropriate.       Transcribed from ambient dictation for Bhavna Vaughn MD by Ngozi Quevedo.  03/28/23   11:21 EDT    Patient or patient representative verbalized consent to the visit recording.  I have personally performed the services described in this document as transcribed by the above individual, and it is both accurate and complete.

## 2023-03-29 ENCOUNTER — TELEPHONE (OUTPATIENT)
Dept: FAMILY MEDICINE CLINIC | Facility: CLINIC | Age: 73
End: 2023-03-29
Payer: MEDICARE

## 2023-03-29 LAB
ALBUMIN SERPL-MCNC: 4.7 G/DL (ref 3.5–5.2)
ALBUMIN/GLOB SERPL: 1.5 G/DL
ALP SERPL-CCNC: 109 U/L (ref 39–117)
ALT SERPL W P-5'-P-CCNC: 20 U/L (ref 1–41)
ANION GAP SERPL CALCULATED.3IONS-SCNC: 7.7 MMOL/L (ref 5–15)
AST SERPL-CCNC: 21 U/L (ref 1–40)
BILIRUB SERPL-MCNC: 0.7 MG/DL (ref 0–1.2)
BUN SERPL-MCNC: 11 MG/DL (ref 8–23)
BUN/CREAT SERPL: 11.5 (ref 7–25)
CALCIUM SPEC-SCNC: 10.6 MG/DL (ref 8.6–10.5)
CHLORIDE SERPL-SCNC: 103 MMOL/L (ref 98–107)
CHOLEST SERPL-MCNC: 206 MG/DL (ref 0–200)
CO2 SERPL-SCNC: 29.3 MMOL/L (ref 22–29)
CREAT SERPL-MCNC: 0.96 MG/DL (ref 0.76–1.27)
EGFRCR SERPLBLD CKD-EPI 2021: 83.5 ML/MIN/1.73
GLOBULIN UR ELPH-MCNC: 3.1 GM/DL
GLUCOSE SERPL-MCNC: 115 MG/DL (ref 65–99)
HDLC SERPL-MCNC: 78 MG/DL (ref 40–60)
LDLC SERPL CALC-MCNC: 116 MG/DL (ref 0–100)
LDLC/HDLC SERPL: 1.48 {RATIO}
POTASSIUM SERPL-SCNC: 3.6 MMOL/L (ref 3.5–5.2)
PROT SERPL-MCNC: 7.8 G/DL (ref 6–8.5)
SODIUM SERPL-SCNC: 140 MMOL/L (ref 136–145)
TRIGL SERPL-MCNC: 64 MG/DL (ref 0–150)
VLDLC SERPL-MCNC: 12 MG/DL (ref 5–40)

## 2023-03-29 NOTE — TELEPHONE ENCOUNTER
Caller: angy muñoz    Relationship: Emergency Contact    Best call back number: [phone]    Requested Prescriptions:   MAGIC MOUTH WASH  CHOLESTEROL MEDS  BLOOD PRESSURE MEDS    Pharmacy where request should be sent: [address] [phone] [facility] [phone]      Last office visit with prescribing clinician: 3/28/2023   Last telemedicine visit with prescribing clinician: 6/2/2023   Next office visit with prescribing clinician: 6/2/2023     Additional details provided by patient:  NEEDS ASAP    Does the patient have less than a 3 day supply:  [x] Yes  [] No    Would you like a call back once the refill request has been completed: [x] Yes [] No    If the office needs to give you a call back, can they leave a voicemail: [] Yes [] No    Chelsey Tsai Rep   03/29/23 15:06 EDT

## 2023-03-30 RX ORDER — CHLORHEXIDINE GLUCONATE 0.12 MG/ML
RINSE ORAL
Qty: 473 ML | Refills: 5 | OUTPATIENT
Start: 2023-03-30

## 2023-03-30 NOTE — TELEPHONE ENCOUNTER
No to the magic mouthwash. He says his throat is not sore.    I'm going to need the specific names and dosages from the bottles. We may not have the correct names/dosages.

## 2023-03-30 NOTE — TELEPHONE ENCOUNTER
No to the magic mouthwash. He says his throat is not sore.    I'm going to need the specific names and dosages from the bottles. We may not have the correct names/dosages.       Spoke with Brenda she will have to get his bottles & call back.    Daughter returned call he had thrown his empty pill bottles away,she called the pharmacy & his BP med is Amlodipine 10 mg daily,he tells her that you gave him samples of his last Cholesterol medication & he does not know what it was & she said he once again said he had to have the Magic Mouthwash for when his mouth/throat hurts.

## 2023-03-31 RX ORDER — AMLODIPINE BESYLATE 10 MG/1
10 TABLET ORAL DAILY
Qty: 30 TABLET | Refills: 2 | Status: SHIPPED | OUTPATIENT
Start: 2023-03-31

## 2023-03-31 NOTE — TELEPHONE ENCOUNTER
Can you call and get a med rec from [facility]? We don't have cholesterol samples. Once upon a time, he had more than one BP medication too.       Called [facility] they will fax one.

## 2023-03-31 NOTE — TELEPHONE ENCOUNTER
Can you call and get a med rec from [facility]? We don't have cholesterol samples. Once upon a time, he had more than one BP medication too.

## 2023-03-31 NOTE — TELEPHONE ENCOUNTER
I sent in amlodipine 10 mg daily for his blood pressures.    We have never given him any cholesterol samples. I have previously prescribed him pravastatin, but he complained that it made his legs ache. There is a record of zetia being prescribed, but he has never had that filled at [facility]. Perhaps the [place] [facility] that he's been getting prescriptions from also has samples. He really should be on something though as he has a 24.6% chance of a cardiac event in the next 10 years. Would he like to restart a statin?    No to the magic mouthwash. We are sending him to ENT for evaluation. He can discuss it with them. Those mouthwashes are not intended for chronic use.

## 2023-04-03 RX ORDER — ATORVASTATIN CALCIUM 10 MG/1
10 TABLET, FILM COATED ORAL DAILY
Qty: 90 TABLET | Refills: 0 | Status: SHIPPED | OUTPATIENT
Start: 2023-04-03

## 2023-05-11 RX ORDER — CHLORHEXIDINE GLUCONATE 0.12 MG/ML
RINSE ORAL
Qty: 473 ML | Refills: 11 | OUTPATIENT
Start: 2023-05-11

## 2023-05-30 ENCOUNTER — OFFICE VISIT (OUTPATIENT)
Dept: CARDIOLOGY | Facility: CLINIC | Age: 73
End: 2023-05-30

## 2023-05-30 VITALS
SYSTOLIC BLOOD PRESSURE: 159 MMHG | HEART RATE: 52 BPM | WEIGHT: 163 LBS | BODY MASS INDEX: 23.34 KG/M2 | HEIGHT: 70 IN | DIASTOLIC BLOOD PRESSURE: 65 MMHG | RESPIRATION RATE: 16 BRPM | OXYGEN SATURATION: 99 %

## 2023-05-30 DIAGNOSIS — E78.00 HYPERCHOLESTEROLEMIA: Primary | ICD-10-CM

## 2023-05-30 DIAGNOSIS — I10 PRIMARY HYPERTENSION: ICD-10-CM

## 2023-05-30 DIAGNOSIS — I25.10 ASCVD (ARTERIOSCLEROTIC CARDIOVASCULAR DISEASE): ICD-10-CM

## 2023-05-30 PROCEDURE — 1159F MED LIST DOCD IN RCRD: CPT | Performed by: NURSE PRACTITIONER

## 2023-05-30 PROCEDURE — 3077F SYST BP >= 140 MM HG: CPT | Performed by: NURSE PRACTITIONER

## 2023-05-30 PROCEDURE — 99214 OFFICE O/P EST MOD 30 MIN: CPT | Performed by: NURSE PRACTITIONER

## 2023-05-30 PROCEDURE — 1160F RVW MEDS BY RX/DR IN RCRD: CPT | Performed by: NURSE PRACTITIONER

## 2023-05-30 PROCEDURE — 3078F DIAST BP <80 MM HG: CPT | Performed by: NURSE PRACTITIONER

## 2023-05-30 PROCEDURE — 93000 ELECTROCARDIOGRAM COMPLETE: CPT | Performed by: NURSE PRACTITIONER

## 2023-05-30 RX ORDER — LISINOPRIL 10 MG/1
10 TABLET ORAL DAILY
Qty: 30 TABLET | Refills: 3 | Status: SHIPPED | OUTPATIENT
Start: 2023-05-30

## 2023-05-30 NOTE — PROGRESS NOTES
[facility]             SyedaFELISHA Carrillo Cherie O, MD Chestwalker CANELA Noah  1950 05/30/2023    Patient Active Problem List   Diagnosis   • Prostate cancer   • Erectile dysfunction following radical prostatectomy   • BEULAH (stress urinary incontinence), male   • Arthritis   • Chronic back pain   • Hypercholesterolemia   • Primary hypertension   • Allergic rhinitis   • History of oral aphthous ulcers   • Precordial pain   • ASCVD (arteriosclerotic cardiovascular disease)       Dear Bhavna Vaughn MD:    Subjective     Chief Complaint   Patient presents with   • Follow-up     3 mos. Review labs   • Med Management     verbal       HPI:     This is a 73 y.o. male with known past medical history of essential hypertension and hyperlipidemia.    Alexanderwalker Zamora presents today for routine cardiology follow up.  Patient states he has been doing overall well since his last visit.  Denies any chest pain, shortness of breath or palpitations.  Blood pressure elevated in the office today.  States it has been running in the 140s at home systolic.  Did have lipid panel which showed .  CMP shows stable kidney function electrolytes.    • Diagnostic Testing  • Stress echo 1/2023: Abnormal stress echo with echocardiographic evidence for myocardial ischemia located in the inferior wall.  • CT coronary angiogram 2/2023: Total calcium score 175, multifocal stenotic lesions involving the distal left circumflex and proximal PDA with approximately 50% as well as stenotic lesion involving the proximal third of the LAD, bicuspid aortic valve     All other systems were reviewed and were negative.    Patient Active Problem List   Diagnosis   • Prostate cancer   • Erectile dysfunction following radical prostatectomy   • BEULAH (stress urinary incontinence), male   • Arthritis   • Chronic back pain   • Hypercholesterolemia   • Primary hypertension   • Allergic rhinitis   • History of  oral aphthous ulcers   • Precordial pain   • ASCVD (arteriosclerotic cardiovascular disease)       family history includes Bradycardia in his mother; Diabetes in an other family member; Heart disease in his sister; Nephrolithiasis in his father.     reports that he quit smoking about 53 years ago. His smoking use included cigarettes. He has a 10.00 pack-year smoking history. He has never used smokeless tobacco. He reports that he does not drink alcohol and does not use drugs.    Allergies   Allergen Reactions   • Crestor [Rosuvastatin Calcium] Myalgia   • Amoxicillin Rash         Current Outpatient Medications:   •  amLODIPine (NORVASC) 10 MG tablet, Take 1 tablet by mouth Daily., Disp: 30 tablet, Rfl: 2  •  aspirin 81 MG EC tablet, Take 1 tablet by mouth Daily., Disp: 30 tablet, Rfl: 3  •  cetirizine (zyrTEC) 10 MG tablet, Take 1 tablet by mouth Daily., Disp: 90 tablet, Rfl: 1  •  chlorhexidine (PERIDEX) 0.12 % solution, Apply 15 mL to the mouth or throat 2 (Two) Times a Day., Disp: , Rfl:   •  dilTIAZem CD (CARDIZEM CD) 240 MG 24 hr capsule, Take 1 capsule by mouth Daily., Disp: , Rfl:   •  diphenhydrAMINE 12.5 MG/5ML elixir 20 mL, aluminum-magnesium hydroxide-simethicone 400-400-40 MG/5ML suspension 20 mL, Lidocaine Viscous HCl 2 % solution 20 mL, Swish and spit Every 4 (Four) Hours As Needed., Disp: , Rfl:   •  ezetimibe (ZETIA) 10 MG tablet, Take 1 tablet by mouth Daily., Disp: 30 tablet, Rfl: 2  •  ibuprofen (ADVIL,MOTRIN) 600 MG tablet, Take 1 tablet by mouth 3 (Three) Times a Day., Disp: , Rfl:   •  Misc Natural Products (NEURIVA PO), Take  by mouth Daily., Disp: , Rfl:   •  montelukast (SINGULAIR) 10 MG tablet, Take 1 tablet by mouth Every Night., Disp: 90 tablet, Rfl: 1  •  Multiple Vitamin (MULTI VITAMIN DAILY) tablet, Take 1 tablet by mouth Daily., Disp: , Rfl:   •  Omega-3 Fatty Acids (OMEGA 3 500 PO), Take 500 mg by mouth As Needed., Disp: , Rfl:   •  vitamin C (ASCORBIC ACID) 250 MG tablet, Take 1  tablet by mouth Daily., Disp: , Rfl:   •  Zinc 50 MG capsule, Take  by mouth., Disp: , Rfl:   •  lisinopril (PRINIVIL,ZESTRIL) 10 MG tablet, Take 1 tablet by mouth Daily., Disp: 30 tablet, Rfl: 3      Physical Exam:  I have reviewed the patient's current vital signs as documented in the patient's EMR.   Vitals:    05/30/23 0956   BP: 159/65   Pulse: 52   Resp: 16   SpO2: 99%     Body mass index is 23.39 kg/m².       05/30/23  0956   Weight: 73.9 kg (163 lb)      Physical Exam  Constitutional:       General: He is not in acute distress.     Appearance: Normal appearance. He is well-developed.   HENT:      Head: Normocephalic and atraumatic.      Mouth/Throat:      Mouth: Mucous membranes are moist.   Eyes:      Extraocular Movements: Extraocular movements intact.      Pupils: Pupils are equal, round, and reactive to light.   Neck:      Vascular: No JVD.   Cardiovascular:      Rate and Rhythm: Normal rate and regular rhythm.      Heart sounds: Normal heart sounds. No murmur heard.    No S3 or S4 sounds.   Pulmonary:      Effort: Pulmonary effort is normal. No respiratory distress.      Breath sounds: Normal breath sounds. No wheezing.   Abdominal:      General: Bowel sounds are normal. There is no distension.      Palpations: Abdomen is soft. There is no hepatomegaly.      Tenderness: There is no abdominal tenderness.   Musculoskeletal:         General: Normal range of motion.      Cervical back: Normal range of motion and neck supple.   Skin:     General: Skin is warm and dry.      Coloration: Skin is not jaundiced or pale.   Neurological:      General: No focal deficit present.      Mental Status: He is alert and oriented to person, place, and time. Mental status is at baseline.   Psychiatric:         Mood and Affect: Mood normal.         Behavior: Behavior normal.         Thought Content: Thought content normal.         Judgment: Judgment normal.            DATA REVIEWED:     TTE/CAROLIN:  Results for orders placed  during the hospital encounter of 01/05/23    Adult Stress Echo W/ Cont or Stress Agent if Necessary Per Protocol    Interpretation Summary  •  Echocardiogram Findings:  •  Normal left ventricular cavity size and wall thickness noted. All left ventricular wall segments contract normally.  •  Left ventricular ejection fraction appears to be 61 - 65%.  •  Left ventricular diastolic function is consistent with (grade I) impaired relaxation.  •  The mitral valve is structurally normal with no significant stenosis present. Mild mitral valve regurgitation is present.  •  The aortic valve is structurally normal with no regurgitation or stenosis present.  •  There is no evidence of pericardial effusion. .  •  Stress Procedure  •  A stress test was performed following the Humberto protocol.  •  Exercise duration (min) 8 min Estimated workload 10.1 METS  •  Baseline Vitals Baseline HR 53 bpm Baseline /66 mmHg Peak Stress Vitals Peak  bpm Peak /60 mmHg Recovery Vitals Recovery HR 75 bpm Recovery /71 mmHg Exercise Data Target HR (85%) 125 bpm Max. Pred. HR (100%) 147 bpm Percent Max Pred HR 85.71 %  •  Arrhythmias during stress: rare PVCs, bigeminy.  •  Findings consistent with an indeterminate ECG stress test.  •  Stress Echo Findings  •  Segment augmentation had an abnormal response to stress. Left ventricular function is normal  •  The following left ventricular wall segments are hypokinetic: basal inferolateral, basal inferoseptal, mid inferoseptal, basal inferior and basal inferoseptal.  •  Abnormal stress echo with echocardiographic evidence for myocardial ischemia located in the inferior wall.      Laboratory evaluations:    Lab Results   Component Value Date    GLUCOSE 115 (H) 03/28/2023    BUN 11 03/28/2023    CREATININE 0.96 03/28/2023    EGFRIFNONA 81 04/27/2019    BCR 11.5 03/28/2023    K 3.6 03/28/2023    CO2 29.3 (H) 03/28/2023    CALCIUM 10.6 (H) 03/28/2023    ALBUMIN 4.7 03/28/2023    LABIL2  1.2 (L) 09/08/2015    AST 21 03/28/2023    ALT 20 03/28/2023     Lab Results   Component Value Date    WBC 7.80 03/11/2022    HGB 13.9 03/11/2022    HCT 40.9 03/11/2022    MCV 88.7 03/11/2022     03/11/2022     Lab Results   Component Value Date    CHOL 206 (H) 03/28/2023    TRIG 64 03/28/2023    HDL 78 (H) 03/28/2023     (H) 03/28/2023     Lab Results   Component Value Date    TSH 3.460 03/11/2022     No results found for: HGBA1C  Lab Results   Component Value Date    ALT 20 03/28/2023     No results found for: HGBA1C  Lab Results   Component Value Date    CREATININE 0.96 03/28/2023     No results found for: IRON, TIBC, FERRITIN  No results found for: INR, PROTIME          ECG 12 Lead    Date/Time: 5/30/2023 10:51 AM  Performed by: Syeda Bhatia APRN  Authorized by: Syeda Bhatia APRN   Comparison: compared with previous ECG   Rhythm: sinus bradycardia  Other findings: non-specific ST-T wave changes    Clinical impression: non-specific ECG          --------------------------------------------------------------------------------------------------------------------------    ASSESSMENT/PLAN:      Diagnosis Plan   1. Hypercholesterolemia        2. Primary hypertension  lisinopril (PRINIVIL,ZESTRIL) 10 MG tablet      3. ASCVD (arteriosclerotic cardiovascular disease)            1. Essential hypertension  • Blood pressure elevated in the office today.  Start lisinopril 10 mg p.o. daily for better blood pressure control.  Titrate up as needed.  Recent CMP showed stable kidney function and electrolytes    2. Hyperlipidemia  • Patient intolerant to statins.  Recent .  Given patient's known nonobstructive coronary artery disease LDL goal would be less than 70.  I did discuss about initiating PCSK9 such as Repatha however patient states he would like for his primary doctor to do this as he does not want to deal with going to the lipid clinic.    3.  Abnormal stress echo  4.   ASCVD  · Patient underwent CT coronary angiogram which showed a 50% stenosis in the distal left circumflex and proximal PDA with stenotic lesion involving the proximal third of the LAD.  Patient currently asymptomatic.  Continue with medical management.      This document has been @Electronically signed by FELISHA Torrez, 05/30/23, 8:43 AM EDT.       Dictated Utilizing Dragon Dictation: Part of this note may be an electronic transcription/translation of spoken language to printed text using the Dragon Dictation System.    Follow-up appointment and medication changes provided in hand delivered After Visit Summary as well as reviewed in the room.

## 2023-08-07 ENCOUNTER — OFFICE VISIT (OUTPATIENT)
Dept: FAMILY MEDICINE CLINIC | Facility: CLINIC | Age: 73
End: 2023-08-07
Payer: MEDICARE

## 2023-08-07 VITALS
HEART RATE: 63 BPM | WEIGHT: 156 LBS | BODY MASS INDEX: 22.33 KG/M2 | OXYGEN SATURATION: 98 % | DIASTOLIC BLOOD PRESSURE: 68 MMHG | TEMPERATURE: 98 F | HEIGHT: 70 IN | SYSTOLIC BLOOD PRESSURE: 144 MMHG

## 2023-08-07 DIAGNOSIS — S61.409A: Primary | ICD-10-CM

## 2023-08-07 PROCEDURE — 3078F DIAST BP <80 MM HG: CPT | Performed by: FAMILY MEDICINE

## 2023-08-07 PROCEDURE — 3077F SYST BP >= 140 MM HG: CPT | Performed by: FAMILY MEDICINE

## 2023-08-07 PROCEDURE — 99213 OFFICE O/P EST LOW 20 MIN: CPT | Performed by: FAMILY MEDICINE

## 2023-08-07 RX ORDER — MUPIROCIN CALCIUM 20 MG/G
1 CREAM TOPICAL 3 TIMES DAILY
Qty: 15 G | Refills: 0 | Status: SHIPPED | OUTPATIENT
Start: 2023-08-07

## 2023-08-07 NOTE — PROGRESS NOTES
Arleen Zamora is a 73 y.o. male.   Pt presents today with CC of Wound Check (Right hand)      History of Present Illness   History of Present Illness  This afternoon he was closing a door.  The dorsum of his right hand scraped tearing off a piece of skin that is approximately the size of quarter.  Only about 25% of the skin is still attached, the rest has been debrided.  He cleaned it with soap and water, alcohol, then peroxide.  He is here now for evaluation.  It has stopped bleeding, but it is still draining serous fluid.  Pain is minimal.     The following portions of the patient's history were reviewed and updated as appropriate: allergies, current medications, past family history, past medical history, past social history, past surgical history, and problem list.    Review of Systems   Constitutional:  Negative for chills, fever and unexpected weight loss.   HENT:  Negative for congestion and sore throat.    Eyes:  Negative for blurred vision and visual disturbance.   Respiratory:  Negative for cough and wheezing.    Cardiovascular:  Negative for chest pain and palpitations.   Gastrointestinal:  Negative for abdominal pain and diarrhea.   Endocrine: Negative for cold intolerance and heat intolerance.   Genitourinary:  Negative for dysuria.   Musculoskeletal:  Negative for arthralgias and neck stiffness.   Neurological:  Negative for dizziness, seizures and syncope.   Psychiatric/Behavioral:  Negative for self-injury, suicidal ideas and depressed mood.      Objective   Physical Exam  Musculoskeletal:        Hands:       Comments: Drawn above is approximately the shape of the wound.  Approximately the size of a quarter, attached skin distally, it has been completely debrided otherwise.  It is a very thin layer of skin, most of the ulnar side is not viable, and thus cannot be stitched.  I would estimate that only about 30% of the wound border could be stitched.  The rest is too thin and the skin is  frayed.  Stitching also would predispose to damaging local band.  Veins are prominent on the dorsum of his hand and because of the swelling from the injury, they are hard to locate.  The skin is too frayed to consider taping or glue.         Assessment & Plan   Diagnoses and all orders for this visit:    1. Open wound of dorsum of hand (Primary)  -     mupirocin (BACTROBAN) 2 % cream; Apply 1 application  topically to the appropriate area as directed 3 (Three) Times a Day.  Dispense: 15 g; Refill: 0    Right hand.  We discussed options including stitching, gluing, and Steri-Strip.  Unfortunately, at least 25% of the circumference on the ulnar side is not viable and cannot be stitched.  There are many prominent veins in this area that were difficult to locate, all things considered, I think that the risk versus benefit cancel each other out.  Chose not to stitch.  He is going to avoid making a fist for 10 days.  He is can address this wound at least twice daily with mupirocin followed by Band-Aid.  I recommend putting something in his palm such as a pair of socks at night to prevent making a fist as this will tear it back open.  I estimated to him that 10 days is the minimum, it may take up to 21 days for it to heal adequately.  There is not enough there left to stitch, unfortunately.  He is agreeable to plan.              BMI is within normal parameters. No other follow-up for BMI required.          This document has been electronically signed by Kailyn Parker  August 7, 2023 15:55 EDT    Dictated Utilizing Dragon Dictation: Part of this note may be an electronic transcription/translation of spoken language to printed text using the Dragon Dictation System.

## 2023-10-02 ENCOUNTER — OFFICE VISIT (OUTPATIENT)
Dept: FAMILY MEDICINE CLINIC | Facility: CLINIC | Age: 73
End: 2023-10-02
Payer: MEDICARE

## 2023-10-02 VITALS
SYSTOLIC BLOOD PRESSURE: 134 MMHG | WEIGHT: 155.8 LBS | HEIGHT: 70 IN | OXYGEN SATURATION: 96 % | DIASTOLIC BLOOD PRESSURE: 76 MMHG | TEMPERATURE: 98 F | BODY MASS INDEX: 22.3 KG/M2 | HEART RATE: 51 BPM

## 2023-10-02 DIAGNOSIS — J30.89 SEASONAL ALLERGIC RHINITIS DUE TO OTHER ALLERGIC TRIGGER: ICD-10-CM

## 2023-10-02 DIAGNOSIS — I10 ESSENTIAL HYPERTENSION: ICD-10-CM

## 2023-10-02 DIAGNOSIS — E78.49 OTHER HYPERLIPIDEMIA: ICD-10-CM

## 2023-10-02 DIAGNOSIS — K21.9 GASTROESOPHAGEAL REFLUX DISEASE WITHOUT ESOPHAGITIS: ICD-10-CM

## 2023-10-02 PROCEDURE — 3075F SYST BP GE 130 - 139MM HG: CPT | Performed by: FAMILY MEDICINE

## 2023-10-02 PROCEDURE — 1159F MED LIST DOCD IN RCRD: CPT | Performed by: FAMILY MEDICINE

## 2023-10-02 PROCEDURE — 1160F RVW MEDS BY RX/DR IN RCRD: CPT | Performed by: FAMILY MEDICINE

## 2023-10-02 PROCEDURE — 3078F DIAST BP <80 MM HG: CPT | Performed by: FAMILY MEDICINE

## 2023-10-02 PROCEDURE — 99214 OFFICE O/P EST MOD 30 MIN: CPT | Performed by: FAMILY MEDICINE

## 2023-10-02 RX ORDER — PANTOPRAZOLE SODIUM 40 MG/1
40 TABLET, DELAYED RELEASE ORAL DAILY
Qty: 90 TABLET | Refills: 1 | Status: SHIPPED | OUTPATIENT
Start: 2023-10-02

## 2023-10-02 RX ORDER — MONTELUKAST SODIUM 10 MG/1
10 TABLET ORAL NIGHTLY
Qty: 90 TABLET | Refills: 1 | Status: SHIPPED | OUTPATIENT
Start: 2023-10-02

## 2023-10-02 RX ORDER — CHLORHEXIDINE GLUCONATE ORAL RINSE 1.2 MG/ML
15 SOLUTION DENTAL 2 TIMES DAILY
Qty: 473 ML | Refills: 0 | Status: SHIPPED | OUTPATIENT
Start: 2023-10-02

## 2023-10-02 RX ORDER — EZETIMIBE 10 MG/1
10 TABLET ORAL DAILY
Qty: 90 TABLET | Refills: 1 | Status: SHIPPED | OUTPATIENT
Start: 2023-10-02

## 2023-10-02 RX ORDER — AMLODIPINE BESYLATE 10 MG/1
10 TABLET ORAL DAILY
Qty: 90 TABLET | Refills: 1 | Status: SHIPPED | OUTPATIENT
Start: 2023-10-02

## 2023-10-02 RX ORDER — LISINOPRIL 20 MG/1
20 TABLET ORAL DAILY
Qty: 90 TABLET | Refills: 1 | Status: SHIPPED | OUTPATIENT
Start: 2023-10-02

## 2023-10-02 NOTE — PROGRESS NOTES
"lAexander Zamora     VITALS: Blood pressure 134/76, pulse 51, temperature 98 °F (36.7 °C), temperature source Temporal, height 177.8 cm (70\"), weight 70.7 kg (155 lb 12.8 oz), SpO2 96 %.    Subjective  Chief Complaint  Essential hypertension    Subjective          History of Present Illness:  The patient is a 73-year-old with medical conditions significant for allergic rhinitis, hypertension, and hyperlipidemia who presents to clinic secondary to medical follow-up.    The patient feels like there might be something in his ear. His blood pressure is within normal limits. The patient declines his influenza vaccination.    No complaints about any of the medications.    The following portions of the patient's history were reviewed and updated as appropriate: allergies, current medications, past family history, past medical history, past social history, past surgical history and problem list.    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    Chronic back pain     Depression     Elevated prostate specific antigen (PSA)     Erectile dysfunction     Hypercholesterolemia     Primary hypertension     Prostatitis        Surgical History  Past Surgical History:   Procedure Laterality Date    OTHER SURGICAL HISTORY      surgery for an ulcer, PNEUMOTHORAX AND ULCER THERAPY       Family History  Family History   Problem Relation Age of Onset    Bradycardia Mother         Had pacemaker    Nephrolithiasis Father     Heart disease Sister     Diabetes Other        Social History  Social History     Socioeconomic History    Marital status:    Tobacco Use    Smoking status: Former     Packs/day: 2.00     Years: 5.00     Pack years: 10.00     Types: Cigarettes     Quit date: 1970     Years since quittin.7    Smokeless tobacco: Never   Vaping Use    Vaping Use: Never used   Substance and Sexual Activity    Alcohol use: No    Drug use: No       Objective   Vital Signs: " "  /76 (BP Location: Right arm, Patient Position: Sitting, Cuff Size: Adult)   Pulse 51   Temp 98 °F (36.7 °C) (Temporal)   Ht 177.8 cm (70\")   Wt 70.7 kg (155 lb 12.8 oz)   SpO2 96%   BMI 22.35 kg/m²     Physical Exam     Gen: Patient in NAD. Pleasant and answers appropriately. A&Ox3.    Skin: Warm and dry with normal turgor. No purpura, rashes, or unusual pigmentation noted. Hair is normal in appearance and distribution.    HEENT: NC/AT. No lesions noted. Conjunctiva clear, sclera nonicteric. PERRL. EOMI without nystagmus or strabismus. Fundi appear benign. No hemorrhages or exudates of eyes. Auditory canals are patent bilaterally without lesions. TMs intact,  nonerythematous, bulging without lesions. Nasal mucosa erythematous, and nonedematous. Frontal and maxillary sinuses are nontender. O/P nonerythematous and moist without exudate.    Neck: Supple without lymph nodes palpated. FROM.     Lungs: CTA B/L without rales, rhonchi, crackles, or wheezes.    Heart: RRR. S1 and S2 normal. No S3 or S4. No MRGT.    Abd: Soft, nontender,nondistended. (+)BSx4 quadrants.     Extrem: No CCE. Radial pulses 2+/4 and equal B/L. FROMx4. No bone, joint, or muscle tenderness noted.    Neuro: No focal motor/sensory deficits.    Procedures    Result Review :   The following data was reviewed by: Bhavna Vaughn MD on 10/02/2023:                Assessment and Plan    Alexander Zamora is a 73 y.o. here for medical followup.    Diagnoses and all orders for this visit:    1. Other hyperlipidemia  -     ezetimibe (ZETIA) 10 MG tablet; Take 1 tablet by mouth Daily.  Dispense: 90 tablet; Refill: 1    2. Essential hypertension  -     amLODIPine (NORVASC) 10 MG tablet; Take 1 tablet by mouth Daily.  Dispense: 90 tablet; Refill: 1  -     lisinopril (PRINIVIL,ZESTRIL) 20 MG tablet; Take 1 tablet by mouth Daily.  Dispense: 90 tablet; Refill: 1    3. Seasonal allergic rhinitis due to other allergic trigger  -     montelukast " (SINGULAIR) 10 MG tablet; Take 1 tablet by mouth Every Night.  Dispense: 90 tablet; Refill: 1    4. Gastroesophageal reflux disease without esophagitis  -     pantoprazole (PROTONIX) 40 MG EC tablet; Take 1 tablet by mouth Daily.  Dispense: 90 tablet; Refill: 1    Other orders  -     diphenhydrAMINE 12.5 MG/5ML elixir 20 mL, aluminum-magnesium hydroxide-simethicone 400-400-40 MG/5ML suspension 20 mL, Lidocaine Viscous HCl 2 % solution 20 mL; Swish and spit 5 mL Every 4 (Four) Hours As Needed for Mucositis.  Dispense: 300 mL; Refill: 1  -     chlorhexidine (PERIDEX) 0.12 % solution; Apply 15 mL to the mouth or throat 2 (Two) Times a Day.  Dispense: 473 mL; Refill: 0          Problem List Items Addressed This Visit          Allergies and Adverse Reactions    Allergic rhinitis    Relevant Medications    montelukast (SINGULAIR) 10 MG tablet     Other Visit Diagnoses       Other hyperlipidemia        Relevant Medications    ezetimibe (ZETIA) 10 MG tablet    Essential hypertension        Relevant Medications    amLODIPine (NORVASC) 10 MG tablet    lisinopril (PRINIVIL,ZESTRIL) 20 MG tablet    Gastroesophageal reflux disease without esophagitis        Relevant Medications    diphenhydrAMINE 12.5 MG/5ML elixir 20 mL, aluminum-magnesium hydroxide-simethicone 400-400-40 MG/5ML suspension 20 mL, Lidocaine Viscous HCl 2 % solution 20 mL    pantoprazole (PROTONIX) 40 MG EC tablet          1. Cerumen impaction.  - This was resolved in the office today.    The patient will follow up in 6 months.    BMI is within normal parameters. No other follow-up for BMI required.         Follow Up   Return in about 6 months (around 4/2/2024).  Findings and plans discussed with patient who verbalizes understanding and agreement. Will followup with patient once results are in. Patient was given instructions and counseling regarding his condition or for health maintenance advice. Please see specific information pulled into the AVS if appropriate.        Bhavna Vaughn MD    Transcribed from ambient dictation for Bhavna Vaughn MD by Kiara Hsieh.  10/02/23   14:14 EDT    Patient or patient representative verbalized consent to the visit recording.  I have personally performed the services described in this document as transcribed by the above individual, and it is both accurate and complete.

## 2023-10-04 ENCOUNTER — TELEPHONE (OUTPATIENT)
Dept: FAMILY MEDICINE CLINIC | Facility: CLINIC | Age: 73
End: 2023-10-04
Payer: MEDICARE

## 2023-10-05 RX ORDER — FLUTICASONE PROPIONATE 50 MCG
2 SPRAY, SUSPENSION (ML) NASAL DAILY
Qty: 16 G | Refills: 5 | Status: SHIPPED | OUTPATIENT
Start: 2023-10-05

## 2023-11-16 ENCOUNTER — TELEPHONE (OUTPATIENT)
Dept: FAMILY MEDICINE CLINIC | Facility: CLINIC | Age: 73
End: 2023-11-16
Payer: MEDICARE

## 2023-12-05 ENCOUNTER — OFFICE VISIT (OUTPATIENT)
Dept: CARDIOLOGY | Facility: CLINIC | Age: 73
End: 2023-12-05
Payer: MEDICARE

## 2023-12-05 VITALS
DIASTOLIC BLOOD PRESSURE: 75 MMHG | SYSTOLIC BLOOD PRESSURE: 159 MMHG | HEART RATE: 54 BPM | HEIGHT: 70 IN | OXYGEN SATURATION: 97 % | RESPIRATION RATE: 18 BRPM | WEIGHT: 156.6 LBS | BODY MASS INDEX: 22.42 KG/M2

## 2023-12-05 DIAGNOSIS — E78.00 HYPERCHOLESTEROLEMIA: ICD-10-CM

## 2023-12-05 DIAGNOSIS — I25.10 ASCVD (ARTERIOSCLEROTIC CARDIOVASCULAR DISEASE): ICD-10-CM

## 2023-12-05 DIAGNOSIS — E78.5 DYSLIPIDEMIA: ICD-10-CM

## 2023-12-05 DIAGNOSIS — I10 ESSENTIAL HYPERTENSION: ICD-10-CM

## 2023-12-05 DIAGNOSIS — I10 PRIMARY HYPERTENSION: Primary | ICD-10-CM

## 2023-12-05 RX ORDER — CEPHALEXIN 500 MG/1
CAPSULE ORAL
COMMUNITY
Start: 2023-12-02

## 2023-12-05 NOTE — PROGRESS NOTES
[facility]             [facility] FELISHA Bhatia Cherie O, MD Chester C Mahan  1950 12/05/2023    Patient Active Problem List   Diagnosis    Prostate cancer    Erectile dysfunction following radical prostatectomy    BEULAH (stress urinary incontinence), male    Arthritis    Chronic back pain    Hypercholesterolemia    Primary hypertension    Allergic rhinitis    History of oral aphthous ulcers    Precordial pain    ASCVD (arteriosclerotic cardiovascular disease)       Dear Bhavna Vaughn MD:    Subjective     Chief Complaint   Patient presents with    Follow-up     6 month         HPI:     This is a 73 y.o. male with known past medical history of essential hypertension, coronary artery disease and hyperlipidemia     Alexander Zamora presents today for routine cardiology follow up.  Patient states has been doing overall well since his last visit.  Denies any chest pain or shortness of breath.  Brings in blood pressure log today which shows some intermittently high systolic blood pressures in the 150s and 160s.  Patient has only been taking lisinopril 10 mg instead of 20 mg.    Diagnostic Testing  Stress echo 1/2023: Abnormal stress echo with echocardiographic evidence for myocardial ischemia located in the inferior wall.  CT coronary angiogram 2/2023: Total calcium score 175, multifocal stenotic lesions involving the distal left circumflex and proximal PDA with approximately 50% as well as stenotic lesion involving the proximal third of the LAD, bicuspid aortic valve     All other systems were reviewed and were negative.    Patient Active Problem List   Diagnosis    Prostate cancer    Erectile dysfunction following radical prostatectomy    BEULAH (stress urinary incontinence), male    Arthritis    Chronic back pain    Hypercholesterolemia    Primary hypertension    Allergic rhinitis    History of oral aphthous ulcers    Precordial pain    ASCVD (arteriosclerotic  cardiovascular disease)       family history includes Bradycardia in his mother; Diabetes in an other family member; Heart disease in his sister; Nephrolithiasis in his father.     reports that he quit smoking about 53 years ago. His smoking use included cigarettes. He has a 10.00 pack-year smoking history. He has never used smokeless tobacco. He reports that he does not drink alcohol and does not use drugs.    Allergies   Allergen Reactions    Crestor [Rosuvastatin Calcium] Myalgia    Amoxicillin Rash         Current Outpatient Medications:     amLODIPine (NORVASC) 10 MG tablet, Take 1 tablet by mouth Daily., Disp: 90 tablet, Rfl: 1    aspirin 81 MG EC tablet, Take 1 tablet by mouth Daily., Disp: 30 tablet, Rfl: 3    cephalexin (KEFLEX) 500 MG capsule, , Disp: , Rfl:     cetirizine (zyrTEC) 10 MG tablet, Take 1 tablet by mouth Daily., Disp: 90 tablet, Rfl: 1    chlorhexidine (PERIDEX) 0.12 % solution, Apply 15 mL to the mouth or throat 2 (Two) Times a Day., Disp: 473 mL, Rfl: 0    diphenhydrAMINE 12.5 MG/5ML elixir 20 mL, aluminum-magnesium hydroxide-simethicone 400-400-40 MG/5ML suspension 20 mL, Lidocaine Viscous HCl 2 % solution 20 mL, Swish and spit 5 mL Every 4 (Four) Hours As Needed for Mucositis., Disp: 300 mL, Rfl: 1    ezetimibe (ZETIA) 10 MG tablet, Take 1 tablet by mouth Daily., Disp: 90 tablet, Rfl: 1    fluticasone (FLONASE) 50 MCG/ACT nasal spray, 2 sprays into the nostril(s) as directed by provider Daily., Disp: 16 g, Rfl: 5    ibuprofen (ADVIL,MOTRIN) 600 MG tablet, Take 1 tablet by mouth 3 (Three) Times a Day., Disp: , Rfl:     lisinopril (PRINIVIL,ZESTRIL) 20 MG tablet, Take 1 tablet by mouth Daily., Disp: 90 tablet, Rfl: 1    Misc Natural Products (NEURIVA PO), Take  by mouth Daily., Disp: , Rfl:     montelukast (SINGULAIR) 10 MG tablet, Take 1 tablet by mouth Every Night., Disp: 90 tablet, Rfl: 1    Multiple Vitamin (MULTI VITAMIN DAILY) tablet, Take 1 tablet by mouth Daily., Disp: , Rfl:      mupirocin (BACTROBAN) 2 % cream, Apply 1 application  topically to the appropriate area as directed 3 (Three) Times a Day., Disp: 15 g, Rfl: 0    Omega-3 Fatty Acids (OMEGA 3 500 PO), Take 500 mg by mouth As Needed., Disp: , Rfl:     pantoprazole (PROTONIX) 40 MG EC tablet, Take 1 tablet by mouth Daily., Disp: 90 tablet, Rfl: 1    vitamin C (ASCORBIC ACID) 250 MG tablet, Take 1 tablet by mouth Daily., Disp: , Rfl:     Zinc 50 MG capsule, Take  by mouth., Disp: , Rfl:     dilTIAZem CD (CARDIZEM CD) 240 MG 24 hr capsule, Take 1 capsule by mouth Daily. (Patient not taking: Reported on 12/5/2023), Disp: , Rfl:     Evolocumab (REPATHA) solution auto-injector SureClick injection, Inject 1 mL under the skin into the appropriate area as directed Every 14 (Fourteen) Days., Disp: 4 mL, Rfl: 3      Physical Exam:  I have reviewed the patient's current vital signs as documented in the patient's EMR.   Vitals:    12/05/23 1319   BP: 159/75   Pulse: 54   Resp: 18   SpO2: 97%     Body mass index is 22.47 kg/m².       12/05/23  1319   Weight: 71 kg (156 lb 9.6 oz)      Physical Exam  Constitutional:       General: He is not in acute distress.     Appearance: Normal appearance. He is well-developed.   HENT:      Head: Normocephalic and atraumatic.      Mouth/Throat:      Mouth: Mucous membranes are moist.   Eyes:      Extraocular Movements: Extraocular movements intact.      Pupils: Pupils are equal, round, and reactive to light.   Neck:      Vascular: No JVD.   Cardiovascular:      Rate and Rhythm: Normal rate and regular rhythm.      Heart sounds: Normal heart sounds. No murmur heard.     No S3 or S4 sounds.   Pulmonary:      Effort: Pulmonary effort is normal. No respiratory distress.      Breath sounds: Normal breath sounds. No wheezing.   Abdominal:      General: Bowel sounds are normal. There is no distension.      Palpations: Abdomen is soft. There is no hepatomegaly.      Tenderness: There is no abdominal tenderness.    Musculoskeletal:         General: Normal range of motion.      Cervical back: Normal range of motion and neck supple.   Skin:     General: Skin is warm and dry.      Coloration: Skin is not jaundiced or pale.   Neurological:      General: No focal deficit present.      Mental Status: He is alert and oriented to person, place, and time. Mental status is at baseline.   Psychiatric:         Mood and Affect: Mood normal.         Behavior: Behavior normal.         Thought Content: Thought content normal.         Judgment: Judgment normal.          DATA REVIEWED:     TTE/CAROLIN:  Results for orders placed during the hospital encounter of 01/05/23    Adult Stress Echo W/ Cont or Stress Agent if Necessary Per Protocol    Interpretation Summary    Echocardiogram Findings:    Normal left ventricular cavity size and wall thickness noted. All left ventricular wall segments contract normally.    Left ventricular ejection fraction appears to be 61 - 65%.    Left ventricular diastolic function is consistent with (grade I) impaired relaxation.    The mitral valve is structurally normal with no significant stenosis present. Mild mitral valve regurgitation is present.    The aortic valve is structurally normal with no regurgitation or stenosis present.    There is no evidence of pericardial effusion. .    Stress Procedure    A stress test was performed following the Humberto protocol.    Exercise duration (min) 8 min Estimated workload 10.1 METS    Baseline Vitals Baseline HR 53 bpm Baseline /66 mmHg Peak Stress Vitals Peak  bpm Peak /60 mmHg Recovery Vitals Recovery HR 75 bpm Recovery /71 mmHg Exercise Data Target HR (85%) 125 bpm Max. Pred. HR (100%) 147 bpm Percent Max Pred HR 85.71 %    Arrhythmias during stress: rare PVCs, bigeminy.    Findings consistent with an indeterminate ECG stress test.    Stress Echo Findings    Segment augmentation had an abnormal response to stress. Left ventricular function is  "normal    The following left ventricular wall segments are hypokinetic: basal inferolateral, basal inferoseptal, mid inferoseptal, basal inferior and basal inferoseptal.    Abnormal stress echo with echocardiographic evidence for myocardial ischemia located in the inferior wall.      Laboratory evaluations:    Lab Results   Component Value Date    GLUCOSE 115 (H) 03/28/2023    BUN 11 03/28/2023    CREATININE 0.96 03/28/2023    EGFRIFNONA 81 04/27/2019    BCR 11.5 03/28/2023    K 3.6 03/28/2023    CO2 29.3 (H) 03/28/2023    CALCIUM 10.6 (H) 03/28/2023    ALBUMIN 4.7 03/28/2023    LABIL2 1.2 (L) 09/08/2015    AST 21 03/28/2023    ALT 20 03/28/2023     Lab Results   Component Value Date    WBC 7.80 03/11/2022    HGB 13.9 03/11/2022    HCT 40.9 03/11/2022    MCV 88.7 03/11/2022     03/11/2022     Lab Results   Component Value Date    CHOL 206 (H) 03/28/2023    TRIG 64 03/28/2023    HDL 78 (H) 03/28/2023     (H) 03/28/2023     Lab Results   Component Value Date    TSH 3.460 03/11/2022     No results found for: \"HGBA1C\"  Lab Results   Component Value Date    ALT 20 03/28/2023     No results found for: \"HGBA1C\"  Lab Results   Component Value Date    CREATININE 0.96 03/28/2023     No results found for: \"IRON\", \"TIBC\", \"FERRITIN\"  No results found for: \"INR\", \"PROTIME\"        --------------------------------------------------------------------------------------------------------------------------    ASSESSMENT/PLAN:      Diagnosis Plan   1. Primary hypertension        2. ASCVD (arteriosclerotic cardiovascular disease)        3. Essential hypertension        4. Dyslipidemia  Evolocumab (REPATHA) solution auto-injector SureClick injection      5. Hypercholesterolemia            Essential hypertension  Blood pressure elevated in the office today.  States his lisinopril was recently increased to 20 mg however he has not been taking this yet therefore I instructed him to start taking lisinopril 20 mg and titrate up " as needed.    Hyperlipidemia  ASCVD  Patient underwent CT coronary angiogram which showed a 50% stenosis in the distal left circumflex and proximal PDA with stenotic lesion involving the proximal third of the LAD.  Patient currently asymptomatic.  Lipid panel in March 2023 showed LDL of 116.  Patient intolerant to statins in the past.  I did offer PCSK9 initiation at last visit however he declined at that time.  I did rerecommend initiation of PCSK9 at this visit to get his LDL to goal at less than 70 and he did agree therefore I will order this.  I did offer referral to lipid clinic but he declines.      This document has been @Electronically signed by FELISHA Torrez, 12/05/23, 12:42 PM EST.       Dictated Utilizing Dragon Dictation: Part of this note may be an electronic transcription/translation of spoken language to printed text using the Dragon Dictation System.    Follow-up appointment and medication changes provided in hand delivered After Visit Summary as well as reviewed in the room.

## 2023-12-06 ENCOUNTER — PATIENT OUTREACH (OUTPATIENT)
Dept: CASE MANAGEMENT | Facility: OTHER | Age: 73
End: 2023-12-06
Payer: MEDICARE

## 2023-12-14 ENCOUNTER — PATIENT OUTREACH (OUTPATIENT)
Dept: CASE MANAGEMENT | Facility: OTHER | Age: 73
End: 2023-12-14
Payer: MEDICARE

## 2023-12-14 NOTE — OUTREACH NOTE
UTR. SW will try again next week re: in-home support resources and overview of waiver services.     Orlando GARCIA -   Ambulatory Case Management    12/14/2023, 13:37 EST

## 2023-12-14 NOTE — OUTREACH NOTE
Patient Outreach    SW received a return call from pt. Pt had not placed a call to Debora to inquire about the in-home support benefit eligibility status. Pt stated his daughter works for [employer] and he will call her after the phone call to assist him in looking into this. SW stated she would F/u with pt next week, and pt expressed thanks.     Orlando GARCIA -   Ambulatory Case Management    12/14/2023, 15:25 EST

## 2023-12-15 ENCOUNTER — PATIENT OUTREACH (OUTPATIENT)
Dept: CASE MANAGEMENT | Facility: OTHER | Age: 73
End: 2023-12-15
Payer: MEDICARE

## 2023-12-15 NOTE — OUTREACH NOTE
Patient Outreach    LAWANDA left a message with pt daughter clarifying the type of benefit she need's to help aid pt in inquiring about with his insurance company. LAWANDA stated her direct line and requested call back if there were futher assistance needs. LAWANDA will F/u with pt next week.    Orlando GARCIA -   Ambulatory Case Management    12/15/2023, 10:12 EST

## 2023-12-21 ENCOUNTER — PATIENT OUTREACH (OUTPATIENT)
Dept: CASE MANAGEMENT | Facility: OTHER | Age: 73
End: 2023-12-21
Payer: MEDICARE

## 2023-12-21 NOTE — OUTREACH NOTE
Patient Outreach  UTRx2. SW will attempt again in a week.     Orlando GARCIA -   Ambulatory Case Management    12/21/2023, 15:19 EST

## 2023-12-22 ENCOUNTER — TELEPHONE (OUTPATIENT)
Dept: FAMILY MEDICINE CLINIC | Facility: CLINIC | Age: 73
End: 2023-12-22
Payer: MEDICARE

## 2023-12-22 NOTE — TELEPHONE ENCOUNTER
Daughter called requesting patient have an autoimmune work up because of his reoccurring mouth ulcers she states she has an autoimmune disorder & would like for him to be tested because her research shows these could be related.

## 2023-12-27 ENCOUNTER — PATIENT OUTREACH (OUTPATIENT)
Dept: CASE MANAGEMENT | Facility: OTHER | Age: 73
End: 2023-12-27
Payer: MEDICARE

## 2023-12-27 NOTE — OUTREACH NOTE
Patient Outreach    SW spoke with pt, who states that he received a packet of paperwork to apply for in-home support services with his [employer] plan, but that he does not want to disclose personal financial information requested on that application. SW inquired about pt daughter's feedback regarding this, and pt stated he wasn't sure about her opinion on it. LAWANDA will F/u with pt daughter tomorrow.     Orlando GARCIA -   Ambulatory Case Management    12/27/2023, 13:05 EST

## 2023-12-28 ENCOUNTER — PATIENT OUTREACH (OUTPATIENT)
Dept: CASE MANAGEMENT | Facility: OTHER | Age: 73
End: 2023-12-28
Payer: MEDICARE

## 2023-12-28 NOTE — OUTREACH NOTE
Patient Outreach    SW spoke with pt daughter to follow-up on his resource exploration with [employer]. Pt daughter stated that pt has opted for a different extra essential benefit than the in-home help. SW and pt daughter also discussed waiver services and pt applying for the homecare program. Pt daughter has contacted [facility] and the pt has received a paper packet at home to complete and send in to apply for the program. Pt daughter stated that there is currently an estimated thirty people on the waitlist in the region for that program. SW and pt daughter discussed reaching out in the future, should there be further assistance needs. Pt daughter expressed thanks for the help. SW will D/c due to program outreach goal being met.     Orlando GARCIA -   Ambulatory Case Management    12/28/2023, 10:36 EST

## 2024-01-02 RX ORDER — CHLORHEXIDINE GLUCONATE ORAL RINSE 1.2 MG/ML
SOLUTION DENTAL
Qty: 473 ML | Refills: 5 | OUTPATIENT
Start: 2024-01-02

## 2024-01-03 RX ORDER — CHLORHEXIDINE GLUCONATE ORAL RINSE 1.2 MG/ML
15 SOLUTION DENTAL 2 TIMES DAILY
Qty: 473 ML | Refills: 0 | Status: SHIPPED | OUTPATIENT
Start: 2024-01-03

## 2024-01-03 NOTE — TELEPHONE ENCOUNTER
Peridex has been sent. Please call the compound mouthwash in for 300 ml, no refills, and do not refill until 1/9/2024 as Epic does not send this correctly. It hasn't even been a month. Please call patient. Please let him know that we filled this less than a month ago and this is why he has not gotten another bottle. He needs to see a dentist. This is an acute medication with medications in it that can affect him adversely so we are limited in the amounts that we can give him. Also, as an acute medication, this is not something that we give out chronically. When he saw Dr. Sadler (ENT) in June 2023 about this, Dr Sadler felt that his problems were caused by his dentures and encouraged him to follow up with a dentist and prescribed this medication to tide him over until he spoke to a dentist. As a result, we will be giving him one more month of this medication to tide him over; from then on, he is going to have to discuss with a dentist in helping him with the inflammation in his mouth. Chronic lidocaine is not appropriate.

## 2024-01-03 NOTE — TELEPHONE ENCOUNTER
Called to [facility] as directed,patient notified & verbalized understanding,stated he was not going back to the same dentist that he would have to find a new one,encouraged to do so soon.

## 2024-02-06 DIAGNOSIS — J30.89 SEASONAL ALLERGIC RHINITIS DUE TO OTHER ALLERGIC TRIGGER: ICD-10-CM

## 2024-02-06 RX ORDER — MONTELUKAST SODIUM 10 MG/1
10 TABLET ORAL NIGHTLY
Qty: 90 TABLET | Refills: 1 | OUTPATIENT
Start: 2024-02-06

## 2024-02-06 NOTE — TELEPHONE ENCOUNTER
Caller: angy muñoz    Relationship: Emergency Contact    Best call back number: [phone]     Requested Prescriptions:   Requested Prescriptions     Pending Prescriptions Disp Refills    montelukast (SINGULAIR) 10 MG tablet 90 tablet 1     Sig: Take 1 tablet by mouth Every Night.    chlorhexidine (PERIDEX) 0.12 % solution 473 mL 0     Sig: Apply 15 mL to the mouth or throat 2 (Two) Times a Day.    diphenhydrAMINE 12.5 MG/5ML elixir 20 mL, aluminum-magnesium hydroxide-simethicone 400-400-40 MG/5ML suspension 20 mL, Lidocaine Viscous HCl 2 % solution 20 mL       Sig: Swish and spit 5 mL Every 4 (Four) Hours As Needed for Mucositis.        Pharmacy where request should be sent: [address] [phone] [facility] [phone] FX     Last office visit with prescribing clinician: 10/2/2023   Last telemedicine visit with prescribing clinician: Visit date not found   Next office visit with prescribing clinician: 4/8/2024     Additional details provided by patient:     Does the patient have less than a 3 day supply:  [x] Yes  [] No    Would you like a call back once the refill request has been completed: [] Yes [x] No    If the office needs to give you a call back, can they leave a voicemail: [] Yes [x] No    Chelsey Castaneda   02/06/24 14:15 EST

## 2024-02-08 ENCOUNTER — TELEPHONE (OUTPATIENT)
Dept: FAMILY MEDICINE CLINIC | Facility: CLINIC | Age: 74
End: 2024-02-08
Payer: MEDICARE

## 2024-02-08 RX ORDER — CHLORHEXIDINE GLUCONATE ORAL RINSE 1.2 MG/ML
5 SOLUTION DENTAL 2 TIMES DAILY
Qty: 473 ML | Refills: 0 | Status: SHIPPED | OUTPATIENT
Start: 2024-02-08

## 2024-02-08 NOTE — TELEPHONE ENCOUNTER
Peridex has been sent.     Please call Brenda. Please let her know that we talked to Alexander last month about the other elixir. This is not supposed to be a permanent medication. There are side effects to this medication to which we cannot give this chronically.  He needs to see a dentist. When he saw Dr. Sadler (ENT) in June 2023 about this, Dr Sadler felt that his problems were caused by his dentures and encouraged him to follow up with a dentist and prescribed this medication to tide him over until he spoke to a dentist.

## 2024-02-08 NOTE — TELEPHONE ENCOUNTER
Caller: [place], [place] - [address] [facility]. - [phone] [facility] [phone] FX    Relationship: Pharmacy    Best call back number: [phone]    Requested Prescriptions:   MAGIC MOUTHWASH COMPOUND       Pharmacy where request should be sent: [place], [place] - [address] [facility] - [phone] [facility] [phone] FX     Last office visit with prescribing clinician: 10/2/2023   Last telemedicine visit with prescribing clinician: Visit date not found   Next office visit with prescribing clinician: 4/8/2024     Additional details provided by patient: PHARMACY NEEDS A NEW PRESCRIPTION- OK TO CALL WITH A VERBAL      Chelsey Johnson Rep   02/08/24 14:42 EST

## 2024-02-09 DIAGNOSIS — K12.30 MUCOSITIS: Primary | ICD-10-CM

## 2024-03-04 ENCOUNTER — TELEPHONE (OUTPATIENT)
Dept: FAMILY MEDICINE CLINIC | Facility: CLINIC | Age: 74
End: 2024-03-04

## 2024-03-04 DIAGNOSIS — J30.89 SEASONAL ALLERGIC RHINITIS DUE TO OTHER ALLERGIC TRIGGER: ICD-10-CM

## 2024-03-04 DIAGNOSIS — E78.49 OTHER HYPERLIPIDEMIA: ICD-10-CM

## 2024-03-04 NOTE — TELEPHONE ENCOUNTER
I think this is a perfect time to take a look at his mouth because we've never had him in here when something's been inflamed.

## 2024-03-04 NOTE — TELEPHONE ENCOUNTER
Caller: angy irving    Relationship: Emergency Contact    Best call back number: [phone]    What medication are you requesting: TRIAMCINOLONE    What are your current symptoms: CYST ON JAW    How long have you been experiencing symptoms: WEEKS    If a prescription is needed, what is your preferred pharmacy and phone number: [facility] [address] [facility] [phone] [facility] [phone]      Additional notes: PATIENT IS NOT ABLE TO GET IN TO THE ENT DOCTOR UNTIL APRIL 4TH AND HIS JAW ARE PRETTY ENFLAMED AND SORE. PLEASE CALL [facility] IF THIS IS A POSSIBILITY TO PRESCRIBE THIS MEDICATION.

## 2024-03-04 NOTE — TELEPHONE ENCOUNTER
Caller: angy muñoz    Relationship: Emergency Contact    Best call back number: [phone]    Requested Prescriptions:   Requested Prescriptions     Pending Prescriptions Disp Refills    cetirizine (zyrTEC) 10 MG tablet 90 tablet 1     Sig: Take 1 tablet by mouth Daily.    ezetimibe (ZETIA) 10 MG tablet 90 tablet 1     Sig: Take 1 tablet by mouth Daily.        Pharmacy where request should be sent: [address] [phone] [facility] [phone]      Last office visit with prescribing clinician: 10/2/2023   Last telemedicine visit with prescribing clinician: Visit date not found   Next office visit with prescribing clinician: 4/8/2024     Additional details provided by patient: PATIENT HAS ONE TABLET    Does the patient have less than a 3 day supply:  [x] Yes  [] No    Would you like a call back once the refill request has been completed: [] Yes [x] No    If the office needs to give you a call back, can they leave a voicemail: [] Yes [x] No    Chelsey Brown Rep   03/04/24 08:59 EST

## 2024-03-05 ENCOUNTER — OFFICE VISIT (OUTPATIENT)
Dept: FAMILY MEDICINE CLINIC | Facility: CLINIC | Age: 74
End: 2024-03-05
Payer: MEDICARE

## 2024-03-05 VITALS
TEMPERATURE: 98 F | OXYGEN SATURATION: 99 % | HEART RATE: 55 BPM | WEIGHT: 150 LBS | SYSTOLIC BLOOD PRESSURE: 166 MMHG | BODY MASS INDEX: 21.47 KG/M2 | DIASTOLIC BLOOD PRESSURE: 68 MMHG | HEIGHT: 70 IN

## 2024-03-05 DIAGNOSIS — J30.89 SEASONAL ALLERGIC RHINITIS DUE TO OTHER ALLERGIC TRIGGER: ICD-10-CM

## 2024-03-05 DIAGNOSIS — Z13.220 ENCOUNTER FOR LIPID SCREENING FOR CARDIOVASCULAR DISEASE: ICD-10-CM

## 2024-03-05 DIAGNOSIS — Z13.6 ENCOUNTER FOR LIPID SCREENING FOR CARDIOVASCULAR DISEASE: ICD-10-CM

## 2024-03-05 DIAGNOSIS — K12.30 MUCOSITIS: Primary | ICD-10-CM

## 2024-03-05 DIAGNOSIS — Z12.5 ENCOUNTER FOR SCREENING FOR MALIGNANT NEOPLASM OF PROSTATE: ICD-10-CM

## 2024-03-05 PROCEDURE — 86140 C-REACTIVE PROTEIN: CPT | Performed by: FAMILY MEDICINE

## 2024-03-05 PROCEDURE — 36415 COLL VENOUS BLD VENIPUNCTURE: CPT | Performed by: FAMILY MEDICINE

## 2024-03-05 PROCEDURE — 80053 COMPREHEN METABOLIC PANEL: CPT | Performed by: FAMILY MEDICINE

## 2024-03-05 PROCEDURE — G0103 PSA SCREENING: HCPCS | Performed by: FAMILY MEDICINE

## 2024-03-05 PROCEDURE — 80061 LIPID PANEL: CPT | Performed by: FAMILY MEDICINE

## 2024-03-05 PROCEDURE — 82746 ASSAY OF FOLIC ACID SERUM: CPT | Performed by: FAMILY MEDICINE

## 2024-03-05 PROCEDURE — 85025 COMPLETE CBC W/AUTO DIFF WBC: CPT | Performed by: FAMILY MEDICINE

## 2024-03-05 PROCEDURE — 86038 ANTINUCLEAR ANTIBODIES: CPT | Performed by: FAMILY MEDICINE

## 2024-03-05 PROCEDURE — 85652 RBC SED RATE AUTOMATED: CPT | Performed by: FAMILY MEDICINE

## 2024-03-05 RX ORDER — SALIVA SUBSTITUTE COMBO NO.2
30 SOLUTION, ORAL MUCOUS MEMBRANE AS NEEDED
Qty: 300 ML | Refills: 0 | Status: SHIPPED | OUTPATIENT
Start: 2024-03-05

## 2024-03-05 RX ORDER — EZETIMIBE 10 MG/1
10 TABLET ORAL DAILY
Qty: 90 TABLET | Refills: 1 | Status: SHIPPED | OUTPATIENT
Start: 2024-03-05

## 2024-03-05 RX ORDER — CETIRIZINE HYDROCHLORIDE 10 MG/1
10 TABLET ORAL DAILY
Qty: 90 TABLET | Refills: 1 | Status: SHIPPED | OUTPATIENT
Start: 2024-03-05

## 2024-03-05 NOTE — PROGRESS NOTES
"Alexander Zamora     VITALS: Blood pressure 166/68, pulse 55, temperature 98 °F (36.7 °C), height 177.8 cm (70\"), weight 68 kg (150 lb), SpO2 99%.    Subjective  Chief Complaint  Mouth Lesions    Subjective          History of Present Illness:  The patient is a 33-year-old male with medical conditions significant for allergic rhinitis, hypertension, and hyperlipidemia who presents to clinic for follow-up.    Mouth sores  He has mouth sores through his jaw and on the side of his tongue. It is aggravated with eating and talking. He has rinsed with magic mouthwash after breakfast and dinner. He has found relief with this. He has a productive cough and sore throat. He went to the dentist and had his teeth cleaned almost 6 months ago.  He wears dentures. He must eat soft foods. He has tried vitamin E and vitamin C. He has not tried folic acid.      His sister had lung cancer.    No complaints about any of the medications.    The following portions of the patient's history were reviewed and updated as appropriate: allergies, current medications, past family history, past medical history, past social history, past surgical history and problem list.    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    Chronic back pain     Depression     Elevated prostate specific antigen (PSA)     Erectile dysfunction     Hypercholesterolemia     Primary hypertension     Prostatitis        Surgical History  Past Surgical History:   Procedure Laterality Date    OTHER SURGICAL HISTORY      surgery for an ulcer, PNEUMOTHORAX AND ULCER THERAPY       Family History  Family History   Problem Relation Age of Onset    Bradycardia Mother         Had pacemaker    Nephrolithiasis Father     Heart disease Sister     Diabetes Other        Social History  Social History     Socioeconomic History    Marital status:    Tobacco Use    Smoking status: Former     Current packs/day: 0.00     Average " "packs/day: 2.0 packs/day for 5.0 years (10.0 ttl pk-yrs)     Types: Cigarettes     Start date:      Quit date: 1970     Years since quittin.2    Smokeless tobacco: Never   Vaping Use    Vaping status: Never Used   Substance and Sexual Activity    Alcohol use: No    Drug use: No       Objective   Vital Signs:   /68 (BP Location: Left arm, Patient Position: Sitting)   Pulse 55   Temp 98 °F (36.7 °C)   Ht 177.8 cm (70\")   Wt 68 kg (150 lb)   SpO2 99%   BMI 21.52 kg/m²     Physical Exam     Gen: Patient in NAD. Pleasant and answers appropriately. A&Ox3.    Skin: Warm and dry with normal turgor. No purpura, rashes, or unusual pigmentation noted. Hair is normal in appearance and distribution.    HEENT: NC/AT. No lesions noted. Conjunctiva clear, sclera nonicteric. PERRL. EOMI without nystagmus or strabismus. Fundi appear benign. No hemorrhages or exudates of eyes. Auditory canals are patent bilaterally without lesions. TMs intact,  nonerythematous, nonbulging without lesions. Nasal mucosa pink, nonerythematous, and nonedematous. Frontal and maxillary sinuses are nontender. O/P nonerythematous and moist without exudate.  Swollen mucosa on the inside.    Neck: Supple without lymph nodes palpated. FROM.     Lungs: Decreased B/L without rales, rhonchi, crackles, or wheezes.    Heart: RRR. S1 and S2 normal. No S3 or S4. No MRGT.    Abd: Soft, nontender,nondistended. (+)BSx4 quadrants.     Extrem: No CCE. Radial pulses 2+/4 and equal B/L. FROMx4. No bone, joint, or muscle tenderness noted.    Neuro: No focal motor/sensory deficits.    Procedures    Result Review :   The following data was reviewed by: Bhavna Vaughn MD on 2024:                Assessment and Plan    Alexander Zamora is a 73 y.o. here for medical followup.    Diagnoses and all orders for this visit:    1. Mucositis (Primary)  -     CBC Auto Differential; Future  -     Comprehensive Metabolic Panel; Future  -     MOUSTAPHA by IFA, Reflex " 9-biomarkers profile; Future  -     Sedimentation Rate; Future  -     C-reactive Protein; Future  -     Folate; Future  -     CBC Auto Differential  -     Comprehensive Metabolic Panel  -     MOUSTAPHA by IFA, Reflex 9-biomarkers profile  -     Sedimentation Rate  -     C-reactive Protein  -     Folate    2. Seasonal allergic rhinitis due to other allergic trigger  -     CBC Auto Differential; Future  -     Comprehensive Metabolic Panel; Future  -     CBC Auto Differential  -     Comprehensive Metabolic Panel    3. Encounter for lipid screening for cardiovascular disease  -     Lipid Panel; Future  -     Lipid Panel    4. Encounter for screening for malignant neoplasm of prostate  -     PSA Screen; Future  -     PSA Screen    Other orders  -     caphosol (SALIVA SUBSTITUTE) solution; Take 30 mL by mouth As Needed (mucositis).  Dispense: 300 mL; Refill: 0          Problem List Items Addressed This Visit          Allergies and Adverse Reactions    Allergic rhinitis    Relevant Orders    CBC Auto Differential    Comprehensive Metabolic Panel     Other Visit Diagnoses       Mucositis    -  Primary    Relevant Orders    CBC Auto Differential    Comprehensive Metabolic Panel    MOUSTAPHA by IFA, Reflex 9-biomarkers profile    Sedimentation Rate    C-reactive Protein    Folate    Encounter for lipid screening for cardiovascular disease        Relevant Orders    Lipid Panel    Encounter for screening for malignant neoplasm of prostate        Relevant Orders    PSA Screen          1. Mouth sores.  I will prescribe allopurinol mouth rinse. He will try over-the-counter vitamin E and folic acid. He will get labs done.    BMI is within normal parameters. No other follow-up for BMI required.         Follow Up   Return in about 3 months (around 6/5/2024), or LABS.  Findings and plans discussed with patient who verbalizes understanding and agreement. Will followup with patient once results are in. Patient was given instructions and counseling  regarding his condition or for health maintenance advice. Please see specific information pulled into the AVS if appropriate.       Bhavna Vaughn MD    Transcribed from ambient dictation for Bhavna Vaughn MD by Spring Freitas.  03/05/24   16:29 EST    Patient or patient representative verbalized consent to the visit recording.  I have personally performed the services described in this document as transcribed by the above individual, and it is both accurate and complete.

## 2024-03-06 ENCOUNTER — TELEPHONE (OUTPATIENT)
Dept: FAMILY MEDICINE CLINIC | Facility: CLINIC | Age: 74
End: 2024-03-06
Payer: MEDICARE

## 2024-03-06 LAB
ALBUMIN SERPL-MCNC: 4 G/DL (ref 3.5–5.2)
ALBUMIN/GLOB SERPL: 1.6 G/DL
ALP SERPL-CCNC: 74 U/L (ref 39–117)
ALT SERPL W P-5'-P-CCNC: 18 U/L (ref 1–41)
ANION GAP SERPL CALCULATED.3IONS-SCNC: 12.7 MMOL/L (ref 5–15)
AST SERPL-CCNC: 17 U/L (ref 1–40)
BASOPHILS # BLD AUTO: 0.06 10*3/MM3 (ref 0–0.2)
BASOPHILS NFR BLD AUTO: 1 % (ref 0–1.5)
BILIRUB SERPL-MCNC: 0.5 MG/DL (ref 0–1.2)
BUN SERPL-MCNC: 19 MG/DL (ref 8–23)
BUN/CREAT SERPL: 19 (ref 7–25)
CALCIUM SPEC-SCNC: 10.4 MG/DL (ref 8.6–10.5)
CHLORIDE SERPL-SCNC: 102 MMOL/L (ref 98–107)
CHOLEST SERPL-MCNC: 180 MG/DL (ref 0–200)
CO2 SERPL-SCNC: 28.3 MMOL/L (ref 22–29)
CREAT SERPL-MCNC: 1 MG/DL (ref 0.76–1.27)
CRP SERPL-MCNC: <0.3 MG/DL (ref 0–0.5)
DEPRECATED RDW RBC AUTO: 40.6 FL (ref 37–54)
EGFRCR SERPLBLD CKD-EPI 2021: 79 ML/MIN/1.73
EOSINOPHIL # BLD AUTO: 0.07 10*3/MM3 (ref 0–0.4)
EOSINOPHIL NFR BLD AUTO: 1.1 % (ref 0.3–6.2)
ERYTHROCYTE [DISTWIDTH] IN BLOOD BY AUTOMATED COUNT: 12.6 % (ref 12.3–15.4)
ERYTHROCYTE [SEDIMENTATION RATE] IN BLOOD: 9 MM/HR (ref 0–20)
FOLATE SERPL-MCNC: >20 NG/ML (ref 4.78–24.2)
GLOBULIN UR ELPH-MCNC: 2.5 GM/DL
GLUCOSE SERPL-MCNC: 85 MG/DL (ref 65–99)
HCT VFR BLD AUTO: 34.6 % (ref 37.5–51)
HDLC SERPL-MCNC: 60 MG/DL (ref 40–60)
HGB BLD-MCNC: 11.5 G/DL (ref 13–17.7)
IMM GRANULOCYTES # BLD AUTO: 0.01 10*3/MM3 (ref 0–0.05)
IMM GRANULOCYTES NFR BLD AUTO: 0.2 % (ref 0–0.5)
LDLC SERPL CALC-MCNC: 106 MG/DL (ref 0–100)
LDLC/HDLC SERPL: 1.74 {RATIO}
LYMPHOCYTES # BLD AUTO: 2.18 10*3/MM3 (ref 0.7–3.1)
LYMPHOCYTES NFR BLD AUTO: 35 % (ref 19.6–45.3)
MCH RBC QN AUTO: 29.3 PG (ref 26.6–33)
MCHC RBC AUTO-ENTMCNC: 33.2 G/DL (ref 31.5–35.7)
MCV RBC AUTO: 88.3 FL (ref 79–97)
MONOCYTES # BLD AUTO: 0.25 10*3/MM3 (ref 0.1–0.9)
MONOCYTES NFR BLD AUTO: 4 % (ref 5–12)
NEUTROPHILS NFR BLD AUTO: 3.66 10*3/MM3 (ref 1.7–7)
NEUTROPHILS NFR BLD AUTO: 58.7 % (ref 42.7–76)
NRBC BLD AUTO-RTO: 0 /100 WBC (ref 0–0.2)
PLATELET # BLD AUTO: 232 10*3/MM3 (ref 140–450)
PMV BLD AUTO: 12.3 FL (ref 6–12)
POTASSIUM SERPL-SCNC: 3.1 MMOL/L (ref 3.5–5.2)
PROT SERPL-MCNC: 6.5 G/DL (ref 6–8.5)
PSA SERPL-MCNC: <0.014 NG/ML (ref 0–4)
RBC # BLD AUTO: 3.92 10*6/MM3 (ref 4.14–5.8)
SODIUM SERPL-SCNC: 143 MMOL/L (ref 136–145)
TRIGL SERPL-MCNC: 78 MG/DL (ref 0–150)
VLDLC SERPL-MCNC: 14 MG/DL (ref 5–40)
WBC NRBC COR # BLD AUTO: 6.23 10*3/MM3 (ref 3.4–10.8)

## 2024-03-06 NOTE — TELEPHONE ENCOUNTER
Spoke with Brenda she wanted to know what was sent in on his visit because he told her that there were 4 new med's,I checked his chart & told her the Caphosol saliva substitute was the only one sent in for his mouth & then 2 refills of routine med's were sent,she verbalized understanding,she reports Repatha is on his list & he was afraid of it so he has never taken that injection.

## 2024-03-06 NOTE — TELEPHONE ENCOUNTER
Caller: angy muñoz    Relationship: Emergency Contact    Best call back number: [phone]     What is the best time to reach you: ANY    Who are you requesting to speak with (clinical staff, provider,  specific staff member): CLINICAL       What was the call regarding: PATIENT DAUGHTER REQUESTING A CALL BACK TO DISCUSS NEW MEDICATIONS THAT WAS PRESCRIBED AT HIS APPOINTMENT ON 3/5/24. PATIENT AND DAUGHTER HAD QUESTIONS ABOUT THESE AND JUST NEEDED CLARIFICATION. PLEASE CALL AND ADVISE.

## 2024-03-06 NOTE — TELEPHONE ENCOUNTER
Alexander called upset because all he got at the pharmacy was a few Zetia he said not even enough to cover the bottom of the bottle! I called [facility] he got a 30 day supply of Zetia that's all his insurance will let him get at one time & they are tiny,the Zyrtec is OTC but they will fill it,as to the Caphosol Saliva Substitute they do not have it & cannot order it at this time it is temporarily out of stock & does not give a date it will be back in,the pharmacist stayed on the phone with me & tried every saliva substitute he could find & none of them are in stock to order,any suggestions?.

## 2024-03-06 NOTE — TELEPHONE ENCOUNTER
Can you call Brenda? (And see if she wants you to call Hall too or if she wants to clarify things with Alexander).    So I gave him a post it note with four lines on it. Not 4 new meds.     The first line said Caphosol. This was a new med for the mucositis (the inflammation of his inside cheeks). However, since she's called, we've had some other things happen and it is actually on backorder at the pharmacy. When they get it, they WILL call him and I still want him to try it. It is the current recommendation for this. So I just talked to the pharmacy about this and if they had other recommendations. We are going to try cevimeline. It should decreased the inflammation. Three times a day. It is ready for him at the pharmacy.     The second line said doxycycline syrup. He should already have that in his possession. He wasn't sure.  The third line said chlorhexidine. Again, he should have already that in his possession. He wasn't sure.     I had wanted him to locate all three and try them and see which one he liked and which he didn't.    The fourth line said Vitamin E and folate. Two things over the counter that I wanted him to get and try for the same reasons.    You can let Brenda know that I agree with her - it's not from the dentures. I think it's from his cancer history; that his mucosal /skin/gland pathways are forever messed up from his treatments (even though the cancer was on the other end of his body, the treatments will affect ALL of the body). His symptoms will flare up if there is irritation, whether it is the dentures that aggravate it or a certain kind of food or even maybe if he gets more toothpaste than usual. I don't think there is anything autoimmune, but I did run labs just in case.

## 2024-03-07 NOTE — TELEPHONE ENCOUNTER
Spoke with Brenda & she verbalized understanding,she will check on all of this,she reports he had no Chemo or Radiation just had his prostate removed so that part she does not understand,she is concerned when she reviewed his labs that his H & H has dropped,does not know of any bleeding so is questioning iif he needs another colonoscopy.

## 2024-03-07 NOTE — TELEPHONE ENCOUNTER
Then no. The cancer history is not part of it. He had said differently.   His change in blood levels is not wide enough to look at a colonoscopy at this time; would require monitoring. If it continues to drop some more and maintain that drop, then we would need to do a workup to indicate for a colonoscopy.

## 2024-03-07 NOTE — TELEPHONE ENCOUNTER
Brenda called back stated the only one he has in the home is the Doxycycline Syrup & he says that nothing has helped so she does not know,is not even sure if he has used it very much so she is going to have him use it for a week & see if it helps.

## 2024-03-08 RX ORDER — CHLORHEXIDINE GLUCONATE ORAL RINSE 1.2 MG/ML
5 SOLUTION DENTAL 2 TIMES DAILY
Qty: 473 ML | Refills: 0 | Status: SHIPPED | OUTPATIENT
Start: 2024-03-08

## 2024-03-08 NOTE — TELEPHONE ENCOUNTER
Ok. I am fine with that. If she calls back, should ask about the cevimeline and/or chlorhexidine. I don't think the doxycycline will help because they had presented it as if it was apthous ulcers. No...it is more mucositis - swollen tissue.

## 2024-03-09 LAB
ANA SER QL IF: NEGATIVE
LABORATORY COMMENT REPORT: NORMAL

## 2024-05-01 ENCOUNTER — TELEPHONE (OUTPATIENT)
Dept: FAMILY MEDICINE CLINIC | Facility: CLINIC | Age: 74
End: 2024-05-01
Payer: MEDICARE

## 2024-05-01 NOTE — TELEPHONE ENCOUNTER
Caller: Alexander Zamora    Relationship: Self    Best call back number: [phone]    Which medication are you concerned about:     MAGIC MOUTH WASH    What are your concerns:     doxycycline (VIBRAMYCIN) 50 MG/5ML syrup  HEALED HIS MOUTH    MAGIC MOUTH WASH MADE HIS MIND BAD    PATIENT TOOK PILL BEFORE HE EATS IN MORNING - HE QUIT TAKING AND HIS MOUTH IS NOT AS BAD BECAUSE HE IS NOW TAKING THEM AFTER HE EATS    HIS LEGS ARE GETTING SORE PLACES ON THEM    BACK IS STARTING TO HURT AND TRYING TO RUN DOWN HIS LEG

## 2024-05-02 NOTE — TELEPHONE ENCOUNTER
Spoke with patient he is requesting Doxycycline syrup stated it healed his mouth in the past,reports the Magic Mouthwash worked on his mind he could not remember anything when he used it,reports scratch like areas on Valdo. Lower extremities from knees down not really sores,has scratched them til they bleed,has been out mowing & weed eating,reports his back hurts & is running down his right leg,please advise.

## 2024-05-06 ENCOUNTER — LAB (OUTPATIENT)
Dept: FAMILY MEDICINE CLINIC | Facility: CLINIC | Age: 74
End: 2024-05-06
Payer: MEDICARE

## 2024-05-06 ENCOUNTER — OFFICE VISIT (OUTPATIENT)
Dept: FAMILY MEDICINE CLINIC | Facility: CLINIC | Age: 74
End: 2024-05-06
Payer: MEDICARE

## 2024-05-06 VITALS
TEMPERATURE: 97.7 F | HEART RATE: 62 BPM | HEIGHT: 70 IN | SYSTOLIC BLOOD PRESSURE: 174 MMHG | BODY MASS INDEX: 21.85 KG/M2 | OXYGEN SATURATION: 97 % | DIASTOLIC BLOOD PRESSURE: 68 MMHG | WEIGHT: 152.6 LBS

## 2024-05-06 DIAGNOSIS — I10 ESSENTIAL HYPERTENSION: ICD-10-CM

## 2024-05-06 DIAGNOSIS — J30.89 SEASONAL ALLERGIC RHINITIS DUE TO OTHER ALLERGIC TRIGGER: ICD-10-CM

## 2024-05-06 DIAGNOSIS — E87.6 HYPOKALEMIA: ICD-10-CM

## 2024-05-06 DIAGNOSIS — H60.311 ACUTE DIFFUSE OTITIS EXTERNA OF RIGHT EAR: Primary | ICD-10-CM

## 2024-05-06 DIAGNOSIS — R41.3 MEMORY DEFICIT: ICD-10-CM

## 2024-05-06 PROCEDURE — 3077F SYST BP >= 140 MM HG: CPT | Performed by: FAMILY MEDICINE

## 2024-05-06 PROCEDURE — 99214 OFFICE O/P EST MOD 30 MIN: CPT | Performed by: FAMILY MEDICINE

## 2024-05-06 PROCEDURE — 1159F MED LIST DOCD IN RCRD: CPT | Performed by: FAMILY MEDICINE

## 2024-05-06 PROCEDURE — 36415 COLL VENOUS BLD VENIPUNCTURE: CPT | Performed by: FAMILY MEDICINE

## 2024-05-06 PROCEDURE — 1126F AMNT PAIN NOTED NONE PRSNT: CPT | Performed by: FAMILY MEDICINE

## 2024-05-06 PROCEDURE — 3078F DIAST BP <80 MM HG: CPT | Performed by: FAMILY MEDICINE

## 2024-05-06 PROCEDURE — 80053 COMPREHEN METABOLIC PANEL: CPT | Performed by: FAMILY MEDICINE

## 2024-05-06 PROCEDURE — 1160F RVW MEDS BY RX/DR IN RCRD: CPT | Performed by: FAMILY MEDICINE

## 2024-05-06 PROCEDURE — G2211 COMPLEX E/M VISIT ADD ON: HCPCS | Performed by: FAMILY MEDICINE

## 2024-05-06 RX ORDER — LISINOPRIL 40 MG/1
40 TABLET ORAL DAILY
Qty: 90 TABLET | Refills: 1 | Status: SHIPPED | OUTPATIENT
Start: 2024-05-06

## 2024-05-06 RX ORDER — AMLODIPINE BESYLATE 10 MG/1
10 TABLET ORAL DAILY
Qty: 90 TABLET | Refills: 1 | Status: SHIPPED | OUTPATIENT
Start: 2024-05-06

## 2024-05-06 RX ORDER — MONTELUKAST SODIUM 10 MG/1
10 TABLET ORAL NIGHTLY
Qty: 90 TABLET | Refills: 1 | Status: SHIPPED | OUTPATIENT
Start: 2024-05-06

## 2024-05-06 RX ORDER — OFLOXACIN 3 MG/ML
5 SOLUTION AURICULAR (OTIC) DAILY
Qty: 10 ML | Refills: 0 | Status: SHIPPED | OUTPATIENT
Start: 2024-05-06 | End: 2024-05-15 | Stop reason: SDUPTHER

## 2024-05-06 RX ORDER — LISINOPRIL 20 MG/1
20 TABLET ORAL DAILY
Qty: 90 TABLET | Refills: 1 | Status: CANCELLED | OUTPATIENT
Start: 2024-05-06

## 2024-05-06 RX ORDER — DONEPEZIL HYDROCHLORIDE 5 MG/1
5 TABLET, ORALLY DISINTEGRATING ORAL NIGHTLY
Qty: 30 TABLET | Refills: 2 | Status: SHIPPED | OUTPATIENT
Start: 2024-05-06

## 2024-05-06 NOTE — PROGRESS NOTES
"Alexander Zamora     VITALS: Blood pressure 174/68, pulse 62, temperature 97.7 °F (36.5 °C), temperature source Temporal, height 177.8 cm (70\"), weight 69.2 kg (152 lb 9.6 oz), SpO2 97%.    Subjective  Chief Complaint  Earache (Right) and Back Pain    Subjective          History of Present Illness:  Patient is a 74 y.o.  male with medical conditions significant for allergic rhinitis, hypertension, and hyperlipidemia who presents to clinic for medical follow-up.     The patient is experiencing pain at his throat that radiates to his right ear.    The patient has developed strawberry angiomas on his neck, back, and legs. He has been utilizing a facial mask to prevent facial damage. Additionally, he has dry skin on his legs, for which he applies an over-the-counter salve.    The patient expresses concern about his cognitive function. He resides alone in [place] and his wife resides in [place]. His daughter is in [place]. He maintains an active lifestyle, including household chores and cooking. He struggles with recalling information within 5 minutes of a sentence. His cognitive function improves when he is occupied, particularly when his neighbor assists him.    The patient adheres to his prescribed medication regimen, taking lisinopril once in the morning and once in the afternoon.    No complaints about any of the medications.    The following portions of the patient's history were reviewed and updated as appropriate: allergies, current medications, past family history, past medical history, past social history, past surgical history and problem list.    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    Chronic back pain     Depression     Elevated prostate specific antigen (PSA)     Erectile dysfunction     Hypercholesterolemia     Primary hypertension     Prostatitis        Surgical History  Past Surgical History:   Procedure Laterality Date    OTHER SURGICAL " "HISTORY      surgery for an ulcer, PNEUMOTHORAX AND ULCER THERAPY       Family History  Family History   Problem Relation Age of Onset    Bradycardia Mother         Had pacemaker    Nephrolithiasis Father     Heart disease Sister     Diabetes Other        Social History  Social History     Socioeconomic History    Marital status: Unknown   Tobacco Use    Smoking status: Former     Current packs/day: 0.00     Average packs/day: 2.0 packs/day for 5.0 years (10.0 ttl pk-yrs)     Types: Cigarettes     Start date:      Quit date:      Years since quittin.3    Smokeless tobacco: Never   Vaping Use    Vaping status: Never Used   Substance and Sexual Activity    Alcohol use: No    Drug use: No       Objective   Vital Signs:   /68 (BP Location: Right arm, Patient Position: Sitting, Cuff Size: Adult)   Pulse 62   Temp 97.7 °F (36.5 °C) (Temporal)   Ht 177.8 cm (70\")   Wt 69.2 kg (152 lb 9.6 oz)   SpO2 97%   BMI 21.90 kg/m²     Physical Exam     Gen: Patient in NAD. Pleasant and answers appropriately. MMSE 18/30.    Skin: Strawberry angiomas noted on the skin. Warm and dry with normal turgor. No purpura, rashes, or unusual pigmentation noted. Hair is normal in appearance and distribution.    HEENT: NC/AT. No lesions noted. Conjunctiva clear, sclera nonicteric. PERRL. EOMI without nystagmus or strabismus. Fundi appear benign. No hemorrhages or exudates of eyes. Auditory canals are patent bilaterally without lesions. TMs intact,  nonerythematous, bulging without lesions.  Right tragus painful.  Left tragus benign.  Nasal mucosa erythematous, and nonedematous. Frontal and maxillary sinuses are nontender. O/P nonerythematous and moist without exudate.    Neck: Supple without lymph nodes palpated. FROM.     Lungs: Slightly decreased B/L without rales, rhonchi, crackles, or wheezes.    Heart: RRR. S1 and S2 normal. No S3 or S4. No MRGT.    Abd: Soft, nontender,nondistended. (+)BSx4 quadrants.     Extrem: No " CC.  Trace edema bilateral lower extremities.  Radial pulses 2+/4 and equal B/L. FROMx4.  Positive joint tenderness noted.    Back : Decreased range of motion.      Neuro: No focal motor/sensory deficits.    Procedures    Result Review :   The following data was reviewed by: Bhavna Vaughn MD on 05/06/2024:                Assessment and Plan    Alexander Zamora is a 74 y.o. here for medical followup.    Diagnoses and all orders for this visit:    1. Acute diffuse otitis externa of right ear (Primary)  Comments:  The patient's right ear will be irrigated today. A prescription for otic drops will be provided.  Orders:  -     Discontinue: ofloxacin (FLOXIN) 0.3 % otic solution; Administer 5 drops to the right ear Daily.  Dispense: 10 mL; Refill: 0    2. Essential hypertension  Comments:  The patient's blood pressure is elevated. The patient's lisinopril dosage will be increased from 20 mg to 40 mg.  Orders:  -     amLODIPine (NORVASC) 10 MG tablet; Take 1 tablet by mouth Daily.  Dispense: 90 tablet; Refill: 1  -     lisinopril (PRINIVIL,ZESTRIL) 40 MG tablet; Take 1 tablet by mouth Daily.  Dispense: 90 tablet; Refill: 1    3. Seasonal allergic rhinitis due to other allergic trigger  Comments:  The patient's symptoms may be attributed to allergies.  Orders:  -     montelukast (SINGULAIR) 10 MG tablet; Take 1 tablet by mouth Every Night.  Dispense: 90 tablet; Refill: 1    4. Memory deficit  -     donepezil ODT (ARICEPT ODT) 5 MG disintegrating tablet; Place 1 tablet on the tongue Every Night.  Dispense: 30 tablet; Refill: 2  -     MRI Brain Without Contrast; Future  -     Ambulatory Referral to Case Management Care Coordination, Caregiving/Support, Advanced Care Planning    5. Hypokalemia  -     Comprehensive Metabolic Panel; Future  -     Comprehensive Metabolic Panel      Patient    Problem List Items Addressed This Visit       Allergic rhinitis    Relevant Medications    montelukast (SINGULAIR) 10 MG tablet      Other Visit Diagnoses       Memory deficit    -  Primary    Relevant Medications    donepezil ODT (ARICEPT ODT) 5 MG disintegrating tablet    Essential hypertension        Relevant Medications    amLODIPine (NORVASC) 10 MG tablet    lisinopril (PRINIVIL,ZESTRIL) 40 MG tablet    Hypokalemia        Relevant Orders    Comprehensive Metabolic Panel    Acute diffuse otitis externa of right ear        Relevant Medications    ofloxacin (FLOXIN) 0.3 % otic solution          Memory deficit  The patient has demonstrated a decline in his cognitive function over the past few years. His long-term memory is satisfactory. The patient has been advised to discontinue his current medication. A prescription for Aricept will be provided.  Encouraged patient to discuss with his daughter regarding long-term care plans.    BMI is within normal parameters. No other follow-up for BMI required.                 Follow Up   Return in about 6 weeks (around 6/17/2024), or (already has appt)LABS.  Findings and plans discussed with patient who verbalizes understanding and agreement. Will followup with patient once results are in. Patient was given instructions and counseling regarding his condition or for health maintenance advice. Please see specific information pulled into the AVS if appropriate.       Bhavna Vaughn MD      Transcribed from ambient dictation for Bhavna Vaughn MD by Merlin Bustamante.  05/06/24   10:39 EDT    Patient or patient representative verbalized consent to the visit recording.  I have personally performed the services described in this document as transcribed by the above individual, and it is both accurate and complete.

## 2024-05-07 ENCOUNTER — REFERRAL TRIAGE (OUTPATIENT)
Dept: CASE MANAGEMENT | Facility: OTHER | Age: 74
End: 2024-05-07
Payer: MEDICARE

## 2024-05-07 LAB
ALBUMIN SERPL-MCNC: 4.1 G/DL (ref 3.5–5.2)
ALBUMIN/GLOB SERPL: 1.5 G/DL
ALP SERPL-CCNC: 91 U/L (ref 39–117)
ALT SERPL W P-5'-P-CCNC: 10 U/L (ref 1–41)
ANION GAP SERPL CALCULATED.3IONS-SCNC: 11.2 MMOL/L (ref 5–15)
AST SERPL-CCNC: 12 U/L (ref 1–40)
BILIRUB SERPL-MCNC: 0.3 MG/DL (ref 0–1.2)
BUN SERPL-MCNC: 21 MG/DL (ref 8–23)
BUN/CREAT SERPL: 21.6 (ref 7–25)
CALCIUM SPEC-SCNC: 10.1 MG/DL (ref 8.6–10.5)
CHLORIDE SERPL-SCNC: 103 MMOL/L (ref 98–107)
CO2 SERPL-SCNC: 28.8 MMOL/L (ref 22–29)
CREAT SERPL-MCNC: 0.97 MG/DL (ref 0.76–1.27)
EGFRCR SERPLBLD CKD-EPI 2021: 81.4 ML/MIN/1.73
GLOBULIN UR ELPH-MCNC: 2.7 GM/DL
GLUCOSE SERPL-MCNC: 96 MG/DL (ref 65–99)
POTASSIUM SERPL-SCNC: 3.1 MMOL/L (ref 3.5–5.2)
PROT SERPL-MCNC: 6.8 G/DL (ref 6–8.5)
SODIUM SERPL-SCNC: 143 MMOL/L (ref 136–145)

## 2024-05-08 ENCOUNTER — PATIENT OUTREACH (OUTPATIENT)
Dept: CASE MANAGEMENT | Facility: OTHER | Age: 74
End: 2024-05-08
Payer: MEDICARE

## 2024-05-14 ENCOUNTER — TELEPHONE (OUTPATIENT)
Dept: FAMILY MEDICINE CLINIC | Facility: CLINIC | Age: 74
End: 2024-05-14
Payer: MEDICARE

## 2024-05-14 NOTE — TELEPHONE ENCOUNTER
[facility] called stated his right ear & right side of his face is sore to touch,his tongue is sore ,his throat is sore & it hurts to swallow even water,reports he is having to spit continuously,? If needs to be seen

## 2024-05-14 NOTE — TELEPHONE ENCOUNTER
I'll be honest. I never really know what he's talking about unless I can take a look. He might have strep for all I know. Can you schedule him in for tomorrow?

## 2024-05-15 ENCOUNTER — OFFICE VISIT (OUTPATIENT)
Dept: FAMILY MEDICINE CLINIC | Facility: CLINIC | Age: 74
End: 2024-05-15
Payer: MEDICARE

## 2024-05-15 VITALS
TEMPERATURE: 98.4 F | WEIGHT: 149.2 LBS | HEIGHT: 70 IN | BODY MASS INDEX: 21.36 KG/M2 | SYSTOLIC BLOOD PRESSURE: 168 MMHG | DIASTOLIC BLOOD PRESSURE: 74 MMHG

## 2024-05-15 DIAGNOSIS — H60.311 ACUTE DIFFUSE OTITIS EXTERNA OF RIGHT EAR: ICD-10-CM

## 2024-05-15 DIAGNOSIS — J02.9 SORE THROAT: Primary | ICD-10-CM

## 2024-05-15 DIAGNOSIS — R60.0 FACIAL EDEMA: ICD-10-CM

## 2024-05-15 LAB
EXPIRATION DATE: NORMAL
INTERNAL CONTROL: NORMAL
Lab: NORMAL
S PYO RRNA THROAT QL PROBE: NEGATIVE

## 2024-05-15 PROCEDURE — 3078F DIAST BP <80 MM HG: CPT | Performed by: FAMILY MEDICINE

## 2024-05-15 PROCEDURE — 3077F SYST BP >= 140 MM HG: CPT | Performed by: FAMILY MEDICINE

## 2024-05-15 PROCEDURE — 1126F AMNT PAIN NOTED NONE PRSNT: CPT | Performed by: FAMILY MEDICINE

## 2024-05-15 PROCEDURE — 99213 OFFICE O/P EST LOW 20 MIN: CPT | Performed by: FAMILY MEDICINE

## 2024-05-15 PROCEDURE — 87651 STREP A DNA AMP PROBE: CPT | Performed by: FAMILY MEDICINE

## 2024-05-15 RX ORDER — CLARITHROMYCIN 500 MG/1
500 TABLET, COATED ORAL 2 TIMES DAILY
Qty: 14 TABLET | Refills: 0 | Status: SHIPPED | OUTPATIENT
Start: 2024-05-15

## 2024-05-15 RX ORDER — OFLOXACIN 3 MG/ML
5 SOLUTION AURICULAR (OTIC) DAILY
Qty: 10 ML | Refills: 0 | Status: SHIPPED | OUTPATIENT
Start: 2024-05-15

## 2024-05-15 NOTE — PROGRESS NOTES
"Alexander Zamora     VITALS: Blood pressure 168/74, temperature 98.4 °F (36.9 °C), temperature source Temporal, height 177.8 cm (70\"), weight 67.7 kg (149 lb 3.2 oz).    Subjective  Chief Complaint  Earache (Right) and Sore Throat    Subjective          History of Present Illness:  Patient is a 74 y.o. male with medical conditions significant for allergic rhinitis, hypertension, and hyperlipidemia who presents to clinic today for medical follow-up.    Today he reports persistent throat, tongue, and ear pain, despite maintaining a good appetite and adherence to his prescribed medication regimen. He denies any febrile episodes or rhinorrhea. He has been expectorating white sputum and reports neck pain. He experiences dysphagia, particularly when consuming water, but denies xerostomia. He reports a sensation of sialorrhea, necessitating frequent spit-ups. He has attempted to alleviate his tongue pain with peroxide and Listerine, but to no avail. He has been self-administering otic solution to his ear, but to no discernible discharge. He speculates that the otic solution may have been used incorrectly. He has been using a nasal spray for symptom management.    His current weight is 148 pounds, down from a previous weight of 150 pounds.     No complaints about any of the medications.    The following portions of the patient's history were reviewed and updated as appropriate: allergies, current medications, past family history, past medical history, past social history, past surgical history and problem list.    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    Chronic back pain     Depression     Elevated prostate specific antigen (PSA)     Erectile dysfunction     Hypercholesterolemia     Primary hypertension     Prostatitis        Surgical History  Past Surgical History:   Procedure Laterality Date    OTHER SURGICAL HISTORY      surgery for an ulcer, PNEUMOTHORAX " "AND ULCER THERAPY       Family History  Family History   Problem Relation Age of Onset    Bradycardia Mother         Had pacemaker    Nephrolithiasis Father     Heart disease Sister     Diabetes Other        Social History  Social History     Socioeconomic History    Marital status: Unknown   Tobacco Use    Smoking status: Former     Current packs/day: 0.00     Average packs/day: 2.0 packs/day for 5.0 years (10.0 ttl pk-yrs)     Types: Cigarettes     Start date:      Quit date:      Years since quittin.4    Smokeless tobacco: Never   Vaping Use    Vaping status: Never Used   Substance and Sexual Activity    Alcohol use: No    Drug use: No       Objective   Vital Signs:   /74 (BP Location: Right arm, Patient Position: Sitting, Cuff Size: Adult)   Temp 98.4 °F (36.9 °C) (Temporal)   Ht 177.8 cm (70\")   Wt 67.7 kg (149 lb 3.2 oz)   BMI 21.41 kg/m²     Physical Exam   Gen: Patient in NAD. Pleasant and answers appropriately.     Skin: Warm and dry with normal turgor. No purpura, rashes, or unusual pigmentation noted. Hair is normal in appearance and distribution.    HEENT: NC/AT. No lesions noted. Conjunctiva clear, sclera non-icteric. PERRL. EOMI without nystagmus or strabismus. Fundi appear benign. No hemorrhages or exudates of eyes. Auditory canals are patent bilaterally without lesions. TMs intact,  non-erythematous, bulging without lesions.  Right tragus painful.  Left tragus benign.  Nasal mucosa erythematous, and non-edematous. Frontal and maxillary sinuses are nontender. O/P erythematous and moist without exudate.  Some edema to the face.    Neck: Supple without lymph nodes palpated. FROM.     Lungs: Decreased B/L without rales, rhonchi, crackles, or wheezes.    Heart: RRR. S1 and S2 normal. No S3 or S4. No MRGT.    Abd: Soft, non-tender, non-distended. (+)BSx4 quadrants.     Extrem: No CCE. Radial pulses 2+/4 and equal B/L. FROMx4. No bone, joint, or muscle tenderness noted.    Neuro: No " focal motor/sensory deficits.    Procedures       Assessment and Plan    This is a 74-year-old male presents to clinic for medical follow-up.    Diagnoses and all orders for this visit:    1. Sore throat (Primary)  -     POCT Strep A, molecular  -     US Head Neck Soft Tissue; Future  -     clarithromycin (BIAXIN) 500 MG tablet; Take 1 tablet by mouth 2 (Two) Times a Day.  Dispense: 14 tablet; Refill: 0    2. Acute diffuse otitis externa of right ear  Comments:  The patient's right ear will be irrigated today. A prescription for otic drops will be provided.  Orders:  -     ofloxacin (FLOXIN) 0.3 % otic solution; Administer 5 drops to the right ear Daily.  Dispense: 10 mL; Refill: 0    3. Facial edema  -     US Head Neck Soft Tissue; Future  -     clarithromycin (BIAXIN) 500 MG tablet; Take 1 tablet by mouth 2 (Two) Times a Day.  Dispense: 14 tablet; Refill: 0        Pharyngeal discomfort.  Antibiotic therapy will be prescribed to alleviate the inflammation. Additionally, otic drops will be prescribed to alleviate the pain. An MRI scan of the brain is scheduled for 06/03/2024. An ultrasound will be performed to rule out any potential masses.    Problem List Items Addressed This Visit    None  Visit Diagnoses       Sore throat    -  Primary    Relevant Medications    clarithromycin (BIAXIN) 500 MG tablet    Other Relevant Orders    POCT Strep A, molecular (Completed)    US Head Neck Soft Tissue    Acute diffuse otitis externa of right ear        The patient's right ear will be irrigated today. A prescription for otic drops will be provided.    Relevant Medications    ofloxacin (FLOXIN) 0.3 % otic solution    Facial edema        Relevant Medications    clarithromycin (BIAXIN) 500 MG tablet    Other Relevant Orders    US Head Neck Soft Tissue            BMI is within normal parameters. No other follow-up for BMI required.              Follow Up   Return (already has appt).  Findings and plans discussed with patient who  verbalizes understanding and agreement. Will followup with patient once results are in. Patient was given instructions and counseling regarding his condition or for health maintenance advice. Please see specific information pulled into the AVS if appropriate.       Transcribed from ambient dictation for Bhavna Vaughn MD by Jailyn Aviles.  05/15/24   16:59 EDT    Patient or patient representative verbalized consent to the visit recording.  I have personally performed the services described in this document as transcribed by the above individual, and it is both accurate and complete.

## 2024-05-17 ENCOUNTER — TELEPHONE (OUTPATIENT)
Dept: FAMILY MEDICINE CLINIC | Facility: CLINIC | Age: 74
End: 2024-05-17
Payer: MEDICARE

## 2024-05-17 NOTE — TELEPHONE ENCOUNTER
Alexander called wanted you to know that the medicine you gave him was GREAT,he feels so much better!!

## 2024-05-22 ENCOUNTER — TELEPHONE (OUTPATIENT)
Dept: FAMILY MEDICINE CLINIC | Facility: CLINIC | Age: 74
End: 2024-05-22
Payer: MEDICARE

## 2024-05-22 NOTE — TELEPHONE ENCOUNTER
Please call patient and let him know that I really can't give him more clarithromycin because that's actually an antibiotic and we can only give if there's any active infection. Did he find the chlorhexidine (PERIDEX) 0.12 % solution helpful? I can refill that.

## 2024-05-22 NOTE — TELEPHONE ENCOUNTER
Alexander called stated he is out of Clarithromycin 500 mg & needs a refill or her will not even be able to drink water,while on it he did great.

## 2024-05-22 NOTE — TELEPHONE ENCOUNTER
Spoke with patient & he verbalized understanding ,requests a refill on the Peridex Solution he used it before. ( Was not happy)

## 2024-05-23 RX ORDER — CHLORHEXIDINE GLUCONATE ORAL RINSE 1.2 MG/ML
5 SOLUTION DENTAL 2 TIMES DAILY
Qty: 473 ML | Refills: 0 | Status: SHIPPED | OUTPATIENT
Start: 2024-05-23

## 2024-05-28 ENCOUNTER — TELEPHONE (OUTPATIENT)
Dept: FAMILY MEDICINE CLINIC | Facility: CLINIC | Age: 74
End: 2024-05-28
Payer: MEDICARE

## 2024-05-28 NOTE — TELEPHONE ENCOUNTER
Caller: Alexander Zamora    Relationship to patient: Self    Best call back number: [phone]     Characteristics of symptom/severity: EARACHE, SORE THROAT    Where are you experiencing symptoms: EAR AND THROAT    How long have you been experiencing symptoms: ABOUT ONE WEEK    When have you experienced or been treated for these symptoms before:     Have you had any recent surgeries, procedures or injections: [] Yes  [x] No   If yes, explain:     Is it the symptoms constant or intermittent: [x] Constant  [] Intermittent   What makes it worse:     What makes it better:     What therapies/medications have you tried:     If a prescription is needed, what is your preferred pharmacy:   [facility] [place], [place] - [address] [place] [address] [phone] [facility] [phone]   [address] [place] [id number]  Phone: [phone] Fax: [phone]

## 2024-05-29 ENCOUNTER — TELEPHONE (OUTPATIENT)
Dept: FAMILY MEDICINE CLINIC | Facility: CLINIC | Age: 74
End: 2024-05-29
Payer: MEDICARE

## 2024-05-29 ENCOUNTER — HOSPITAL ENCOUNTER (OUTPATIENT)
Dept: ULTRASOUND IMAGING | Facility: HOSPITAL | Age: 74
Discharge: HOME | End: 2024-05-29
Admitting: FAMILY MEDICINE
Payer: MEDICARE

## 2024-05-29 DIAGNOSIS — R60.0 FACIAL EDEMA: ICD-10-CM

## 2024-05-29 DIAGNOSIS — J02.9 SORE THROAT: ICD-10-CM

## 2024-05-29 PROCEDURE — 76536 US EXAM OF HEAD AND NECK: CPT

## 2024-05-29 NOTE — TELEPHONE ENCOUNTER
Please call patient. Is he taking his flonase, singulair, and zyrtec?    So please let him know that his lymph nodes in his neck are enlarged. Weird question, but does he ever get diarrhea? I actually don't think that all of this is allergic rhinitis, but I do agree that it is an immune reaction, maybe something like Orofacial Granulomatosis. I actually want to get someone to biopsy a lymph node and maybe if he has some protruding edema/mucositis in his mouth, to biopsy that too. He had an appointment scheduled with a new ENT back in April in [place]. He had refused to go at that time. Can I send him in for more workup? I just want to get this treated correctly instead of just throwing medications at him.

## 2024-05-29 NOTE — TELEPHONE ENCOUNTER
"Alexander called as soon as he left from getting the US this am upset stated\" I thought they were going to find out what was wrong with my neck & all they did was run something up & down the outside of his neck\",explained that was the US ordered & what it would show,he wants called with results,they are available now,please advise.  "

## 2024-05-29 NOTE — TELEPHONE ENCOUNTER
Alexander called back wanted you to know that he is sneezing & blowing his nose often & his eyes are watering so there is more going on than we realize.   Daily Note     Today's date: 2018  Patient name: Charo Vilchis  : 2016  MRN: [id number]  Referring provider: Susanna Eldridge MD  Dx:   Encounter Diagnosis     ICD-10-CM    1  Feeding difficulty in child R63 3        Start Time: 1300  Stop Time: 1345  Total time in clinic (min): 45 minutes     1* [facility]- no visit limit        2* [facility] ----> precert    Subjective: Pt seen for 1:1 tx session as ST is out of clinic  Pt brought to therapy by mom who observed session from behind observation mirror  Mom suggested skipping the prep activities as she thinks it may be getting pt excited and contributing to his difficulties participating in feeding demands  Explained that the activities are proprioceptive, inhibitory tasks but we can trial skipping it  Objective: Pt transitioned down khan carrying small weighted ball  He was seated in booster seat with his feet supported on bench table to facilitate 90-90-90 sitting position  Continued to establish routine with blowing bubbles and cleaning hands and table with table bubbles  He was presented with nuk brush, chewy tube, and stars inside straw for oral awareness  He was presented with carrot stick, cheese ball, cheese puff, twizzler, and mum mum cracker  Assessment: He required assist to hold ball and avoid dropping it or rolling it  He required reminders to avoid grabbing bubble wand away from therapist  He demonstrated negative behaviors with picking up and playing with plate and napkin requiring redirection to keeping plate on table  He avoided [place] assist with pulling away and swinging hands  Positive, playful interactions with hard munchables, meltable solids, and straw with puff inside encouraging pt to bring them to his mouth  He required a great deal of encouragement and ignoring negative behaviors but he was able to bring all foods to his lips  He held cheeto puff on his tongue for approx 3 secs  He completed clean up routine       Other: Session discussed with Parent  She noticed that pt still demonstrated negative behaviors even with skipping prep activities  She stated they have been busy completing all of the steps required for the decannulation process  Pt also has other upcoming appts later this month and in September  Plan: Continue per plan of care

## 2024-05-29 NOTE — TELEPHONE ENCOUNTER
Spoke with Alexander & he verbalized understanding,denies any diarrhea,is agreeable to go see ENT but thinks there is one in [place] & had rather go to [place] than [place],is using the Flonase,zrytec & singular.

## 2024-05-31 DIAGNOSIS — R60.0 FACIAL EDEMA: Primary | ICD-10-CM

## 2024-05-31 DIAGNOSIS — R59.0 CERVICAL LYMPHADENOPATHY: ICD-10-CM

## 2024-05-31 NOTE — TELEPHONE ENCOUNTER
ENT referral sent. Interestingly, if he gets this diagnosis with the immune system, the treatment is prednisone so he will end up getting what he has liked all along!

## 2024-06-03 ENCOUNTER — HOSPITAL ENCOUNTER (OUTPATIENT)
Dept: MRI IMAGING | Facility: HOSPITAL | Age: 74
Discharge: HOME | End: 2024-06-03
Payer: MEDICARE

## 2024-06-03 DIAGNOSIS — R41.3 MEMORY DEFICIT: ICD-10-CM

## 2024-06-03 PROCEDURE — 70551 MRI BRAIN STEM W/O DYE: CPT

## 2024-06-03 PROCEDURE — 70551 MRI BRAIN STEM W/O DYE: CPT | Performed by: RADIOLOGY

## 2024-06-04 ENCOUNTER — TELEPHONE (OUTPATIENT)
Dept: FAMILY MEDICINE CLINIC | Facility: CLINIC | Age: 74
End: 2024-06-04
Payer: MEDICARE

## 2024-06-04 NOTE — TELEPHONE ENCOUNTER
----- Message from Bhavna Vaughn sent at 6/3/2024 10:29 PM EDT -----  Labs stable. Okay to either call or send letter to patient. Thanks.

## 2024-06-05 ENCOUNTER — TELEPHONE (OUTPATIENT)
Dept: FAMILY MEDICINE CLINIC | Facility: CLINIC | Age: 74
End: 2024-06-05
Payer: MEDICARE

## 2024-06-05 NOTE — TELEPHONE ENCOUNTER
The referral has been in. They are trying to find someone closer than the [place] one he declined.

## 2024-06-05 NOTE — TELEPHONE ENCOUNTER
You can let him know that it's okay. No cancer or masses. His brain has shrunk a little, but that is normal for his age. Also, as the brain tissue gets older, sometimes it doesn't get the blood flow that it needs, but it is not a stroke or TIA. This has happened, and this is probably why he is starting to have memory issues.

## 2024-06-05 NOTE — TELEPHONE ENCOUNTER
Patient verbalized understanding. He also wants to know if you will be sending him to a throat specialist like you have discussed last visit.  Please advice.

## 2024-06-06 ENCOUNTER — OFFICE VISIT (OUTPATIENT)
Dept: FAMILY MEDICINE CLINIC | Facility: CLINIC | Age: 74
End: 2024-06-06
Payer: MEDICARE

## 2024-06-06 VITALS
TEMPERATURE: 98.6 F | HEART RATE: 54 BPM | OXYGEN SATURATION: 95 % | DIASTOLIC BLOOD PRESSURE: 72 MMHG | HEIGHT: 70 IN | BODY MASS INDEX: 20.73 KG/M2 | SYSTOLIC BLOOD PRESSURE: 160 MMHG | WEIGHT: 144.8 LBS

## 2024-06-06 DIAGNOSIS — K13.70 MOUTH LESION: Primary | ICD-10-CM

## 2024-06-06 PROCEDURE — 3077F SYST BP >= 140 MM HG: CPT | Performed by: FAMILY MEDICINE

## 2024-06-06 PROCEDURE — 1125F AMNT PAIN NOTED PAIN PRSNT: CPT | Performed by: FAMILY MEDICINE

## 2024-06-06 PROCEDURE — 99213 OFFICE O/P EST LOW 20 MIN: CPT | Performed by: FAMILY MEDICINE

## 2024-06-06 PROCEDURE — 3078F DIAST BP <80 MM HG: CPT | Performed by: FAMILY MEDICINE

## 2024-06-06 NOTE — PROGRESS NOTES
"Chief Complaint  Mouth Lesions    Subjective          Shasta HILTON Zamora presents to [facility]  History of Present Illness    Patient states he is having some sorenoess in his back right throat. States he is having trouble eating because of this.     States he is waiting for a ENT appt still. Seen urgent care yesterday and received and antibiotic yesterday. States he has start these. States he has appt with pcp on Tuesday and dentist on Monday.     Review of Systems      Objective   Vital Signs:   /72 (BP Location: Right arm, Patient Position: Sitting)   Pulse 54   Temp 98.6 °F (37 °C) (Temporal)   Ht 177.8 cm (70\")   Wt 65.7 kg (144 lb 12.8 oz)   SpO2 95%   BMI 20.78 kg/m²     Physical Exam  Constitutional:       General: He is not in acute distress.     Appearance: Normal appearance. He is well-developed and well-groomed. He is not ill-appearing, toxic-appearing or diaphoretic.   HENT:      Head: Normocephalic.      Nose: Nose normal. No congestion or rhinorrhea.      Mouth/Throat:      Mouth: Mucous membranes are moist. Oral lesions present.      Pharynx: Oropharynx is clear. No oropharyngeal exudate or posterior oropharyngeal erythema.     Eyes:      General: Lids are normal.         Right eye: No discharge.         Left eye: No discharge.      Extraocular Movements: Extraocular movements intact.      Pupils: Pupils are equal, round, and reactive to light.   Neck:      Vascular: No carotid bruit.   Cardiovascular:      Rate and Rhythm: Normal rate and regular rhythm.      Pulses: Normal pulses.      Heart sounds: Normal heart sounds. No murmur heard.     No friction rub. No gallop.   Pulmonary:      Effort: Pulmonary effort is normal. No respiratory distress.      Breath sounds: Normal breath sounds. No stridor. No wheezing, rhonchi or rales.   Chest:      Chest wall: No tenderness.   Abdominal:      General: Bowel sounds are normal. There is no distension.      " Palpations: Abdomen is soft. There is no mass.      Tenderness: There is no abdominal tenderness. There is no right CVA tenderness, left CVA tenderness, guarding or rebound.      Hernia: No hernia is present.   Musculoskeletal:         General: No swelling or tenderness. Normal range of motion.      Cervical back: Normal range of motion and neck supple. No rigidity or tenderness.      Right lower leg: No edema.      Left lower leg: No edema.   Lymphadenopathy:      Cervical: No cervical adenopathy.   Skin:     General: Skin is warm.      Capillary Refill: Capillary refill takes less than 2 seconds.      Coloration: Skin is not jaundiced.      Findings: No bruising, erythema or rash.   Neurological:      General: No focal deficit present.      Mental Status: He is alert and oriented to person, place, and time.      Motor: Motor function is intact. No weakness.      Coordination: Coordination is intact.      Gait: Gait is intact. Gait normal.   Psychiatric:         Attention and Perception: Attention normal.         Mood and Affect: Mood normal.         Speech: Speech normal.         Behavior: Behavior normal.         Cognition and Memory: Cognition normal.         Judgment: Judgment normal.        Result Review :                 Assessment and Plan    Diagnoses and all orders for this visit:    1. Mouth lesion (Primary)  Comments:  will get in for possible biopsy. continue ENT referral and pcp and dentist appt as well.   Continue antibiotics.  Orders:  -     Ambulatory Referral to Oral Maxillofacial Surgery      Patient's Body mass index is 20.78 kg/m². indicating that he is within normal range (BMI 18.5-24.9). No BMI management plan needed..    Follow Up   No follow-ups on file.  Patient was given instructions and counseling regarding his condition or for health maintenance advice. Please see specific information pulled into the AVS if appropriate.     This document has been electronically signed by Diana Aguilar  APRN  June 6, 2024 16:12 EDT

## 2024-07-12 DIAGNOSIS — K21.9 GASTROESOPHAGEAL REFLUX DISEASE WITHOUT ESOPHAGITIS: ICD-10-CM

## 2024-07-12 DIAGNOSIS — E78.49 OTHER HYPERLIPIDEMIA: ICD-10-CM

## 2024-07-12 DIAGNOSIS — R41.3 MEMORY DEFICIT: ICD-10-CM

## 2024-07-12 RX ORDER — PANTOPRAZOLE SODIUM 40 MG/1
40 TABLET, DELAYED RELEASE ORAL DAILY
Qty: 90 TABLET | Refills: 5 | Status: SHIPPED | OUTPATIENT
Start: 2024-07-12 | End: 2024-07-15 | Stop reason: SDUPTHER

## 2024-07-12 RX ORDER — DONEPEZIL HYDROCHLORIDE 5 MG/1
TABLET, ORALLY DISINTEGRATING ORAL
Qty: 30 TABLET | Refills: 5 | Status: SHIPPED | OUTPATIENT
Start: 2024-07-12 | End: 2024-07-15 | Stop reason: SDUPTHER

## 2024-07-12 RX ORDER — EZETIMIBE 10 MG/1
10 TABLET ORAL DAILY
Qty: 30 TABLET | Refills: 5 | Status: SHIPPED | OUTPATIENT
Start: 2024-07-12 | End: 2024-07-15 | Stop reason: SDUPTHER

## 2024-07-12 NOTE — TELEPHONE ENCOUNTER
Confirmed patient is still taking Zetia.  She wanted to make Dr Vaughn aware she is in town for the next few days if you would like her to come in with [place].  Also noted he IS NOT doing Repatha injection ([facility]

## 2024-07-12 NOTE — TELEPHONE ENCOUNTER
Caller: angy muñoz    Relationship: Emergency Contact    Best call back number: [phone]       What was the call regarding: NEEDS TO KNOW IF PATIENT IS ON ZETIA AND AMLODIPINE. ALSO HAS QUESTIONS ABOUT OTHER MEDICATIONS. WOULD LIKE A CALL TO DISCUSS WHICH ONES HE SHOULD BE TAKING

## 2024-07-12 NOTE — TELEPHONE ENCOUNTER
Brenda indicated a refill is needed on Aricept because patient lost the mediation.  Also, should he be on pantoprazole? (its not on current medication list).  A refill is also needed on Zetia.  She reached out to pharmacy and they have none on file.

## 2024-07-12 NOTE — TELEPHONE ENCOUNTER
"Patient scheduled for Monday.  Daughter indicated a refill is needed on pended medication as he \"still has sores in his mouth\".   "

## 2024-07-12 NOTE — TELEPHONE ENCOUNTER
I think it would be a great idea. I would block out a 30 minute appointment for us all to meet as he needs advance planning also.

## 2024-07-15 ENCOUNTER — OFFICE VISIT (OUTPATIENT)
Dept: FAMILY MEDICINE CLINIC | Facility: CLINIC | Age: 74
End: 2024-07-15
Payer: MEDICARE

## 2024-07-15 VITALS
DIASTOLIC BLOOD PRESSURE: 60 MMHG | HEIGHT: 70 IN | HEART RATE: 76 BPM | BODY MASS INDEX: 21.02 KG/M2 | OXYGEN SATURATION: 98 % | TEMPERATURE: 97.7 F | SYSTOLIC BLOOD PRESSURE: 134 MMHG | WEIGHT: 146.8 LBS | RESPIRATION RATE: 16 BRPM

## 2024-07-15 DIAGNOSIS — M54.50 CHRONIC BILATERAL LOW BACK PAIN WITHOUT SCIATICA: ICD-10-CM

## 2024-07-15 DIAGNOSIS — K12.30 MUCOSITIS: ICD-10-CM

## 2024-07-15 DIAGNOSIS — J30.89 SEASONAL ALLERGIC RHINITIS DUE TO OTHER ALLERGIC TRIGGER: ICD-10-CM

## 2024-07-15 DIAGNOSIS — I10 ESSENTIAL HYPERTENSION: ICD-10-CM

## 2024-07-15 DIAGNOSIS — R25.2 LEG CRAMPING: ICD-10-CM

## 2024-07-15 DIAGNOSIS — E55.9 VITAMIN D DEFICIENCY, UNSPECIFIED: ICD-10-CM

## 2024-07-15 DIAGNOSIS — R41.3 MEMORY DEFICIT: ICD-10-CM

## 2024-07-15 DIAGNOSIS — K21.9 GASTROESOPHAGEAL REFLUX DISEASE WITHOUT ESOPHAGITIS: ICD-10-CM

## 2024-07-15 DIAGNOSIS — Z00.00 ENCOUNTER FOR SUBSEQUENT ANNUAL WELLNESS VISIT IN MEDICARE PATIENT: Primary | ICD-10-CM

## 2024-07-15 DIAGNOSIS — E78.49 OTHER HYPERLIPIDEMIA: ICD-10-CM

## 2024-07-15 DIAGNOSIS — G89.29 CHRONIC BILATERAL LOW BACK PAIN WITHOUT SCIATICA: ICD-10-CM

## 2024-07-15 PROCEDURE — 3078F DIAST BP <80 MM HG: CPT | Performed by: FAMILY MEDICINE

## 2024-07-15 PROCEDURE — 1159F MED LIST DOCD IN RCRD: CPT | Performed by: FAMILY MEDICINE

## 2024-07-15 PROCEDURE — G0439 PPPS, SUBSEQ VISIT: HCPCS | Performed by: FAMILY MEDICINE

## 2024-07-15 PROCEDURE — 1160F RVW MEDS BY RX/DR IN RCRD: CPT | Performed by: FAMILY MEDICINE

## 2024-07-15 PROCEDURE — 3075F SYST BP GE 130 - 139MM HG: CPT | Performed by: FAMILY MEDICINE

## 2024-07-15 PROCEDURE — 1125F AMNT PAIN NOTED PAIN PRSNT: CPT | Performed by: FAMILY MEDICINE

## 2024-07-15 RX ORDER — CEVIMELINE HYDROCHLORIDE 30 MG/1
30 CAPSULE ORAL 3 TIMES DAILY
Qty: 270 CAPSULE | Refills: 1 | Status: SHIPPED | OUTPATIENT
Start: 2024-07-15

## 2024-07-15 RX ORDER — LISINOPRIL 40 MG/1
40 TABLET ORAL DAILY
Qty: 90 TABLET | Refills: 1 | Status: SHIPPED | OUTPATIENT
Start: 2024-07-15

## 2024-07-15 RX ORDER — PHENOL 1.4 %
1 AEROSOL, SPRAY (ML) MUCOUS MEMBRANE NIGHTLY
COMMUNITY

## 2024-07-15 RX ORDER — LIDOCAINE HYDROCHLORIDE 20 MG/ML
SOLUTION OROPHARYNGEAL AS NEEDED
COMMUNITY

## 2024-07-15 RX ORDER — CHLORHEXIDINE GLUCONATE ORAL RINSE 1.2 MG/ML
5 SOLUTION DENTAL 2 TIMES DAILY
Qty: 473 ML | Refills: 0 | OUTPATIENT
Start: 2024-07-15

## 2024-07-15 RX ORDER — SALIVA SUBSTITUTE COMBO NO.2
30 SOLUTION, ORAL MUCOUS MEMBRANE AS NEEDED
Qty: 900 ML | Refills: 1 | Status: SHIPPED | OUTPATIENT
Start: 2024-07-15

## 2024-07-15 RX ORDER — CHOLECALCIFEROL (VITAMIN D3) 125 MCG
1 TABLET ORAL DAILY
COMMUNITY

## 2024-07-15 RX ORDER — CHLORHEXIDINE GLUCONATE ORAL RINSE 1.2 MG/ML
SOLUTION DENTAL
Qty: 473 ML | Refills: 5 | OUTPATIENT
Start: 2024-07-15

## 2024-07-15 RX ORDER — CETIRIZINE HYDROCHLORIDE 10 MG/1
10 TABLET ORAL DAILY
Qty: 90 TABLET | Refills: 1 | Status: SHIPPED | OUTPATIENT
Start: 2024-07-15

## 2024-07-15 RX ORDER — AMLODIPINE BESYLATE 10 MG/1
10 TABLET ORAL DAILY
Qty: 90 TABLET | Refills: 1 | Status: SHIPPED | OUTPATIENT
Start: 2024-07-15

## 2024-07-15 RX ORDER — EZETIMIBE 10 MG/1
10 TABLET ORAL DAILY
Qty: 90 TABLET | Refills: 1 | Status: SHIPPED | OUTPATIENT
Start: 2024-07-15

## 2024-07-15 RX ORDER — CEVIMELINE HYDROCHLORIDE 30 MG/1
30 CAPSULE ORAL 3 TIMES DAILY
COMMUNITY
End: 2024-07-15 | Stop reason: SDUPTHER

## 2024-07-15 RX ORDER — CHLORHEXIDINE GLUCONATE ORAL RINSE 1.2 MG/ML
5 SOLUTION DENTAL 2 TIMES DAILY
Qty: 473 ML | Refills: 1 | Status: SHIPPED | OUTPATIENT
Start: 2024-07-15

## 2024-07-15 RX ORDER — VALACYCLOVIR HYDROCHLORIDE 1 G/1
1000 TABLET, FILM COATED ORAL DAILY PRN
COMMUNITY

## 2024-07-15 RX ORDER — DONEPEZIL HYDROCHLORIDE 5 MG/1
TABLET, ORALLY DISINTEGRATING ORAL
Qty: 30 TABLET | Refills: 5 | OUTPATIENT
Start: 2024-07-15

## 2024-07-15 RX ORDER — CHOLECALCIFEROL (VITAMIN D3) 25 MCG
1000 TABLET ORAL DAILY
COMMUNITY

## 2024-07-15 RX ORDER — CHLORHEXIDINE GLUCONATE ORAL RINSE 1.2 MG/ML
5 SOLUTION DENTAL 2 TIMES DAILY
Qty: 473 ML | Refills: 0 | Status: SHIPPED | OUTPATIENT
Start: 2024-07-15 | End: 2024-07-15 | Stop reason: SDUPTHER

## 2024-07-15 RX ORDER — LISINOPRIL 20 MG/1
20 TABLET ORAL DAILY
COMMUNITY
End: 2024-07-15

## 2024-07-15 RX ORDER — DONEPEZIL HYDROCHLORIDE 5 MG/1
5 TABLET, ORALLY DISINTEGRATING ORAL NIGHTLY
Qty: 90 TABLET | Refills: 1 | Status: SHIPPED | OUTPATIENT
Start: 2024-07-15

## 2024-07-15 RX ORDER — MONTELUKAST SODIUM 10 MG/1
10 TABLET ORAL NIGHTLY
Qty: 90 TABLET | Refills: 1 | Status: SHIPPED | OUTPATIENT
Start: 2024-07-15

## 2024-07-15 RX ORDER — PANTOPRAZOLE SODIUM 40 MG/1
40 TABLET, DELAYED RELEASE ORAL DAILY
Qty: 90 TABLET | Refills: 1 | Status: SHIPPED | OUTPATIENT
Start: 2024-07-15

## 2024-07-15 NOTE — TELEPHONE ENCOUNTER
Caller: JOSSELINE WITH [place], [place] - [address] [facility] - [phone] [facility] [phone] FX    Relationship: Pharmacy    Best call back number:  [phone]     Requested Prescriptions:   Requested Prescriptions     Pending Prescriptions Disp Refills    donepezil ODT (ARICEPT ODT) 5 MG disintegrating tablet 30 tablet 5    chlorhexidine (PERIDEX) 0.12 % solution 473 mL 0     Sig: Apply 5 mL to the mouth or throat 2 (Two) Times a Day.        Pharmacy where request should be sent: [place], [place] - [address] [facility] - [phone] [facility] [phone] FX     Last office visit with prescribing clinician: 5/15/2024   Last telemedicine visit with prescribing clinician: Visit date not found   Next office visit with prescribing clinician: 7/15/2024     Additional details provided by patient: IS OUT AFTER TODAY.    Does the patient have less than a 3 day supply:  [x] Yes  [] No    Would you like a call back once the refill request has been completed: [] Yes [x] No    If the office needs to give you a call back, can they leave a voicemail: [] Yes [x] No    Chelsey Landeros Rep   07/15/24 10:16 EDT

## 2024-07-16 ENCOUNTER — LAB (OUTPATIENT)
Dept: FAMILY MEDICINE CLINIC | Facility: CLINIC | Age: 74
End: 2024-07-16
Payer: MEDICARE

## 2024-07-16 DIAGNOSIS — J30.89 SEASONAL ALLERGIC RHINITIS DUE TO OTHER ALLERGIC TRIGGER: ICD-10-CM

## 2024-07-16 DIAGNOSIS — R25.2 LEG CRAMPING: ICD-10-CM

## 2024-07-16 DIAGNOSIS — I10 ESSENTIAL HYPERTENSION: ICD-10-CM

## 2024-07-16 DIAGNOSIS — R41.3 MEMORY DEFICIT: ICD-10-CM

## 2024-07-16 DIAGNOSIS — E78.49 OTHER HYPERLIPIDEMIA: ICD-10-CM

## 2024-07-16 DIAGNOSIS — E55.9 VITAMIN D DEFICIENCY, UNSPECIFIED: ICD-10-CM

## 2024-07-16 DIAGNOSIS — K21.9 GASTROESOPHAGEAL REFLUX DISEASE WITHOUT ESOPHAGITIS: ICD-10-CM

## 2024-07-16 PROCEDURE — 85025 COMPLETE CBC W/AUTO DIFF WBC: CPT | Performed by: FAMILY MEDICINE

## 2024-07-16 PROCEDURE — 36415 COLL VENOUS BLD VENIPUNCTURE: CPT

## 2024-07-16 PROCEDURE — 82607 VITAMIN B-12: CPT | Performed by: FAMILY MEDICINE

## 2024-07-16 PROCEDURE — 82306 VITAMIN D 25 HYDROXY: CPT | Performed by: FAMILY MEDICINE

## 2024-07-16 PROCEDURE — 80053 COMPREHEN METABOLIC PANEL: CPT | Performed by: FAMILY MEDICINE

## 2024-07-16 PROCEDURE — 83735 ASSAY OF MAGNESIUM: CPT | Performed by: FAMILY MEDICINE

## 2024-07-17 LAB
25(OH)D3 SERPL-MCNC: 75.2 NG/ML (ref 30–100)
ALBUMIN SERPL-MCNC: 3.7 G/DL (ref 3.5–5.2)
ALBUMIN/GLOB SERPL: 1.2 G/DL
ALP SERPL-CCNC: 85 U/L (ref 39–117)
ALT SERPL W P-5'-P-CCNC: 18 U/L (ref 1–41)
ANION GAP SERPL CALCULATED.3IONS-SCNC: 9.2 MMOL/L (ref 5–15)
AST SERPL-CCNC: 19 U/L (ref 1–40)
BASOPHILS # BLD AUTO: 0.06 10*3/MM3 (ref 0–0.2)
BASOPHILS NFR BLD AUTO: 0.8 % (ref 0–1.5)
BILIRUB SERPL-MCNC: 0.2 MG/DL (ref 0–1.2)
BUN SERPL-MCNC: 19 MG/DL (ref 8–23)
BUN/CREAT SERPL: 17.6 (ref 7–25)
CALCIUM SPEC-SCNC: 10.3 MG/DL (ref 8.6–10.5)
CHLORIDE SERPL-SCNC: 104 MMOL/L (ref 98–107)
CO2 SERPL-SCNC: 27.8 MMOL/L (ref 22–29)
CREAT SERPL-MCNC: 1.08 MG/DL (ref 0.76–1.27)
DEPRECATED RDW RBC AUTO: 40.9 FL (ref 37–54)
EGFRCR SERPLBLD CKD-EPI 2021: 71.6 ML/MIN/1.73
EOSINOPHIL # BLD AUTO: 0.07 10*3/MM3 (ref 0–0.4)
EOSINOPHIL NFR BLD AUTO: 0.9 % (ref 0.3–6.2)
ERYTHROCYTE [DISTWIDTH] IN BLOOD BY AUTOMATED COUNT: 13 % (ref 12.3–15.4)
GLOBULIN UR ELPH-MCNC: 3.1 GM/DL
GLUCOSE SERPL-MCNC: 97 MG/DL (ref 65–99)
HCT VFR BLD AUTO: 32.5 % (ref 37.5–51)
HGB BLD-MCNC: 10.9 G/DL (ref 13–17.7)
IMM GRANULOCYTES # BLD AUTO: 0.02 10*3/MM3 (ref 0–0.05)
IMM GRANULOCYTES NFR BLD AUTO: 0.3 % (ref 0–0.5)
LYMPHOCYTES # BLD AUTO: 2.42 10*3/MM3 (ref 0.7–3.1)
LYMPHOCYTES NFR BLD AUTO: 32.5 % (ref 19.6–45.3)
MAGNESIUM SERPL-MCNC: 1.9 MG/DL (ref 1.6–2.4)
MCH RBC QN AUTO: 29.5 PG (ref 26.6–33)
MCHC RBC AUTO-ENTMCNC: 33.5 G/DL (ref 31.5–35.7)
MCV RBC AUTO: 87.8 FL (ref 79–97)
MONOCYTES # BLD AUTO: 0.27 10*3/MM3 (ref 0.1–0.9)
MONOCYTES NFR BLD AUTO: 3.6 % (ref 5–12)
NEUTROPHILS NFR BLD AUTO: 4.61 10*3/MM3 (ref 1.7–7)
NEUTROPHILS NFR BLD AUTO: 61.9 % (ref 42.7–76)
NRBC BLD AUTO-RTO: 0 /100 WBC (ref 0–0.2)
PLATELET # BLD AUTO: 260 10*3/MM3 (ref 140–450)
PMV BLD AUTO: 12 FL (ref 6–12)
POTASSIUM SERPL-SCNC: 3.8 MMOL/L (ref 3.5–5.2)
PROT SERPL-MCNC: 6.8 G/DL (ref 6–8.5)
RBC # BLD AUTO: 3.7 10*6/MM3 (ref 4.14–5.8)
SODIUM SERPL-SCNC: 141 MMOL/L (ref 136–145)
VIT B12 BLD-MCNC: 683 PG/ML (ref 211–946)
WBC NRBC COR # BLD AUTO: 7.45 10*3/MM3 (ref 3.4–10.8)

## 2024-07-22 ENCOUNTER — TELEPHONE (OUTPATIENT)
Dept: FAMILY MEDICINE CLINIC | Facility: CLINIC | Age: 74
End: 2024-07-22

## 2024-07-22 NOTE — TELEPHONE ENCOUNTER
Caller: angy muñoz    Relationship: Emergency Contact    Best call back number: [phone]     What is the best time to reach you: ANYTIME TODAY    Who are you requesting to speak with (clinical staff, provider,  specific staff member): PCP/MA    Do you know the name of the person who called: ANGY    What was the call regarding: DAUGHTER REQUEST A CALLBACK TO DISCUSS LABS AND IF PATIENT SHOULD COME IN SOONER TO SEE PCP    Is it okay if the provider responds through MyChart: CALLBACK TODAY

## 2024-07-26 NOTE — TELEPHONE ENCOUNTER
Please call Brenda and let her know that all labs are stable and good. His blood is a little bit lower, but I'm going to watch it and have it rechecked at his next appointment. No changes to the plan.

## 2024-07-30 NOTE — PROGRESS NOTES
The ABCs of the Annual Wellness Visit  Subsequent Medicare Wellness Visit    Arleen Zamora is a 74 y.o. male with medical conditions significant for allergic rhinitis, hypertension, and hyperlipidemia who presents for a Subsequent Medicare Wellness Visit.    The following portions of the patient's history were reviewed and   updated as appropriate: allergies, current medications, past family history, past medical history, past social history, past surgical history, and problem list.    Compared to one year ago, the patient feels his physical   health is worse.    Compared to one year ago, the patient feels his mental   health is the same.    Recent Hospitalizations:  He was not admitted to the hospital during the last year.       Current Medical Providers:  Patient Care Team:  Bhavna Vaughn MD as PCP - General (Family Medicine)  Lowell Espinoza MD as Consulting Physician (Otolaryngology)  Wan Salder Jr., MD (Otolaryngology)  Syeda Bhatia APRN as Nurse Practitioner (Cardiology)    Outpatient Medications Prior to Visit   Medication Sig Dispense Refill    Cholecalciferol 25 MCG (1000 UT) tablet Take 1 tablet by mouth Daily.      Ergocalciferol (Vitamin D2) 50 MCG (2000 UT) tablet Take 1 tablet by mouth Daily.      fluticasone (FLONASE) 50 MCG/ACT nasal spray 2 sprays into the nostril(s) as directed by provider Daily. 16 g 5    ibuprofen (ADVIL,MOTRIN) 600 MG tablet Take 1 tablet by mouth 3 (Three) Times a Day.      Lidocaine Viscous HCl (XYLOCAINE) 2 % solution Take  by mouth As Needed for Mild Pain. Mix 20mL Lidocaine with 20mL of Distilled Water, place into a spray bottle, apply 3 sprays on each side where tonsils were every 1 to 2 hours as needed.      Melatonin 10 MG tablet Take 1 tablet by mouth Every Night.      Misc Natural Products (NEURIVA PO) Take  by mouth Daily.      Multiple Vitamin (MULTI VITAMIN DAILY) tablet Take 1 tablet by mouth Daily.      mupirocin  (BACTROBAN) 2 % ointment Apply 1 Application topically to the appropriate area as directed 3 (Three) Times a Day.      Omega-3 Fatty Acids (OMEGA 3 500 PO) Take 500 mg by mouth As Needed.      valACYclovir (VALTREX) 1000 MG tablet Take 1 tablet by mouth Daily As Needed (Herpez Flair).      vitamin C (ASCORBIC ACID) 250 MG tablet Take 1 tablet by mouth Daily.      vitamin E 100 UNIT capsule Take 1 capsule by mouth Daily.      Zinc 50 MG capsule Take  by mouth.      amLODIPine (NORVASC) 10 MG tablet Take 1 tablet by mouth Daily. 90 tablet 1    cetirizine (zyrTEC) 10 MG tablet Take 1 tablet by mouth Daily. 90 tablet 1    cevimeline (EVOXAC) 30 MG capsule Take 1 capsule by mouth 3 (Three) Times a Day.      chlorhexidine (PERIDEX) 0.12 % solution Apply 5 mL to the mouth or throat 2 (Two) Times a Day. 473 mL 0    donepezil ODT (ARICEPT ODT) 5 MG disintegrating tablet DISSOLVE ONE TABLET ON TOP OF TONGUE EVERY NIGHT AT BEDTIME 30 tablet 5    ezetimibe (ZETIA) 10 MG tablet TAKE ONE TABLET BY MOUTH EVERY DAY 30 tablet 5    lisinopril (PRINIVIL,ZESTRIL) 20 MG tablet Take 1 tablet by mouth Daily.      montelukast (SINGULAIR) 10 MG tablet Take 1 tablet by mouth Every Night. 90 tablet 1    ofloxacin (FLOXIN) 0.3 % otic solution Administer 5 drops to the right ear Daily. 10 mL 0    pantoprazole (PROTONIX) 40 MG EC tablet TAKE ONE TABLET BY MOUTH EVERY DAY FOR STOMACH 90 tablet 5    caphosol (SALIVA SUBSTITUTE) solution Take 30 mL by mouth As Needed (mucositis). (Patient not taking: Reported on 7/15/2024) 300 mL 0    clarithromycin (BIAXIN) 500 MG tablet Take 1 tablet by mouth 2 (Two) Times a Day. (Patient not taking: Reported on 7/15/2024) 14 tablet 0    doxycycline (VIBRAMYCIN) 50 MG/5ML syrup Apply to affected areas using a qtip up to TID PRN pain. (Patient not taking: Reported on 7/15/2024) 100 mL 0    Evolocumab (REPATHA) solution auto-injector SureClick injection Inject 1 mL under the skin into the appropriate area as  "directed Every 14 (Fourteen) Days. (Patient not taking: Reported on 7/15/2024) 4 mL 3    lisinopril (PRINIVIL,ZESTRIL) 40 MG tablet Take 1 tablet by mouth Daily. 90 tablet 1     No facility-administered medications prior to visit.       No opioid medication identified on active medication list. I have reviewed chart for other potential  high risk medication/s and harmful drug interactions in the elderly.        Aspirin is not on active medication list.  Aspirin use is not indicated based on review of current medical condition/s. Risk of harm outweighs potential benefits.  .    Patient Active Problem List   Diagnosis    Prostate cancer    Erectile dysfunction following radical prostatectomy    BEULAH (stress urinary incontinence), male    Arthritis    Chronic back pain    Hypercholesterolemia    Primary hypertension    Allergic rhinitis    History of oral aphthous ulcers    Precordial pain    ASCVD (arteriosclerotic cardiovascular disease)     Advance Care Planning   Advance Care Planning     Advance Directive is not on file.  ACP discussion was held with the patient during this visit. Patient does not have an advance directive, information provided.     Objective    Vitals:    07/15/24 1522 07/15/24 1637   BP: 140/70 134/60   BP Location: Right arm Right arm   Patient Position: Sitting Sitting   Cuff Size: Adult Adult   Pulse: 76    Resp: 16    Temp: 97.7 °F (36.5 °C)    TempSrc: Temporal    SpO2: 98%    Weight: 66.6 kg (146 lb 12.8 oz)    Height: 177.8 cm (70\")    PainSc:   8    PainLoc: Back      Estimated body mass index is 21.06 kg/m² as calculated from the following:    Height as of this encounter: 177.8 cm (70\").    Weight as of this encounter: 66.6 kg (146 lb 12.8 oz).    BMI is within normal parameters. No other follow-up for BMI required.      Does the patient have evidence of cognitive impairment? Yes          HEALTH RISK ASSESSMENT    Smoking Status:  Social History     Tobacco Use   Smoking Status Former    " Current packs/day: 0.00    Average packs/day: 2.0 packs/day for 5.0 years (10.0 ttl pk-yrs)    Types: Cigarettes    Start date:     Quit date: 1970    Years since quittin.6   Smokeless Tobacco Never     Alcohol Consumption:  Social History     Substance and Sexual Activity   Alcohol Use No     Fall Risk Screen:    SHONDA Fall Risk Assessment was completed, and patient is at LOW risk for falls.Assessment completed on:7/15/2024    Depression Screenin/15/2024     3:41 PM   PHQ-2/PHQ-9 Depression Screening   Little Interest or Pleasure in Doing Things 0-->not at all   Feeling Down, Depressed or Hopeless 0-->not at all   PHQ-9: Brief Depression Severity Measure Score 0       Health Habits and Functional and Cognitive Screenin/15/2024     3:46 PM   Functional & Cognitive Status   Do you have difficulty concentrating, remembering or making decisions? Yes       Age-appropriate Screening Schedule:  Refer to the list below for future screening recommendations based on patient's age, sex and/or medical conditions. Orders for these recommended tests are listed in the plan section. The patient has been provided with a written plan.    Health Maintenance   Topic Date Due    HEPATITIS C SCREENING  Never done    COVID-19 Vaccine (2023-24 season) 2023    RSV Vaccine - Adults (1 - 1-dose 60+ series) 07/15/2025 (Originally 2009)    INFLUENZA VACCINE  2024    LIPID PANEL  2025    ANNUAL WELLNESS VISIT  07/15/2025    COLORECTAL CANCER SCREENING  2029    TDAP/TD VACCINES (2 - Tdap) 2032    Pneumococcal Vaccine 65+  Completed    AAA SCREEN (ONE-TIME)  Completed    ZOSTER VACCINE  Completed                  CMS Preventative Services Quick Reference  Risk Factors Identified During Encounter  Depression/Dysphoria:  Continue to monitor.  Inadequate Social Support, Isolation, Loneliness, Lack of Transportation, Financial Difficulties, or Caregiver Stress: .  The above  "risks/problems have been discussed with the patient.  Pertinent information has been shared with the patient in the After Visit Summary.  An After Visit Summary and PPPS were made available to the patient.    Follow Up:   Next Medicare Wellness visit to be scheduled in 1 year.       Additional E&M Note during same encounter follows:  Patient has multiple medical problems which are significant and separately identifiable that require additional work above and beyond the Medicare Wellness Visit.      Chief Complaint  Medicare Wellness-subsequent and Insomnia    Subjective        HPI  Alexanderwalker Zamora is also being seen today for his sore mouth and social living conditions. He comes with his daughter, Brenda, today.    Since his last visit, patient has seen Dr. aPn secondary to his sore mouth.  Dr. Pan diagnosed him with mucositis.  He is scheduled to see the oral surgeon on Thursday.  He is wanting some refills on his oral solutions.    Patient has been slightly losing his balance.    Brenda is wanting him to move towards [place] where she lives.  He is resisting.         Objective   Vital Signs:  /60 (BP Location: Right arm, Patient Position: Sitting, Cuff Size: Adult)   Pulse 76   Temp 97.7 °F (36.5 °C) (Temporal)   Resp 16   Ht 177.8 cm (70\")   Wt 66.6 kg (146 lb 12.8 oz)   SpO2 98%   BMI 21.06 kg/m²     Physical Exam   Gen: Patient in NAD.  Occasionally confused. A&Ox3.    Skin: Warm and dry with normal turgor. No purpura, rashes, or unusual pigmentation noted. Hair is normal in appearance and distribution.    HEENT: NC/AT. No lesions noted. Conjunctiva clear, sclera nonicteric. PERRL. EOMI without nystagmus or strabismus. Fundi appear benign. No hemorrhages or exudates of eyes. Auditory canals are patent bilaterally without lesions. TMs intact,  nonerythematous, bulging without lesions. Nasal mucosa pink, nonerythematous, and nonedematous. Frontal and maxillary sinuses are " nontender. O/P slightly erythematous and moist without exudate.    Neck: Supple without lymph nodes palpated. FROM.     Lungs: Decreased B/L without rales, rhonchi, crackles, or wheezes.    Heart: RRR. S1 and S2 normal. No S3 or S4. No MRGT.    Abd: Soft, nontender,nondistended. (+)BSx4 quadrants.     Extrem: No CCE. Radial pulses 2+/4 and equal B/L. FROMx4. No bone, joint, or muscle tenderness noted.    Neuro: No focal motor/sensory deficits.                   Assessment and Plan   Diagnoses and all orders for this visit:    1. Encounter for subsequent annual wellness visit in Medicare patient (Primary)    2. Essential hypertension  Orders:  -     amLODIPine (NORVASC) 10 MG tablet; Take 1 tablet by mouth Daily.  Dispense: 90 tablet; Refill: 1  -     lisinopril (PRINIVIL,ZESTRIL) 40 MG tablet; Take 1 tablet by mouth Daily.  Dispense: 90 tablet; Refill: 1  -     CBC Auto Differential; Future  -     Comprehensive Metabolic Panel; Future    3. Seasonal allergic rhinitis due to other allergic trigger  -     cetirizine (zyrTEC) 10 MG tablet; Take 1 tablet by mouth Daily.  Dispense: 90 tablet; Refill: 1  -     montelukast (SINGULAIR) 10 MG tablet; Take 1 tablet by mouth Every Night.  Dispense: 90 tablet; Refill: 1  -     CBC Auto Differential; Future  -     Comprehensive Metabolic Panel; Future    4. Memory deficit  -     donepezil ODT (ARICEPT ODT) 5 MG disintegrating tablet; Place 1 tablet on the tongue Every Night.  Dispense: 90 tablet; Refill: 1  -     CBC Auto Differential; Future  -     Comprehensive Metabolic Panel; Future    5. Other hyperlipidemia  -     ezetimibe (ZETIA) 10 MG tablet; Take 1 tablet by mouth Daily.  Dispense: 90 tablet; Refill: 1  -     CBC Auto Differential; Future  -     Comprehensive Metabolic Panel; Future    6. Seasonal allergic rhinitis due to other allergic trigger  Orders:  -     cetirizine (zyrTEC) 10 MG tablet; Take 1 tablet by mouth Daily.  Dispense: 90 tablet; Refill: 1  -      montelukast (SINGULAIR) 10 MG tablet; Take 1 tablet by mouth Every Night.  Dispense: 90 tablet; Refill: 1  -     CBC Auto Differential; Future  -     Comprehensive Metabolic Panel; Future    7. Gastroesophageal reflux disease without esophagitis  -     pantoprazole (PROTONIX) 40 MG EC tablet; Take 1 tablet by mouth Daily.  Dispense: 90 tablet; Refill: 1  -     CBC Auto Differential; Future  -     Comprehensive Metabolic Panel; Future    8. Leg cramping  -     Vitamin D,25-Hydroxy; Future  -     Magnesium; Future  -     Vitamin B12; Future    9. Vitamin D deficiency, unspecified  -     Vitamin D,25-Hydroxy; Future    10. Chronic bilateral low back pain without sciatica  -     Ambulatory Referral to Physical Therapy    11. Mucositis  -     caphosol (SALIVA SUBSTITUTE) solution; Take 30 mL by mouth As Needed (mucositis).  Dispense: 900 mL; Refill: 1  -     cevimeline (EVOXAC) 30 MG capsule; Take 1 capsule by mouth 3 (Three) Times a Day.  Dispense: 270 capsule; Refill: 1  -     chlorhexidine (PERIDEX) 0.12 % solution; Apply 5 mL to the mouth or throat 2 (Two) Times a Day.  Dispense: 473 mL; Refill: 1               Follow Up   Return in about 6 weeks (around 8/26/2024), or (MONTSERRAT Pan).  Patient was given instructions and counseling regarding his condition or for health maintenance advice. Please see specific information pulled into the AVS if appropriate.

## 2024-08-07 ENCOUNTER — TELEPHONE (OUTPATIENT)
Dept: FAMILY MEDICINE CLINIC | Facility: CLINIC | Age: 74
End: 2024-08-07
Payer: MEDICARE

## 2024-08-07 NOTE — TELEPHONE ENCOUNTER
Daughter called stated the Dentist called her today after seeing her Dad they cleaned his teeth & looked at his Dentures did not see any major problems the reason they called her is because he complained of freezing so bad while he was there that they had to take him outside to warm him up & her question is looking at his CBC could those abnormalities cause his weather intolerance? What can be done about this?Also he has been complaining of leg cramps due to the Cholesterol Medication,please advise.

## 2024-08-08 ENCOUNTER — OFFICE VISIT (OUTPATIENT)
Dept: FAMILY MEDICINE CLINIC | Facility: CLINIC | Age: 74
End: 2024-08-08
Payer: MEDICARE

## 2024-08-08 VITALS
TEMPERATURE: 97.8 F | OXYGEN SATURATION: 98 % | WEIGHT: 147.4 LBS | SYSTOLIC BLOOD PRESSURE: 138 MMHG | BODY MASS INDEX: 21.1 KG/M2 | HEART RATE: 53 BPM | HEIGHT: 70 IN | DIASTOLIC BLOOD PRESSURE: 66 MMHG

## 2024-08-08 DIAGNOSIS — U07.1 COVID-19 VIRUS DETECTED: ICD-10-CM

## 2024-08-08 DIAGNOSIS — R53.83 OTHER FATIGUE: Primary | ICD-10-CM

## 2024-08-08 DIAGNOSIS — E78.49 OTHER HYPERLIPIDEMIA: ICD-10-CM

## 2024-08-08 LAB
EXPIRATION DATE: ABNORMAL
FLUAV AG UPPER RESP QL IA.RAPID: NOT DETECTED
FLUBV AG UPPER RESP QL IA.RAPID: NOT DETECTED
INTERNAL CONTROL: ABNORMAL
Lab: ABNORMAL
SARS-COV-2 AG UPPER RESP QL IA.RAPID: DETECTED

## 2024-08-08 PROCEDURE — 1159F MED LIST DOCD IN RCRD: CPT | Performed by: FAMILY MEDICINE

## 2024-08-08 PROCEDURE — 1126F AMNT PAIN NOTED NONE PRSNT: CPT | Performed by: FAMILY MEDICINE

## 2024-08-08 PROCEDURE — 3078F DIAST BP <80 MM HG: CPT | Performed by: FAMILY MEDICINE

## 2024-08-08 PROCEDURE — 99214 OFFICE O/P EST MOD 30 MIN: CPT | Performed by: FAMILY MEDICINE

## 2024-08-08 PROCEDURE — 87428 SARSCOV & INF VIR A&B AG IA: CPT | Performed by: FAMILY MEDICINE

## 2024-08-08 PROCEDURE — 3075F SYST BP GE 130 - 139MM HG: CPT | Performed by: FAMILY MEDICINE

## 2024-08-08 PROCEDURE — 1160F RVW MEDS BY RX/DR IN RCRD: CPT | Performed by: FAMILY MEDICINE

## 2024-08-08 RX ORDER — EZETIMIBE 10 MG/1
10 TABLET ORAL DAILY
Qty: 90 TABLET | Refills: 1 | Status: SHIPPED | OUTPATIENT
Start: 2024-08-08

## 2024-08-08 RX ORDER — GUAIFENESIN 600 MG/1
1200 TABLET, EXTENDED RELEASE ORAL 2 TIMES DAILY
Qty: 120 TABLET | Refills: 0 | Status: SHIPPED | OUTPATIENT
Start: 2024-08-08

## 2024-08-08 RX ORDER — PSEUDOEPHEDRINE HYDROCHLORIDE 60 MG/1
60 TABLET, FILM COATED ORAL 2 TIMES DAILY
Qty: 60 TABLET | Refills: 0 | Status: SHIPPED | OUTPATIENT
Start: 2024-08-08 | End: 2024-08-12

## 2024-08-08 NOTE — TELEPHONE ENCOUNTER
Daughter reports he has a head cold & she is really really concerned about his cold intolerance,informed her that provider would assess him at his appointment today,she requests a call after he is seen.

## 2024-08-08 NOTE — TELEPHONE ENCOUNTER
You can call Brenda and let her know that her father has COVID and that's the reason for his symptoms. He has paxlovid, mucinex, and sudafed at [facility]. His insurance might not cover paxlovid so I told him that it was okay to go without it if it did not cover.    He has never complained about cold intolerance to me so I think it is because he was feeling sick. He also told me that it was very cold in the dentist office, and it is not cold in our office.  She might want to call them and let them know because they've been all exposed. I did look at his CBC and yes, he does have some normocytic anemia going on. This is not new. He has had that the entire time he's been seeing me. It will get better and go back to normal and then it will worsen. I think it is dependent on what he's eating as we have done a workup on it in the past. It is something that I am continuously monitoring in him. If it continues, I would do another workup on him when he is feeling better.     The zetia that he is on for his cholesterol do not cause leg cramping. It is not a statin. We have not had him on a statin for years. His cholesterol is horrible, and I can't change his diet so he needs to be taking the zetia for some coverage. I think the leg cramping is because he works really hard and is very active and I think his leg muscles just aren't up to it anymore. I told him to get some topical magnesium over the counter and start using it on his legs to see if it helps. Also, we may want to get a back xray when he comes back. Sometimes arthritis can cause the nerves going to the lower extremities to misfire.

## 2024-08-09 ENCOUNTER — TELEPHONE (OUTPATIENT)
Dept: FAMILY MEDICINE CLINIC | Facility: CLINIC | Age: 74
End: 2024-08-09
Payer: MEDICARE

## 2024-08-09 NOTE — TELEPHONE ENCOUNTER
"Patient called indicating he \"got a big bag of the salt\" and it really helped his back this morning.  He was able to  Paxlovid and Sudafed yesterday but Mucinex is not covered on his insurance.    He is specifically complaining of a sore throat.  Please advise.   "

## 2024-08-09 NOTE — TELEPHONE ENCOUNTER
Caller: angy muñoz    Relationship: Emergency Contact    Best call back number: [phone]     What is the best time to reach you: ANYTIME, OK TO LEAVE VOICEMAIL.    Who are you requesting to speak with (clinical staff, provider,  specific staff member): CLINICAL STAFF    Do you know the name of the person who called: PATIENT'S DAUGHTER ANGY    What was the call regarding: PATIENT'S DAUGHTER IS CALLING TO DISCUSS HER FATHERS MEDICATIONS AND APPOINTMENT FROM YESTERDAY. PATIENT'S DAUGHTER WOULD LIKE TO DISCUSS THAT HE IS STILL HAVING VERY SORE THROAT AND SHE IS CONCERNED ABOUT IT GETTING WORSE AND GETTING BLISTERS IN HIS MOUTH.    Is it okay if the provider responds through MyChart: NO CALL ONLY

## 2024-08-09 NOTE — TELEPHONE ENCOUNTER
I unfortunately would have too many choices in regards to an antibiotic to predict how to best help him. We use one thing for sinuses vs a different one for the pharynx vs another different one for superficial oral tissue vs two different ones for chest issues. All of them can also cause C. Diff, which is a diarrhea disease caused by the overenthusiasm of the antibiotics in getting rid of bacteria, including the good bacteria, and a lot of times now, C. Diff is requiring a stay in the hospital for treatment. So that's why we really don't prescribe until we see what is going on. Monitoring him is the best bet.

## 2024-08-09 NOTE — TELEPHONE ENCOUNTER
"Brenda notified of sore throat remedies and verbalized understanding.  She stated Bacon woke up with \"blisters on his private\" and he is concerned.  She is assuming it is a heat rash and has advised him to keep it clean and dry.   She has asked if you would prescribe an antibiotic, despite COVID being viral she is afraid things will go south considering his ENT history.    "

## 2024-08-09 NOTE — TELEPHONE ENCOUNTER
For the sore throat, we would recommend him to gargle with salt water, utilize honey, and maybe utilize OTC Cepacol which will help with the pain. We have heard that with this variant, it is a little rougher than normal.

## 2024-08-12 ENCOUNTER — OFFICE VISIT (OUTPATIENT)
Dept: FAMILY MEDICINE CLINIC | Facility: CLINIC | Age: 74
End: 2024-08-12
Payer: MEDICARE

## 2024-08-12 VITALS
BODY MASS INDEX: 20.27 KG/M2 | WEIGHT: 141.6 LBS | HEIGHT: 70 IN | HEART RATE: 61 BPM | TEMPERATURE: 98.2 F | OXYGEN SATURATION: 96 % | DIASTOLIC BLOOD PRESSURE: 74 MMHG | SYSTOLIC BLOOD PRESSURE: 130 MMHG

## 2024-08-12 DIAGNOSIS — N50.89 ULCERS OF GENITAL ORGAN IN MALE: Primary | ICD-10-CM

## 2024-08-12 DIAGNOSIS — J02.9 SORE THROAT: ICD-10-CM

## 2024-08-12 DIAGNOSIS — A60.02 HERPES GENITALIS IN MEN: ICD-10-CM

## 2024-08-12 DIAGNOSIS — U07.1 COVID-19 VIRUS DETECTED: ICD-10-CM

## 2024-08-12 PROCEDURE — 99214 OFFICE O/P EST MOD 30 MIN: CPT | Performed by: FAMILY MEDICINE

## 2024-08-12 PROCEDURE — 3078F DIAST BP <80 MM HG: CPT | Performed by: FAMILY MEDICINE

## 2024-08-12 PROCEDURE — 1126F AMNT PAIN NOTED NONE PRSNT: CPT | Performed by: FAMILY MEDICINE

## 2024-08-12 PROCEDURE — 3075F SYST BP GE 130 - 139MM HG: CPT | Performed by: FAMILY MEDICINE

## 2024-08-12 RX ORDER — VALACYCLOVIR HYDROCHLORIDE 1 G/1
1000 TABLET, FILM COATED ORAL DAILY
Qty: 5 TABLET | Refills: 0 | Status: SHIPPED | OUTPATIENT
Start: 2024-08-12

## 2024-08-12 NOTE — PROGRESS NOTES
Arleen Zamora is a 74 y.o. male.   Pt presents today with CC of Blister (Groin and butt area)      History of Present Illness   History of Present Illness  Patient is a 74-year-old male with history of herpes.  He recently came down with COVID, per report.  He has had symptoms of an upper respiratory illness.  In the last couple days he has developed ulcers on his genitals and around his anus that are very painful.  He does not have Valtrex currently.       The following portions of the patient's history were reviewed and updated as appropriate: allergies, current medications, past family history, past medical history, past social history, past surgical history, and problem list.    Review of Systems   Constitutional:  Negative for chills, fever and unexpected weight loss.   HENT:  Negative for congestion and sore throat.    Eyes:  Negative for blurred vision and visual disturbance.   Respiratory:  Negative for cough and wheezing.    Cardiovascular:  Negative for chest pain and palpitations.   Gastrointestinal:  Negative for abdominal pain and diarrhea.   Endocrine: Negative for cold intolerance and heat intolerance.   Genitourinary:  Negative for dysuria.   Musculoskeletal:  Negative for arthralgias and neck stiffness.   Neurological:  Negative for dizziness, seizures and syncope.   Psychiatric/Behavioral:  Negative for self-injury, suicidal ideas and depressed mood.      Vitals:    08/12/24 1605   BP: 130/74   Pulse: 61   Temp: 98.2 °F (36.8 °C)   SpO2: 96%        Objective   Physical Exam  Vitals and nursing note reviewed.   Constitutional:       Appearance: He is well-developed.   HENT:      Head: Normocephalic and atraumatic.      Right Ear: External ear normal.      Left Ear: External ear normal.      Nose: Nose normal.   Eyes:      Conjunctiva/sclera: Conjunctivae normal.      Pupils: Pupils are equal, round, and reactive to light.   Cardiovascular:      Rate and Rhythm: Normal rate and regular  rhythm.      Heart sounds: Normal heart sounds.   Pulmonary:      Effort: Pulmonary effort is normal.      Breath sounds: Normal breath sounds.   Abdominal:      General: Bowel sounds are normal.      Palpations: Abdomen is soft.   Musculoskeletal:      Cervical back: Normal range of motion and neck supple.   Skin:     General: Skin is warm and dry.      Comments: Several herpetic ulcerations on his genitals, perineum, and around his anus.  No sign of secondary bacterial infection at this time.   Neurological:      Mental Status: He is alert and oriented to person, place, and time.   Psychiatric:         Behavior: Behavior normal.           Assessment & Plan   Diagnoses and all orders for this visit:    1. Ulcers of genital organ in male (Primary)  -     valACYclovir (VALTREX) 1000 MG tablet; Take 1 tablet by mouth Daily.  Dispense: 5 tablet; Refill: 0  -     mupirocin (BACTROBAN) 2 % ointment; Apply 1 Application topically to the appropriate area as directed 3 (Three) Times a Day.  Dispense: 30 g; Refill: 0    2. Herpes genitalis in men  -     valACYclovir (VALTREX) 1000 MG tablet; Take 1 tablet by mouth Daily.  Dispense: 5 tablet; Refill: 0  -     mupirocin (BACTROBAN) 2 % ointment; Apply 1 Application topically to the appropriate area as directed 3 (Three) Times a Day.  Dispense: 30 g; Refill: 0    Per medical record, he has a history of herpes, these lesions are very suggestive of herpes.  I recommend Valtrex 1000 daily for the next 5 days, and Bactroban on the lesions to prevent secondary bacterial infection as they are at high risk for infection due to his incontinence.  He is going to follow-up later this week, or early next week if needed.  Of note, he also complains of sore throat, there is no evidence of lesions in his throat.  His throat appears normal.    #3 COVID-19 detected     No major issues with his respiratory system suspected from his COVID.  Although I believe that his genital herpes flare is  closely associated with COVID.    #4 sore throat    His sore throat seems to strictly be from viral illness.  No herpetic lesions.       BMI is within normal parameters. No other follow-up for BMI required.          This document has been electronically signed by Kailyn Parker  August 12, 2024 16:09 EDT    Dictated Utilizing Dragon Dictation: Part of this note may be an electronic transcription/translation of spoken language to printed text using the Dragon Dictation System.

## 2024-08-22 ENCOUNTER — TELEPHONE (OUTPATIENT)
Dept: FAMILY MEDICINE CLINIC | Facility: CLINIC | Age: 74
End: 2024-08-22
Payer: MEDICARE

## 2024-08-22 NOTE — TELEPHONE ENCOUNTER
Spoke with Daughter she is concerned because her Dad is having Dizziness & sweating episodes,she reports he was at a furniture store a few days ago complained of dizziness ,sweating & turned very pale the store called EMS they came out he refused to go to the ED & they really did not find anything,then day before yesterday she talked to him & he complained of Dizziness & sweating again,he refused to go to the ED then yesterday she insisted & [facility] she reports he told her his BP was low & that is oxygen was 50% & they wanted him to have an XR,came home with no med's so she is not sure what happened there (Records Requested) is concerned wants to know what is causing these episodes is it his Heart? BP?Residual from Covid?Adverse med effect? Please advise.

## 2024-08-22 NOTE — TELEPHONE ENCOUNTER
Caller: angy muñoz    Relationship to patient: Emergency Contact    Best call back number: [phone]     PATIENT'S DAUGHTER IS WANTING A RETURN CALL FROM EITHER DR PHELAN OR HER NURSE REGARDING HIS HEALTH ISSUES.

## 2024-08-22 NOTE — TELEPHONE ENCOUNTER
We haven't gotten those records back, but I would call and offer him an appointment for reassessment. I can't begin to have answers unless I do an assessment. I am not in the office the next couple of days, but he can schedule an acute with someone else, but otherwise can fit him in sometime next week in my schedule.

## 2024-08-23 NOTE — TELEPHONE ENCOUNTER
Spoke with Brenda.  She feels due to the nature of his dizziness it would be most advantageous for him to see Cardiology.  She plans to reach out to their office for an appointment and will call us back if he needs to see Dr Vaughn next week.

## 2024-08-26 NOTE — PROGRESS NOTES
"[facility] HILTON Zamora     VITALS: Blood pressure 138/66, pulse 53, temperature 97.8 °F (36.6 °C), temperature source Temporal, height 177.8 cm (70\"), weight 66.9 kg (147 lb 6.4 oz), SpO2 98%.    Subjective  Chief Complaint  cold intolerance    Subjective          History of Present Illness:  Patient is a 74 y.o.  male with medical conditions significant for arthritis, depression, hyperlipidemia, and hypertension who presents to clinic secondary to medical followup.  Patient states that he is having chills.  He states that this started when he was at the dentist yesterday.  He denies any fevers.  He denies any ear pain.  He denies any congestion.  He denies any rhinorrhea or coughing.  Has not been around anyone who is sick.    No complaints about any of the medications.    The following portions of the patient's history were reviewed and updated as appropriate: allergies, current medications, past family history, past medical history, past social history, past surgical history and problem list.    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    Chronic back pain     Collapsed lung     Depression     Elevated prostate specific antigen (PSA)     Erectile dysfunction     Hypercholesterolemia     Primary hypertension     Prostate cancer     Prostatitis        Surgical History  Past Surgical History:   Procedure Laterality Date    OTHER SURGICAL HISTORY      surgery for an ulcer, PNEUMOTHORAX AND ULCER THERAPY       Family History  Family History   Problem Relation Age of Onset    Hypertension Mother     Bradycardia Mother         Had pacemaker    Heart attack Mother     Cancer Father         Throat    Nephrolithiasis Father     Parkinsonism Father     Diabetes Sister     Heart disease Sister     COPD Sister     Heart disease Sister     Heart attack Sister     Rheum arthritis Sister     Cancer Sister         Stomach    Cancer Sister         Lung    " "Cancer Brother         Kidney    Hyperlipidemia Brother     Prostatitis Brother     Diabetes Other        Social History  Social History     Socioeconomic History    Marital status: Unknown   Tobacco Use    Smoking status: Former     Current packs/day: 0.00     Average packs/day: 2.0 packs/day for 5.0 years (10.0 ttl pk-yrs)     Types: Cigarettes     Start date:      Quit date: 1970     Years since quittin.6    Smokeless tobacco: Never   Vaping Use    Vaping status: Never Used   Substance and Sexual Activity    Alcohol use: No    Drug use: No    Sexual activity: Defer       Objective   Vital Signs:   /66 (BP Location: Right arm, Patient Position: Sitting, Cuff Size: Adult)   Pulse 53   Temp 97.8 °F (36.6 °C) (Temporal)   Ht 177.8 cm (70\")   Wt 66.9 kg (147 lb 6.4 oz)   SpO2 98%   BMI 21.15 kg/m²     Physical Exam     Gen: Patient in NAD. Pleasant and answers appropriately. A&Ox3.    Skin: Warm and dry with normal turgor. No purpura, rashes, or unusual pigmentation noted. Hair is normal in appearance and distribution.    HEENT: NC/AT. No lesions noted. Conjunctiva clear, sclera nonicteric. PERRL. EOMI without nystagmus or strabismus. Fundi appear benign. No hemorrhages or exudates of eyes. Auditory canals are patent bilaterally without lesions. TMs intact,  nonerythematous, bulging without lesions. Nasal mucosa pink, nonerythematous, and nonedematous. Frontal and maxillary sinuses are nontender. O/P nonerythematous and moist without exudate.    Neck: Supple without lymph nodes palpated. FROM.     Lungs: Slightly decreased B/L without rales, rhonchi, crackles, or wheezes.    Heart: RRR. S1 and S2 normal. No S3 or S4. No MRGT.    Abd: Soft, nontender,nondistended. (+)BSx4 quadrants.     Extrem: No CCE. Radial pulses 2+/4 and equal B/L. FROMx4. No bone, joint, or muscle tenderness noted.    Neuro: No focal motor/sensory deficits.    Procedures    Result Review :   The following data was reviewed by: " Bhavna Vaughn MD on 08/08/2024:                Assessment and Plan    Alexander Zamora is a 74 y.o. here for medical followup.    Diagnoses and all orders for this visit:    1. Other fatigue (Primary)  -     POCT SARS-CoV-2 Antigen JUN + Flu    2. Other hyperlipidemia  -     ezetimibe (ZETIA) 10 MG tablet; Take 1 tablet by mouth Daily.  Dispense: 90 tablet; Refill: 1    3. COVID-19 virus detected  -     Nirmatrelvir & Ritonavir, 300mg/100mg, (PAXLOVID); Take 3 tablets by mouth 2 (Two) Times a Day for 5 days.  Dispense: 30 each; Refill: 0  -     guaiFENesin (Mucinex) 600 MG 12 hr tablet; Take 2 tablets by mouth 2 (Two) Times a Day.  Dispense: 120 tablet; Refill: 0  -     Discontinue: pseudoephedrine (SUDAFED) 60 MG tablet; Take 1 tablet by mouth 2 (Two) Times a Day.  Dispense: 60 tablet; Refill: 0      Supportive care indicated, including increased fluids and rest. Patient to monitor. Patient to call if symptoms continue or worsen.       Problem List Items Addressed This Visit    None  Visit Diagnoses       Other fatigue    -  Primary    Relevant Orders    POCT SARS-CoV-2 Antigen JUN + Flu (Completed)    Other hyperlipidemia        Relevant Medications    ezetimibe (ZETIA) 10 MG tablet    COVID-19 virus detected        Relevant Medications    guaiFENesin (Mucinex) 600 MG 12 hr tablet              BMI is within normal parameters. No other follow-up for BMI required.         Follow Up   Return in 4 weeks (on 9/5/2024), or (cancel 8/27).  Findings and plans discussed with patient who verbalizes understanding and agreement. Will followup with patient once results are in. Patient was given instructions and counseling regarding his condition or for health maintenance advice. Please see specific information pulled into the AVS if appropriate.       Bhavna Vaughn MD

## 2024-09-09 ENCOUNTER — TELEPHONE (OUTPATIENT)
Dept: FAMILY MEDICINE CLINIC | Facility: CLINIC | Age: 74
End: 2024-09-09
Payer: MEDICARE

## 2024-09-11 ENCOUNTER — TELEPHONE (OUTPATIENT)
Dept: FAMILY MEDICINE CLINIC | Facility: CLINIC | Age: 74
End: 2024-09-11
Payer: MEDICARE

## 2024-09-11 NOTE — TELEPHONE ENCOUNTER
Daughter called stated Patient had called her & said he was dizzy & his BP was low,she is at the Hospital with her Mother & asked that we call & check on him,I Called him & asked him how he is today stated he is dizzy had him check his BP while I was on the phone it was 97/79 HR-65 He has already taken his Lisinopril 40 mg today & Amlodipine 10 mg both were taken this am,instructed to increase his fluid intake & keep a check on his BP if his dizziness increases or his BP lowers to go to the ED,also instructed to check his BP & HR in the morning before taking his medication & call the office for further instructions.Brenda notified of our conversation,he has an appointment on Friday & she requests a cane or walker be ordered for him as well.

## 2024-09-12 NOTE — TELEPHONE ENCOUNTER
Alexander called back last night his blood pressures are as follows:Left arm-142/64 HR-64,148/77 HR-60 this am after med's 88/67 HR-67 took med's about 7 am  Rt arm-last night-140/75 HR-69,139/72 HR-62 this am 86/71 HR-71,reports his dizziness is some better this am. ( Has an appointment tomorrow)    Dr. Espinoza's note is in Media now also.

## 2024-09-13 ENCOUNTER — OFFICE VISIT (OUTPATIENT)
Dept: FAMILY MEDICINE CLINIC | Facility: CLINIC | Age: 74
End: 2024-09-13
Payer: MEDICARE

## 2024-09-13 VITALS
DIASTOLIC BLOOD PRESSURE: 60 MMHG | TEMPERATURE: 97.3 F | HEART RATE: 66 BPM | WEIGHT: 139 LBS | RESPIRATION RATE: 16 BRPM | BODY MASS INDEX: 19.9 KG/M2 | OXYGEN SATURATION: 97 % | SYSTOLIC BLOOD PRESSURE: 134 MMHG | HEIGHT: 70 IN

## 2024-09-13 DIAGNOSIS — J30.89 SEASONAL ALLERGIC RHINITIS DUE TO OTHER ALLERGIC TRIGGER: ICD-10-CM

## 2024-09-13 DIAGNOSIS — R42 DIZZINESS: ICD-10-CM

## 2024-09-13 DIAGNOSIS — R05.1 ACUTE COUGH: Primary | ICD-10-CM

## 2024-09-13 LAB
EXPIRATION DATE: NORMAL
FLUAV AG UPPER RESP QL IA.RAPID: NOT DETECTED
FLUBV AG UPPER RESP QL IA.RAPID: NOT DETECTED
INTERNAL CONTROL: NORMAL
Lab: NORMAL
SARS-COV-2 AG UPPER RESP QL IA.RAPID: NOT DETECTED

## 2024-09-13 RX ORDER — GUAIFENESIN 600 MG/1
1200 TABLET, EXTENDED RELEASE ORAL 2 TIMES DAILY
Qty: 120 TABLET | Refills: 0 | Status: SHIPPED | OUTPATIENT
Start: 2024-09-13

## 2024-09-13 RX ORDER — FLUTICASONE PROPIONATE 50 UG/1
2 SPRAY, METERED NASAL DAILY
Qty: 16 G | Refills: 5 | Status: SHIPPED | OUTPATIENT
Start: 2024-09-13

## 2024-09-17 ENCOUNTER — TELEPHONE (OUTPATIENT)
Dept: CARDIOLOGY | Facility: CLINIC | Age: 74
End: 2024-09-17
Payer: MEDICARE

## 2024-09-25 ENCOUNTER — OFFICE VISIT (OUTPATIENT)
Dept: CARDIOLOGY | Facility: CLINIC | Age: 74
End: 2024-09-25
Payer: MEDICARE

## 2024-09-25 ENCOUNTER — TELEPHONE (OUTPATIENT)
Dept: CARDIOLOGY | Facility: CLINIC | Age: 74
End: 2024-09-25

## 2024-09-25 VITALS
HEIGHT: 70 IN | WEIGHT: 141.8 LBS | BODY MASS INDEX: 20.3 KG/M2 | DIASTOLIC BLOOD PRESSURE: 66 MMHG | SYSTOLIC BLOOD PRESSURE: 155 MMHG | OXYGEN SATURATION: 97 % | RESPIRATION RATE: 18 BRPM | HEART RATE: 59 BPM

## 2024-09-25 DIAGNOSIS — E78.00 HYPERCHOLESTEROLEMIA: ICD-10-CM

## 2024-09-25 DIAGNOSIS — I10 PRIMARY HYPERTENSION: ICD-10-CM

## 2024-09-25 DIAGNOSIS — I25.10 ASCVD (ARTERIOSCLEROTIC CARDIOVASCULAR DISEASE): Primary | ICD-10-CM

## 2024-09-25 PROCEDURE — 1159F MED LIST DOCD IN RCRD: CPT | Performed by: NURSE PRACTITIONER

## 2024-09-25 PROCEDURE — 99214 OFFICE O/P EST MOD 30 MIN: CPT | Performed by: NURSE PRACTITIONER

## 2024-09-25 PROCEDURE — 3078F DIAST BP <80 MM HG: CPT | Performed by: NURSE PRACTITIONER

## 2024-09-25 PROCEDURE — 3077F SYST BP >= 140 MM HG: CPT | Performed by: NURSE PRACTITIONER

## 2024-09-25 PROCEDURE — 1160F RVW MEDS BY RX/DR IN RCRD: CPT | Performed by: NURSE PRACTITIONER

## 2024-10-01 ENCOUNTER — TELEPHONE (OUTPATIENT)
Dept: FAMILY MEDICINE CLINIC | Facility: CLINIC | Age: 74
End: 2024-10-01
Payer: MEDICARE

## 2024-10-01 NOTE — TELEPHONE ENCOUNTER
Caller: angy muñoz    Relationship: Emergency Contact    Best call back number: [phone]     What is the best time to reach you: ANYTIME    Who are you requesting to speak with (clinical staff, provider,  specific staff member): DR. PHELAN    What was the call regarding: PATIENT'S DAUGHTER WOULD LIKE A CALL BACK FROM DR. PHELAN TO DISCUSS THE PLAN OF CARE BETWEEN HER AND DR. MYERS (CARDIOLOGIST). SHE WAS EXPECTING A PHONE CALL FROM DR. PHELAN BECAUSE THE PATIENT TOLD HER THAT DR. PHELAN WOULD BE CALLING HER TO DISCUSS .

## 2024-10-03 NOTE — TELEPHONE ENCOUNTER
No. It wasn't me. I had sent him back to [facility] (the NP he is seeing in cardiology), and she actually has tried to call Brenda herself to discuss so I would have her start there.

## 2024-10-15 ENCOUNTER — OFFICE VISIT (OUTPATIENT)
Dept: FAMILY MEDICINE CLINIC | Facility: CLINIC | Age: 74
End: 2024-10-15
Payer: MEDICARE

## 2024-10-15 VITALS
BODY MASS INDEX: 20.44 KG/M2 | TEMPERATURE: 97.8 F | SYSTOLIC BLOOD PRESSURE: 152 MMHG | RESPIRATION RATE: 16 BRPM | WEIGHT: 142.8 LBS | HEIGHT: 70 IN | OXYGEN SATURATION: 100 % | DIASTOLIC BLOOD PRESSURE: 72 MMHG | HEART RATE: 61 BPM

## 2024-10-15 DIAGNOSIS — K12.30 MUCOSITIS: Primary | ICD-10-CM

## 2024-10-15 DIAGNOSIS — E78.49 OTHER HYPERLIPIDEMIA: ICD-10-CM

## 2024-10-15 DIAGNOSIS — I10 ESSENTIAL HYPERTENSION: ICD-10-CM

## 2024-10-15 PROCEDURE — 3077F SYST BP >= 140 MM HG: CPT | Performed by: FAMILY MEDICINE

## 2024-10-15 PROCEDURE — 1126F AMNT PAIN NOTED NONE PRSNT: CPT | Performed by: FAMILY MEDICINE

## 2024-10-15 PROCEDURE — G2211 COMPLEX E/M VISIT ADD ON: HCPCS | Performed by: FAMILY MEDICINE

## 2024-10-15 PROCEDURE — 1160F RVW MEDS BY RX/DR IN RCRD: CPT | Performed by: FAMILY MEDICINE

## 2024-10-15 PROCEDURE — 1159F MED LIST DOCD IN RCRD: CPT | Performed by: FAMILY MEDICINE

## 2024-10-15 PROCEDURE — 99214 OFFICE O/P EST MOD 30 MIN: CPT | Performed by: FAMILY MEDICINE

## 2024-10-15 PROCEDURE — 3078F DIAST BP <80 MM HG: CPT | Performed by: FAMILY MEDICINE

## 2024-10-30 NOTE — PROGRESS NOTES
"[facility]     VITALS: Blood pressure 152/72, pulse 61, temperature 97.8 °F (36.6 °C), temperature source Temporal, resp. rate 16, height 177.8 cm (70\"), weight 64.8 kg (142 lb 12.8 oz), SpO2 100%.    Subjective  Chief Complaint  Hypertension and Hyperlipidemia    Subjective          History of Present Illness:  Patient is a 74 y.o.  male with medical conditions significant for arthritis, depression, hyperlipidemia, and hypertension who presents to clinic secondary to medical followup.     No new or acute concerns today.  Patient is doing well.  He states that he actually feels better than he has in the past.    No complaints about any of the medications.    The following portions of the patient's history were reviewed and updated as appropriate: allergies, current medications, past family history, past medical history, past social history, past surgical history and problem list.    Past Medical History  Past Medical History:   Diagnosis Date    Arthritis     Bleeding ulcer     Cancer 2015    Prostate    Chronic back pain     Collapsed lung     Depression     Elevated prostate specific antigen (PSA)     Erectile dysfunction     Hypercholesterolemia     Primary hypertension     Prostate cancer     Prostatitis        Surgical History  Past Surgical History:   Procedure Laterality Date    OTHER SURGICAL HISTORY      surgery for an ulcer, PNEUMOTHORAX AND ULCER THERAPY       Family History  Family History   Problem Relation Age of Onset    Hypertension Mother     Bradycardia Mother         Had pacemaker    Heart attack Mother     Cancer Father         Throat    Nephrolithiasis Father     Parkinsonism Father     Diabetes Sister     Heart disease Sister     COPD Sister     Heart disease Sister     Heart attack Sister     Rheum arthritis Sister     Cancer Sister         Stomach    Cancer Sister         Lung    Cancer Brother         Kidney    Hyperlipidemia Brother     Prostatitis Brother     Diabetes Other  " "      Social History  Social History     Socioeconomic History    Marital status: Unknown   Tobacco Use    Smoking status: Former     Current packs/day: 0.00     Average packs/day: 2.0 packs/day for 5.0 years (10.0 ttl pk-yrs)     Types: Cigarettes     Start date:      Quit date: 1970     Years since quittin.8    Smokeless tobacco: Never   Vaping Use    Vaping status: Never Used   Substance and Sexual Activity    Alcohol use: No    Drug use: No    Sexual activity: Defer       Objective   Vital Signs:   /72 (BP Location: Right arm, Patient Position: Sitting, Cuff Size: Adult)   Pulse 61   Temp 97.8 °F (36.6 °C) (Temporal)   Resp 16   Ht 177.8 cm (70\")   Wt 64.8 kg (142 lb 12.8 oz)   SpO2 100%   BMI 20.49 kg/m²     Physical Exam     Gen: Patient in NAD. Pleasant and answers appropriately. A&Ox3.    Skin: Warm and dry with normal turgor. No purpura, rashes, or unusual pigmentation noted. Hair is normal in appearance and distribution.    HEENT: NC/AT. No lesions noted. Conjunctiva clear, sclera nonicteric. PERRL. EOMI without nystagmus or strabismus. Fundi appear benign. No hemorrhages or exudates of eyes. Auditory canals are patent bilaterally without lesions. TMs intact,  nonerythematous, nonbulging without lesions. Nasal mucosa pink, nonerythematous, and nonedematous. Frontal and maxillary sinuses are nontender. O/P nonerythematous and moist without exudate.    Neck: Supple without lymph nodes palpated. FROM.     Lungs: CTA B/L without rales, rhonchi, crackles, or wheezes.    Heart: RRR. S1 and S2 normal. No S3 or S4. No MRGT.    Abd: Soft, nontender,nondistended. (+)BSx4 quadrants.     Extrem: No CCE. Radial pulses 2+/4 and equal B/L. FROMx4.  Positive joint tenderness noted.    Neuro: No focal motor/sensory deficits.    Procedures    Result Review :   The following data was reviewed by: Bhavna Vaughn MD on 10/15/2024:                Assessment and Plan    Alexander Zamora is a 74 y.o. here " for medical followup.    Diagnoses and all orders for this visit:    1. Mucositis (Primary)  Patient to continue cevimeline 30 mg orally 3 times a day.    2. Other hyperlipidemia  Currently on Zetia 10 mg orally daily.  Not on a statin secondary to muscle weakness.    3. Essential hypertension  Elevated today.  He states that it is better at home.  Will continue Norvasc 10 mg orally daily lisinopril 40 mg orally daily for now.  Will need to monitor.        Problem List Items Addressed This Visit    None  Visit Diagnoses       Mucositis    -  Primary    Other hyperlipidemia        Essential hypertension                  BMI is within normal parameters. No other follow-up for BMI required.         I spent 30 minutes caring for Alexander on this date of service. This time includes time spent by me in the following activities:performing a medically appropriate examination and/or evaluation  and counseling and educating the patient/family/caregiver  Follow Up   Return in about 3 months (around 1/15/2025).  Findings and plans discussed with patient who verbalizes understanding and agreement. Will followup with patient once results are in. Patient was given instructions and counseling regarding his condition or for health maintenance advice. Please see specific information pulled into the AVS if appropriate.       Bhavna Vaughn MD

## 2024-11-27 DIAGNOSIS — J30.89 SEASONAL ALLERGIC RHINITIS DUE TO OTHER ALLERGIC TRIGGER: ICD-10-CM

## 2024-11-27 DIAGNOSIS — I10 ESSENTIAL HYPERTENSION: ICD-10-CM

## 2024-11-27 DIAGNOSIS — K21.9 GASTROESOPHAGEAL REFLUX DISEASE WITHOUT ESOPHAGITIS: ICD-10-CM

## 2024-11-27 RX ORDER — LISINOPRIL 40 MG/1
40 TABLET ORAL DAILY
Qty: 90 TABLET | Refills: 1 | OUTPATIENT
Start: 2024-11-27

## 2024-11-27 RX ORDER — CEVIMELINE HYDROCHLORIDE 30 MG/1
30 CAPSULE ORAL 3 TIMES DAILY
Qty: 270 CAPSULE | Refills: 1 | OUTPATIENT
Start: 2024-11-27

## 2024-11-27 RX ORDER — MONTELUKAST SODIUM 10 MG/1
10 TABLET ORAL NIGHTLY
Qty: 90 TABLET | Refills: 1 | OUTPATIENT
Start: 2024-11-27

## 2024-11-27 RX ORDER — PANTOPRAZOLE SODIUM 40 MG/1
40 TABLET, DELAYED RELEASE ORAL DAILY
Qty: 90 TABLET | Refills: 1 | OUTPATIENT
Start: 2024-11-27

## 2024-11-27 RX ORDER — AMLODIPINE BESYLATE 10 MG/1
10 TABLET ORAL DAILY
Qty: 90 TABLET | Refills: 1 | OUTPATIENT
Start: 2024-11-27

## 2024-11-30 DIAGNOSIS — R41.3 MEMORY DEFICIT: ICD-10-CM

## 2024-12-02 RX ORDER — DONEPEZIL HYDROCHLORIDE 5 MG/1
5 TABLET, ORALLY DISINTEGRATING ORAL NIGHTLY
Qty: 90 TABLET | Refills: 1 | OUTPATIENT
Start: 2024-12-02

## 2025-01-02 ENCOUNTER — APPOINTMENT (OUTPATIENT)
Dept: CT IMAGING | Facility: HOSPITAL | Age: 75
End: 2025-01-02
Payer: MEDICARE

## 2025-01-02 ENCOUNTER — HOSPITAL ENCOUNTER (EMERGENCY)
Facility: HOSPITAL | Age: 75
Discharge: HOME | End: 2025-01-02
Attending: STUDENT IN AN ORGANIZED HEALTH CARE EDUCATION/TRAINING PROGRAM
Payer: MEDICARE

## 2025-01-02 VITALS
BODY MASS INDEX: 20.45 KG/M2 | RESPIRATION RATE: 18 BRPM | SYSTOLIC BLOOD PRESSURE: 186 MMHG | TEMPERATURE: 97.9 F | HEIGHT: 70 IN | WEIGHT: 142.86 LBS | HEART RATE: 62 BPM | DIASTOLIC BLOOD PRESSURE: 66 MMHG | OXYGEN SATURATION: 96 %

## 2025-01-02 DIAGNOSIS — K21.9 GASTROESOPHAGEAL REFLUX DISEASE WITHOUT ESOPHAGITIS: ICD-10-CM

## 2025-01-02 DIAGNOSIS — M54.9 ACUTE BACK PAIN, UNSPECIFIED BACK LOCATION, UNSPECIFIED BACK PAIN LATERALITY: ICD-10-CM

## 2025-01-02 DIAGNOSIS — I10 ESSENTIAL HYPERTENSION: ICD-10-CM
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
## 2025-01-02 DIAGNOSIS — J30.89 SEASONAL ALLERGIC RHINITIS DUE TO OTHER ALLERGIC TRIGGER: ICD-10-CM

## 2025-01-02 PROCEDURE — 72131 CT LUMBAR SPINE W/O DYE: CPT

## 2025-01-02 PROCEDURE — 72125 CT NECK SPINE W/O DYE: CPT

## 2025-01-02 PROCEDURE — 72128 CT CHEST SPINE W/O DYE: CPT

## 2025-01-02 PROCEDURE — 72131 CT LUMBAR SPINE W/O DYE: CPT | Performed by: RADIOLOGY

## 2025-01-02 PROCEDURE — 70450 CT HEAD/BRAIN W/O DYE: CPT | Performed by: RADIOLOGY

## 2025-01-02 PROCEDURE — 72128 CT CHEST SPINE W/O DYE: CPT | Performed by: RADIOLOGY

## 2025-01-02 PROCEDURE — 70450 CT HEAD/BRAIN W/O DYE: CPT

## 2025-01-02 PROCEDURE — 99284 EMERGENCY DEPT VISIT MOD MDM: CPT

## 2025-01-02 PROCEDURE — 72125 CT NECK SPINE W/O DYE: CPT | Performed by: RADIOLOGY
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
## 2025-01-06 RX ORDER — LISINOPRIL 40 MG/1
40 TABLET ORAL DAILY
Qty: 90 TABLET | Refills: 1 | Status: SHIPPED | OUTPATIENT
Start: 2025-01-06

## 2025-01-06 RX ORDER — CEVIMELINE HYDROCHLORIDE 30 MG/1
30 CAPSULE ORAL 3 TIMES DAILY
Qty: 270 CAPSULE | Refills: 1 | Status: SHIPPED | OUTPATIENT
Start: 2025-01-06

## 2025-01-06 RX ORDER — PANTOPRAZOLE SODIUM 40 MG/1
40 TABLET, DELAYED RELEASE ORAL DAILY
Qty: 90 TABLET | Refills: 1 | Status: SHIPPED | OUTPATIENT
Start: 2025-01-06

## 2025-01-06 RX ORDER — MONTELUKAST SODIUM 10 MG/1
10 TABLET ORAL NIGHTLY
Qty: 90 TABLET | Refills: 1 | Status: SHIPPED | OUTPATIENT
Start: 2025-01-06

## 2025-01-06 RX ORDER — AMLODIPINE BESYLATE 10 MG/1
10 TABLET ORAL DAILY
Qty: 90 TABLET | Refills: 1 | Status: SHIPPED | OUTPATIENT
Start: 2025-01-06

## 2025-01-13 DIAGNOSIS — R41.3 MEMORY DEFICIT: ICD-10-CM

## 2025-01-13 RX ORDER — DONEPEZIL HYDROCHLORIDE 5 MG/1
5 TABLET, ORALLY DISINTEGRATING ORAL NIGHTLY
Qty: 90 TABLET | Refills: 1 | Status: SHIPPED | OUTPATIENT
Start: 2025-01-13

## 2025-01-13 NOTE — TELEPHONE ENCOUNTER
Caller: angy muñoz    Relationship: Emergency Contact    Best call back number:     [phone] (Mobile)     Requested Prescriptions:   Requested Prescriptions     Pending Prescriptions Disp Refills    donepezil ODT (ARICEPT ODT) 5 MG disintegrating tablet 90 tablet 1     Sig: Place 1 tablet on the tongue Every Night.      Pharmacy where request should be sent:     [facility], [address] [phone] [place] [phone] FX     Last office visit with prescribing clinician: 10/15/2024   Last telemedicine visit with prescribing clinician: Visit date not found   Next office visit with prescribing clinician: 1/16/2025     Additional details provided by patient:     CALLER STATED PATIENT HAS LESS THAN (1) WEEK SUPPLY LEFT    PHARMACY SHARED THERE ARE NO REFILLS FOR MEDICATION    Does the patient have less than a 3 day supply:  [] Yes  [x] No    Would you like a call back once the refill request has been completed: [] Yes [] No    If the office needs to give you a call back, can they leave a voicemail: [] Yes [] No    Chelsey Person Rep   01/13/25 10:02 EST

## 2025-01-16 ENCOUNTER — OFFICE VISIT (OUTPATIENT)
Dept: FAMILY MEDICINE CLINIC | Facility: CLINIC | Age: 75
End: 2025-01-16
Payer: MEDICARE

## 2025-01-16 ENCOUNTER — LAB (OUTPATIENT)
Dept: FAMILY MEDICINE CLINIC | Facility: CLINIC | Age: 75
End: 2025-01-16
Payer: OTHER MISCELLANEOUS

## 2025-01-16 VITALS
TEMPERATURE: 96.6 F | HEART RATE: 52 BPM | RESPIRATION RATE: 16 BRPM | BODY MASS INDEX: 21.13 KG/M2 | HEIGHT: 70 IN | DIASTOLIC BLOOD PRESSURE: 74 MMHG | OXYGEN SATURATION: 100 % | WEIGHT: 147.6 LBS | SYSTOLIC BLOOD PRESSURE: 154 MMHG

## 2025-01-16 DIAGNOSIS — F51.01 PRIMARY INSOMNIA: ICD-10-CM

## 2025-01-16 DIAGNOSIS — J30.89 SEASONAL ALLERGIC RHINITIS DUE TO OTHER ALLERGIC TRIGGER: ICD-10-CM

## 2025-01-16 DIAGNOSIS — R68.2 DRY MOUTH: Primary | ICD-10-CM

## 2025-01-16 DIAGNOSIS — I10 ESSENTIAL HYPERTENSION: ICD-10-CM

## 2025-01-16 DIAGNOSIS — E78.2 MIXED HYPERLIPIDEMIA: ICD-10-CM

## 2025-01-16 DIAGNOSIS — R68.2 DRY MOUTH: ICD-10-CM

## 2025-01-16 DIAGNOSIS — R05.1 ACUTE COUGH: ICD-10-CM

## 2025-01-16 PROCEDURE — 80061 LIPID PANEL: CPT | Performed by: FAMILY MEDICINE

## 2025-01-16 PROCEDURE — 36415 COLL VENOUS BLD VENIPUNCTURE: CPT

## 2025-01-16 PROCEDURE — 99214 OFFICE O/P EST MOD 30 MIN: CPT | Performed by: FAMILY MEDICINE

## 2025-01-16 PROCEDURE — 80053 COMPREHEN METABOLIC PANEL: CPT | Performed by: FAMILY MEDICINE

## 2025-01-16 RX ORDER — PHENOL 1.4 %
1 AEROSOL, SPRAY (ML) MUCOUS MEMBRANE NIGHTLY
Qty: 90 TABLET | Refills: 1 | Status: SHIPPED | OUTPATIENT
Start: 2025-01-16

## 2025-01-16 RX ORDER — HYDRALAZINE HYDROCHLORIDE 10 MG/1
10 TABLET, FILM COATED ORAL DAILY
Qty: 90 TABLET | Refills: 0 | Status: SHIPPED | OUTPATIENT
Start: 2025-01-16

## 2025-01-16 RX ORDER — CETIRIZINE HYDROCHLORIDE 10 MG/1
10 TABLET ORAL DAILY
Qty: 90 TABLET | Refills: 1 | Status: SHIPPED | OUTPATIENT
Start: 2025-01-16

## 2025-01-16 RX ORDER — EZETIMIBE 10 MG/1
10 TABLET ORAL DAILY
Qty: 90 TABLET | Refills: 1 | Status: SHIPPED | OUTPATIENT
Start: 2025-01-16

## 2025-01-16 RX ORDER — GUAIFENESIN 600 MG/1
1200 TABLET, EXTENDED RELEASE ORAL 2 TIMES DAILY
Qty: 120 TABLET | Refills: 0 | Status: SHIPPED | OUTPATIENT
Start: 2025-01-16

## 2025-01-16 RX ORDER — FLUTICASONE PROPIONATE 50 MCG
2 SPRAY, SUSPENSION (ML) NASAL DAILY
Qty: 16 G | Refills: 5 | Status: SHIPPED | OUTPATIENT
Start: 2025-01-16

## 2025-01-16 RX ORDER — SALIVA SUBSTITUTE COMBO NO.2
30 SOLUTION, ORAL MUCOUS MEMBRANE AS NEEDED
Qty: 900 ML | Refills: 1 | Status: SHIPPED | OUTPATIENT
Start: 2025-01-16

## 2025-01-16 RX ORDER — CHLORHEXIDINE GLUCONATE ORAL RINSE 1.2 MG/ML
5 SOLUTION DENTAL 2 TIMES DAILY
Qty: 473 ML | Refills: 1 | Status: SHIPPED | OUTPATIENT
Start: 2025-01-16

## 2025-01-17 LAB
ALBUMIN SERPL-MCNC: 4.1 G/DL (ref 3.5–5.2)
ALBUMIN/GLOB SERPL: 1.5 G/DL
ALP SERPL-CCNC: 87 U/L (ref 39–117)
ALT SERPL W P-5'-P-CCNC: 14 U/L (ref 1–41)
ANION GAP SERPL CALCULATED.3IONS-SCNC: 9 MMOL/L (ref 5–15)
AST SERPL-CCNC: 18 U/L (ref 1–40)
BILIRUB SERPL-MCNC: 0.3 MG/DL (ref 0–1.2)
BUN SERPL-MCNC: 29 MG/DL (ref 8–23)
BUN/CREAT SERPL: 16.5 (ref 7–25)
CALCIUM SPEC-SCNC: 10.3 MG/DL (ref 8.6–10.5)
CHLORIDE SERPL-SCNC: 103 MMOL/L (ref 98–107)
CHOLEST SERPL-MCNC: 235 MG/DL (ref 0–200)
CO2 SERPL-SCNC: 28 MMOL/L (ref 22–29)
CREAT SERPL-MCNC: 1.76 MG/DL (ref 0.76–1.27)
EGFRCR SERPLBLD CKD-EPI 2021: 39.8 ML/MIN/1.73
GLOBULIN UR ELPH-MCNC: 2.7 GM/DL
GLUCOSE SERPL-MCNC: 81 MG/DL (ref 65–99)
HDLC SERPL-MCNC: 70 MG/DL (ref 40–60)
LDLC SERPL CALC-MCNC: 144 MG/DL (ref 0–100)
LDLC/HDLC SERPL: 2.02 {RATIO}
POTASSIUM SERPL-SCNC: 3.4 MMOL/L (ref 3.5–5.2)
PROT SERPL-MCNC: 6.8 G/DL (ref 6–8.5)
SODIUM SERPL-SCNC: 140 MMOL/L (ref 136–145)
TRIGL SERPL-MCNC: 118 MG/DL (ref 0–150)
VLDLC SERPL-MCNC: 21 MG/DL (ref 5–40)

## 2025-01-20 ENCOUNTER — TELEPHONE (OUTPATIENT)
Dept: FAMILY MEDICINE CLINIC | Facility: CLINIC | Age: 75
End: 2025-01-20
Payer: OTHER MISCELLANEOUS

## 2025-01-20 NOTE — TELEPHONE ENCOUNTER
Spoke with Alexander...  He is drinking approximately 5 bottles of water daily and is taking Zetia...

## 2025-01-20 NOTE — TELEPHONE ENCOUNTER
Please call and let him know:  1) His kidney function is stressed out. How much water is he drinking?  2) His potassium is low because of his kidneys. I'm probably going to send some potassium in depending on his water intake answer.  3) His cholesterol has jumped 40 points. Is he still taking the zetia? Is he monitoring his cholesterol intake? We used to have him on statins but he complained that they hurt his legs and since his cholesterol level was improved, I agreed for him to go off of it for the time being.
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
## 2025-02-10 ENCOUNTER — TELEPHONE (OUTPATIENT)
Dept: FAMILY MEDICINE CLINIC | Facility: CLINIC | Age: 75
End: 2025-02-10

## 2025-02-10 ENCOUNTER — OFFICE VISIT (OUTPATIENT)
Dept: FAMILY MEDICINE CLINIC | Facility: CLINIC | Age: 75
End: 2025-02-10
Payer: OTHER MISCELLANEOUS

## 2025-02-10 VITALS
WEIGHT: 147.2 LBS | DIASTOLIC BLOOD PRESSURE: 70 MMHG | SYSTOLIC BLOOD PRESSURE: 178 MMHG | HEART RATE: 72 BPM | BODY MASS INDEX: 21.07 KG/M2 | TEMPERATURE: 97.5 F | OXYGEN SATURATION: 97 % | RESPIRATION RATE: 16 BRPM | HEIGHT: 70 IN

## 2025-02-10 DIAGNOSIS — R55 SYNCOPE, UNSPECIFIED SYNCOPE TYPE: Primary | ICD-10-CM

## 2025-02-10 PROCEDURE — 84484 ASSAY OF TROPONIN QUANT: CPT | Performed by: FAMILY MEDICINE

## 2025-02-10 PROCEDURE — 83540 ASSAY OF IRON: CPT | Performed by: FAMILY MEDICINE

## 2025-02-10 PROCEDURE — 82550 ASSAY OF CK (CPK): CPT | Performed by: FAMILY MEDICINE

## 2025-02-10 PROCEDURE — 84443 ASSAY THYROID STIM HORMONE: CPT | Performed by: FAMILY MEDICINE

## 2025-02-10 PROCEDURE — 80053 COMPREHEN METABOLIC PANEL: CPT | Performed by: FAMILY MEDICINE

## 2025-02-10 PROCEDURE — 36415 COLL VENOUS BLD VENIPUNCTURE: CPT | Performed by: FAMILY MEDICINE

## 2025-02-10 PROCEDURE — 82728 ASSAY OF FERRITIN: CPT | Performed by: FAMILY MEDICINE

## 2025-02-10 PROCEDURE — 84466 ASSAY OF TRANSFERRIN: CPT | Performed by: FAMILY MEDICINE

## 2025-02-10 PROCEDURE — 82553 CREATINE MB FRACTION: CPT | Performed by: FAMILY MEDICINE

## 2025-02-10 PROCEDURE — 99214 OFFICE O/P EST MOD 30 MIN: CPT | Performed by: FAMILY MEDICINE

## 2025-02-10 NOTE — TELEPHONE ENCOUNTER
[facility])  Discharge Final Note    Primary Care Provider: Ernst King MD    Expected Discharge Date: 3/18/2022    Final Discharge Note (most recent)     Final Note - 03/18/22 0949        Final Note    Assessment Type Final Discharge Note     Anticipated Discharge Disposition Home or Self Care     What phone number can be called within the next 1-3 days to see how you are doing after discharge? --   [phone]    Hospital Resources/Appts/Education Provided Appointments scheduled and added to AVS                 Important Message from Medicare            Future Appointments   Date Time Provider Department Center   4/14/2022 10:30 AM [facility]   4/14/2022 11:30 AM Kelli Taylor PA-C [facility] [address]     Patient discharging home to care of family on 3/18/22.       Daughter called reports Friday night patient went to the bathroom was walking back to bed & reports he apparently passed out,woke up on the floor,did not know how long he was out,no noticeable injuries ,he called her the next morning & she is concerned & wishes for him to be seen,scheduled a same day.

## 2025-02-11 ENCOUNTER — TELEPHONE (OUTPATIENT)
Dept: FAMILY MEDICINE CLINIC | Facility: CLINIC | Age: 75
End: 2025-02-11
Payer: OTHER MISCELLANEOUS

## 2025-02-11 LAB
ALBUMIN SERPL-MCNC: 4.1 G/DL (ref 3.5–5.2)
ALBUMIN/GLOB SERPL: 1.5 G/DL
ALP SERPL-CCNC: 79 U/L (ref 39–117)
ALT SERPL W P-5'-P-CCNC: 14 U/L (ref 1–41)
ANION GAP SERPL CALCULATED.3IONS-SCNC: 8 MMOL/L (ref 5–15)
AST SERPL-CCNC: 25 U/L (ref 1–40)
BILIRUB SERPL-MCNC: 0.2 MG/DL (ref 0–1.2)
BUN SERPL-MCNC: 25 MG/DL (ref 8–23)
BUN/CREAT SERPL: 14.7 (ref 7–25)
CALCIUM SPEC-SCNC: 10.5 MG/DL (ref 8.6–10.5)
CHLORIDE SERPL-SCNC: 105 MMOL/L (ref 98–107)
CK MB SERPL-CCNC: 3.52 NG/ML
CK SERPL-CCNC: 130 U/L (ref 20–200)
CO2 SERPL-SCNC: 30 MMOL/L (ref 22–29)
CREAT SERPL-MCNC: 1.7 MG/DL (ref 0.76–1.27)
EGFRCR SERPLBLD CKD-EPI 2021: 41.5 ML/MIN/1.73
FERRITIN SERPL-MCNC: 324 NG/ML (ref 30–400)
GLOBULIN UR ELPH-MCNC: 2.7 GM/DL
GLUCOSE SERPL-MCNC: 94 MG/DL (ref 65–99)
IRON 24H UR-MRATE: 72 MCG/DL (ref 59–158)
IRON SATN MFR SERPL: 31 % (ref 20–50)
POTASSIUM SERPL-SCNC: 3.5 MMOL/L (ref 3.5–5.2)
PROT SERPL-MCNC: 6.8 G/DL (ref 6–8.5)
SODIUM SERPL-SCNC: 143 MMOL/L (ref 136–145)
TIBC SERPL-MCNC: 234 MCG/DL (ref 298–536)
TRANSFERRIN SERPL-MCNC: 157 MG/DL (ref 200–360)
TROPONIN T SERPL HS-MCNC: 13 NG/L
TSH SERPL DL<=0.05 MIU/L-ACNC: 3 UIU/ML (ref 0.27–4.2)

## 2025-02-11 NOTE — TELEPHONE ENCOUNTER
Brenda returned call & verbalized understanding,agreeable to scheduling imaging & referral back to Nephrology,she stated to notify him & her of appointments.

## 2025-02-11 NOTE — TELEPHONE ENCOUNTER
----- Message from Bhavna Vaughn sent at 2/11/2025  8:05 AM EST -----  Please call the patient/Brenda regarding his abnormal result. His labs are okay except that his kidney function is doing something strange. He says he's drinking a lot of fluid and he's drinking V8 juice, but his kidneys are stressed. He used to see . Can I send him back to him for evaluation? Also, can I go ahead and image his kidneys? He used to have a cyst. That was back in 2019 and he hasn't needed any imaging there since. I think it's time to visualize the kidneys with a kidney ultrasound first (don't want to stress out the kidneys anymore for right now). Thanks.   Principal Discharge DX:	Examination

## 2025-02-18 NOTE — PROGRESS NOTES
"Alexander Zamora     VITALS: Blood pressure 178/70, pulse 72, temperature 97.5 °F (36.4 °C), temperature source Temporal, resp. rate 16, height 177.8 cm (70\"), weight 66.8 kg (147 lb 3.2 oz), SpO2 97%.    Subjective  Chief Complaint  Syncope    Subjective          History of Present Illness:    History of Present Illness  The patient is a 74-year-old male with a medical history significant for dementia and hypertension, presenting to the clinic for a medical follow-up.    He experienced a syncopal episode recently, which he does not recall. His daughter reported that he lost consciousness after using the bathroom, resulting in a fall. He maintains adequate hydration, consuming approximately 4 to 5 bottles of water daily. He has discontinued his previous habit of excessive coffee consumption due to its tiring effects, particularly during the summer months. He also avoids milk intake during the summer.    No complaints regarding medications.     The following portions of the patient's history were reviewed and updated as appropriate: allergies, current medications, past family history, past medical history, past social history, past surgical history and problem list.    Past Medical History  Past Medical History:   Diagnosis Date    Arthritis     Bleeding ulcer     Cancer 2015    Prostate    Chronic back pain     Collapsed lung     Depression     Elevated prostate specific antigen (PSA)     Erectile dysfunction     Hypercholesterolemia     Primary hypertension     Prostate cancer     Prostatitis        Surgical History  Past Surgical History:   Procedure Laterality Date    OTHER SURGICAL HISTORY      surgery for an ulcer, PNEUMOTHORAX AND ULCER THERAPY       Family History  Family History   Problem Relation Age of Onset    Hypertension Mother     Bradycardia Mother         Had pacemaker    Heart attack Mother     Cancer Father         Throat    Nephrolithiasis Father     Parkinsonism Father     Diabetes Sister     " "Heart disease Sister     COPD Sister     Heart disease Sister     Heart attack Sister     Rheum arthritis Sister     Cancer Sister         Stomach    Cancer Sister         Lung    Cancer Brother         Kidney    Hyperlipidemia Brother     Prostatitis Brother     Diabetes Other        Social History  Social History     Socioeconomic History    Marital status: Unknown   Tobacco Use    Smoking status: Former     Current packs/day: 0.00     Average packs/day: 2.0 packs/day for 5.0 years (10.0 ttl pk-yrs)     Types: Cigarettes     Start date:      Quit date:      Years since quittin.1    Smokeless tobacco: Never   Vaping Use    Vaping status: Never Used   Substance and Sexual Activity    Alcohol use: No    Drug use: No    Sexual activity: Defer       Objective   Vital Signs:   /70 (BP Location: Right arm, Patient Position: Sitting, Cuff Size: Adult)   Pulse 72   Temp 97.5 °F (36.4 °C) (Temporal)   Resp 16   Ht 177.8 cm (70\")   Wt 66.8 kg (147 lb 3.2 oz)   SpO2 97%   BMI 21.12 kg/m²       Physical Exam     Physical Exam  Lungs were auscultated.  Heart was examined.    Gen: Patient in NAD. Pleasant and answers appropriately. A&Ox3.    Skin: Warm and dry with normal turgor. No purpura, rashes, or unusual pigmentation noted. Hair is normal in appearance and distribution.    HEENT: NC/AT. No lesions noted. Conjunctiva clear, sclera nonicteric. PERRL. EOMI without nystagmus or strabismus. Fundi appear benign. No hemorrhages or exudates of eyes. Auditory canals are patent bilaterally without lesions. TMs intact,  nonerythematous, nonbulging without lesions. Nasal mucosa pink, nonerythematous, and nonedematous. Frontal and maxillary sinuses are nontender. O/P nonerythematous and moist without exudate.    Neck: Supple without lymph nodes palpated. FROM. No carotid bruits appreciated bilaterally.    Lungs: CTA B/L without rales, rhonchi, crackles, or wheezes.    Heart: RRR. S1 and S2 normal. No S3 or S4. " No MRGT.    Abd: Soft, nontender,nondistended. (+)BSx4 quadrants.     Extrem: No CCE. Radial pulses 2+/4 and equal B/L. FROMx4. No bone, joint, or muscle tenderness noted.    Neuro: No focal motor/sensory deficits.    Procedures    Result Review :   The following data was reviewed by: Bhavna Vaughn MD on 02/10/2025:       Results             Assessment and Plan      Alexander Zamora is a 74 y.o. here for medical followup.    Assessment & Plan  1. Syncopal episode.  The etiology of the syncopal episode remains uncertain, with potential contributing factors including cardiac irregularities, dehydration, or elevated blood pressure. A comprehensive laboratory workup will be conducted today to further investigate the cause. The results will be communicated to his daughter, Brenda, upon receipt.      BMI is within normal parameters. No other follow-up for BMI required.       Patient or patient representative verbalized consent for the use of Ambient Listening during the visit with  Bhavna Vaughn MD for chart documentation. 3/2/2025  22:27 EST      I spent 32 minutes caring for Alexander on this date of service. This time includes time spent by me in the following activities:obtaining and/or reviewing a separately obtained history, performing a medically appropriate examination and/or evaluation , and counseling and educating the patient/family/caregiver  Follow Up   Return in about 1 week (around 2/17/2025).  Findings and plans discussed with patient who verbalizes understanding and agreement. Will followup with patient once results are in. Patient was given instructions and counseling regarding his condition or for health maintenance advice. Please see specific information pulled into the AVS if appropriate.       Bhavna Vaughn MD

## 2025-02-19 NOTE — TELEPHONE ENCOUNTER
Alexander called & left a message  to cancel his appointment tomorrow due to weather & states he has not heard about his referrals.

## 2025-02-25 ENCOUNTER — LAB (OUTPATIENT)
Dept: FAMILY MEDICINE CLINIC | Facility: CLINIC | Age: 75
End: 2025-02-25
Payer: OTHER MISCELLANEOUS

## 2025-02-25 ENCOUNTER — OFFICE VISIT (OUTPATIENT)
Dept: FAMILY MEDICINE CLINIC | Facility: CLINIC | Age: 75
End: 2025-02-25
Payer: MEDICARE

## 2025-02-25 VITALS
TEMPERATURE: 97.1 F | BODY MASS INDEX: 20.76 KG/M2 | HEART RATE: 60 BPM | RESPIRATION RATE: 16 BRPM | SYSTOLIC BLOOD PRESSURE: 134 MMHG | OXYGEN SATURATION: 99 % | DIASTOLIC BLOOD PRESSURE: 68 MMHG | HEIGHT: 70 IN | WEIGHT: 145 LBS

## 2025-02-25 DIAGNOSIS — I10 ESSENTIAL HYPERTENSION: ICD-10-CM

## 2025-02-25 DIAGNOSIS — N18.30 STAGE 3 CHRONIC KIDNEY DISEASE, UNSPECIFIED WHETHER STAGE 3A OR 3B CKD: ICD-10-CM

## 2025-02-25 DIAGNOSIS — M54.50 ACUTE BILATERAL LOW BACK PAIN, UNSPECIFIED WHETHER SCIATICA PRESENT: Primary | ICD-10-CM

## 2025-02-25 LAB
BILIRUB BLD-MCNC: NEGATIVE MG/DL
CLARITY, POC: CLEAR
COLOR UR: YELLOW
GLUCOSE UR STRIP-MCNC: NEGATIVE MG/DL
KETONES UR QL: NEGATIVE
LEUKOCYTE EST, POC: NEGATIVE
NITRITE UR-MCNC: NEGATIVE MG/ML
PH UR: 6 [PH] (ref 5–8)
PROT UR STRIP-MCNC: ABNORMAL MG/DL
RBC # UR STRIP: NEGATIVE /UL
SP GR UR: 1.03 (ref 1–1.03)
UROBILINOGEN UR QL: NORMAL

## 2025-02-25 RX ORDER — METHOCARBAMOL 500 MG/1
500 TABLET, FILM COATED ORAL 2 TIMES DAILY
Qty: 30 TABLET | Refills: 0 | Status: SHIPPED | OUTPATIENT
Start: 2025-02-25

## 2025-02-25 NOTE — PATIENT INSTRUCTIONS
No ibuprofen or advil or naproxen.    Can use tylenol.  Use the muscle relaxer methocarbamol.  We will call you for PT.

## 2025-02-26 ENCOUNTER — LAB (OUTPATIENT)
Dept: FAMILY MEDICINE CLINIC | Facility: CLINIC | Age: 75
End: 2025-02-26
Payer: OTHER MISCELLANEOUS

## 2025-02-26 DIAGNOSIS — N18.30 STAGE 3 CHRONIC KIDNEY DISEASE, UNSPECIFIED WHETHER STAGE 3A OR 3B CKD: ICD-10-CM

## 2025-02-26 LAB
ALBUMIN SERPL-MCNC: 4 G/DL (ref 3.5–5.2)
ALBUMIN/GLOB SERPL: 1.3 G/DL
ALP SERPL-CCNC: 80 U/L (ref 39–117)
ALT SERPL W P-5'-P-CCNC: 10 U/L (ref 1–41)
ANION GAP SERPL CALCULATED.3IONS-SCNC: 10.3 MMOL/L (ref 5–15)
AST SERPL-CCNC: 17 U/L (ref 1–40)
BILIRUB SERPL-MCNC: 0.3 MG/DL (ref 0–1.2)
BUN SERPL-MCNC: 25 MG/DL (ref 8–23)
BUN/CREAT SERPL: 13.6 (ref 7–25)
CALCIUM SPEC-SCNC: 10.3 MG/DL (ref 8.6–10.5)
CHLORIDE SERPL-SCNC: 104 MMOL/L (ref 98–107)
CO2 SERPL-SCNC: 30.7 MMOL/L (ref 22–29)
CREAT SERPL-MCNC: 1.84 MG/DL (ref 0.76–1.27)
EGFRCR SERPLBLD CKD-EPI 2021: 37.8 ML/MIN/1.73
GLOBULIN UR ELPH-MCNC: 3 GM/DL
GLUCOSE SERPL-MCNC: 101 MG/DL (ref 65–99)
POTASSIUM SERPL-SCNC: 3.6 MMOL/L (ref 3.5–5.2)
PROT SERPL-MCNC: 7 G/DL (ref 6–8.5)
SODIUM SERPL-SCNC: 145 MMOL/L (ref 136–145)

## 2025-02-26 PROCEDURE — 36415 COLL VENOUS BLD VENIPUNCTURE: CPT

## 2025-02-26 PROCEDURE — 80053 COMPREHEN METABOLIC PANEL: CPT | Performed by: FAMILY MEDICINE

## 2025-02-26 NOTE — TELEPHONE ENCOUNTER
Patients daughter called to discuss PT referral. States that patient wants to go back to where we referred him the last time. While in office, Dr Vaughn gave him the options and he wanted to try somewhere new. Faxed referral, their office called to schedule and he got confused and had his daughter to call us.     I explained that his last PT referral was to [facility], but he doesn't believe that is correct. I told Brenda that I would speak to Dr. Vaughn about this and return her call.    2/26/25- 330  Returned Antony phone call, LVM.

## 2025-03-03 ENCOUNTER — HOSPITAL ENCOUNTER (OUTPATIENT)
Dept: PHYSICAL THERAPY | Facility: HOSPITAL | Age: 75
Setting detail: THERAPIES SERIES
Discharge: HOME | End: 2025-03-03
Payer: OTHER MISCELLANEOUS

## 2025-03-03 DIAGNOSIS — M54.50 ACUTE LEFT-SIDED LOW BACK PAIN WITHOUT SCIATICA: Primary | ICD-10-CM

## 2025-03-03 PROCEDURE — G0283 ELEC STIM OTHER THAN WOUND: HCPCS | Performed by: PHYSICAL THERAPIST

## 2025-03-03 PROCEDURE — 97162 PT EVAL MOD COMPLEX 30 MIN: CPT | Performed by: PHYSICAL THERAPIST

## 2025-03-03 PROCEDURE — 97110 THERAPEUTIC EXERCISES: CPT | Performed by: PHYSICAL THERAPIST
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
## 2025-03-05 NOTE — PROGRESS NOTES
"Alexander Zamora VITALS: Blood pressure 134/68, pulse 60, temperature 97.1 °F (36.2 °C), temperature source Temporal, resp. rate 16, height 177.8 cm (70\"), weight 65.8 kg (145 lb), SpO2 99%.    Subjective  Chief Complaint  Back Pain    Subjective          History of Present Illness:    History of Present Illness  The patient is a 74-year-old male with medical conditions significant for dementia, hypertension, hyperlipidemia, CAD, and arthritis who presents to the clinic for follow-up.    He reports experiencing back pain, which has been progressively worsening to the point where it is now difficult for him to sit. He has been managing the pain with daily doses of Advil and Tylenol, which he finds effective. However, his daughter, who was contacted during the visit, expressed concern about his frequent use of these medications. She also inquired about the potential use of naproxen for his condition. He has no known history of kidney issues.    Supplemental Information  He takes blood pressure medicine every night.    FAMILY HISTORY  His oldest brother had to have kidney surgery.    MEDICATIONS  Current: Advil, Tylenol    No complaints regarding medications.     The following portions of the patient's history were reviewed and updated as appropriate: allergies, current medications, past family history, past medical history, past social history, past surgical history and problem list.    Past Medical History  Past Medical History:   Diagnosis Date    Arthritis     Bleeding ulcer     Cancer 2015    Prostate    Chronic back pain     Collapsed lung     Depression     Elevated prostate specific antigen (PSA)     Erectile dysfunction     Hypercholesterolemia     Primary hypertension     Prostate cancer     Prostatitis        Surgical History  Past Surgical History:   Procedure Laterality Date    OTHER SURGICAL HISTORY      surgery for an ulcer, PNEUMOTHORAX AND ULCER THERAPY       Family History  Family History   Problem " "Relation Age of Onset    Hypertension Mother     Bradycardia Mother         Had pacemaker    Heart attack Mother     Cancer Father         Throat    Nephrolithiasis Father     Parkinsonism Father     Diabetes Sister     Heart disease Sister     COPD Sister     Heart disease Sister     Heart attack Sister     Rheum arthritis Sister     Cancer Sister         Stomach    Cancer Sister         Lung    Cancer Brother         Kidney    Hyperlipidemia Brother     Prostatitis Brother     Diabetes Other        Social History  Social History     Socioeconomic History    Marital status: Unknown   Tobacco Use    Smoking status: Former     Current packs/day: 0.00     Average packs/day: 2.0 packs/day for 5.0 years (10.0 ttl pk-yrs)     Types: Cigarettes     Start date:      Quit date: 1970     Years since quittin.2    Smokeless tobacco: Never   Vaping Use    Vaping status: Never Used   Substance and Sexual Activity    Alcohol use: No    Drug use: No    Sexual activity: Defer       Objective   Vital Signs:   /68 (BP Location: Right arm, Patient Position: Sitting, Cuff Size: Adult)   Pulse 60   Temp 97.1 °F (36.2 °C) (Temporal)   Resp 16   Ht 177.8 cm (70\")   Wt 65.8 kg (145 lb)   SpO2 99%   BMI 20.81 kg/m²       Physical Exam     Physical Exam      Gen: Patient in NAD.  Pleasantly confused.    Skin: Warm and dry with normal turgor. No purpura, rashes, or unusual pigmentation noted. Hair is normal in appearance and distribution.    HEENT: NC/AT. No lesions noted. Conjunctiva clear, sclera nonicteric. PERRL. EOMI without nystagmus or strabismus. Fundi appear benign. No hemorrhages or exudates of eyes. Auditory canals are patent bilaterally without lesions. TMs intact,  nonerythematous, bulging without lesions. Nasal mucosa erythematous, and nonedematous. Frontal and maxillary sinuses are nontender. O/P erythematous and moist without exudate.    Neck: Supple without lymph nodes palpated.  Slightly decreased ROM. " No carotid bruits appreciated bilaterally.    Lungs: Decreased B/L without rales, rhonchi, crackles, or wheezes.    Heart: RRR. S1 and S2 normal. No S3 or S4. No MRGT.    Abd: Soft, nontender,nondistended. (+)BSx4 quadrants.  No flank pain bilaterally.    Extrem: No CCE. Radial pulses 2+/4 and equal B/L. FROMx4.  Positive joint tenderness noted.    Back: Decreased range of motion.    Neuro: No focal motor/sensory deficits.    Procedures    Result Review :   The following data was reviewed by: Bhavna Vaughn MD on 02/25/2025:       Results             Assessment and Plan      Alexander Zamora is a 74 y.o. here for medical followup.    Diagnoses and all orders for this visit:    1. Acute bilateral low back pain, unspecified whether sciatica present (Primary)  -     POC Urinalysis Dipstick  -     methocarbamol (ROBAXIN) 500 MG tablet; Take 1 tablet by mouth 2 (Two) Times a Day.  Dispense: 30 tablet; Refill: 0  -     Ambulatory Referral to Physical Therapy for Evaluation & Treatment    2. Stage 3 chronic kidney disease, unspecified whether stage 3a or 3b CKD  -     Comprehensive Metabolic Panel; Future    3. Essential hypertension  -     Comprehensive Metabolic Panel; Future      No ibuprofen or advil or naproxen.    Can use tylenol.  Use the muscle relaxer methocarbamol.  We will call you for PT.  Assessment & Plan  1. Back pain.  His back pain is likely due to arthritis, which is causing nerve compression and subsequent tremors. His renal function has shown a decline over the past month, possibly due to the daily intake of Advil. He has been advised to discontinue the use of Advil and instead take Tylenol as needed for pain management. A prescription for methocarbamol, a muscle relaxant, will be provided. A referral for physical therapy at [facility] will be made to help strengthen his back and alleviate pain. His renal function will be reassessed in a few weeks. If his renal function improves but the pain persists,  an alternative arthritis medication that is less harmful to the kidneys will be considered.      BMI is within normal parameters. No other follow-up for BMI required.       Patient or patient representative verbalized consent for the use of Ambient Listening during the visit with  Bhavna Vaughn MD for chart documentation. 3/10/2025  13:41 EDT        Follow Up   Return (already has appt) LABS.  Findings and plans discussed with patient who verbalizes understanding and agreement. Will followup with patient once results are in. Patient was given instructions and counseling regarding his condition or for health maintenance advice. Please see specific information pulled into the AVS if appropriate.       Bhavna Vaughn MD

## 2025-03-07 ENCOUNTER — OFFICE VISIT (OUTPATIENT)
Dept: FAMILY MEDICINE CLINIC | Facility: CLINIC | Age: 75
End: 2025-03-07
Payer: MEDICARE

## 2025-03-07 ENCOUNTER — HOSPITAL ENCOUNTER (OUTPATIENT)
Dept: PHYSICAL THERAPY | Facility: HOSPITAL | Age: 75
Setting detail: THERAPIES SERIES
Discharge: HOME | End: 2025-03-07
Payer: OTHER MISCELLANEOUS

## 2025-03-07 ENCOUNTER — TELEPHONE (OUTPATIENT)
Dept: FAMILY MEDICINE CLINIC | Facility: CLINIC | Age: 75
End: 2025-03-07

## 2025-03-07 VITALS
OXYGEN SATURATION: 99 % | SYSTOLIC BLOOD PRESSURE: 136 MMHG | HEART RATE: 51 BPM | TEMPERATURE: 96.9 F | BODY MASS INDEX: 20.9 KG/M2 | WEIGHT: 146 LBS | RESPIRATION RATE: 16 BRPM | HEIGHT: 70 IN | DIASTOLIC BLOOD PRESSURE: 70 MMHG

## 2025-03-07 DIAGNOSIS — M54.50 ACUTE BILATERAL LOW BACK PAIN, UNSPECIFIED WHETHER SCIATICA PRESENT: ICD-10-CM

## 2025-03-07 DIAGNOSIS — R79.89 ABNORMAL SERUM CREATININE LEVEL: Primary | ICD-10-CM

## 2025-03-07 DIAGNOSIS — N18.30 STAGE 3 CHRONIC KIDNEY DISEASE, UNSPECIFIED WHETHER STAGE 3A OR 3B CKD: ICD-10-CM

## 2025-03-07 DIAGNOSIS — M54.50 ACUTE LEFT-SIDED LOW BACK PAIN WITHOUT SCIATICA: Primary | ICD-10-CM

## 2025-03-07 DIAGNOSIS — I10 ESSENTIAL HYPERTENSION: ICD-10-CM

## 2025-03-07 PROCEDURE — 3075F SYST BP GE 130 - 139MM HG: CPT | Performed by: FAMILY MEDICINE

## 2025-03-07 PROCEDURE — 1126F AMNT PAIN NOTED NONE PRSNT: CPT | Performed by: FAMILY MEDICINE

## 2025-03-07 PROCEDURE — G2211 COMPLEX E/M VISIT ADD ON: HCPCS | Performed by: FAMILY MEDICINE

## 2025-03-07 PROCEDURE — G0283 ELEC STIM OTHER THAN WOUND: HCPCS | Performed by: PHYSICAL THERAPIST

## 2025-03-07 PROCEDURE — 99214 OFFICE O/P EST MOD 30 MIN: CPT | Performed by: FAMILY MEDICINE

## 2025-03-07 PROCEDURE — 3078F DIAST BP <80 MM HG: CPT | Performed by: FAMILY MEDICINE

## 2025-03-07 PROCEDURE — 97140 MANUAL THERAPY 1/> REGIONS: CPT | Performed by: PHYSICAL THERAPIST

## 2025-03-07 PROCEDURE — 97110 THERAPEUTIC EXERCISES: CPT | Performed by: PHYSICAL THERAPIST

## 2025-03-07 PROCEDURE — 1160F RVW MEDS BY RX/DR IN RCRD: CPT | Performed by: FAMILY MEDICINE

## 2025-03-07 PROCEDURE — 1159F MED LIST DOCD IN RCRD: CPT | Performed by: FAMILY MEDICINE

## 2025-03-07 NOTE — TELEPHONE ENCOUNTER
----- Message from Bhavna Vaughn sent at 3/7/2025  1:36 PM EST -----  Please call Brenda and let her know that I'm a little concerned about his kidney function. It's staying stressed out and I'm not sure why. He assures me that he is drinking 6-7 bottles of water a day. I'm going to send him over to nephrology to be checked out.

## 2025-03-07 NOTE — PROGRESS NOTES
Physical Therapy Daily Treatment Note      Patient: Alexander Zamora   : 1950  Referring practitioner: Bhavna Vaughn MD  Date of Initial Visit: Type: THERAPY  Episode: LBP  Today's Date: 3/7/2025  Patient seen for 2 sessions       Visit Diagnoses:    ICD-10-CM ICD-9-CM   1. Acute left-sided low back pain without sciatica  M54.50 724.2       Subjective Evaluation    History of Present Illness    Subjective comment: Patient reports that he has 4/10 pain today, noting that he took his pain medicine earlier.       Objective   See Exercise, Manual, and Modality Logs for complete treatment.       Assessment & Plan       Assessment  Assessment details: Patient tolerated today's session well, noting a decrease in pain to 0/10 pain post-tx.  Treatment initiated with there ex, per flow sheet.  Exercises focused on lumbar ROM, LE strengthening, core strengthening, and postural awareness.  STM performed to lumbar musculature to encourage improved tissue pliability.  Treatment concluded with ice/ESTIM, with no skin irritation following.  He will continue to be progressed per his tolerance and POC.          Timed:         Manual Therapy:    15     mins  67146;     Therapeutic Exercise:    27     mins  33663;     Neuromuscular Rosalie:        mins  14786;    Therapeutic Activity:          mins  40438;     Gait Training:           mins  28441;     Ultrasound:          mins  76923;    Ionto                                   mins   00238  Self Care                            mins   35732      Un-Timed:  Electrical Stimulation:    10     mins  35920 ( );  Dry Needling          mins self-pay  Traction          mins 41215  Canalith Repos         mins 44525    Timed Treatment:   42   mins   Total Treatment:     52   mins    Christen Mon PT  [place] License: [id number]  Electronically signed by Christen Mon PT, 25, 11:35 AM EST.

## 2025-03-10 NOTE — PROGRESS NOTES
"Alexander Zamora VITALS: Blood pressure 136/70, pulse 51, temperature 96.9 °F (36.1 °C), temperature source Temporal, resp. rate 16, height 177.8 cm (70\"), weight 66.2 kg (146 lb), SpO2 99%.    Subjective  Chief Complaint  Abnormal Lab and Back Pain    Subjective          History of Present Illness:    History of Present Illness  The patient is a 74-year-old male with medical conditions significant for dementia, allergic rhinitis, dry mouth, hyperlipidemia, and GERD who presents to the clinic for a medical follow-up.    His back pain was bothering him at his last visit, and he was referred to physical therapy. He reports an improvement in his back pain since starting physical therapy and taking Robaxin 500 mg. He has been engaging in extensive exercises as part of his therapy regimen. He has a scheduled appointment for Tuesday.     He maintains a high water intake, consuming approximately 5 to 6 bottles daily, occasionally exceeding this amount. He experiences excessive sweating at night when lying down. He does not consume dark soda or coffee, and his morning beverage is milk. He also reports daytime sleepiness, necessitating naps, although he did not nap today.    He has dry mouth. He takes medication twice daily for his dry mouth.  The medication works.    FAMILY HISTORY  His oldest brother had to have a kidney operation.      No complaints regarding medications.     The following portions of the patient's history were reviewed and updated as appropriate: allergies, current medications, past family history, past medical history, past social history, past surgical history and problem list.    Past Medical History  Past Medical History:   Diagnosis Date    Arthritis     Bleeding ulcer     Cancer 2015    Prostate    Chronic back pain     Collapsed lung     Depression     Elevated prostate specific antigen (PSA)     Erectile dysfunction     Hypercholesterolemia     Primary hypertension     Prostate cancer     " "Prostatitis        Surgical History  Past Surgical History:   Procedure Laterality Date    OTHER SURGICAL HISTORY      surgery for an ulcer, PNEUMOTHORAX AND ULCER THERAPY       Family History  Family History   Problem Relation Age of Onset    Hypertension Mother     Bradycardia Mother         Had pacemaker    Heart attack Mother     Cancer Father         Throat    Nephrolithiasis Father     Parkinsonism Father     Diabetes Sister     Heart disease Sister     COPD Sister     Heart disease Sister     Heart attack Sister     Rheum arthritis Sister     Cancer Sister         Stomach    Cancer Sister         Lung    Cancer Brother         Kidney    Hyperlipidemia Brother     Prostatitis Brother     Diabetes Other        Social History  Social History     Socioeconomic History    Marital status: Unknown   Tobacco Use    Smoking status: Former     Current packs/day: 0.00     Average packs/day: 2.0 packs/day for 5.0 years (10.0 ttl pk-yrs)     Types: Cigarettes     Start date:      Quit date:      Years since quittin.2    Smokeless tobacco: Never   Vaping Use    Vaping status: Never Used   Substance and Sexual Activity    Alcohol use: No    Drug use: No    Sexual activity: Defer       Objective   Vital Signs:   /70 (BP Location: Right arm, Patient Position: Sitting, Cuff Size: Adult)   Pulse 51   Temp 96.9 °F (36.1 °C) (Temporal)   Resp 16   Ht 177.8 cm (70\")   Wt 66.2 kg (146 lb)   SpO2 99%   BMI 20.95 kg/m²       Physical Exam     Physical Exam      Gen: Patient in NAD.  Pleasantly confused.    Skin: Warm and dry with normal turgor. No purpura, rashes, or unusual pigmentation noted. Hair is normal in appearance and distribution.    HEENT: NC/AT. No lesions noted. Conjunctiva clear, sclera nonicteric. PERRL. EOMI without nystagmus or strabismus. Fundi appear benign. No hemorrhages or exudates of eyes. Auditory canals are patent bilaterally without lesions. TMs intact,  nonerythematous, bulging " without lesions. Nasal mucosa pink, nonerythematous, and nonedematous. Frontal and maxillary sinuses are nontender. O/P nonerythematous and moist without exudate.    Neck: Supple without lymph nodes palpated.  Decreased ROM. No carotid bruits appreciated bilaterally.    Lungs: Decreased B/L without rales, rhonchi, crackles, or wheezes.    Heart: RRR. S1 and S2 normal. No S3 or S4. No MRGT.    Abd: Soft, nontender,nondistended. (+)BSx4 quadrants.     Extrem: No CCE. Radial pulses 2+/4 and equal B/L. FROMx4.  Positive joint tenderness noted.    Back: Improved mobility.    Neuro: No focal motor/sensory deficits.    Procedures    Result Review :   The following data was reviewed by: Bhavna Vaughn MD on 03/07/2025:       Results             Assessment and Plan      Alexander Zamora is a 74 y.o. here for medical followup.    Diagnoses and all orders for this visit:    1. Abnormal serum creatinine level (Primary)  -     Ambulatory Referral to Nephrology  -     US Renal Bilateral; Future    2. Acute bilateral low back pain, unspecified whether sciatica present  Improved.  Patient to continue physical therapy and Robaxin 500 mg orally twice a day.    3. Stage 3 chronic kidney disease, unspecified whether stage 3a or 3b CKD  Will send to nephrology for further workup.    4. Essential hypertension  Slightly elevated today.  Will monitor.  Patient currently on amlodipine 10 mg orally daily, hydralazine 10 mg orally daily, and lisinopril 40 mg orally daily.      Assessment & Plan  1. Back pain.  He reports significant improvement in his back pain since starting physical therapy and taking Robaxin 500 mg. He is advised to continue with physical therapy. He should avoid using Advil and can use Tylenol for pain management.    2. Elevated kidney function.  His kidney function has shown an unexpected stress despite adequate hydration. A referral to a nephrologist will be made for further evaluation. An ultrasound of the kidneys  will be ordered to investigate the cause.    3. Dry mouth.  He continues to experience dry mouth and uses a liquid mouthwash and takes pills twice a day to manage the condition.      BMI is within normal parameters. No other follow-up for BMI required.          Patient or patient representative verbalized consent for the use of Ambient Listening during the visit with  Bhavna Vaughn MD for chart documentation. 3/10/2025  13:46 EDT      I spent 32 minutes caring for Alexander on this date of service. This time includes time spent by me in the following activities:obtaining and/or reviewing a separately obtained history, performing a medically appropriate examination and/or evaluation , and counseling and educating the patient/family/caregiver  Follow Up   Return in about 6 weeks (around 4/18/2025).  Findings and plans discussed with patient who verbalizes understanding and agreement. Will followup with patient once results are in. Patient was given instructions and counseling regarding his condition or for health maintenance advice. Please see specific information pulled into the AVS if appropriate.       Bhavna Vaughn MD

## 2025-03-11 ENCOUNTER — HOSPITAL ENCOUNTER (OUTPATIENT)
Dept: PHYSICAL THERAPY | Facility: HOSPITAL | Age: 75
Setting detail: THERAPIES SERIES
Discharge: HOME | End: 2025-03-11
Payer: OTHER MISCELLANEOUS

## 2025-03-11 DIAGNOSIS — M54.50 ACUTE LEFT-SIDED LOW BACK PAIN WITHOUT SCIATICA: Primary | ICD-10-CM

## 2025-03-11 PROCEDURE — 97110 THERAPEUTIC EXERCISES: CPT | Performed by: PHYSICAL THERAPIST

## 2025-03-11 PROCEDURE — G0283 ELEC STIM OTHER THAN WOUND: HCPCS | Performed by: PHYSICAL THERAPIST

## 2025-03-11 PROCEDURE — 97140 MANUAL THERAPY 1/> REGIONS: CPT | Performed by: PHYSICAL THERAPIST

## 2025-03-11 NOTE — THERAPY TREATMENT NOTE
Physical Therapy Daily Treatment Note      Patient: Alexander Zamora   : 1950  Referring practitioner: Bhavna Vaughn MD  Date of Initial Visit: Type: THERAPY  Episode: LBP  Today's Date: 3/11/2025  Patient seen for 3 sessions       Visit Diagnoses:    ICD-10-CM ICD-9-CM   1. Acute left-sided low back pain without sciatica  M54.50 724.2       Subjective Evaluation    History of Present Illness    Subjective comment: Pt reports 0/10 low back pain prior to today's session, stating he took pain medication prior to today's session.Pain  Current pain ratin         Objective   See Exercise, Manual, and Modality Logs for complete treatment.       Assessment & Plan       Assessment  Assessment details: Today's session was initiated with STM to bilateral lumbar paraspinals, with a greater amount of tightness on the left compared to the right. Therapeutic exercises were performed for improved lumbar ROM, core stability, and BLE strength. Tactile cues were provided for proper form. Today's session concluded with IFC to the lumbar region, addressing pain, with no skin irritation observed. The patient reported 0/10 lumbar pain following today's session.    Plan  Plan details: Progress as indicated to achieve max function.          Timed:         Manual Therapy:    16     mins  16614;     Therapeutic Exercise:    25     mins  92760;     Neuromuscular Rosalie:        mins  38300;    Therapeutic Activity:          mins  90932;     Gait Training:           mins  28295;     Ultrasound:          mins  77176;    Ionto                                   mins   90159  Self Care                            mins   87731  Canalith Repos         mins 16431      Un-Timed:  Electrical Stimulation:    10     mins  10716 ( );  Dry Needling          mins self-pay  Traction          mins [id number]      Timed Treatment:   41   mins   Total Treatment:     51   mins    Ashley Claudene Dalton, PT  [place] License: [id number]      Electronically  signed by Ashley Claudene Dalton, PT, 03/11/25, 9:28 AM EDT

## 2025-03-14 ENCOUNTER — HOSPITAL ENCOUNTER (OUTPATIENT)
Dept: PHYSICAL THERAPY | Facility: HOSPITAL | Age: 75
Setting detail: THERAPIES SERIES
Discharge: HOME | End: 2025-03-14
Payer: OTHER MISCELLANEOUS

## 2025-03-14 DIAGNOSIS — M54.50 ACUTE LEFT-SIDED LOW BACK PAIN WITHOUT SCIATICA: Primary | ICD-10-CM

## 2025-03-14 PROCEDURE — G0283 ELEC STIM OTHER THAN WOUND: HCPCS | Performed by: PHYSICAL THERAPIST

## 2025-03-14 PROCEDURE — 97110 THERAPEUTIC EXERCISES: CPT | Performed by: PHYSICAL THERAPIST

## 2025-03-14 PROCEDURE — 97140 MANUAL THERAPY 1/> REGIONS: CPT | Performed by: PHYSICAL THERAPIST

## 2025-03-14 NOTE — THERAPY EVALUATION
Physical Therapy Daily Treatment Note      Patient: Alexander Zamora   : 1950  Referring practitioner: Bhavna Vaughn MD  Date of Initial Visit: Type: THERAPY  Episode: LBP  Today's Date: 3/14/2025  Patient seen for 4 sessions       Visit Diagnoses:    ICD-10-CM ICD-9-CM   1. Acute left-sided low back pain without sciatica  M54.50 724.2       Subjective Evaluation    History of Present Illness    Subjective comment: Pt has 7/10 pain today.       Objective   See Exercise, Manual, and Modality Logs for complete treatment.       Assessment & Plan       Assessment  Assessment details: Tx today consisted of exercises for improved core stability and decreased pain followed by stm to lower back in side lying and ended with mh and estim for decreased pain. Pt demonstrated good effort and reported no pain following session.    Plan  Plan details: Will follow progressing core stability and decreased pain.          Timed:         Manual Therapy:    14     mins  89891;     Therapeutic Exercise:    25     mins  40215;     Neuromuscular Rosalie:        mins  60309;    Therapeutic Activity:          mins  04890;     Gait Training:           mins  43835;     Ultrasound:          mins  00685;    Ionto                                   mins   34215  Self Care                            mins   07073  Canalith Repos         mins 59365      Un-Timed:  Electrical Stimulation:    10     mins  15973 ( );  Dry Needling          mins self-pay  Traction          mins 97576      Timed Treatment:   39   mins   Total Treatment:     49   mins    Austin Costello PT  [place] License: [id number]      Electronically signed by Austin Costello PT, 25, 9:34 AM EDT

## 2025-03-17 ENCOUNTER — TELEPHONE (OUTPATIENT)
Dept: FAMILY MEDICINE CLINIC | Facility: CLINIC | Age: 75
End: 2025-03-17
Payer: OTHER MISCELLANEOUS

## 2025-03-17 NOTE — TELEPHONE ENCOUNTER
Caller: angy muñoz    Relationship: Emergency Contact    Best call back number: [phone]     Which medication are you concerned about: A PAIN MEDICATION THE PATIENT WAS PRESCRIBED AT LAST VISIT- DOES NOT KNOW THE NAME     What are your concerns: PATIENTS DAUGHTER STATES THE PATIENT WAS PRESCRIBED A MEDIATION AT LAST VISIT THAT THE PATIENT STATES IS A PAIN MEDICATION. HE HAS THREE DAYS LEFT BUT NO REFILLS. SHE WOULD LIKE TO KNOW IF THIS IS A MEDICATION HE NEEDS TO CONTINUE OR NOT. IF IT NEEDS TO BE CONTINUED THEY WILL NEED A NEW REFILL.

## 2025-03-18 ENCOUNTER — HOSPITAL ENCOUNTER (OUTPATIENT)
Dept: PHYSICAL THERAPY | Facility: HOSPITAL | Age: 75
Setting detail: THERAPIES SERIES
Discharge: HOME | End: 2025-03-18
Payer: OTHER MISCELLANEOUS

## 2025-03-18 DIAGNOSIS — M54.50 ACUTE BILATERAL LOW BACK PAIN, UNSPECIFIED WHETHER SCIATICA PRESENT: ICD-10-CM

## 2025-03-18 DIAGNOSIS — M54.50 ACUTE LEFT-SIDED LOW BACK PAIN WITHOUT SCIATICA: Primary | ICD-10-CM

## 2025-03-18 PROCEDURE — 97110 THERAPEUTIC EXERCISES: CPT | Performed by: PHYSICAL THERAPIST

## 2025-03-18 RX ORDER — METHOCARBAMOL 500 MG/1
500 TABLET, FILM COATED ORAL 2 TIMES DAILY
Qty: 180 TABLET | Refills: 0 | Status: SHIPPED | OUTPATIENT
Start: 2025-03-18

## 2025-03-18 NOTE — THERAPY PROGRESS REPORT/RE-CERT
Progress Note/Discharge    Patient: Alexander Zamora   : 1950  Referring practitioner: Bhavna Vaughn MD  Date of Initial Visit: Type: THERAPY  Episode: LBP  Today's Date: 3/18/2025  Patient seen for 5 sessions         Visit Diagnoses:    ICD-10-CM ICD-9-CM   1. Acute left-sided low back pain without sciatica  M54.50 724.2       Subjective Questionnaire: Oswestry: 16% impairment  Clinical Progress: improved  Home Program Compliance: Yes      Subjective Evaluation    History of Present Illness    Subjective comment: Pt reports continued left low back pain prior to today's session, with an intensity of 6-7/10.Pain  Current pain ratin             Objective          Palpation   Left   No palpable tenderness to the external abdominal oblique, iliacus, iliopsoas, internal abdominal oblique, lumbar interspinals, quadratus lumborum, rectus abdominus and transverse abdominus.   Muscle spasm in the lumbar paraspinals.   Tenderness of the erector spinae and lumbar paraspinals.     Right   No palpable tenderness to the external abdominal oblique, iliacus, iliopsoas, internal abdominal oblique, lumbar interspinals, quadratus lumborum, rectus abdominus and transverse abdominus.   Muscle spasm in the lumbar paraspinals. Tenderness of the erector spinae and lumbar paraspinals.     Tenderness     Lumbar Spine  Tenderness in the left transverse process. No tenderness in the spinous process and right transverse process.     Active Range of Motion     Lumbar   Flexion: Active lumbar flexion: 75% available.   Extension: WFL  Left lateral flexion: WFL  Right lateral flexion: WFL  Left rotation: WFL  Right rotation: WFL    Strength/Myotome Testing     Left Hip   Planes of Motion   Flexion: 4+  Abduction: 4+  Adduction: 4+    Right Hip   Planes of Motion   Flexion: 4+  Abduction: 4+  Adduction: 4+    Left Knee   Flexion: 4+  Extension: 4+    Right Knee   Flexion: 4+  Extension: 4+    Left Ankle/Foot   Dorsiflexion:  "4+    Right Ankle/Foot   Dorsiflexion: 4+    Tests       Thoracic   Negative slump.     Lumbar     Left   Negative passive SLR.     Right   Negative passive SLR.     Left Pelvic Girdle/Sacrum   Negative: sacrum compression and thigh thrust.     Right Pelvic Girdle/Sacrum   Negative: sacrum compression and thigh thrust.           Assessment & Plan       Assessment  Assessment details: Outcome measures were obtained during today's session,with patient demonstrating improved lumbar AROM and BLE strength. He states, \"it's not bad, I've slacked off on work\". He has achieved 4/4 STG and 5/5 LTG. The patient reported functional limitations with a 16% impairment on the Modified Oswestry, improving from a 50% impairment reported at initial evaluation. Due to progress achieved and current level of function, discharge was discussed with the patient. The patient was in agreement to discharge from skilled physical therapy. He was instructed to continue HEP, with exercises reviewed. The patient reported 0/10 lumbar pain following today's session.    Goals  Plan Goals: SHORT TERM GOALS:     4 weeks  1. Pt will be instructed in HEP for improved independence.      Met  2. Pt to demonstrate 50% lumbar AROM for improved ability to perform ADL's.      Met  3. Pt to demonstrate 4/5 BLE strength for improved stability of the core/trunk.       Met  4. Pt will report the ability to sleep throughout the night with no more than 2 interruptions due to pain for improved QOL.      Met    LONG TERM GOALS:   8 weeks  1. Pt to demonstrate 75% lumbar AROM to allow for improved ability to perform functional activities.      Met  2. Pt to demonstrate ability to perform full functional squat with good form and without increased pain in the low back for improved ability to obtain objects off the floor.      Met  3. Pt to report less than 30% impairment on the Modified Oswestry for improved functional independence.      Met: 16% impairment  4. Pt to " report the ability to sit for at least an hour with no increase in pain.      Met  5. Pt will report the ability to perform household chores with no more than 2/10 lumbar pain for improved independence.      Met: 0/10, per pt report    Plan  Treatment plan discussed with: patient  Plan details: Discharge           Recommendations: Discharge      Timed:         Manual Therapy:         mins  71252;     Therapeutic Exercise:    40     mins  30566;     Neuromuscular Rosalie:        mins  65320;    Therapeutic Activity:          mins  57782;     Gait Training:           mins  05223;     Ultrasound:          mins  37042;    Ionto                                  mins   03926  Self Care                            mins   25789    Un-Timed:  Electrical Stimulation:         mins  93195 ( );  Dry Needling          mins self-pay  Traction          mins 81239  Re-Eval                               mins  53598  Canalith Repos         mins 44693    Timed Treatment:   40   mins   Total Treatment:     40   mins          PT: Ashley Claudene Dalton, PT     [place] License:  [id number]    Electronically signed by Ashley Claudene Dalton, PT, 03/18/25, 9:25 AM EDT

## 2025-03-21 ENCOUNTER — APPOINTMENT (OUTPATIENT)
Dept: PHYSICAL THERAPY | Facility: HOSPITAL | Age: 75
End: 2025-03-21
Payer: OTHER MISCELLANEOUS

## 2025-03-24 ENCOUNTER — HOSPITAL ENCOUNTER (OUTPATIENT)
Dept: ULTRASOUND IMAGING | Facility: HOSPITAL | Age: 75
Discharge: HOME | End: 2025-03-24
Admitting: FAMILY MEDICINE
Payer: MEDICARE

## 2025-03-24 ENCOUNTER — TELEPHONE (OUTPATIENT)
Dept: FAMILY MEDICINE CLINIC | Facility: CLINIC | Age: 75
End: 2025-03-24
Payer: OTHER MISCELLANEOUS

## 2025-03-24 DIAGNOSIS — R79.89 ABNORMAL SERUM CREATININE LEVEL: ICD-10-CM

## 2025-03-24 PROCEDURE — 76775 US EXAM ABDO BACK WALL LIM: CPT

## 2025-03-24 PROCEDURE — 76775 US EXAM ABDO BACK WALL LIM: CPT | Performed by: RADIOLOGY

## 2025-03-24 NOTE — TELEPHONE ENCOUNTER
He has renal cysts. They are like air bubbles. They will never turn bad and they will come and go, but they take up valuable real estate in the kidneys. This is why his kidney function is not as good. I wonder if he has polycystic kidney disease. Has he seen the kidney doctor yet?

## 2025-03-25 ENCOUNTER — APPOINTMENT (OUTPATIENT)
Dept: PHYSICAL THERAPY | Facility: HOSPITAL | Age: 75
End: 2025-03-25
Payer: OTHER MISCELLANEOUS

## 2025-03-25 ENCOUNTER — OFFICE VISIT (OUTPATIENT)
Dept: CARDIOLOGY | Facility: CLINIC | Age: 75
End: 2025-03-25
Payer: MEDICARE

## 2025-03-25 VITALS
OXYGEN SATURATION: 99 % | BODY MASS INDEX: 20.93 KG/M2 | HEIGHT: 70 IN | RESPIRATION RATE: 16 BRPM | HEART RATE: 49 BPM | DIASTOLIC BLOOD PRESSURE: 64 MMHG | SYSTOLIC BLOOD PRESSURE: 161 MMHG | WEIGHT: 146.2 LBS

## 2025-03-25 DIAGNOSIS — I25.10 ASCVD (ARTERIOSCLEROTIC CARDIOVASCULAR DISEASE): Primary | ICD-10-CM

## 2025-03-25 DIAGNOSIS — R00.1 BRADYCARDIA: ICD-10-CM

## 2025-03-25 DIAGNOSIS — I10 ESSENTIAL HYPERTENSION: ICD-10-CM

## 2025-03-25 DIAGNOSIS — E78.00 HYPERCHOLESTEROLEMIA: ICD-10-CM

## 2025-03-25 PROCEDURE — 3078F DIAST BP <80 MM HG: CPT | Performed by: NURSE PRACTITIONER

## 2025-03-25 PROCEDURE — G2211 COMPLEX E/M VISIT ADD ON: HCPCS | Performed by: NURSE PRACTITIONER

## 2025-03-25 PROCEDURE — 1160F RVW MEDS BY RX/DR IN RCRD: CPT | Performed by: NURSE PRACTITIONER

## 2025-03-25 PROCEDURE — 3077F SYST BP >= 140 MM HG: CPT | Performed by: NURSE PRACTITIONER

## 2025-03-25 PROCEDURE — 99214 OFFICE O/P EST MOD 30 MIN: CPT | Performed by: NURSE PRACTITIONER

## 2025-03-25 PROCEDURE — 1159F MED LIST DOCD IN RCRD: CPT | Performed by: NURSE PRACTITIONER

## 2025-03-25 RX ORDER — HYDRALAZINE HYDROCHLORIDE 25 MG/1
25 TABLET, FILM COATED ORAL DAILY
Qty: 60 TABLET | Refills: 2 | Status: SHIPPED | OUTPATIENT
Start: 2025-03-25

## 2025-03-25 NOTE — PROGRESS NOTES
[facility]             [facility] FELISHA Bhatia Cherie O, MD [facility]walker Grieran  1950 03/25/2025    Patient Active Problem List   Diagnosis    Prostate cancer    Erectile dysfunction following radical prostatectomy    BEULAH (stress urinary incontinence), male    Arthritis    Chronic back pain    Hypercholesterolemia    Primary hypertension    Allergic rhinitis    History of oral aphthous ulcers    Precordial pain    ASCVD (arteriosclerotic cardiovascular disease)    Bradycardia       Dear Bhavna Vaughn MD:    Subjective     Chief Complaint   Patient presents with    Follow-up     6 mos    Med Management     verbal       HPI:     This is a 74 y.o. male with known past medical history of essential hypertension, coronary artery disease and hyperlipidemia     Alexander Zamora presents today for routine cardiology follow up.   History of Present Illness    He reports a general sense of well-being, with no complaints of chest pain or respiratory distress. He has been monitoring his blood pressure at home, which was recorded as 146/65 this morning. A healthcare professional visited his home yesterday and assessed his respiratory function and blood pressure, both of which were reported to be within normal limits. He has been unable to tolerate statins in the past and is currently on Zetia 10 mg for cholesterol management. He expresses interest in exploring alternative treatment options for his cholesterol. He underwent recent laboratory testing at the hospital, including a renal ultrasound, but is uncertain about the results. He has been referred to a nephrologist for further evaluation.  He reports experiencing more back pain more than anything else. He has been taking methocarbamol 500 mg twice a day.  He has not experienced any pain since starting this medication. He has been doing therapy and exercises for his back, which have been helpful. Denies syncope or  dizziness.       Diagnostic Testing  Stress echo 1/2023: Abnormal stress echo with echocardiographic evidence for myocardial ischemia located in the inferior wall.  CT coronary angiogram 2/2023: Total calcium score 175, multifocal stenotic lesions involving the distal left circumflex and proximal PDA with approximately 50% as well as stenotic lesion involving the proximal third of the LAD, bicuspid aortic valve       All other systems were reviewed and were negative.    Patient Active Problem List   Diagnosis    Prostate cancer    Erectile dysfunction following radical prostatectomy    BEULAH (stress urinary incontinence), male    Arthritis    Chronic back pain    Hypercholesterolemia    Primary hypertension    Allergic rhinitis    History of oral aphthous ulcers    Precordial pain    ASCVD (arteriosclerotic cardiovascular disease)    Bradycardia       family history includes Bradycardia in his mother; COPD in his sister; Cancer in his brother, father, sister, and sister; Diabetes in his sister and another family member; Heart attack in his mother and sister; Heart disease in his sister and sister; Hyperlipidemia in his brother; Hypertension in his mother; Nephrolithiasis in his father; Parkinsonism in his father; Prostatitis in his brother; Rheum arthritis in his sister.     reports that he quit smoking about 55 years ago. His smoking use included cigarettes. He started smoking about 60 years ago. He has a 10 pack-year smoking history. He has never used smokeless tobacco. He reports that he does not drink alcohol and does not use drugs.    Allergies   Allergen Reactions    Crestor [Rosuvastatin Calcium] Myalgia    Amoxicillin Rash         Current Outpatient Medications:     amLODIPine (NORVASC) 10 MG tablet, TAKE ONE TABLET BY MOUTH EVERY DAY, Disp: 90 tablet, Rfl: 1    caphosol (SALIVA [employer]STITUTE) solution, Take 30 mL by mouth As Needed (mucositis)., Disp: 900 mL, Rfl: 1    cetirizine (zyrTEC) 10 MG tablet, Take 1  tablet by mouth Daily., Disp: 90 tablet, Rfl: 1    cevimeline (EVOXAC) 30 MG capsule, TAKE ONE CAPSULE BY MOUTH THREE TIMES A DAY (Patient taking differently: Take 2 capsules by mouth 2 (Two) Times a Day.), Disp: 270 capsule, Rfl: 1    chlorhexidine (PERIDEX) 0.12 % solution, Apply 5 mL to the mouth or throat 2 (Two) Times a Day., Disp: 473 mL, Rfl: 1    Cholecalciferol 25 MCG (1000 UT) tablet, Take 1 tablet by mouth Daily., Disp: , Rfl:     donepezil ODT (ARICEPT ODT) 5 MG disintegrating tablet, Place 1 tablet on the tongue Every Night., Disp: 90 tablet, Rfl: 1    ezetimibe (ZETIA) 10 MG tablet, Take 1 tablet by mouth Daily., Disp: 90 tablet, Rfl: 1    fluticasone (FLONASE) 50 MCG/ACT nasal spray, Administer 2 sprays into the nostril(s) as directed by provider Daily., Disp: 16 g, Rfl: 5    guaiFENesin (Mucinex) 600 MG 12 hr tablet, Take 2 tablets by mouth 2 (Two) Times a Day., Disp: 120 tablet, Rfl: 0    hydrALAZINE (APRESOLINE) 25 MG tablet, Take 1 tablet by mouth Daily., Disp: 60 tablet, Rfl: 2    ibuprofen (ADVIL,MOTRIN) 600 MG tablet, Take 1 tablet by mouth 3 (Three) Times a Day., Disp: , Rfl:     Lidocaine Viscous HCl (XYLOCAINE) 2 % solution, Take  by mouth As Needed for Mild Pain. Mix 20mL Lidocaine with 20mL of Distilled Water, place into a spray bottle, apply 3 sprays on each side where tonsils were every 1 to 2 hours as needed., Disp: , Rfl:     lisinopril (PRINIVIL,ZESTRIL) 40 MG tablet, TAKE 1 TABLET BY MOUTH DAILY., Disp: 90 tablet, Rfl: 1    Melatonin 10 MG tablet, Take 1 tablet by mouth Every Night., Disp: 90 tablet, Rfl: 1    methocarbamol (ROBAXIN) 500 MG tablet, Take 1 tablet by mouth 2 (Two) Times a Day., Disp: 180 tablet, Rfl: 0    Misc Natural Products (NEURIVA PO), Take 1 capsule by mouth Daily., Disp: , Rfl:     montelukast (SINGULAIR) 10 MG tablet, TAKE 1 TABLET BY MOUTH EVERY NIGHT, Disp: 90 tablet, Rfl: 1    Multiple Vitamin (MULTI VITAMIN DAILY) tablet, Take 1 tablet by mouth Daily.,  Disp: , Rfl:     mupirocin (BACTROBAN) 2 % ointment, Apply 1 Application topically to the appropriate area as directed 3 (Three) Times a Day., Disp: 30 g, Rfl: 0    pantoprazole (PROTONIX) 40 MG EC tablet, TAKE ONE TABLET BY MOUTH EVERY DAY, Disp: 90 tablet, Rfl: 1    vitamin C (ASCORBIC ACID) 250 MG tablet, Take 1 tablet by mouth Daily., Disp: , Rfl:     vitamin E 100 UNIT capsule, Take 1 capsule by mouth Daily., Disp: , Rfl:     Zinc 50 MG capsule, Take  by mouth., Disp: , Rfl:       Physical Exam:  I have reviewed the patient's current vital signs as documented in the patient's EMR.   Vitals:    03/25/25 1239   BP: 161/64   Pulse: (!) 49   Resp: 16   SpO2: 99%     Body mass index is 20.98 kg/m².       03/25/25  1239   Weight: 66.3 kg (146 lb 3.2 oz)      Physical Exam  Constitutional:       General: He is not in acute distress.     Appearance: Normal appearance. He is well-developed.   HENT:      Head: Normocephalic and atraumatic.      Mouth/Throat:      Mouth: Mucous membranes are moist.   Eyes:      Extraocular Movements: Extraocular movements intact.      Pupils: Pupils are equal, round, and reactive to light.   Neck:      Vascular: No JVD.   Cardiovascular:      Rate and Rhythm: Normal rate and regular rhythm.      Heart sounds: Normal heart sounds. No murmur heard.     No S3 or S4 sounds.   Pulmonary:      Effort: Pulmonary effort is normal. No respiratory distress.      Breath sounds: Normal breath sounds. No wheezing.   Abdominal:      General: Bowel sounds are normal. There is no distension.      Palpations: Abdomen is soft. There is no hepatomegaly.      Tenderness: There is no abdominal tenderness.   Musculoskeletal:         General: Normal range of motion.      Cervical back: Normal range of motion and neck supple.   Skin:     General: Skin is warm and dry.      Coloration: Skin is not jaundiced or pale.   Neurological:      General: No focal deficit present.      Mental Status: He is alert and  oriented to person, place, and time. Mental status is at baseline.   Psychiatric:         Mood and Affect: Mood normal.         Behavior: Behavior normal.         Thought Content: Thought content normal.         Judgment: Judgment normal.            DATA REVIEWED:     TTE/CAROLIN:  Results for orders placed during the hospital encounter of 01/05/23    Adult Stress Echo W/ Cont or Stress Agent if Necessary Per Protocol    Interpretation Summary    Echocardiogram Findings:    Normal left ventricular cavity size and wall thickness noted. All left ventricular wall segments contract normally.    Left ventricular ejection fraction appears to be 61 - 65%.    Left ventricular diastolic function is consistent with (grade I) impaired relaxation.    The mitral valve is structurally normal with no significant stenosis present. Mild mitral valve regurgitation is present.    The aortic valve is structurally normal with no regurgitation or stenosis present.    There is no evidence of pericardial effusion. .    Stress Procedure    A stress test was performed following the Humberto protocol.    Exercise duration (min) 8 min Estimated workload 10.1 METS    Baseline Vitals Baseline HR 53 bpm Baseline /66 mmHg Peak Stress Vitals Peak  bpm Peak /60 mmHg Recovery Vitals Recovery HR 75 bpm Recovery /71 mmHg Exercise Data Target HR (85%) 125 bpm Max. Pred. HR (100%) 147 bpm Percent Max Pred HR 85.71 %    Arrhythmias during stress: rare PVCs, bigeminy.    Findings consistent with an indeterminate ECG stress test.    Stress Echo Findings    Segment augmentation had an abnormal response to stress. Left ventricular function is normal    The following left ventricular wall segments are hypokinetic: basal inferolateral, basal inferoseptal, mid inferoseptal, basal inferior and basal inferoseptal.    Abnormal stress echo with echocardiographic evidence for myocardial ischemia located in the inferior wall.      Laboratory  "evaluations:    Lab Results   Component Value Date    GLUCOSE 101 (H) 02/26/2025    BUN 25 (H) 02/26/2025    CREATININE 1.84 (H) 02/26/2025    EGFRIFNONA 81 04/27/2019    BCR 13.6 02/26/2025    K 3.6 02/26/2025    CO2 30.7 (H) 02/26/2025    CALCIUM 10.3 02/26/2025    ALBUMIN 4.0 02/26/2025    AST 17 02/26/2025    ALT 10 02/26/2025     Lab Results   Component Value Date    WBC 7.45 07/16/2024    HGB 10.9 (L) 07/16/2024    HCT 32.5 (L) 07/16/2024    MCV 87.8 07/16/2024     07/16/2024     Lab Results   Component Value Date    CHOL 235 (H) 01/16/2025    TRIG 118 01/16/2025    HDL 70 (H) 01/16/2025     (H) 01/16/2025     Lab Results   Component Value Date    TSH 3.000 02/10/2025     No results found for: \"HGBA1C\"  Lab Results   Component Value Date    ALT 10 02/26/2025     No results found for: \"HGBA1C\"  Lab Results   Component Value Date    CREATININE 1.84 (H) 02/26/2025     Lab Results   Component Value Date    IRON 72 02/10/2025    TIBC 234 (L) 02/10/2025    FERRITIN 324.00 02/10/2025     No results found for: \"INR\", \"PROTIME\"   No components found for: \"ABSOLUTELUNG\"    --------------------------------------------------------------------------------------------------------------------------    ASSESSMENT/PLAN:      Diagnosis Plan   1. ASCVD (arteriosclerotic cardiovascular disease)        2. Hypercholesterolemia        3. Essential hypertension  hydrALAZINE (APRESOLINE) 25 MG tablet      4. Bradycardia          Assessment & Plan  1. ASCVD  2. Hyperlipidemia  Patient underwent CT coronary angiogram in 2023 which showed a 50% stenosis in the distal left circumflex and proximal PDA with stenotic lesion involving the proximal third of the LAD.  Denies any chest pain.  We did discuss about possible heart catheterization however patient states given he is asymptomatic he would like to hold off for now.  Continue aspirin.  Patient intolerant to statins in the past. Currently on Zetia.  Recently panel showed   " .  LDL goal is less than 70 given CAD.  I did offer to initiate PCSK9 Repatha however patient is hesitant and states he would like for Dr. Vaughn to prescribe this medication.  I did reach out to her regarding this.    2. Hypertension  3.  Bradycardia  Blood pressure elevated in the office today.  States at home it is usually in the 140s systolic.  Increase hydralazine to 25 mg daily.    Also noted to be bradycardic in the low 40s and 50s.  Overall asymptomatic.  Denies any syncope or dizziness.  Recent TSH was normal.  States he was prescribed Robaxin recently for back pain which could be contributing to his bradycardia.  Advised him discuss with his PCP regarding alternative options.      This document has been @Electronically signed by FELISHA Torrez, 03/25/25, 12:08 PM EDT.       Dictated Utilizing Dragon Dictation: Part of this note may be an electronic transcription/translation of spoken language to printed text using the Dragon Dictation System.    Patient or patient representative verbalized consent for the use of Ambient Listening during the visit with  FELISHA Torrez for chart documentation. 3/25/2025  13:20 EDT    Follow-up appointment and medication changes provided in hand delivered After Visit Summary as well as reviewed in the room.

## 2025-03-28 ENCOUNTER — APPOINTMENT (OUTPATIENT)
Dept: PHYSICAL THERAPY | Facility: HOSPITAL | Age: 75
End: 2025-03-28
Payer: OTHER MISCELLANEOUS

## 2025-04-01 ENCOUNTER — APPOINTMENT (OUTPATIENT)
Dept: PHYSICAL THERAPY | Facility: HOSPITAL | Age: 75
End: 2025-04-01
Payer: OTHER MISCELLANEOUS

## 2025-04-01 ENCOUNTER — TELEPHONE (OUTPATIENT)
Dept: FAMILY MEDICINE CLINIC | Facility: CLINIC | Age: 75
End: 2025-04-01
Payer: OTHER MISCELLANEOUS

## 2025-04-01 NOTE — TELEPHONE ENCOUNTER
I'm pretty sure that the zetia is not doing it, but if he wants, he can stop it and put it aside. I'm just going to ask him if his back is hurting at his next visit, and if it is, I'll put him back on it.

## 2025-04-02 ENCOUNTER — TELEPHONE (OUTPATIENT)
Dept: FAMILY MEDICINE CLINIC | Facility: CLINIC | Age: 75
End: 2025-04-02
Payer: OTHER MISCELLANEOUS

## 2025-04-03 DIAGNOSIS — G89.29 CHRONIC BACK PAIN, UNSPECIFIED BACK LOCATION, UNSPECIFIED BACK PAIN LATERALITY: ICD-10-CM

## 2025-04-03 DIAGNOSIS — M54.50 ACUTE BILATERAL LOW BACK PAIN, UNSPECIFIED WHETHER SCIATICA PRESENT: Primary | ICD-10-CM

## 2025-04-03 DIAGNOSIS — M54.9 CHRONIC BACK PAIN, UNSPECIFIED BACK LOCATION, UNSPECIFIED BACK PAIN LATERALITY: ICD-10-CM

## 2025-04-04 ENCOUNTER — TELEPHONE (OUTPATIENT)
Dept: FAMILY MEDICINE CLINIC | Facility: CLINIC | Age: 75
End: 2025-04-04
Payer: OTHER MISCELLANEOUS

## 2025-04-04 NOTE — TELEPHONE ENCOUNTER
Alexander called he told us wrong where he went to PT it was [facility] requests his PT referral be changed to there.  
no

## 2025-04-09 ENCOUNTER — TELEPHONE (OUTPATIENT)
Dept: FAMILY MEDICINE CLINIC | Facility: CLINIC | Age: 75
End: 2025-04-09
Payer: OTHER MISCELLANEOUS

## 2025-04-17 ENCOUNTER — TELEPHONE (OUTPATIENT)
Dept: FAMILY MEDICINE CLINIC | Facility: CLINIC | Age: 75
End: 2025-04-17
Payer: OTHER MISCELLANEOUS

## 2025-04-17 NOTE — TELEPHONE ENCOUNTER
Caller: RICHA [facility]    Relationship: Other    Best call back number: [phone]     What was the call regarding: PATIENT IS RE[facility] A T[facility] UN[facility] FOR [facility] HIM.

## 2025-04-18 ENCOUNTER — LAB (OUTPATIENT)
Dept: FAMILY MEDICINE CLINIC | Facility: CLINIC | Age: 75
End: 2025-04-18
Payer: OTHER MISCELLANEOUS

## 2025-04-18 VITALS
HEART RATE: 47 BPM | TEMPERATURE: 97.1 F | RESPIRATION RATE: 16 BRPM | WEIGHT: 142.4 LBS | DIASTOLIC BLOOD PRESSURE: 68 MMHG | SYSTOLIC BLOOD PRESSURE: 132 MMHG | HEIGHT: 70 IN | OXYGEN SATURATION: 99 % | BODY MASS INDEX: 20.39 KG/M2

## 2025-04-18 DIAGNOSIS — R79.89 ABNORMAL SERUM CREATININE LEVEL: Primary | ICD-10-CM

## 2025-04-18 DIAGNOSIS — G89.29 CHRONIC BACK PAIN, UNSPECIFIED BACK LOCATION, UNSPECIFIED BACK PAIN LATERALITY: ICD-10-CM

## 2025-04-18 DIAGNOSIS — M54.9 CHRONIC BACK PAIN, UNSPECIFIED BACK LOCATION, UNSPECIFIED BACK PAIN LATERALITY: ICD-10-CM

## 2025-04-18 DIAGNOSIS — Z12.5 ENCOUNTER FOR SCREENING FOR MALIGNANT NEOPLASM OF PROSTATE: ICD-10-CM

## 2025-04-18 LAB
ALBUMIN SERPL-MCNC: 4.3 G/DL (ref 3.5–5.2)
ALBUMIN/GLOB SERPL: 1.5 G/DL
ALP SERPL-CCNC: 94 U/L (ref 39–117)
ALT SERPL W P-5'-P-CCNC: 12 U/L (ref 1–41)
ANION GAP SERPL CALCULATED.3IONS-SCNC: 11.9 MMOL/L (ref 5–15)
AST SERPL-CCNC: 23 U/L (ref 1–40)
BILIRUB SERPL-MCNC: 0.4 MG/DL (ref 0–1.2)
BUN SERPL-MCNC: 23 MG/DL (ref 8–23)
BUN/CREAT SERPL: 13.9 (ref 7–25)
CALCIUM SPEC-SCNC: 10.2 MG/DL (ref 8.6–10.5)
CHLORIDE SERPL-SCNC: 99 MMOL/L (ref 98–107)
CO2 SERPL-SCNC: 31.1 MMOL/L (ref 22–29)
CREAT SERPL-MCNC: 1.65 MG/DL (ref 0.76–1.27)
EGFRCR SERPLBLD CKD-EPI 2021: 43 ML/MIN/1.73
GLOBULIN UR ELPH-MCNC: 2.9 GM/DL
GLUCOSE SERPL-MCNC: 93 MG/DL (ref 65–99)
POTASSIUM SERPL-SCNC: 3.1 MMOL/L (ref 3.5–5.2)
PROT SERPL-MCNC: 7.2 G/DL (ref 6–8.5)
PSA SERPL-MCNC: <0.014 NG/ML (ref 0–4)
SODIUM SERPL-SCNC: 142 MMOL/L (ref 136–145)

## 2025-04-18 PROCEDURE — G0103 PSA SCREENING: HCPCS | Performed by: FAMILY MEDICINE

## 2025-04-18 PROCEDURE — 80053 COMPREHEN METABOLIC PANEL: CPT | Performed by: FAMILY MEDICINE

## 2025-04-18 PROCEDURE — 99214 OFFICE O/P EST MOD 30 MIN: CPT | Performed by: FAMILY MEDICINE

## 2025-04-18 PROCEDURE — 36415 COLL VENOUS BLD VENIPUNCTURE: CPT | Performed by: FAMILY MEDICINE

## 2025-04-24 ENCOUNTER — HOSPITAL ENCOUNTER (OUTPATIENT)
Dept: PHYSICAL THERAPY | Facility: HOSPITAL | Age: 75
Setting detail: THERAPIES SERIES
Discharge: HOME | End: 2025-04-24
Payer: OTHER MISCELLANEOUS

## 2025-04-24 DIAGNOSIS — M54.9 CHRONIC BACK PAIN, UNSPECIFIED BACK LOCATION, UNSPECIFIED BACK PAIN LATERALITY: Primary | ICD-10-CM

## 2025-04-24 DIAGNOSIS — G89.29 CHRONIC BACK PAIN, UNSPECIFIED BACK LOCATION, UNSPECIFIED BACK PAIN LATERALITY: Primary | ICD-10-CM

## 2025-04-24 PROCEDURE — 97162 PT EVAL MOD COMPLEX 30 MIN: CPT | Performed by: PHYSICAL THERAPIST

## 2025-04-24 PROCEDURE — G0283 ELEC STIM OTHER THAN WOUND: HCPCS | Performed by: PHYSICAL THERAPIST

## 2025-04-24 PROCEDURE — 97110 THERAPEUTIC EXERCISES: CPT | Performed by: PHYSICAL THERAPIST
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
## 2025-05-01 ENCOUNTER — TELEPHONE (OUTPATIENT)
Dept: FAMILY MEDICINE CLINIC | Facility: CLINIC | Age: 75
End: 2025-05-01
Payer: MEDICARE

## 2025-05-01 ENCOUNTER — HOSPITAL ENCOUNTER (OUTPATIENT)
Dept: PHYSICAL THERAPY | Facility: HOSPITAL | Age: 75
Setting detail: THERAPIES SERIES
Discharge: HOME | End: 2025-05-01
Payer: OTHER MISCELLANEOUS

## 2025-05-01 DIAGNOSIS — G89.29 CHRONIC BACK PAIN, UNSPECIFIED BACK LOCATION, UNSPECIFIED BACK PAIN LATERALITY: Primary | ICD-10-CM

## 2025-05-01 DIAGNOSIS — M54.9 CHRONIC BACK PAIN, UNSPECIFIED BACK LOCATION, UNSPECIFIED BACK PAIN LATERALITY: Primary | ICD-10-CM

## 2025-05-01 PROCEDURE — G0283 ELEC STIM OTHER THAN WOUND: HCPCS | Performed by: PHYSICAL THERAPIST

## 2025-05-01 PROCEDURE — 97110 THERAPEUTIC EXERCISES: CPT | Performed by: PHYSICAL THERAPIST

## 2025-05-01 PROCEDURE — 97140 MANUAL THERAPY 1/> REGIONS: CPT | Performed by: PHYSICAL THERAPIST

## 2025-05-01 NOTE — THERAPY TREATMENT NOTE
Physical Therapy Daily Treatment Note      Patient: Alexander Zamora   : 1950  Referring practitioner: Bhavna Vaughn MD  Date of Initial Visit: Type: THERAPY  Episode: Chronic LBP -   Today's Date: 2025  Patient seen for 2 sessions       Visit Diagnoses:    ICD-10-CM ICD-9-CM   1. Chronic back pain, unspecified back location, unspecified back pain laterality  M54.9 724.5    G89.29 338.29       Subjective Evaluation    History of Present Illness    Subjective comment: Pt reports 7/10 low back pain prior to today's session.Pain  Current pain ratin         Objective   See Exercise, Manual, and Modality Logs for complete treatment.       Assessment & Plan       Assessment  Assessment details: Today's session was initiated with STM to the lumbar region, addressing muscle tightness, with relief reported. Therapeutic exercises were progressed to include RTB activities as well as standing activities for improved strength and stability. Rest breaks were provided as needed. Today's session concluded with MH combined with IFC to the lumbar region, addressing pain and muscle tightness, with no skin irritation observed. The patient reported 0/10 lumbar pain following today's session.    Plan  Plan details: Progress as indicated to achieve max function.          Timed:         Manual Therapy:    14     mins  99538;     Therapeutic Exercise:    26     mins  45062;     Neuromuscular Rosalie:        mins  30292;    Therapeutic Activity:          mins  09750;     Gait Training:           mins  25946;     Ultrasound:          mins  98011;    Ionto                                   mins   72756  Self Care                            mins   83881  Canalith Repos         mins 10886      Un-Timed:  Electrical Stimulation:    10     mins  89956 ( );  Dry Needling          mins self-pay  Traction          mins 49727      Timed Treatment:   40   mins   Total Treatment:     50   mins    Ashley Claudene Dalton, PT  [place]  License: [id number]      Electronically signed by Ashley Claudene Dalton, PT, 05/01/25, 9:07 AM EDT

## 2025-05-01 NOTE — TELEPHONE ENCOUNTER
----- Message from Bhavna Vaughn sent at 5/1/2025  1:53 PM EDT -----  Patient says that he saw nephrology and they wanted him off a bunch of medications and on some new ones that apparently I'm supposed to prescribe, but he can't tell me more. Please call and get a copy of his note. Thanks.

## 2025-05-01 NOTE — TELEPHONE ENCOUNTER
Contacted the pt to see who his nephrologist is and he stated that it's whoever Dr. Vaughn recommended. The pt also stated goes back to Dr. Sotelo on May 18th. I contacted Dr. Sotelo's office and they are faxing over the office note from his visit.   Occupational Therapy  OT BEDSIDE TREATMENT NOTE      Date:10/3/2018  Patient Name: Mary Lund  MRN: [id number]  : 1929  Room: [address] [place] [address]     AM-PAC Daily Activity Scale Raw Score: 1624  Recommended AE/DME: Continue to assess.      Diagnosis: : Terminal ileitis with rectal bleeding    Past Medical History: a-fib, CAD, arthritis, cancer, chronic back pain, HTN, pacemaker, anxiety.       Precautions: falls risk; skin integrity; chair alarms     Home Living/PLOF: Patient currently visiting family in [address] [place] [address] Dr; patient lives in [place] with her daughter in a one-floor home. Independent, w/walker. Daughter able to assist .     Pain Level: Patient denied experiencing pain.     Hearing: WFL grossly  Vision: [employer] [place] grossly      UE AROM/strength WFLS for ADL activity     Cognition:  Alert, oriented and following commands      Functional Assessment:    Initial Status:  2018  Tx Session   10/3/18    Feeding: Setup  Set-up   Grooming: Min A SBA/set-up   Standing at sink washing hands   Upper Body Dressing: Min A     Lower Body Dressing: Max A Mod A  Assist with threading depends over feet and pulling over hips    Bathing: Max A    Toileting: Max A SBA - hygiene    Bed Mobility: Supine-to-Sit: SBA  Sit-to-Supine: SBA Min A (HOB elevated)   Supine to sit    Functional Transfers: Sit-to-Stand: Min A from edge of bed Min A  Sit-stand from bed and commode    Sitting EOB - supervision      Functional Mobility: Min A for functional mobility within patient's room and hallway  Min A,w/walker, O2  Ambulated to/from bathroom     Daughters present        Comments: Upon arrival pt lying in bed . At end of session sitting in chair all lines and tubes intact, call light within reach. · Pt has made fair progress towards set goals.    · Continue with current plan of care    Time in: [address]  Time out:1225    Maryanne Kong OTR/L [id number]

## 2025-05-05 NOTE — PROGRESS NOTES
"Alexander Zamora VITALS: Blood pressure 132/68, pulse (!) 47, temperature 97.1 °F (36.2 °C), temperature source Temporal, resp. rate 16, height 177.8 cm (70\"), weight 64.6 kg (142 lb 6.4 oz), SpO2 99%.    Subjective  Chief Complaint  Back Pain, Chronic Kidney Disease, and Hypertension    Subjective          History of Present Illness:    History of Present Illness  The patient is a 74-year-old male with medical conditions significant for dementia, allergic rhinitis, dry mouth, hyperlipidemia, and GERD who presents to the clinic for a follow-up visit.    Improvement in his back condition is reported, attributed to the use of a TENS unit. He expresses a desire to continue using the TENS unit at home. However, discomfort in the back is experienced when seated for extended periods.    A scheduled appointment with his nephrologist is on 04/23/2025 at 1:30 PM.    No complaints regarding medications.     The following portions of the patient's history were reviewed and updated as appropriate: allergies, current medications, past family history, past medical history, past social history, past surgical history and problem list.    Past Medical History  Past Medical History:   Diagnosis Date    Arthritis     Bleeding ulcer     Cancer 2015    Prostate    Chronic back pain     Collapsed lung     Depression     Elevated prostate specific antigen (PSA)     Erectile dysfunction     Hypercholesterolemia     Primary hypertension     Prostate cancer     Prostatitis        Surgical History  Past Surgical History:   Procedure Laterality Date    OTHER SURGICAL HISTORY      surgery for an ulcer, PNEUMOTHORAX AND ULCER THERAPY       Family History  Family History   Problem Relation Age of Onset    Hypertension Mother     Bradycardia Mother         Had pacemaker    Heart attack Mother     Cancer Father         Throat    Nephrolithiasis Father     Parkinsonism Father     Diabetes Sister     Heart disease Sister     COPD Sister     Heart " "disease Sister     Heart attack Sister     Rheum arthritis Sister     Cancer Sister         Stomach    Cancer Sister         Lung    Cancer Brother         Kidney    Hyperlipidemia Brother     Prostatitis Brother     Diabetes Other        Social History  Social History     Socioeconomic History    Marital status: Unknown   Tobacco Use    Smoking status: Former     Current packs/day: 0.00     Average packs/day: 2.0 packs/day for 5.0 years (10.0 ttl pk-yrs)     Types: Cigarettes     Start date:      Quit date: 1970     Years since quittin.3    Smokeless tobacco: Never   Vaping Use    Vaping status: Never Used   Substance and Sexual Activity    Alcohol use: No    Drug use: No    Sexual activity: Defer       Objective   Vital Signs:   /68 (BP Location: Right arm, Patient Position: Sitting, Cuff Size: Adult)   Pulse (!) 47   Temp 97.1 °F (36.2 °C) (Temporal)   Resp 16   Ht 177.8 cm (70\")   Wt 64.6 kg (142 lb 6.4 oz)   SpO2 99%   BMI 20.43 kg/m²       Physical Exam     Physical Exam      Gen: Patient in NAD. Pleasant and answers appropriately. A&Ox3.    Skin: Warm and dry with normal turgor. No purpura, rashes, or unusual pigmentation noted. Hair is normal in appearance and distribution.    HEENT: NC/AT. No lesions noted. Conjunctiva clear, sclera nonicteric. PERRL. EOMI without nystagmus or strabismus. Fundi appear benign. No hemorrhages or exudates of eyes. Auditory canals are patent bilaterally without lesions. TMs intact,  nonerythematous, bulging without lesions. Nasal mucosa erythematous, and nonedematous. Frontal and maxillary sinuses are nontender. O/P erythematous and moist without exudate.    Neck: Supple without lymph nodes palpated.  Decreased ROM. No carotid bruits appreciated bilaterally.    Lungs: Decreased B/L without rales, rhonchi, crackles, or wheezes.    Heart: RRR. S1 and S2 normal. No S3 or S4. No MRGT.    Abd: Soft, nontender,nondistended. (+)BSx4 quadrants.     Extrem: No CCE. " Radial pulses 2+/4 and equal B/L. FROMx4.  Positive joint tenderness noted.    Neuro: No focal motor/sensory deficits.    Procedures    Result Review :   The following data was reviewed by: Bhavna Vaughn MD on 04/18/2025:       Results             Assessment and Plan      Alexander Zamora is a 74 y.o. here for medical followup.    Diagnoses and all orders for this visit:    1. Abnormal serum creatinine level (Primary)  -     Comprehensive Metabolic Panel; Future  -     Comprehensive Metabolic Panel    2. Encounter for screening for malignant neoplasm of prostate  -     PSA Screen; Future  -     PSA Screen    3. Chronic back pain, unspecified back location, unspecified back pain laterality        Assessment & Plan  1. Back pain.  - Reports significant relief from back pain using a TENS unit.  - Physical exam findings indicate improvement in back pain symptoms.  - Discussed the use of a TENS unit at home and arranged for delivery from [employer].  - Advised to use the TENS unit when experiencing pain, particularly when sitting for prolonged periods.    2. Renal insufficiency.  - Concern about kidney function due to recent symptoms.  - Upcoming nephrology appointment scheduled for 04/23/2025 at 1:30 PM.  - Laboratory tests ordered today to monitor renal function.  - If lab results indicate worsening kidney function, an earlier follow-up will be scheduled.    Follow-up  - Scheduled follow-up appointment in 2 months.      BMI is within normal parameters. No other follow-up for BMI required.     Patient or patient representative verbalized consent for the use of Ambient Listening during the visit with  Bhavna Vaughn MD for chart documentation. 5/4/2025  23:31 EDT      I spent 34 minutes caring for Alexander on this date of service. This time includes time spent by me in the following activities:obtaining and/or reviewing a separately obtained history, performing a medically appropriate  examination and/or evaluation , and counseling and educating the patient/family/caregiver  Follow Up   Return in about 2 months (around 6/18/2025), or cancel 4/24; LABS.  Findings and plans discussed with patient who verbalizes understanding and agreement. Will followup with patient once results are in. Patient was given instructions and counseling regarding his condition or for health maintenance advice. Please see specific information pulled into the AVS if appropriate.       Bhavna Vaughn MD

## 2025-05-05 NOTE — TELEPHONE ENCOUNTER
Brenda called back indicating Alexander was confused about his medication after seeing Dr Sotelo. We had not received the office note, so I called and requested again.  It is on your desk for review.     Side note:  Brenda will be in town until Thursday if you should need to speak with her...

## 2025-05-07 ENCOUNTER — HOSPITAL ENCOUNTER (OUTPATIENT)
Dept: PHYSICAL THERAPY | Facility: HOSPITAL | Age: 75
Setting detail: THERAPIES SERIES
Discharge: HOME | End: 2025-05-07
Payer: OTHER MISCELLANEOUS

## 2025-05-07 DIAGNOSIS — M54.9 CHRONIC BACK PAIN, UNSPECIFIED BACK LOCATION, UNSPECIFIED BACK PAIN LATERALITY: Primary | ICD-10-CM

## 2025-05-07 DIAGNOSIS — M54.50 ACUTE LEFT-SIDED LOW BACK PAIN WITHOUT SCIATICA: ICD-10-CM

## 2025-05-07 DIAGNOSIS — G89.29 CHRONIC BACK PAIN, UNSPECIFIED BACK LOCATION, UNSPECIFIED BACK PAIN LATERALITY: Primary | ICD-10-CM

## 2025-05-07 PROCEDURE — 97140 MANUAL THERAPY 1/> REGIONS: CPT | Performed by: PHYSICAL THERAPIST

## 2025-05-07 PROCEDURE — 97110 THERAPEUTIC EXERCISES: CPT | Performed by: PHYSICAL THERAPIST

## 2025-05-07 NOTE — THERAPY EVALUATION
Physical Therapy Daily Treatment Note      Patient: Alexander Zamora   : 1950  Referring practitioner: Bhavna Vaughn MD  Date of Initial Visit: Type: THERAPY  Episode: Chronic LBP -   Today's Date: 2025  Patient seen for 3 sessions       Visit Diagnoses:    ICD-10-CM ICD-9-CM   1. Chronic back pain, unspecified back location, unspecified back pain laterality  M54.9 724.5    G89.29 338.29   2. Acute left-sided low back pain without sciatica  M54.50 724.2       Subjective Evaluation    History of Present Illness    Subjective comment: Pt has 6/10 pain today.       Objective   See Exercise, Manual, and Modality Logs for complete treatment.       Assessment & Plan       Assessment  Assessment details: Tx today consisted of exercises for improved leg stability and core stability; followed by stm to lower back for decreased pain.  Pt denied ice and estim and reported no pain following session.  Pt required cues for reducing speed with exercises.    Plan  Plan details: Will follow progressing as tolerated.          Timed:         Manual Therapy:  14       mins  29142;     Therapeutic Exercise:    27     mins  74854;     Neuromuscular Rosalie:        mins  77726;    Therapeutic Activity:          mins  29873;     Gait Training:           mins  33100;     Ultrasound:          mins  87457;    Ionto                                   mins   39211  Self Care                            mins   01758  Canalith Repos         mins 83317      Un-Timed:  Electrical Stimulation:         mins  13434 ( );  Dry Needling          mins self-pay  Traction          mins 66174      Timed Treatment:   41   mins   Total Treatment:     41   mins    Austin Costello PT  [place] License: [id number]      Electronically signed by Austin Costello PT, 25, 10:48 AM EDT

## 2025-05-08 NOTE — TELEPHONE ENCOUNTER
Still haven't received the record's from Dr. Sotelo's office. I have called again to request these records. They are suppose to be faxing it again. Will be checking for their arrival.

## 2025-05-08 NOTE — TELEPHONE ENCOUNTER
Caller: angy muñoz    Relationship: Emergency Contact    Best call back number: [phone]    What was the call regarding: PATIENTS DAUGHTER CALLED IN TO CHECK ON THE STATUS OF THIS. PLEASE HAVE SOME ONE CALL HER BACK WITH AN UPDATE.

## 2025-05-13 ENCOUNTER — HOSPITAL ENCOUNTER (OUTPATIENT)
Dept: PHYSICAL THERAPY | Facility: HOSPITAL | Age: 75
Setting detail: THERAPIES SERIES
Discharge: HOME | End: 2025-05-13
Payer: OTHER MISCELLANEOUS

## 2025-05-13 ENCOUNTER — TELEPHONE (OUTPATIENT)
Dept: FAMILY MEDICINE CLINIC | Facility: CLINIC | Age: 75
End: 2025-05-13
Payer: OTHER MISCELLANEOUS

## 2025-05-13 DIAGNOSIS — G89.29 CHRONIC BACK PAIN, UNSPECIFIED BACK LOCATION, UNSPECIFIED BACK PAIN LATERALITY: Primary | ICD-10-CM

## 2025-05-13 DIAGNOSIS — M54.50 ACUTE LEFT-SIDED LOW BACK PAIN WITHOUT SCIATICA: ICD-10-CM

## 2025-05-13 DIAGNOSIS — M54.9 CHRONIC BACK PAIN, UNSPECIFIED BACK LOCATION, UNSPECIFIED BACK PAIN LATERALITY: Primary | ICD-10-CM

## 2025-05-13 PROCEDURE — 97110 THERAPEUTIC EXERCISES: CPT | Performed by: PHYSICAL THERAPIST

## 2025-05-13 PROCEDURE — 97140 MANUAL THERAPY 1/> REGIONS: CPT | Performed by: PHYSICAL THERAPIST

## 2025-05-13 PROCEDURE — G0283 ELEC STIM OTHER THAN WOUND: HCPCS | Performed by: PHYSICAL THERAPIST

## 2025-05-13 NOTE — THERAPY TREATMENT NOTE
Physical Therapy Daily Treatment Note      Patient: Alexander Zamora   : 1950  Referring practitioner: Bhavna Vaughn MD  Date of Initial Visit: Type: THERAPY  Episode: Chronic LBP -   Today's Date: 2025  Patient seen for 4 sessions       Visit Diagnoses:    ICD-10-CM ICD-9-CM   1. Chronic back pain, unspecified back location, unspecified back pain laterality  M54.9 724.5    G89.29 338.29   2. Acute left-sided low back pain without sciatica  M54.50 724.2       Subjective Evaluation    History of Present Illness    Subjective comment: Pt reports 5/10 low back pain prior to today's session.Pain  Current pain ratin         Objective   See Exercise, Manual, and Modality Logs for complete treatment.       Assessment & Plan       Assessment  Assessment details: Today's session was initiated with STM to the lumbar region, addressing muscle tightness. Therapeutic exercises were progressed to include increased repetitions for improved strength and stability. Rest breaks were provided as needed. Today's session concluded with MH combined with IFC to the lumbar region, addressing pain and muscle tightness, with no skin irritation observed. The patient reported 0/10 lumbar pain following today's session.    Plan  Plan details: Progress as indicated to achieve max function.          Timed:         Manual Therapy:    15     mins  70239;     Therapeutic Exercise:    15     mins  28578;     Neuromuscular Rosalie:        mins  51469;    Therapeutic Activity:          mins  64398;     Gait Training:           mins  65561;     Ultrasound:          mins  89692;    Ionto                                   mins   58143  Self Care                            mins   43375  Canalith Repos         mins 83676      Un-Timed:  Electrical Stimulation:    10     mins  81701 ( );  Dry Needling          mins self-pay  Traction          mins 52072      Timed Treatment:   30   mins   Total Treatment:     40   mins    Angelica  Claudene Dalton, PT  [place] License: [id number]      Electronically signed by Ashley Claudene Dalton, PT, 05/13/25, 10:11 AM EDT

## 2025-05-13 NOTE — TELEPHONE ENCOUNTER
Caller: angy muñoz    Relationship to patient: Emergency Contact    Best call back number: [phone]     PATIENT'S DAUGHTER STATES THAT SHE HAS LEFT MESSAGES TO DISCUSS A MEDICATION AFTER DR PHELAN REVIEWED THE RECORDS FROM HIS KIDNEY DOCTOR, BUT SHE IS NOT GETTING ANY RESPONSES. SHE WOULD LIKE A CALL BACK AS SOON AS POSSIBLE.

## 2025-05-15 ENCOUNTER — HOSPITAL ENCOUNTER (OUTPATIENT)
Dept: PHYSICAL THERAPY | Facility: HOSPITAL | Age: 75
Setting detail: THERAPIES SERIES
Discharge: HOME | End: 2025-05-15
Payer: OTHER MISCELLANEOUS

## 2025-05-15 DIAGNOSIS — G89.29 CHRONIC BACK PAIN, UNSPECIFIED BACK LOCATION, UNSPECIFIED BACK PAIN LATERALITY: Primary | ICD-10-CM

## 2025-05-15 DIAGNOSIS — M54.50 ACUTE LEFT-SIDED LOW BACK PAIN WITHOUT SCIATICA: ICD-10-CM

## 2025-05-15 DIAGNOSIS — M54.9 CHRONIC BACK PAIN, UNSPECIFIED BACK LOCATION, UNSPECIFIED BACK PAIN LATERALITY: Primary | ICD-10-CM

## 2025-05-15 PROCEDURE — G0283 ELEC STIM OTHER THAN WOUND: HCPCS | Performed by: PHYSICAL THERAPIST

## 2025-05-15 PROCEDURE — 97140 MANUAL THERAPY 1/> REGIONS: CPT | Performed by: PHYSICAL THERAPIST

## 2025-05-15 PROCEDURE — 97110 THERAPEUTIC EXERCISES: CPT | Performed by: PHYSICAL THERAPIST

## 2025-05-15 NOTE — TELEPHONE ENCOUNTER
You can let her know that per chart notes from nephrology, he is to:  1) Stop lisinopril.  2) Stop ibuprofen  3) Do the labs that he was given an order sheet from nephrology.  4) Document BP at home.  5) Hydralazine 50 mg twice a day should have been sent in from nephrology and started.

## 2025-05-15 NOTE — TELEPHONE ENCOUNTER
I unfortunately will not be in the office during that time. I would have Alexander do his labs and we could go over them. Would make sure that he's on the hydralazine.

## 2025-05-15 NOTE — THERAPY EVALUATION
Physical Therapy Daily Treatment Note      Patient: Alexander Zamora   : 1950  Referring practitioner: Bhavna Vaughn MD  Date of Initial Visit: Type: THERAPY  Episode: Chronic LBP -   Today's Date: 5/15/2025  Patient seen for 5 sessions       Visit Diagnoses:    ICD-10-CM ICD-9-CM   1. Chronic back pain, unspecified back location, unspecified back pain laterality  M54.9 724.5    G89.29 338.29   2. Acute left-sided low back pain without sciatica  M54.50 724.2       Subjective Evaluation    History of Present Illness    Subjective comment: Pt has 5/10 pain today.       Objective   See Exercise, Manual, and Modality Logs for complete treatment.       Assessment & Plan       Assessment  Assessment details: Tx today consisted of review of HEP and demonstration of exercises; followed by stm to lower back and ended with mh and estim for decreased pain.  Pt demonstrated good effort with no pain following session.    Plan  Plan details: Will discharge at this time.  Pt instructed to contact therapy as needed.          Timed:         Manual Therapy:   15      mins  13373;     Therapeutic Exercise:    16     mins  69172;     Neuromuscular Rosalie:        mins  57476;    Therapeutic Activity:          mins  29807;     Gait Training:           mins  77642;     Ultrasound:          mins  48714;    Ionto                                   mins   50735  Self Care                            mins   73531  Canalith Repos         mins 62259      Un-Timed:  Electrical Stimulation:    10     mins  90428 ( );  Dry Needling          mins self-pay  Traction          mins 63159      Timed Treatment:   31   mins   Total Treatment:     41   mins    Austin Costello PT  [place] License: [id number]      Electronically signed by Austin Costello PT, 05/15/25, 10:26 AM EDT

## 2025-05-15 NOTE — TELEPHONE ENCOUNTER
Pt's EC notified and verbalized understanding.    She asked if Dr. Vaughn wanted to have a visit when she is in town. Will be in town May 23rd through the morning of the 27th. Just wanting to know if there's anything in particular to the pt and wanting to stay on the same page as Dr. Vaughn.

## 2025-05-20 ENCOUNTER — APPOINTMENT (OUTPATIENT)
Dept: PHYSICAL THERAPY | Facility: HOSPITAL | Age: 75
End: 2025-05-20
Payer: OTHER MISCELLANEOUS

## 2025-05-20 DIAGNOSIS — I10 ESSENTIAL HYPERTENSION: ICD-10-CM

## 2025-05-20 DIAGNOSIS — K21.9 GASTROESOPHAGEAL REFLUX DISEASE WITHOUT ESOPHAGITIS: ICD-10-CM

## 2025-05-20 DIAGNOSIS — J30.89 SEASONAL ALLERGIC RHINITIS DUE TO OTHER ALLERGIC TRIGGER: ICD-10-CM

## 2025-05-20 RX ORDER — CEVIMELINE HYDROCHLORIDE 30 MG/1
30 CAPSULE ORAL 3 TIMES DAILY
Qty: 270 CAPSULE | Refills: 1 | OUTPATIENT
Start: 2025-05-20

## 2025-05-20 RX ORDER — LISINOPRIL 40 MG/1
40 TABLET ORAL DAILY
Qty: 90 TABLET | Refills: 1 | OUTPATIENT
Start: 2025-05-20

## 2025-05-20 RX ORDER — PANTOPRAZOLE SODIUM 40 MG/1
40 TABLET, DELAYED RELEASE ORAL DAILY
Qty: 90 TABLET | Refills: 1 | OUTPATIENT
Start: 2025-05-20

## 2025-05-20 RX ORDER — MONTELUKAST SODIUM 10 MG/1
10 TABLET ORAL NIGHTLY
Qty: 90 TABLET | Refills: 1 | OUTPATIENT
Start: 2025-05-20

## 2025-05-20 RX ORDER — AMLODIPINE BESYLATE 10 MG/1
10 TABLET ORAL DAILY
Qty: 90 TABLET | Refills: 1 | OUTPATIENT
Start: 2025-05-20

## 2025-05-22 ENCOUNTER — APPOINTMENT (OUTPATIENT)
Dept: PHYSICAL THERAPY | Facility: HOSPITAL | Age: 75
End: 2025-05-22
Payer: OTHER MISCELLANEOUS

## 2025-05-27 ENCOUNTER — APPOINTMENT (OUTPATIENT)
Dept: PHYSICAL THERAPY | Facility: HOSPITAL | Age: 75
End: 2025-05-27
Payer: OTHER MISCELLANEOUS

## 2025-05-27 DIAGNOSIS — R41.3 MEMORY DEFICIT: ICD-10-CM

## 2025-05-27 RX ORDER — DONEPEZIL HYDROCHLORIDE 5 MG/1
5 TABLET, ORALLY DISINTEGRATING ORAL NIGHTLY
Qty: 90 TABLET | Refills: 1 | OUTPATIENT
Start: 2025-05-27

## 2025-05-29 ENCOUNTER — APPOINTMENT (OUTPATIENT)
Dept: PHYSICAL THERAPY | Facility: HOSPITAL | Age: 75
End: 2025-05-29
Payer: OTHER MISCELLANEOUS

## 2025-06-11 ENCOUNTER — LAB (OUTPATIENT)
Dept: FAMILY MEDICINE CLINIC | Facility: CLINIC | Age: 75
End: 2025-06-11
Payer: MEDICARE

## 2025-06-11 DIAGNOSIS — I10 ESSENTIAL HYPERTENSION: Primary | ICD-10-CM

## 2025-06-11 LAB
ANION GAP SERPL CALCULATED.3IONS-SCNC: 12 MMOL/L (ref 5–15)
BUN SERPL-MCNC: 15 MG/DL (ref 8–23)
BUN/CREAT SERPL: 9.9 (ref 7–25)
CALCIUM SPEC-SCNC: 10.2 MG/DL (ref 8.6–10.5)
CHLORIDE SERPL-SCNC: 100 MMOL/L (ref 98–107)
CO2 SERPL-SCNC: 28 MMOL/L (ref 22–29)
CREAT SERPL-MCNC: 1.52 MG/DL (ref 0.76–1.27)
EGFRCR SERPLBLD CKD-EPI 2021: 47.2 ML/MIN/1.73
GLUCOSE SERPL-MCNC: 100 MG/DL (ref 65–99)
POTASSIUM SERPL-SCNC: 3.3 MMOL/L (ref 3.5–5.2)
SODIUM SERPL-SCNC: 140 MMOL/L (ref 136–145)

## 2025-06-11 PROCEDURE — 82570 ASSAY OF URINE CREATININE: CPT | Performed by: INTERNAL MEDICINE

## 2025-06-11 PROCEDURE — 84156 ASSAY OF PROTEIN URINE: CPT | Performed by: INTERNAL MEDICINE

## 2025-06-11 PROCEDURE — 81001 URINALYSIS AUTO W/SCOPE: CPT | Performed by: INTERNAL MEDICINE

## 2025-06-11 PROCEDURE — 80048 BASIC METABOLIC PNL TOTAL CA: CPT | Performed by: INTERNAL MEDICINE

## 2025-06-11 PROCEDURE — 36415 COLL VENOUS BLD VENIPUNCTURE: CPT

## 2025-06-11 PROCEDURE — 82043 UR ALBUMIN QUANTITATIVE: CPT | Performed by: INTERNAL MEDICINE

## 2025-06-12 ENCOUNTER — TELEPHONE (OUTPATIENT)
Dept: FAMILY MEDICINE CLINIC | Facility: CLINIC | Age: 75
End: 2025-06-12
Payer: MEDICARE

## 2025-06-12 DIAGNOSIS — K21.9 GASTROESOPHAGEAL REFLUX DISEASE WITHOUT ESOPHAGITIS: ICD-10-CM

## 2025-06-12 LAB
ALBUMIN UR-MCNC: 7.9 MG/DL
BACTERIA UR QL AUTO: NORMAL /HPF
BILIRUB UR QL STRIP: NEGATIVE
CLARITY UR: CLEAR
COLOR UR: YELLOW
CREAT UR-MCNC: 103.3 MG/DL
CREAT UR-MCNC: 103.3 MG/DL
GLUCOSE UR STRIP-MCNC: NEGATIVE MG/DL
HGB UR QL STRIP.AUTO: NEGATIVE
HYALINE CASTS UR QL AUTO: NORMAL /LPF
KETONES UR QL STRIP: NEGATIVE
LEUKOCYTE ESTERASE UR QL STRIP.AUTO: NEGATIVE
MICROALBUMIN/CREAT UR: 76.5 MG/G (ref 0–29)
NITRITE UR QL STRIP: NEGATIVE
PH UR STRIP.AUTO: 6 [PH] (ref 5–8)
PROT ?TM UR-MCNC: 17.9 MG/DL
PROT UR QL STRIP: ABNORMAL
PROT/CREAT UR: 173.3 MG/G CREA (ref 0–200)
RBC # UR STRIP: NORMAL /HPF
REF LAB TEST METHOD: NORMAL
SP GR UR STRIP: 1.01 (ref 1–1.03)
SQUAMOUS #/AREA URNS HPF: NORMAL /HPF
UROBILINOGEN UR QL STRIP: ABNORMAL
WBC # UR STRIP: NORMAL /HPF

## 2025-06-12 RX ORDER — PANTOPRAZOLE SODIUM 40 MG/1
40 TABLET, DELAYED RELEASE ORAL DAILY
Qty: 90 TABLET | Refills: 0 | Status: CANCELLED | OUTPATIENT
Start: 2025-06-12

## 2025-06-12 NOTE — TELEPHONE ENCOUNTER
Caller: angy muñoz NOT ON  [facility]    Relationship: Emergency Contact    Best call back number: [phone]     Requested Prescriptions:   Requested Prescriptions     Pending Prescriptions Disp Refills    pantoprazole (PROTONIX) 40 MG EC tablet 90 tablet 1     Sig: Take 1 tablet by mouth Daily.        Pharmacy where request should be sent: [address] [phone] [facility] [phone]      Last office visit with prescribing clinician: 4/18/2025   Last telemedicine visit with prescribing clinician: Visit date not found   Next office visit with prescribing clinician: 6/20/2025     Additional details provided by patient: PATIENT IS OUT OF MEDICATION, REQUESTING A CALLBACK TO GO OVER THE REST OF THE MEDICATION    Does the patient have less than a 3 day supply:  [x] Yes  [] No    Would you like a call back once the refill request has been completed: [x] Yes [] No    If the office needs to give you a call back, can they leave a voicemail: [] Yes [x] No    Chelsey Zuleta Rep   06/12/25 14:08 EDT

## 2025-06-12 NOTE — TELEPHONE ENCOUNTER
Spoke with Brenda she is requesting all his med's be refilled to Mail order & also a short supply to [facility] on the [facility] as he is out.

## 2025-06-13 DIAGNOSIS — K21.9 GASTROESOPHAGEAL REFLUX DISEASE WITHOUT ESOPHAGITIS: ICD-10-CM

## 2025-06-13 RX ORDER — PANTOPRAZOLE SODIUM 40 MG/1
40 TABLET, DELAYED RELEASE ORAL DAILY
Qty: 90 TABLET | Refills: 1 | Status: SHIPPED | OUTPATIENT
Start: 2025-06-13

## 2025-06-13 NOTE — TELEPHONE ENCOUNTER
Alexander called back requests it be sent to Mail order only does not want any sent to the local pharmacy.

## 2025-06-20 ENCOUNTER — OFFICE VISIT (OUTPATIENT)
Dept: FAMILY MEDICINE CLINIC | Facility: CLINIC | Age: 75
End: 2025-06-20
Payer: OTHER MISCELLANEOUS

## 2025-06-20 VITALS
OXYGEN SATURATION: 98 % | SYSTOLIC BLOOD PRESSURE: 132 MMHG | DIASTOLIC BLOOD PRESSURE: 68 MMHG | WEIGHT: 143.2 LBS | RESPIRATION RATE: 16 BRPM | TEMPERATURE: 97.5 F | HEART RATE: 43 BPM | HEIGHT: 70 IN | BODY MASS INDEX: 20.5 KG/M2

## 2025-06-20 DIAGNOSIS — J30.89 SEASONAL ALLERGIC RHINITIS DUE TO OTHER ALLERGIC TRIGGER: ICD-10-CM

## 2025-06-20 DIAGNOSIS — I10 ESSENTIAL HYPERTENSION: ICD-10-CM

## 2025-06-20 DIAGNOSIS — R68.2 DRY MOUTH: ICD-10-CM

## 2025-06-20 DIAGNOSIS — R41.3 MEMORY DEFICIT: ICD-10-CM

## 2025-06-20 DIAGNOSIS — M54.50 ACUTE BILATERAL LOW BACK PAIN, UNSPECIFIED WHETHER SCIATICA PRESENT: ICD-10-CM

## 2025-06-20 DIAGNOSIS — E78.2 MIXED HYPERLIPIDEMIA: ICD-10-CM

## 2025-06-20 DIAGNOSIS — F51.01 PRIMARY INSOMNIA: ICD-10-CM

## 2025-06-20 PROCEDURE — 99214 OFFICE O/P EST MOD 30 MIN: CPT | Performed by: FAMILY MEDICINE

## 2025-06-20 RX ORDER — MONTELUKAST SODIUM 10 MG/1
10 TABLET ORAL NIGHTLY
Qty: 90 TABLET | Refills: 1 | OUTPATIENT
Start: 2025-06-20

## 2025-06-20 RX ORDER — AMLODIPINE BESYLATE 10 MG/1
10 TABLET ORAL DAILY
Qty: 90 TABLET | Refills: 1 | Status: SHIPPED | OUTPATIENT
Start: 2025-06-20

## 2025-06-20 RX ORDER — MONTELUKAST SODIUM 10 MG/1
10 TABLET ORAL NIGHTLY
Qty: 90 TABLET | Refills: 1 | Status: SHIPPED | OUTPATIENT
Start: 2025-06-20

## 2025-06-20 RX ORDER — CEVIMELINE HYDROCHLORIDE 30 MG/1
30 CAPSULE ORAL 3 TIMES DAILY
Qty: 270 CAPSULE | Refills: 1 | OUTPATIENT
Start: 2025-06-20

## 2025-06-20 RX ORDER — SALIVA SUBSTITUTE COMBO NO.2
30 SOLUTION, ORAL MUCOUS MEMBRANE AS NEEDED
Qty: 900 ML | Refills: 1 | Status: SHIPPED | OUTPATIENT
Start: 2025-06-20

## 2025-06-20 RX ORDER — CHLORHEXIDINE GLUCONATE ORAL RINSE 1.2 MG/ML
5 SOLUTION DENTAL 2 TIMES DAILY
Qty: 473 ML | Refills: 1 | Status: SHIPPED | OUTPATIENT
Start: 2025-06-20

## 2025-06-20 RX ORDER — CEVIMELINE HYDROCHLORIDE 30 MG/1
30 CAPSULE ORAL 3 TIMES DAILY
Qty: 270 CAPSULE | Refills: 1 | Status: SHIPPED | OUTPATIENT
Start: 2025-06-20

## 2025-06-20 RX ORDER — HYDRALAZINE HYDROCHLORIDE 50 MG/1
50 TABLET, FILM COATED ORAL 2 TIMES DAILY
Start: 2025-06-20

## 2025-06-20 RX ORDER — METHOCARBAMOL 500 MG/1
500 TABLET, FILM COATED ORAL 2 TIMES DAILY
Qty: 180 TABLET | Refills: 0 | Status: SHIPPED | OUTPATIENT
Start: 2025-06-20

## 2025-06-20 RX ORDER — SPIRONOLACTONE 25 MG/1
25 TABLET ORAL DAILY
Qty: 90 TABLET | Refills: 0 | Status: SHIPPED | OUTPATIENT
Start: 2025-06-20

## 2025-06-20 RX ORDER — SPIRONOLACTONE 25 MG/1
TABLET ORAL
COMMUNITY
Start: 2025-06-18 | End: 2025-06-20 | Stop reason: SDUPTHER

## 2025-06-20 RX ORDER — DONEPEZIL HYDROCHLORIDE 5 MG/1
5 TABLET, ORALLY DISINTEGRATING ORAL NIGHTLY
Qty: 90 TABLET | Refills: 1 | Status: SHIPPED | OUTPATIENT
Start: 2025-06-20

## 2025-06-20 RX ORDER — METHOCARBAMOL 500 MG/1
500 TABLET, FILM COATED ORAL 2 TIMES DAILY
Qty: 180 TABLET | Refills: 0 | OUTPATIENT
Start: 2025-06-20

## 2025-06-20 RX ORDER — AMLODIPINE BESYLATE 10 MG/1
10 TABLET ORAL DAILY
Qty: 90 TABLET | Refills: 1 | OUTPATIENT
Start: 2025-06-20

## 2025-06-20 RX ORDER — PHENOL 1.4 %
1 AEROSOL, SPRAY (ML) MUCOUS MEMBRANE NIGHTLY
Qty: 90 TABLET | Refills: 1 | Status: SHIPPED | OUTPATIENT
Start: 2025-06-20

## 2025-06-20 RX ORDER — CETIRIZINE HYDROCHLORIDE 10 MG/1
10 TABLET ORAL DAILY
Qty: 90 TABLET | Refills: 1 | Status: SHIPPED | OUTPATIENT
Start: 2025-06-20

## 2025-06-20 RX ORDER — DONEPEZIL HYDROCHLORIDE 5 MG/1
5 TABLET, ORALLY DISINTEGRATING ORAL NIGHTLY
Qty: 90 TABLET | Refills: 1 | OUTPATIENT
Start: 2025-06-20

## 2025-06-20 RX ORDER — EZETIMIBE 10 MG/1
10 TABLET ORAL DAILY
Qty: 90 TABLET | Refills: 1 | Status: SHIPPED | OUTPATIENT
Start: 2025-06-20

## 2025-06-20 RX ORDER — FLUTICASONE PROPIONATE 50 MCG
2 SPRAY, SUSPENSION (ML) NASAL DAILY
Qty: 16 G | Refills: 5 | Status: SHIPPED | OUTPATIENT
Start: 2025-06-20

## 2025-06-21 RX ORDER — LISINOPRIL 40 MG/1
40 TABLET ORAL DAILY
Qty: 90 TABLET | Refills: 1 | OUTPATIENT
Start: 2025-06-21

## 2025-06-29 DIAGNOSIS — E78.2 MIXED HYPERLIPIDEMIA: ICD-10-CM

## 2025-06-30 RX ORDER — EZETIMIBE 10 MG/1
10 TABLET ORAL DAILY
Qty: 90 TABLET | Refills: 1 | OUTPATIENT
Start: 2025-06-30

## 2025-07-04 NOTE — PROGRESS NOTES
"Alexander Zamora     VITALS: Blood pressure 132/68, pulse (!) 43, temperature 97.5 °F (36.4 °C), resp. rate 16, height 177.8 cm (70\"), weight 65 kg (143 lb 3.2 oz), SpO2 98%.    Subjective  Chief Complaint  Hypertension and Chronic Kidney Disease    Subjective          History of Present Illness:    History of Present Illness  The patient is a 75-year-old male with medical conditions significant for dementia, allergic rhinitis, dry mouth, hyperlipidemia, and GERD who presents to the clinic for a medical follow-up.    He reports satisfactory blood pressure readings at home, with systolic values not exceeding 100 and diastolic values around 49. He has been maintaining a record of these readings as per his physician's advice. Hydration is maintained through the consumption of water, juice, and decaffeinated tea. He also reports experiencing night sweats.     He is nearing the end of his pain medication supply, with only two tablets remaining. He continues to take a muscle relaxant. Additionally, he is running low on his oral medication, which is in capsule form.    He has been experiencing bruising on his arm and other parts of his body. Additionally, he reported a sensation of burning on his back after spending time outdoors in the sun.    He is taking vitamin E, vitamin D3, and vitamin C supplements.    No complaints regarding medications.     The following portions of the patient's history were reviewed and updated as appropriate: allergies, current medications, past family history, past medical history, past social history, past surgical history and problem list.    Past Medical History  Past Medical History:   Diagnosis Date    Arthritis     Bleeding ulcer     Cancer 2015    Prostate    Chronic back pain     Collapsed lung     Depression     Elevated prostate specific antigen (PSA)     Erectile dysfunction     Hypercholesterolemia     Primary hypertension     Prostate cancer     Prostatitis        Surgical " "History  Past Surgical History:   Procedure Laterality Date    OTHER SURGICAL HISTORY      surgery for an ulcer, PNEUMOTHORAX AND ULCER THERAPY       Family History  Family History   Problem Relation Age of Onset    Hypertension Mother     Bradycardia Mother         Had pacemaker    Heart attack Mother     Cancer Father         Throat    Nephrolithiasis Father     Parkinsonism Father     Diabetes Sister     Heart disease Sister     COPD Sister     Heart disease Sister     Heart attack Sister     Rheum arthritis Sister     Cancer Sister         Stomach    Cancer Sister         Lung    Cancer Brother         Kidney    Hyperlipidemia Brother     Prostatitis Brother     Diabetes Other        Social History  Social History     Socioeconomic History    Marital status: Unknown   Tobacco Use    Smoking status: Former     Current packs/day: 0.00     Average packs/day: 2.0 packs/day for 5.0 years (10.0 ttl pk-yrs)     Types: Cigarettes     Start date:      Quit date:      Years since quittin.5    Smokeless tobacco: Never   Vaping Use    Vaping status: Never Used   Substance and Sexual Activity    Alcohol use: No    Drug use: No    Sexual activity: Defer       Objective   Vital Signs:   /68 (BP Location: Right arm, Patient Position: Sitting, Cuff Size: Adult)   Pulse (!) 43   Temp 97.5 °F (36.4 °C)   Resp 16   Ht 177.8 cm (70\")   Wt 65 kg (143 lb 3.2 oz)   SpO2 98%   BMI 20.55 kg/m²       Physical Exam     Physical Exam  Skin: Bruising noted on the arm, otherwise no abnormalities, no rashes or lesions    Gen: Patient in NAD. Pleasant and answers appropriately. A&Ox3.    Skin: Warm and dry with normal turgor. No purpura, rashes noted. Hair is normal in appearance and distribution.    HEENT: NC/AT. No lesions noted. Conjunctiva clear, sclera nonicteric. PERRL. EOMI without nystagmus or strabismus. Fundi appear benign. No hemorrhages or exudates of eyes. Auditory canals are patent bilaterally without " lesions. TMs intact,  nonerythematous, bulging without lesions. Nasal mucosa pink, nonerythematous, and nonedematous. Frontal and maxillary sinuses are nontender. O/P erythematous and moist without exudate.    Neck: Supple without lymph nodes palpated.  Decreased ROM. No carotid bruits appreciated bilaterally.    Lungs: Decreased B/L without rales, rhonchi, crackles, or wheezes.    Heart: Bradycardic with regular rhythm. S1 and S2 normal. No S3 or S4. No MRGT.    Abd: Soft, nontender,nondistended. (+)BSx4 quadrants.     Extrem: No CCE. Radial pulses 2+/4 and equal B/L. FROMx4.  Positive joint tenderness noted.    Neuro: No focal motor/sensory deficits.    Procedures    Result Review :   The following data was reviewed by: Bhavna Vaughn MD on 06/20/2025:       Results             Assessment and Plan      Alexander Zamora is a 75 y.o. here for medical followup.    Diagnoses and all orders for this visit:    1. Essential hypertension  -     amLODIPine (NORVASC) 10 MG tablet; Take 1 tablet by mouth Daily.  Dispense: 90 tablet; Refill: 1  -     hydrALAZINE (APRESOLINE) 50 MG tablet; Take 1 tablet by mouth 2 (Two) Times a Day.  -     spironolactone (ALDACTONE) 25 MG tablet; Take 1 tablet by mouth Daily.  Dispense: 90 tablet; Refill: 0    2. Dry mouth  -     caphosol (SALIVA SUBSTITUTE) solution; Take 30 mL by mouth As Needed (mucositis).  Dispense: 900 mL; Refill: 1  -     cevimeline (EVOXAC) 30 MG capsule; Take 1 capsule by mouth 3 (Three) Times a Day.  Dispense: 270 capsule; Refill: 1  -     chlorhexidine (PERIDEX) 0.12 % solution; Apply 5 mL to the mouth or throat 2 (Two) Times a Day.  Dispense: 473 mL; Refill: 1    3. Seasonal allergic rhinitis due to other allergic trigger  -     cetirizine (zyrTEC) 10 MG tablet; Take 1 tablet by mouth Daily.  Dispense: 90 tablet; Refill: 1  -     fluticasone (FLONASE) 50 MCG/ACT nasal spray; Administer 2 sprays into the nostril(s) as directed by provider Daily.  Dispense: 16 g;  Refill: 5  -     montelukast (SINGULAIR) 10 MG tablet; Take 1 tablet by mouth Every Night.  Dispense: 90 tablet; Refill: 1    4. Memory deficit  -     donepezil ODT (ARICEPT ODT) 5 MG disintegrating tablet; Place 1 tablet on the tongue Every Night.  Dispense: 90 tablet; Refill: 1    5. Mixed hyperlipidemia  -     ezetimibe (ZETIA) 10 MG tablet; Take 1 tablet by mouth Daily.  Dispense: 90 tablet; Refill: 1    6. Primary insomnia  -     Melatonin 10 MG tablet; Take 1 tablet by mouth Every Night.  Dispense: 90 tablet; Refill: 1    7. Acute bilateral low back pain, unspecified whether sciatica present  -     methocarbamol (ROBAXIN) 500 MG tablet; Take 1 tablet by mouth 2 (Two) Times a Day.  Dispense: 180 tablet; Refill: 0        Assessment & Plan  1. Hypertension.  - Blood pressure readings are within the normal range today.  - Advised to continue monitoring blood pressure at home and bring the readings to the next appointment.  - Current medication regimen will be maintained without any changes.  - Labs including CMP, magnesium, and random urine microalbumin, creatinine are ordered for the end of July.    2. Dry mouth.  - Running low on medication for dry mouth.  - Refill for the capsule will be sent to the pharmacy.    3. Pain management.  - Mentioned having only two pain pills left.  - Refill for pain medication will be sent to the pharmacy.    4. Bruising.  - Reports bruising easily and having bruises all over the body.  - Likely due to age-related skin changes.  - Advised to stay hydrated and keep cool, especially during hot weather.    5. Health maintenance.  - Taking vitamin E, vitamin D3, and vitamin C supplements.      BMI is within normal parameters. No other follow-up for BMI required.            Patient or patient representative verbalized consent for the use of Ambient Listening during the visit with  Bhavna Vaughn MD for chart documentation. 7/4/2025  14:42 EDT        Follow Up   Return in about 4  weeks (around 7/18/2025), or (Make this next appt Medicare wellness).  Findings and plans discussed with patient who verbalizes understanding and agreement. Will followup with patient once results are in. Patient was given instructions and counseling regarding his condition or for health maintenance advice. Please see specific information pulled into the AVS if appropriate.       Bhavna Vaughn MD

## 2025-07-14 ENCOUNTER — TELEPHONE (OUTPATIENT)
Dept: FAMILY MEDICINE CLINIC | Facility: CLINIC | Age: 75
End: 2025-07-14
Payer: MEDICARE

## 2025-07-14 NOTE — TELEPHONE ENCOUNTER
Brenda called indicating you had mentioned there was someone good she could reach out to in regards to Alexander's dementia but she couldn't remember her name.  Also, she will be in town by the first of next week.  Would you rather Alexander reschedule his upcoming appointment to a time she can accompany him?

## 2025-07-15 NOTE — TELEPHONE ENCOUNTER
Brenda returned call & left a message to call her at the number dialed,left a message to return call.

## 2025-07-15 NOTE — TELEPHONE ENCOUNTER
Attempted to contact the pt's daughter for rescheduling but she did not answer. Left a message for her to return the call.

## 2025-07-16 NOTE — TELEPHONE ENCOUNTER
She is coming with him, correct? That was the point of changing the appointment. We can discuss that at the visit.

## 2025-07-16 NOTE — TELEPHONE ENCOUNTER
Called and spoke dariela the pt and he was fine with changing his appointment. I changed it to Tuesday at 9 so that Brenda would be able to come with him.    Spoke with Brenda and she wants to speak with whoever Dr. Vaughn had mentioned was knowledgeable with elderly care. She has lot so questions in regards to the pt and his care needs.

## 2025-07-16 NOTE — TELEPHONE ENCOUNTER
Attempted to contact Brenda back but she did not answer. Left a message for her to return the call.

## 2025-07-22 ENCOUNTER — LAB (OUTPATIENT)
Dept: FAMILY MEDICINE CLINIC | Facility: CLINIC | Age: 75
End: 2025-07-22
Payer: MEDICARE

## 2025-07-22 ENCOUNTER — OFFICE VISIT (OUTPATIENT)
Dept: FAMILY MEDICINE CLINIC | Facility: CLINIC | Age: 75
End: 2025-07-22
Payer: MEDICARE

## 2025-07-22 VITALS
TEMPERATURE: 97.1 F | HEIGHT: 70 IN | WEIGHT: 143.4 LBS | BODY MASS INDEX: 20.53 KG/M2 | SYSTOLIC BLOOD PRESSURE: 132 MMHG | HEART RATE: 48 BPM | OXYGEN SATURATION: 98 % | DIASTOLIC BLOOD PRESSURE: 56 MMHG | RESPIRATION RATE: 16 BRPM

## 2025-07-22 DIAGNOSIS — Z00.00 ENCOUNTER FOR SUBSEQUENT ANNUAL WELLNESS VISIT IN MEDICARE PATIENT: Primary | ICD-10-CM

## 2025-07-22 DIAGNOSIS — N18.30 STAGE 3 CHRONIC KIDNEY DISEASE, UNSPECIFIED WHETHER STAGE 3A OR 3B CKD: ICD-10-CM

## 2025-07-22 DIAGNOSIS — M54.50 ACUTE BILATERAL LOW BACK PAIN, UNSPECIFIED WHETHER SCIATICA PRESENT: ICD-10-CM

## 2025-07-22 DIAGNOSIS — R41.3 MEMORY DEFICIT: ICD-10-CM

## 2025-07-22 LAB
ALBUMIN SERPL-MCNC: 4 G/DL (ref 3.5–5.2)
ALBUMIN UR-MCNC: 10.5 MG/DL
ALBUMIN/GLOB SERPL: 1.4 G/DL
ALP SERPL-CCNC: 85 U/L (ref 39–117)
ALT SERPL W P-5'-P-CCNC: 17 U/L (ref 1–41)
ANION GAP SERPL CALCULATED.3IONS-SCNC: 8 MMOL/L (ref 5–15)
AST SERPL-CCNC: 20 U/L (ref 1–40)
BILIRUB SERPL-MCNC: 0.2 MG/DL (ref 0–1.2)
BUN SERPL-MCNC: 20 MG/DL (ref 8–23)
BUN/CREAT SERPL: 11.6 (ref 7–25)
CALCIUM SPEC-SCNC: 10.2 MG/DL (ref 8.6–10.5)
CHLORIDE SERPL-SCNC: 103 MMOL/L (ref 98–107)
CO2 SERPL-SCNC: 28 MMOL/L (ref 22–29)
CREAT SERPL-MCNC: 1.72 MG/DL (ref 0.76–1.27)
CREAT UR-MCNC: 169.9 MG/DL
EGFRCR SERPLBLD CKD-EPI 2021: 40.7 ML/MIN/1.73
GLOBULIN UR ELPH-MCNC: 2.8 GM/DL
GLUCOSE SERPL-MCNC: 86 MG/DL (ref 65–99)
MAGNESIUM SERPL-MCNC: 2.1 MG/DL (ref 1.6–2.4)
MICROALBUMIN/CREAT UR: 61.8 MG/G (ref 0–29)
POTASSIUM SERPL-SCNC: 3.7 MMOL/L (ref 3.5–5.2)
PROT SERPL-MCNC: 6.8 G/DL (ref 6–8.5)
SODIUM SERPL-SCNC: 139 MMOL/L (ref 136–145)

## 2025-07-22 PROCEDURE — 3078F DIAST BP <80 MM HG: CPT | Performed by: FAMILY MEDICINE

## 2025-07-22 PROCEDURE — 82043 UR ALBUMIN QUANTITATIVE: CPT | Performed by: FAMILY MEDICINE

## 2025-07-22 PROCEDURE — G0439 PPPS, SUBSEQ VISIT: HCPCS | Performed by: FAMILY MEDICINE

## 2025-07-22 PROCEDURE — 82570 ASSAY OF URINE CREATININE: CPT | Performed by: FAMILY MEDICINE

## 2025-07-22 PROCEDURE — 1159F MED LIST DOCD IN RCRD: CPT | Performed by: FAMILY MEDICINE

## 2025-07-22 PROCEDURE — 36415 COLL VENOUS BLD VENIPUNCTURE: CPT

## 2025-07-22 PROCEDURE — 80053 COMPREHEN METABOLIC PANEL: CPT | Performed by: FAMILY MEDICINE

## 2025-07-22 PROCEDURE — 1160F RVW MEDS BY RX/DR IN RCRD: CPT | Performed by: FAMILY MEDICINE

## 2025-07-22 PROCEDURE — 1125F AMNT PAIN NOTED PAIN PRSNT: CPT | Performed by: FAMILY MEDICINE

## 2025-07-22 PROCEDURE — 3075F SYST BP GE 130 - 139MM HG: CPT | Performed by: FAMILY MEDICINE

## 2025-07-22 PROCEDURE — 83735 ASSAY OF MAGNESIUM: CPT | Performed by: FAMILY MEDICINE

## 2025-07-22 RX ORDER — HYDRALAZINE HYDROCHLORIDE 25 MG/1
25 TABLET, FILM COATED ORAL DAILY
Status: ON HOLD | COMMUNITY
Start: 2025-07-03 | End: 2025-07-30

## 2025-07-22 RX ORDER — DONEPEZIL HYDROCHLORIDE 10 MG/1
10 TABLET, FILM COATED ORAL NIGHTLY
Qty: 90 TABLET | Refills: 1 | Status: SHIPPED | OUTPATIENT
Start: 2025-07-22 | End: 2025-07-30 | Stop reason: HOSPADM

## 2025-07-24 ENCOUNTER — EXTERNAL PBMM DATA (OUTPATIENT)
Dept: PHARMACY | Facility: OTHER | Age: 75
End: 2025-07-24
Payer: MEDICARE

## 2025-07-28 ENCOUNTER — APPOINTMENT (OUTPATIENT)
Dept: GENERAL RADIOLOGY | Facility: HOSPITAL | Age: 75
End: 2025-07-28
Payer: MEDICARE

## 2025-07-28 ENCOUNTER — HOSPITAL ENCOUNTER (INPATIENT)
Facility: HOSPITAL | Age: 75
LOS: 2 days | Discharge: HOME HEALTH | End: 2025-07-30
Attending: STUDENT IN AN ORGANIZED HEALTH CARE EDUCATION/TRAINING PROGRAM | Admitting: INTERNAL MEDICINE
Payer: MEDICARE

## 2025-07-28 ENCOUNTER — APPOINTMENT (OUTPATIENT)
Dept: CT IMAGING | Facility: HOSPITAL | Age: 75
End: 2025-07-28
Payer: MEDICARE

## 2025-07-28 DIAGNOSIS — R00.1 BRADYCARDIA: ICD-10-CM

## 2025-07-28 DIAGNOSIS — R41.82 ALTERED MENTAL STATUS, UNSPECIFIED ALTERED MENTAL STATUS TYPE: Primary | ICD-10-CM

## 2025-07-28 DIAGNOSIS — M54.9 CHRONIC BACK PAIN, UNSPECIFIED BACK LOCATION, UNSPECIFIED BACK PAIN LATERALITY: ICD-10-CM

## 2025-07-28 DIAGNOSIS — G89.29 CHRONIC BACK PAIN, UNSPECIFIED BACK LOCATION, UNSPECIFIED BACK PAIN LATERALITY: ICD-10-CM

## 2025-07-28 DIAGNOSIS — R00.1 BRADYCARDIA, UNSPECIFIED: ICD-10-CM

## 2025-07-28 DIAGNOSIS — R54 AGE-RELATED PHYSICAL DEBILITY: ICD-10-CM

## 2025-07-28 DIAGNOSIS — R54 ADVANCED AGE: ICD-10-CM

## 2025-07-28 LAB
ALBUMIN SERPL-MCNC: 4.5 G/DL (ref 3.5–5.2)
ALBUMIN/GLOB SERPL: 1.6 G/DL
ALP SERPL-CCNC: 103 U/L (ref 39–117)
ALT SERPL W P-5'-P-CCNC: 23 U/L (ref 1–41)
AMMONIA BLD-SCNC: 38 UMOL/L (ref 16–60)
AMPHET+METHAMPHET UR QL: NEGATIVE
AMPHETAMINES UR QL: NEGATIVE
ANION GAP SERPL CALCULATED.3IONS-SCNC: 13.7 MMOL/L (ref 5–15)
ANISOCYTOSIS BLD QL: ABNORMAL
AST SERPL-CCNC: 27 U/L (ref 1–40)
BACTERIA UR QL AUTO: NORMAL /HPF
BARBITURATES UR QL SCN: NEGATIVE
BASOPHILS # BLD MANUAL: 0.08 10*3/MM3 (ref 0–0.2)
BASOPHILS NFR BLD MANUAL: 1 % (ref 0–1.5)
BENZODIAZ UR QL SCN: NEGATIVE
BILIRUB SERPL-MCNC: 0.5 MG/DL (ref 0–1.2)
BILIRUB UR QL STRIP: NEGATIVE
BUN SERPL-MCNC: 16.8 MG/DL (ref 8–23)
BUN/CREAT SERPL: 14.5 (ref 7–25)
BUPRENORPHINE SERPL-MCNC: NEGATIVE NG/ML
CALCIUM SPEC-SCNC: 10.5 MG/DL (ref 8.6–10.5)
CANNABINOIDS SERPL QL: NEGATIVE
CHLORIDE SERPL-SCNC: 101 MMOL/L (ref 98–107)
CLARITY UR: CLEAR
CO2 SERPL-SCNC: 23.3 MMOL/L (ref 22–29)
COCAINE UR QL: NEGATIVE
COLOR UR: YELLOW
CREAT SERPL-MCNC: 1.16 MG/DL (ref 0.76–1.27)
CRP SERPL-MCNC: <0.3 MG/DL (ref 0–0.5)
DEPRECATED RDW RBC AUTO: 39.8 FL (ref 37–54)
EGFRCR SERPLBLD CKD-EPI 2021: 65.3 ML/MIN/1.73
ERYTHROCYTE [DISTWIDTH] IN BLOOD BY AUTOMATED COUNT: 12.3 % (ref 12.3–15.4)
ETHANOL BLD-MCNC: <10 MG/DL (ref 0–10)
ETHANOL UR QL: <0.01 %
FENTANYL UR-MCNC: NEGATIVE NG/ML
FOLATE SERPL-MCNC: 14.2 NG/ML (ref 4.78–24.2)
GEN 5 1HR TROPONIN T REFLEX: 8 NG/L
GLOBULIN UR ELPH-MCNC: 2.9 GM/DL
GLUCOSE BLDC GLUCOMTR-MCNC: 116 MG/DL (ref 70–130)
GLUCOSE SERPL-MCNC: 117 MG/DL (ref 65–99)
GLUCOSE UR STRIP-MCNC: NEGATIVE MG/DL
HCT VFR BLD AUTO: 39 % (ref 37.5–51)
HGB BLD-MCNC: 13.2 G/DL (ref 13–17.7)
HGB UR QL STRIP.AUTO: NEGATIVE
HOLD SPECIMEN: NORMAL
HOLD SPECIMEN: NORMAL
HYALINE CASTS UR QL AUTO: NORMAL /LPF
KETONES UR QL STRIP: ABNORMAL
LEUKOCYTE ESTERASE UR QL STRIP.AUTO: NEGATIVE
LYMPHOCYTES # BLD MANUAL: 2.18 10*3/MM3 (ref 0.7–3.1)
LYMPHOCYTES NFR BLD MANUAL: 1 % (ref 5–12)
MCH RBC QN AUTO: 29.9 PG (ref 26.6–33)
MCHC RBC AUTO-ENTMCNC: 33.8 G/DL (ref 31.5–35.7)
MCV RBC AUTO: 88.4 FL (ref 79–97)
METHADONE UR QL SCN: NEGATIVE
MONOCYTES # BLD: 0.08 10*3/MM3 (ref 0.1–0.9)
NEUTROPHILS # BLD AUTO: 5.74 10*3/MM3 (ref 1.7–7)
NEUTROPHILS NFR BLD MANUAL: 71 % (ref 42.7–76)
NITRITE UR QL STRIP: NEGATIVE
OPIATES UR QL: NEGATIVE
OXYCODONE UR QL SCN: NEGATIVE
PCP UR QL SCN: NEGATIVE
PH UR STRIP.AUTO: 7.5 [PH] (ref 5–8)
PLAT MORPH BLD: NORMAL
PLATELET # BLD AUTO: 235 10*3/MM3 (ref 140–450)
PMV BLD AUTO: 11.1 FL (ref 6–12)
POTASSIUM SERPL-SCNC: 3.6 MMOL/L (ref 3.5–5.2)
PROT SERPL-MCNC: 7.4 G/DL (ref 6–8.5)
PROT UR QL STRIP: ABNORMAL
QT INTERVAL: 460 MS
QT INTERVAL: 490 MS
QTC INTERVAL: 418 MS
QTC INTERVAL: 466 MS
RBC # BLD AUTO: 4.41 10*6/MM3 (ref 4.14–5.8)
RBC # UR STRIP: NORMAL /HPF
REF LAB TEST METHOD: NORMAL
SODIUM SERPL-SCNC: 138 MMOL/L (ref 136–145)
SP GR UR STRIP: 1.01 (ref 1–1.03)
SQUAMOUS #/AREA URNS HPF: NORMAL /HPF
TRICYCLICS UR QL SCN: NEGATIVE
TROPONIN T NUMERIC DELTA: -1 NG/L
TROPONIN T SERPL HS-MCNC: 9 NG/L
TSH SERPL DL<=0.05 MIU/L-ACNC: 3.23 UIU/ML (ref 0.27–4.2)
UROBILINOGEN UR QL STRIP: ABNORMAL
VARIANT LYMPHS NFR BLD MANUAL: 27 % (ref 19.6–45.3)
VIT B12 BLD-MCNC: 723 PG/ML (ref 211–946)
WBC # UR STRIP: NORMAL /HPF
WBC NRBC COR # BLD AUTO: 8.08 10*3/MM3 (ref 3.4–10.8)
WHOLE BLOOD HOLD COAG: NORMAL
WHOLE BLOOD HOLD SPECIMEN: NORMAL

## 2025-07-28 PROCEDURE — 82140 ASSAY OF AMMONIA: CPT | Performed by: INTERNAL MEDICINE

## 2025-07-28 PROCEDURE — 85007 BL SMEAR W/DIFF WBC COUNT: CPT | Performed by: NURSE PRACTITIONER

## 2025-07-28 PROCEDURE — 85025 COMPLETE CBC W/AUTO DIFF WBC: CPT | Performed by: NURSE PRACTITIONER

## 2025-07-28 PROCEDURE — 82746 ASSAY OF FOLIC ACID SERUM: CPT | Performed by: STUDENT IN AN ORGANIZED HEALTH CARE EDUCATION/TRAINING PROGRAM

## 2025-07-28 PROCEDURE — 71045 X-RAY EXAM CHEST 1 VIEW: CPT | Performed by: RADIOLOGY

## 2025-07-28 PROCEDURE — 84484 ASSAY OF TROPONIN QUANT: CPT | Performed by: NURSE PRACTITIONER

## 2025-07-28 PROCEDURE — 81001 URINALYSIS AUTO W/SCOPE: CPT | Performed by: NURSE PRACTITIONER

## 2025-07-28 PROCEDURE — 25010000002 LORAZEPAM PER 2 MG: Performed by: NURSE PRACTITIONER

## 2025-07-28 PROCEDURE — 70450 CT HEAD/BRAIN W/O DYE: CPT | Performed by: RADIOLOGY

## 2025-07-28 PROCEDURE — 36415 COLL VENOUS BLD VENIPUNCTURE: CPT

## 2025-07-28 PROCEDURE — 80307 DRUG TEST PRSMV CHEM ANLYZR: CPT | Performed by: STUDENT IN AN ORGANIZED HEALTH CARE EDUCATION/TRAINING PROGRAM

## 2025-07-28 PROCEDURE — 84439 ASSAY OF FREE THYROXINE: CPT

## 2025-07-28 PROCEDURE — 99223 1ST HOSP IP/OBS HIGH 75: CPT

## 2025-07-28 PROCEDURE — 25810000003 SODIUM CHLORIDE 0.9 % SOLUTION: Performed by: NURSE PRACTITIONER

## 2025-07-28 PROCEDURE — 80053 COMPREHEN METABOLIC PANEL: CPT | Performed by: NURSE PRACTITIONER

## 2025-07-28 PROCEDURE — 25010000002 HYDRALAZINE PER 20 MG

## 2025-07-28 PROCEDURE — 82077 ASSAY SPEC XCP UR&BREATH IA: CPT

## 2025-07-28 PROCEDURE — 86592 SYPHILIS TEST NON-TREP QUAL: CPT | Performed by: STUDENT IN AN ORGANIZED HEALTH CARE EDUCATION/TRAINING PROGRAM

## 2025-07-28 PROCEDURE — 70450 CT HEAD/BRAIN W/O DYE: CPT

## 2025-07-28 PROCEDURE — 82948 REAGENT STRIP/BLOOD GLUCOSE: CPT

## 2025-07-28 PROCEDURE — 99285 EMERGENCY DEPT VISIT HI MDM: CPT

## 2025-07-28 PROCEDURE — 93010 ELECTROCARDIOGRAM REPORT: CPT | Performed by: INTERNAL MEDICINE

## 2025-07-28 PROCEDURE — 82607 VITAMIN B-12: CPT | Performed by: STUDENT IN AN ORGANIZED HEALTH CARE EDUCATION/TRAINING PROGRAM

## 2025-07-28 PROCEDURE — 25010000002 ENOXAPARIN PER 10 MG: Performed by: INTERNAL MEDICINE

## 2025-07-28 PROCEDURE — 93005 ELECTROCARDIOGRAM TRACING: CPT | Performed by: INTERNAL MEDICINE

## 2025-07-28 PROCEDURE — 71045 X-RAY EXAM CHEST 1 VIEW: CPT

## 2025-07-28 PROCEDURE — 84132 ASSAY OF SERUM POTASSIUM: CPT | Performed by: INTERNAL MEDICINE

## 2025-07-28 PROCEDURE — 93005 ELECTROCARDIOGRAM TRACING: CPT | Performed by: NURSE PRACTITIONER

## 2025-07-28 PROCEDURE — 86140 C-REACTIVE PROTEIN: CPT | Performed by: NURSE PRACTITIONER

## 2025-07-28 PROCEDURE — 84443 ASSAY THYROID STIM HORMONE: CPT | Performed by: STUDENT IN AN ORGANIZED HEALTH CARE EDUCATION/TRAINING PROGRAM

## 2025-07-28 RX ORDER — NITROGLYCERIN 0.4 MG/1
0.4 TABLET SUBLINGUAL
Status: DISCONTINUED | OUTPATIENT
Start: 2025-07-28 | End: 2025-07-30 | Stop reason: HOSPADM

## 2025-07-28 RX ORDER — MONTELUKAST SODIUM 10 MG/1
10 TABLET ORAL NIGHTLY
Status: CANCELLED | OUTPATIENT
Start: 2025-07-28

## 2025-07-28 RX ORDER — BISACODYL 5 MG/1
5 TABLET, DELAYED RELEASE ORAL DAILY PRN
Status: DISCONTINUED | OUTPATIENT
Start: 2025-07-28 | End: 2025-07-30 | Stop reason: HOSPADM

## 2025-07-28 RX ORDER — CHLORHEXIDINE GLUCONATE ORAL RINSE 1.2 MG/ML
5 SOLUTION DENTAL 2 TIMES DAILY
Status: DISCONTINUED | OUTPATIENT
Start: 2025-07-28 | End: 2025-07-30 | Stop reason: HOSPADM

## 2025-07-28 RX ORDER — AMLODIPINE BESYLATE 10 MG/1
10 TABLET ORAL DAILY
Status: CANCELLED | OUTPATIENT
Start: 2025-07-28

## 2025-07-28 RX ORDER — HYDRALAZINE HYDROCHLORIDE 20 MG/ML
10 INJECTION INTRAMUSCULAR; INTRAVENOUS ONCE
Status: COMPLETED | OUTPATIENT
Start: 2025-07-28 | End: 2025-07-28

## 2025-07-28 RX ORDER — SODIUM CHLORIDE 9 MG/ML
40 INJECTION, SOLUTION INTRAVENOUS AS NEEDED
Status: DISCONTINUED | OUTPATIENT
Start: 2025-07-28 | End: 2025-07-30 | Stop reason: HOSPADM

## 2025-07-28 RX ORDER — SODIUM CHLORIDE 0.9 % (FLUSH) 0.9 %
10 SYRINGE (ML) INJECTION AS NEEDED
Status: DISCONTINUED | OUTPATIENT
Start: 2025-07-28 | End: 2025-07-30 | Stop reason: HOSPADM

## 2025-07-28 RX ORDER — SPIRONOLACTONE 25 MG/1
25 TABLET ORAL DAILY
Status: CANCELLED | OUTPATIENT
Start: 2025-07-28

## 2025-07-28 RX ORDER — SPIRONOLACTONE 25 MG/1
25 TABLET ORAL DAILY
Status: DISCONTINUED | OUTPATIENT
Start: 2025-07-28 | End: 2025-07-30 | Stop reason: HOSPADM

## 2025-07-28 RX ORDER — AMOXICILLIN 250 MG
2 CAPSULE ORAL 2 TIMES DAILY
Status: DISCONTINUED | OUTPATIENT
Start: 2025-07-28 | End: 2025-07-30 | Stop reason: HOSPADM

## 2025-07-28 RX ORDER — DONEPEZIL HYDROCHLORIDE 5 MG/1
10 TABLET, FILM COATED ORAL NIGHTLY
Status: DISCONTINUED | OUTPATIENT
Start: 2025-07-28 | End: 2025-07-29

## 2025-07-28 RX ORDER — PANTOPRAZOLE SODIUM 40 MG/1
40 TABLET, DELAYED RELEASE ORAL DAILY
Status: CANCELLED | OUTPATIENT
Start: 2025-07-28

## 2025-07-28 RX ORDER — ACETAMINOPHEN 160 MG/5ML
650 SOLUTION ORAL EVERY 4 HOURS PRN
Status: DISCONTINUED | OUTPATIENT
Start: 2025-07-28 | End: 2025-07-30 | Stop reason: HOSPADM

## 2025-07-28 RX ORDER — POTASSIUM CHLORIDE 1500 MG/1
40 TABLET, EXTENDED RELEASE ORAL EVERY 4 HOURS
Status: COMPLETED | OUTPATIENT
Start: 2025-07-28 | End: 2025-07-28

## 2025-07-28 RX ORDER — METHOCARBAMOL 500 MG/1
500 TABLET, FILM COATED ORAL 2 TIMES DAILY
Status: CANCELLED | OUTPATIENT
Start: 2025-07-28

## 2025-07-28 RX ORDER — SODIUM CHLORIDE 0.9 % (FLUSH) 0.9 %
10 SYRINGE (ML) INJECTION EVERY 12 HOURS SCHEDULED
Status: DISCONTINUED | OUTPATIENT
Start: 2025-07-28 | End: 2025-07-30 | Stop reason: HOSPADM

## 2025-07-28 RX ORDER — DONEPEZIL HYDROCHLORIDE 5 MG/1
10 TABLET, FILM COATED ORAL NIGHTLY
Status: CANCELLED | OUTPATIENT
Start: 2025-07-28

## 2025-07-28 RX ORDER — CHLORHEXIDINE GLUCONATE ORAL RINSE 1.2 MG/ML
5 SOLUTION DENTAL 2 TIMES DAILY
Status: CANCELLED | OUTPATIENT
Start: 2025-07-28

## 2025-07-28 RX ORDER — MONTELUKAST SODIUM 10 MG/1
10 TABLET ORAL NIGHTLY
Status: DISCONTINUED | OUTPATIENT
Start: 2025-07-28 | End: 2025-07-30 | Stop reason: HOSPADM

## 2025-07-28 RX ORDER — FLUTICASONE PROPIONATE 50 MCG
2 SPRAY, SUSPENSION (ML) NASAL DAILY
Status: CANCELLED | OUTPATIENT
Start: 2025-07-28

## 2025-07-28 RX ORDER — PANTOPRAZOLE SODIUM 40 MG/1
40 TABLET, DELAYED RELEASE ORAL
Status: DISCONTINUED | OUTPATIENT
Start: 2025-07-28 | End: 2025-07-30 | Stop reason: HOSPADM

## 2025-07-28 RX ORDER — ACETAMINOPHEN 325 MG/1
650 TABLET ORAL EVERY 4 HOURS PRN
Status: DISCONTINUED | OUTPATIENT
Start: 2025-07-28 | End: 2025-07-30 | Stop reason: HOSPADM

## 2025-07-28 RX ORDER — LORAZEPAM 2 MG/ML
0.5 INJECTION INTRAMUSCULAR ONCE
Status: COMPLETED | OUTPATIENT
Start: 2025-07-28 | End: 2025-07-28

## 2025-07-28 RX ORDER — FLUTICASONE PROPIONATE 50 MCG
2 SPRAY, SUSPENSION (ML) NASAL DAILY
Status: DISCONTINUED | OUTPATIENT
Start: 2025-07-28 | End: 2025-07-30 | Stop reason: HOSPADM

## 2025-07-28 RX ORDER — ENOXAPARIN SODIUM 100 MG/ML
40 INJECTION SUBCUTANEOUS DAILY
Status: DISCONTINUED | OUTPATIENT
Start: 2025-07-28 | End: 2025-07-30 | Stop reason: HOSPADM

## 2025-07-28 RX ORDER — AMLODIPINE BESYLATE 10 MG/1
10 TABLET ORAL DAILY
Status: DISCONTINUED | OUTPATIENT
Start: 2025-07-28 | End: 2025-07-30 | Stop reason: HOSPADM

## 2025-07-28 RX ORDER — HYDRALAZINE HYDROCHLORIDE 50 MG/1
50 TABLET, FILM COATED ORAL 2 TIMES DAILY
Status: DISCONTINUED | OUTPATIENT
Start: 2025-07-28 | End: 2025-07-30 | Stop reason: HOSPADM

## 2025-07-28 RX ORDER — ACETAMINOPHEN 650 MG/1
650 SUPPOSITORY RECTAL EVERY 4 HOURS PRN
Status: DISCONTINUED | OUTPATIENT
Start: 2025-07-28 | End: 2025-07-30 | Stop reason: HOSPADM

## 2025-07-28 RX ORDER — QUETIAPINE FUMARATE 25 MG/1
25 TABLET, FILM COATED ORAL NIGHTLY
Status: DISCONTINUED | OUTPATIENT
Start: 2025-07-28 | End: 2025-07-30 | Stop reason: HOSPADM

## 2025-07-28 RX ORDER — POLYETHYLENE GLYCOL 3350 17 G/17G
17 POWDER, FOR SOLUTION ORAL DAILY PRN
Status: DISCONTINUED | OUTPATIENT
Start: 2025-07-28 | End: 2025-07-30 | Stop reason: HOSPADM

## 2025-07-28 RX ORDER — BISACODYL 10 MG
10 SUPPOSITORY, RECTAL RECTAL DAILY PRN
Status: DISCONTINUED | OUTPATIENT
Start: 2025-07-28 | End: 2025-07-30 | Stop reason: HOSPADM

## 2025-07-28 RX ADMIN — QUETIAPINE 25 MG: 25 TABLET ORAL at 20:34

## 2025-07-28 RX ADMIN — PANTOPRAZOLE SODIUM 40 MG: 40 TABLET, DELAYED RELEASE ORAL at 16:36

## 2025-07-28 RX ADMIN — Medication 10 ML: at 13:04

## 2025-07-28 RX ADMIN — Medication 10 ML: at 20:35

## 2025-07-28 RX ADMIN — CHLORHEXIDINE GLUCONATE 5 ML: 1.2 RINSE ORAL at 20:35

## 2025-07-28 RX ADMIN — HYDRALAZINE HYDROCHLORIDE 10 MG: 20 INJECTION INTRAMUSCULAR; INTRAVENOUS at 13:04

## 2025-07-28 RX ADMIN — DONEPEZIL HYDROCHLORIDE 10 MG: 5 TABLET, FILM COATED ORAL at 20:34

## 2025-07-28 RX ADMIN — AMLODIPINE BESYLATE 10 MG: 10 TABLET ORAL at 16:36

## 2025-07-28 RX ADMIN — POTASSIUM CHLORIDE 40 MEQ: 1500 TABLET, EXTENDED RELEASE ORAL at 20:34

## 2025-07-28 RX ADMIN — POTASSIUM CHLORIDE 40 MEQ: 1500 TABLET, EXTENDED RELEASE ORAL at 16:36

## 2025-07-28 RX ADMIN — MONTELUKAST 10 MG: 10 TABLET, FILM COATED ORAL at 20:34

## 2025-07-28 RX ADMIN — SPIRONOLACTONE 25 MG: 25 TABLET ORAL at 16:36

## 2025-07-28 RX ADMIN — LORAZEPAM 0.5 MG: 2 INJECTION INTRAMUSCULAR; INTRAVENOUS at 09:54

## 2025-07-28 RX ADMIN — ENOXAPARIN SODIUM 40 MG: 100 INJECTION SUBCUTANEOUS at 13:04

## 2025-07-28 RX ADMIN — SODIUM CHLORIDE 500 ML: 9 INJECTION, SOLUTION INTRAVENOUS at 09:35

## 2025-07-28 RX ADMIN — SENNOSIDES, DOCUSATE SODIUM 2 TABLET: 50; 8.6 TABLET, FILM COATED ORAL at 20:34

## 2025-07-28 RX ADMIN — HYDRALAZINE HYDROCHLORIDE 50 MG: 50 TABLET ORAL at 20:34

## 2025-07-28 NOTE — PLAN OF CARE
Goal Outcome Evaluation:  Plan of Care Reviewed With: patient           Outcome Evaluation: pt resting in bed at this time. pt alert and oriented. pt was hypertensive, see MAR for interventions. BP now stable. No request at this time. Will continue to follow plan of care til 7p.

## 2025-07-28 NOTE — CASE MANAGEMENT/SOCIAL WORK
Discharge Planning Assessment   Sin     Patient Name: Alexander Zamora  MRN: [id number]  Today's Date: 7/28/2025    Admit Date: 7/28/2025    Plan: Spoke with patients sister Janee at bedside patient unable to answer. Patient lives at home alone and has Brenda Tam Daughter [phone] that will be coming in today from [place]. Patients PCP Bhavna Vaughn and unsure what pharmacy patient uese. Patients uses no DME or HH. Patients family will transport home at discharge. Patients sister stated patient fell this AM and is now very confused.   Discharge Needs Assessment       Row Name 07/28/25 1133       Living Environment    People in Home alone    Current Living Arrangements home    Potentially Unsafe Housing Conditions none    In the past 12 months has the electric, gas, oil, or water company threatened to shut off services in your home? No    Primary Care Provided by self    Provides Primary Care For no one    Family Caregiver if Needed child(chad), adult    Family Caregiver Names Brenda Tam Daughter [phone]    Quality of Family Relationships unable to assess    Able to Return to Prior Arrangements yes       Resource/Environmental Concerns    Resource/Environmental Concerns none    Transportation Concerns none       Transportation Needs    In the past 12 months, has lack of transportation kept you from medical appointments or from getting medications? no    In the past 12 months, has lack of transportation kept you from meetings, work, or from getting things needed for daily living? No       Food Insecurity    Within the past 12 months, you worried that your food would run out before you got the money to buy more. Never true    Within the past 12 months, the food you bought just didn't last and you didn't have money to get more. Never true       Transition Planning    Patient/Family Anticipates Transition to home    Patient/Family Anticipated Services at Transition none     Transportation Anticipated family or friend will provide       Discharge Needs Assessment    Readmission Within the Last 30 Days no previous admission in last 30 days    Equipment Currently Used at Home none    Concerns to be Addressed discharge planning    Do you want help finding or keeping work or a job? Patient unable to answer    Do you want help with school or training? For example, starting or completing job training or getting a high school diploma, GED or equivalent Patient unable to answer    Anticipated Changes Related to Illness none    Equipment Needed After Discharge none                   Discharge Plan       Row Name 07/28/25 6918       Plan    Plan Spoke with patients sister Janee at bedside patient unable to answer. Patient lives at home alone and has Brenda Tam Daughter [phone] that will be coming in today from [place]. Patients PCP Bhavna Vaughn and unsure what pharmacy patient uese. Patients uses no DME or HH. Patients family will transport home at discharge. Patients sister stated patient fell this AM and is now very confused.    Patient/Family in Agreement with Plan yes      Row Name 07/28/25 1139       Plan    Plan Spoke w                  Continued Care and Services - Admitted Since 7/28/2025    No active coordination exists.       Expected Discharge Date and Time       Expected Discharge Date Expected Discharge Time    Jul 31, 2025            Demographic Summary       Row Name 07/28/25 1130       General Information    Admission Type inpatient    Arrived From home    Referral Source emergency department    Reason for Consult discharge planning    Preferred Language English                     Bettina Cr

## 2025-07-28 NOTE — H&P
[facility] Medicine Services  HISTORY & PHYSICAL    Patient Identification:  Name:  Alexander Zamora  Age:  75 y.o.  Sex:  male  :  1950  MRN:  [id number]   Visit Number:  [id number]  Admit Date: 2025   Primary Care Physician:  Bhavna Vaughn MD     Subjective     Chief complaint:   Chief Complaint   Patient presents with    Weakness - Generalized     History of presenting illness:   Patient is a 75 y.o. male with past medical history significant for prostate cancer s/p radical prostatectomy, hypertension, depression, PUD, insomnia, mild dementia that presented to the [facility] emergency department for evaluation of altered mental status.    Patient examined at bedside in ED. Sister at bedside.  Patient reports he woke up this morning and went to get out of bed when he suddenly became dizzy and fell onto the floor.  Denies any injury or hitting his head.  Denies any chest pain, palpitations.  Denies any headache, fever, chills, neck pain.  Denies any nausea, vomiting, dysuria.  He does report that he started taking something new to help him sleep within the past few nights.  Could not be more specific on what this was but said he gets medication mailed to him.  He was oriented to person, and time, able to tell me the exact date.  Was not oriented to place.  Patient's sister states that he still does not seem like himself.  She states whenever she saw him  he seemed slowed and not quite like himself at that time as well.    Upon arrival to the ED, vitals were temperature 98.2, HR 65, RR 16, /69, SpO2 100% on room air.  Workup in the ED overall unremarkable, no significant lab abnormalities.  Chest x-ray with no abnormalities.  CT head shows atrophy with no acute changes.    Patient has been admitted to the PCU.   ---------------------------------------------------------------------------------------------------------------------   Review of Systems    Constitutional:  Negative for chills and fatigue.   Respiratory:  Negative for cough and shortness of breath.    Cardiovascular:  Negative for chest pain, palpitations and leg swelling.   Gastrointestinal:  Negative for abdominal pain, diarrhea, nausea and vomiting.   Genitourinary:  Negative for difficulty urinating and dysuria.   Musculoskeletal:  Negative for neck pain and neck stiffness.   Skin:  Negative for rash and wound.   Neurological:  Positive for dizziness and light-headedness. Negative for headaches.   Psychiatric/Behavioral:  Positive for confusion. Negative for agitation.       ---------------------------------------------------------------------------------------------------------------------   Past Medical History:   Diagnosis Date    Arthritis     Bleeding ulcer     Cancer 2015    Prostate    Chronic back pain     Collapsed lung     Depression     Elevated prostate specific antigen (PSA)     Erectile dysfunction     Hypercholesterolemia     Primary hypertension     Prostate cancer     Prostatitis      Past Surgical History:   Procedure Laterality Date    OTHER SURGICAL HISTORY      surgery for an ulcer, PNEUMOTHORAX AND ULCER THERAPY     Family History   Problem Relation Age of Onset    Hypertension Mother     Bradycardia Mother         Had pacemaker    Heart attack Mother     Cancer Father         Throat    Nephrolithiasis Father     Parkinsonism Father     Diabetes Sister     Heart disease Sister     COPD Sister     Heart disease Sister     Heart attack Sister     Rheum arthritis Sister     Cancer Sister         Stomach    Cancer Sister         Lung    Cancer Brother         Kidney    Hyperlipidemia Brother     Prostatitis Brother     Diabetes Other      Social History     Socioeconomic History    Marital status: Unknown   Tobacco Use    Smoking status: Former     Current packs/day: 0.00     Average packs/day: 2.0 packs/day for 5.0 years (10.0 ttl pk-yrs)     Types: Cigarettes     Start date:       Quit date: 1970     Years since quittin.6    Smokeless tobacco: Never   Vaping Use    Vaping status: Never Used   Substance and Sexual Activity    Alcohol use: No    Drug use: No    Sexual activity: Defer     ---------------------------------------------------------------------------------------------------------------------   Allergies:  Crestor [rosuvastatin calcium] and Amoxicillin  ---------------------------------------------------------------------------------------------------------------------   Medications below are reported home medications pulling from within the system; at this time, these medications have not been reconciled unless otherwise specified and are in the verification process for further verifcation as current home medications.    Prior to Admission Medications       Prescriptions Last Dose Informant Patient Reported? Taking?    amLODIPine (NORVASC) 10 MG tablet   No No    Take 1 tablet by mouth Daily.    caphosol (SALIVA SUBSTITUTE) solution   No No    Take 30 mL by mouth As Needed (mucositis).    cetirizine (zyrTEC) 10 MG tablet   No No    Take 1 tablet by mouth Daily.    cevimeline (EVOXAC) 30 MG capsule   No No    Take 1 capsule by mouth 3 (Three) Times a Day.    chlorhexidine (PERIDEX) 0.12 % solution   No No    Apply 5 mL to the mouth or throat 2 (Two) Times a Day.    Cholecalciferol 25 MCG (1000 UT) tablet   Yes No    Take 1 tablet by mouth Daily.    donepezil (ARICEPT) 10 MG tablet   No No    Take 1 tablet by mouth Every Night.    ezetimibe (ZETIA) 10 MG tablet   No No    Take 1 tablet by mouth Daily.    fluticasone (FLONASE) 50 MCG/ACT nasal spray   No No    Administer 2 sprays into the nostril(s) as directed by provider Daily.    hydrALAZINE (APRESOLINE) 25 MG tablet   Yes No    Take 1 tablet by mouth Daily.    hydrALAZINE (APRESOLINE) 50 MG tablet   No No    Take 1 tablet by mouth 2 (Two) Times a Day.    Patient taking differently:  Take 1 tablet by mouth Daily.     ibuprofen (ADVIL,MOTRIN) 600 MG tablet   Yes No    Take 1 tablet by mouth 3 (Three) Times a Day.    Lidocaine Viscous HCl (XYLOCAINE) 2 % solution   Yes No    Take  by mouth As Needed for Mild Pain. Mix 20mL Lidocaine with 20mL of Distilled Water, place into a spray bottle, apply 3 sprays on each side where tonsils were every 1 to 2 hours as needed.    Melatonin 10 MG tablet   No No    Take 1 tablet by mouth Every Night.    methocarbamol (ROBAXIN) 500 MG tablet   No No    Take 1 tablet by mouth 2 (Two) Times a Day.    Misc Natural Products (NEURIVA PO)   Yes No    Take 1 capsule by mouth Daily.    montelukast (SINGULAIR) 10 MG tablet   No No    Take 1 tablet by mouth Every Night.    Multiple Vitamin (MULTI VITAMIN DAILY) tablet   Yes No    Take 1 tablet by mouth Daily.    Omega-3 Fatty Acids (SUPER OMEGA 3 PO)   Yes No    Take 1 capsule by mouth Daily.    pantoprazole (PROTONIX) 40 MG EC tablet   No No    Take 1 tablet by mouth Daily.    spironolactone (ALDACTONE) 25 MG tablet   No No    Take 1 tablet by mouth Daily.    vitamin C (ASCORBIC ACID) 250 MG tablet   Yes No    Take 1 tablet by mouth Daily.    vitamin E 100 UNIT capsule   Yes No    Take 1 capsule by mouth Daily.    Zinc 50 MG capsule   Yes No    Take  by mouth.          ---------------------------------------------------------------------------------------------------------------------    Objective     Hospital Scheduled Meds:  enoxaparin sodium, 40 mg, Subcutaneous, Daily  hydrALAZINE, 10 mg, Intravenous, Once  QUEtiapine, 25 mg, Oral, Nightly  senna-docusate sodium, 2 tablet, Oral, BID  sodium chloride, 10 mL, Intravenous, Q12H           Current listed hospital scheduled medications may not yet reflect those currently placed in orders that are signed and held, awaiting patient's arrival to floor/unit.    ---------------------------------------------------------------------------------------------------------------------   Vital Signs:  Temp:  [98 °F  (36.7 °C)-98.2 °F (36.8 °C)] 98 °F (36.7 °C)  Heart Rate:  [43-82] 49  Resp:  [14-16] 14  BP: (149-203)/(64-79) 203/79  Mean Arterial Pressure (Non-Invasive) for the past 24 hrs (Last 3 readings):   Noninvasive MAP (mmHg)   07/28/25 1226 128   07/28/25 1142 112   07/28/25 1120 122     SpO2 Percentage    07/28/25 1200 07/28/25 1206 07/28/25 1209   SpO2: 98% 98% 99%     SpO2:  [96 %-100 %] 99 %  on   ;   Device (Oxygen Therapy): room air    Body mass index is 19.93 kg/m².  Wt Readings from Last 3 Encounters:   07/28/25 63 kg (138 lb 14.2 oz)   07/22/25 65 kg (143 lb 6.4 oz)   06/20/25 65 kg (143 lb 3.2 oz)     ---------------------------------------------------------------------------------------------------------------------   Physical Exam:  Physical Exam  Constitutional:       General: He is not in acute distress.     Appearance: Normal appearance.   HENT:      Head: Normocephalic and atraumatic.      Right Ear: External ear normal.      Left Ear: External ear normal.      Nose: No nasal deformity.      Mouth/Throat:      Lips: Pink.      Mouth: Mucous membranes are moist.   Eyes:      Conjunctiva/sclera: Conjunctivae normal.      Pupils: Pupils are equal, round, and reactive to light.   Cardiovascular:      Rate and Rhythm: Regular rhythm. Bradycardia present.      Pulses:           Dorsalis pedis pulses are 2+ on the right side and 2+ on the left side.      Heart sounds: Normal heart sounds.   Pulmonary:      Effort: Pulmonary effort is normal.      Breath sounds: Normal breath sounds. No wheezing, rhonchi or rales.   Abdominal:      General: Abdomen is flat. Bowel sounds are normal.      Palpations: Abdomen is soft.      Tenderness: There is no guarding or rebound.   Genitourinary:     Comments: No argueta catheter in place   Musculoskeletal:      Cervical back: Neck supple. Normal range of motion.      Right lower leg: No edema.      Left lower leg: No edema.   Skin:     General: Skin is warm and dry.  "  Neurological:      General: No focal deficit present.      Mental Status: He is alert.      Comments: Oriented to person and time not oriented to place   Psychiatric:         Mood and Affect: Mood normal.         Behavior: Behavior normal.       ---------------------------------------------------------------------------------------------------------------------  EKG:        I have personally reviewed the EKG/Telemetry strip  ---------------------------------------------------------------------------------------------------------------------   Results from last 7 days   Lab Units 07/28/25  0955 07/28/25  0855   HSTROP T ng/L 8 9           Results from last 7 days   Lab Units 07/28/25  0855   CRP mg/dL <0.30   WBC 10*3/mm3 8.08   HEMOGLOBIN g/dL 13.2   HEMATOCRIT % 39.0   MCV fL 88.4   MCHC g/dL 33.8   PLATELETS 10*3/mm3 235     Results from last 7 days   Lab Units 07/28/25  0855 07/22/25  1123   SODIUM mmol/L 138 139   POTASSIUM mmol/L 3.6 3.7   MAGNESIUM mg/dL  --  2.1   CHLORIDE mmol/L 101 103   CO2 mmol/L 23.3 28.0   BUN mg/dL 16.8 20.0   CREATININE mg/dL 1.16 1.72*   CALCIUM mg/dL 10.5 10.2   GLUCOSE mg/dL 117* 86   ALBUMIN g/dL 4.5 4.0   BILIRUBIN mg/dL 0.5 0.2   ALK PHOS U/L 103 85   AST (SGOT) U/L 27 20   ALT (SGPT) U/L 23 17   Estimated Creatinine Clearance: 48.3 mL/min (by C-G formula based on SCr of 1.16 mg/dL).  Ammonia   Date Value Ref Range Status   07/28/2025 38 16 - 60 umol/L Final       Glucose   Date/Time Value Ref Range Status   07/28/2025 0844 116 70 - 130 mg/dL Final     Comment:     Serial Number: [id number]:  [id number]     No results found for: \"HGBA1C\"  Lab Results   Component Value Date    TSH 3.230 07/28/2025       No results found for: \"BLOODCX\"  No results found for: \"URINECX\"  No results found for: \"WOUNDCX\"  No results found for: \"STOOLCX\"  No results found for: \"RESPCX\"  No results found for: \"MRSACX\"  Pain Management Panel  More data exists         Latest Ref Rng & Units " 7/28/2025 7/22/2025   Pain Management Panel   Creatinine, Urine mg/dL - 169.9    Amphetamine, Urine Qual Negative Negative  -   Barbiturates Screen, Urine Negative Negative  -   Benzodiazepine Screen, Urine Negative Negative  -   Buprenorphine, Screen, Urine Negative Negative  -   Cocaine Screen, Urine Negative Negative  -   Fentanyl, Urine Negative Negative  -   Methadone Screen , Urine Negative Negative  -   Methamphetamine, Ur Negative Negative  -     I have personally reviewed the above laboratory results.   ---------------------------------------------------------------------------------------------------------------------  Imaging Results (Last 7 Days)       Procedure Component Value Units Date/Time    XR Chest 1 View [[id number]] Collected: 07/28/25 0928     Updated: 07/28/25 0931    Narrative:      XR CHEST 1 VW-     CLINICAL INDICATION: weakness        COMPARISON: 7/29/2015     TECHNIQUE: Single frontal view of the chest.     FINDINGS:     LUNGS: Lungs are adequately aerated.      HEART AND MEDIASTINUM: Heart and mediastinal contours are unremarkable        SKELETON: Bony and soft tissue structures are unremarkable.             Impression:      No radiographic evidence of acute cardiac or pulmonary disease.           This report was finalized on 7/28/2025 9:28 AM by Dr. Ralph Kohli MD.       CT Head Without Contrast [[id number]] Collected: 07/28/25 0914     Updated: 07/28/25 0921    Narrative:      CT HEAD WO CONTRAST-     CLINICAL INDICATION: Weakness, dizziness        COMPARISON: 1/2/2025      TECHNIQUE: Axial images of the brain were obtained with out intravenous  contrast.  Reformatted images were created in the sagittal and coronal  planes.     DOSE:     Radiation dose reduction techniques were utilized per ALARA protocol.  Automated exposure control was initiated through either or [employer] or  "[employer] software packages by  protocol.        FINDINGS:    BRAIN:  Unremarkable.  No  hemorrhage.  No significant white matter  disease.  No edema.       VENTRICLES:  Unremarkable.  No ventriculomegaly.       BONES/JOINTS:  Unremarkable.  No acute fracture.       SOFT TISSUES:  Unremarkable.       SINUSES:  no air fluid levels       MASTOID AIR CELLS:  Unremarkable as visualized.  No mastoid effusion.          Impression:         1. Atrophy  2. No parenchymal mass, hemorrhage, or midline shift     This report was finalized on 7/28/2025 9:19 AM by Dr. Ralph Kohli MD.             I have personally reviewed the above radiology results.     Last Echocardiogram:  Results for orders placed during the hospital encounter of 01/05/23    Adult Stress Echo W/ Cont or Stress Agent if Necessary Per Protocol 01/11/2023  6:26 PM    Interpretation Summary    Echocardiogram Findings:    Normal left ventricular cavity size and wall thickness noted. All left ventricular wall segments contract normally.    Left ventricular ejection fraction appears to be 61 - 65%.    Left ventricular diastolic function is consistent with (grade I) impaired relaxation.    The mitral valve is structurally normal with no significant stenosis present. Mild mitral valve regurgitation is present.    The aortic valve is structurally normal with no regurgitation or stenosis present.    There is no evidence of pericardial effusion. .    Stress Procedure    A stress test was performed following the Humberto protocol.    Exercise duration (min) 8 min Estimated workload 10.1 METS    Baseline Vitals Baseline HR 53 bpm Baseline /66 mmHg Peak Stress Vitals Peak  bpm Peak /60 mmHg Recovery Vitals Recovery HR 75 bpm Recovery /71 mmHg Exercise Data Target HR (85%) 125 bpm Max. Pred. HR (100%) 147 bpm Percent Max Pred HR 85.71 %    Arrhythmias during stress: rare PVCs, bigeminy.    Findings consistent with an indeterminate ECG stress test.    Stress Echo Findings    Segment augmentation had an abnormal response to stress. Left  ventricular function is normal    The following left ventricular wall segments are hypokinetic: basal inferolateral, basal inferoseptal, mid inferoseptal, basal inferior and basal inferoseptal.    Abnormal stress echo with echocardiographic evidence for myocardial ischemia located in the inferior wall.    ---------------------------------------------------------------------------------------------------------------------    Assessment & Plan      Active Hospital Problems    Diagnosis  POA    **AMS (altered mental status) [R41.37]  Yes     Altered mental status, etiology unclear, POA  Dizziness  Fall  History of dementia  prostate cancer s/p radical prostatectomy  Hypertensive urgency   Insomnia   Sinus bradycardia  Patient presented with complaints of an episode of dizziness, fall, and reported confusion.  Patient does report taking something new for insomnia within the past few days, but could not be more specific.  Of note he is a questionably reliable historian with the current reports of altered mental status.  Overall, etiology of AMS is unclear.  No evidence of infection on workup thus far.  Ammonia normal.  UDS negative.  Ethanol level negative.  Potentially due to a questionably new insomnia medication.  Will review formal med rec when available  Dizziness/fall could be secondary to symptomatic bradycardia, however patient has been sinus bradycardia for quite some time on chart review.  Will continue to monitor on telemetry and monitor for recurrence of symptoms.  Orthostatic vital signs ordered.  Patient without any focal neurological symptoms, CT head negative.  Could consider further neurological workup with MRI if confusion persist  Patient hypertensive when arriving to the floor, IV hydralazine ordered.  Will resume home medications pending med rec.  Hold any that may contribute to bradycardia.  F/E/N: P.o. hydration.  Continue monitor electrolytes.  Regular  diet.    ---------------------------------------------------  DVT Prophylaxis: SCDs  Activity: Up with assistance    The patient is considered to be a high risk patient due to: Altered mental status    INPATIENT status due to the need for care which can only be reasonably provided in an hospital setting such as aggressive/expedited ancillary services and/or consultation services, the necessity for IV medications, close physician monitoring and/or the possible need for procedures.  In such, I feel patient’s risk for adverse outcomes and need for care warrant INPATIENT evaluation and predict the patient’s care encounter to likely last beyond 2 midnights.      Code Status: Full code    Disposition/Discharge planning: Pending clinical course    I have discussed the patient's assessment and plan with Dr. Yordan Hanson PA-C  Hospitalist Service -- [facility]       07/28/25  12:49 EDT    Attending Physician: Onesimo Farr,*

## 2025-07-28 NOTE — ED PROVIDER NOTES
Subjective   History of Present Illness  Patient is a 75 year old male with PMH significant for arthritis, depression, anxiety, hypertension, hyperlipidemia and hx of prostate cancer. He presents to the ED for generalized weakness and confusion. He reports he was lightheaded this morning and had a fall. Due to his confusion he is a poor historian. Patient's sister at bedside report he lives home alone and does all of his ADL's independently at baseline. She reports she believes he was diagnosed with dementia at some point. Denies any other complaints.        Review of Systems   Constitutional:  Positive for fatigue. Negative for fever.   HENT: Negative.     Eyes: Negative.    Respiratory: Negative.     Cardiovascular: Negative.  Negative for chest pain.   Gastrointestinal: Negative.  Negative for abdominal pain.   Endocrine: Negative.    Genitourinary: Negative.  Negative for dysuria.   Musculoskeletal: Negative.    Skin: Negative.    Allergic/Immunologic: Negative.    Neurological:  Positive for weakness.   Hematological: Negative.    Psychiatric/Behavioral: Negative.     All other systems reviewed and are negative.      Past Medical History:   Diagnosis Date    Arthritis     Bleeding ulcer     Cancer 2015    Prostate    Chronic back pain     Collapsed lung     Depression     Elevated prostate specific antigen (PSA)     Erectile dysfunction     Hypercholesterolemia     Primary hypertension     Prostate cancer     Prostatitis        Allergies   Allergen Reactions    Crestor [Rosuvastatin Calcium] Myalgia    Amoxicillin Rash       Past Surgical History:   Procedure Laterality Date    OTHER SURGICAL HISTORY      surgery for an ulcer, PNEUMOTHORAX AND ULCER THERAPY       Family History   Problem Relation Age of Onset    Hypertension Mother     Bradycardia Mother         Had pacemaker    Heart attack Mother     Cancer Father         Throat    Nephrolithiasis Father     Parkinsonism Father     Diabetes Sister     Heart  disease Sister     COPD Sister     Heart disease Sister     Heart attack Sister     Rheum arthritis Sister     Cancer Sister         Stomach    Cancer Sister         Lung    Cancer Brother         Kidney    Hyperlipidemia Brother     Prostatitis Brother     Diabetes Other        Social History     Socioeconomic History    Marital status: Unknown   Tobacco Use    Smoking status: Former     Current packs/day: 0.00     Average packs/day: 2.0 packs/day for 5.0 years (10.0 ttl pk-yrs)     Types: Cigarettes     Start date:      Quit date: 1970     Years since quittin.6    Smokeless tobacco: Never   Vaping Use    Vaping status: Never Used   Substance and Sexual Activity    Alcohol use: No    Drug use: No    Sexual activity: Defer           Objective   Physical Exam  Vitals and nursing note reviewed.   Constitutional:       General: He is not in acute distress.     Appearance: He is well-developed. He is not diaphoretic.   HENT:      Head: Normocephalic and atraumatic.      Right Ear: External ear normal.      Left Ear: External ear normal.      Nose: Nose normal.      Mouth/Throat:      Mouth: Mucous membranes are moist.      Pharynx: Oropharynx is clear.   Eyes:      Conjunctiva/sclera: Conjunctivae normal.      Pupils: Pupils are equal, round, and reactive to light.   Neck:      Vascular: No JVD.      Trachea: No tracheal deviation.   Cardiovascular:      Rate and Rhythm: Normal rate and regular rhythm.      Heart sounds: Normal heart sounds. No murmur heard.  Pulmonary:      Effort: Pulmonary effort is normal. No respiratory distress.      Breath sounds: Normal breath sounds. No wheezing.   Abdominal:      General: Bowel sounds are normal.      Palpations: Abdomen is soft.      Tenderness: There is no abdominal tenderness.   Musculoskeletal:         General: No deformity. Normal range of motion.      Cervical back: Normal range of motion and neck supple.   Skin:     General: Skin is warm and dry.      Capillary  Refill: Capillary refill takes less than 2 seconds.      Coloration: Skin is not pale.      Findings: No erythema or rash.   Neurological:      General: No focal deficit present.      Mental Status: He is alert. He is disoriented.      Cranial Nerves: No cranial nerve deficit.   Psychiatric:         Mood and Affect: Mood normal.         Behavior: Behavior normal.         Thought Content: Thought content normal.         Judgment: Judgment normal.         Procedures       Results for orders placed or performed during the hospital encounter of 07/28/25   POC Glucose Once    Collection Time: 07/28/25  8:44 AM    Specimen: Blood   Result Value Ref Range    Glucose 116 70 - 130 mg/dL   Comprehensive Metabolic Panel    Collection Time: 07/28/25  8:55 AM    Specimen: Blood   Result Value Ref Range    Glucose 117 (H) 65 - 99 mg/dL    BUN 16.8 8.0 - 23.0 mg/dL    Creatinine 1.16 0.76 - 1.27 mg/dL    Sodium 138 136 - 145 mmol/L    Potassium 3.6 3.5 - 5.2 mmol/L    Chloride 101 98 - 107 mmol/L    CO2 23.3 22.0 - 29.0 mmol/L    Calcium 10.5 8.6 - 10.5 mg/dL    Total Protein 7.4 6.0 - 8.5 g/dL    Albumin 4.5 3.5 - 5.2 g/dL    ALT (SGPT) 23 1 - 41 U/L    AST (SGOT) 27 1 - 40 U/L    Alkaline Phosphatase 103 39 - 117 U/L    Total Bilirubin 0.5 0.0 - 1.2 mg/dL    Globulin 2.9 gm/dL    A/G Ratio 1.6 g/dL    BUN/Creatinine Ratio 14.5 7.0 - 25.0    Anion Gap 13.7 5.0 - 15.0 mmol/L    eGFR 65.3 >60.0 mL/min/1.73   High Sensitivity Troponin T    Collection Time: 07/28/25  8:55 AM    Specimen: Blood   Result Value Ref Range    HS Troponin T 9 <22 ng/L   C-reactive Protein    Collection Time: 07/28/25  8:55 AM    Specimen: Blood   Result Value Ref Range    C-Reactive Protein <0.30 0.00 - 0.50 mg/dL   CBC Auto Differential    Collection Time: 07/28/25  8:55 AM    Specimen: Blood   Result Value Ref Range    WBC 8.08 3.40 - 10.80 10*3/mm3    RBC 4.41 4.14 - 5.80 10*6/mm3    Hemoglobin 13.2 13.0 - 17.7 g/dL    Hematocrit 39.0 37.5 - 51.0 %     MCV 88.4 79.0 - 97.0 fL    MCH 29.9 26.6 - 33.0 pg    MCHC 33.8 31.5 - 35.7 g/dL    RDW 12.3 12.3 - 15.4 %    RDW-SD 39.8 37.0 - 54.0 fl    MPV 11.1 6.0 - 12.0 fL    Platelets 235 140 - 450 10*3/mm3   Manual Differential    Collection Time: 07/28/25  8:55 AM    Specimen: Blood   Result Value Ref Range    Neutrophil % 71.0 42.7 - 76.0 %    Lymphocyte % 27.0 19.6 - 45.3 %    Monocyte % 1.0 (L) 5.0 - 12.0 %    Basophil % 1.0 0.0 - 1.5 %    Neutrophils Absolute 5.74 1.70 - 7.00 10*3/mm3    Lymphocytes Absolute 2.18 0.70 - 3.10 10*3/mm3    Monocytes Absolute 0.08 (L) 0.10 - 0.90 10*3/mm3    Basophils Absolute 0.08 0.00 - 0.20 10*3/mm3    Anisocytosis Slight/1+ None Seen    Platelet Morphology Normal Normal   Green Top (Gel)    Collection Time: 07/28/25  8:55 AM   Result Value Ref Range    Extra Tube Hold for add-ons.    Lavender Top    Collection Time: 07/28/25  8:55 AM   Result Value Ref Range    Extra Tube hold for add-on    Gold Top - SST    Collection Time: 07/28/25  8:55 AM   Result Value Ref Range    Extra Tube Hold for add-ons.    Light Blue Top    Collection Time: 07/28/25  8:55 AM   Result Value Ref Range    Extra Tube Hold for add-ons.    ECG 12 Lead Other; Weakness    Collection Time: 07/28/25  8:55 AM   Result Value Ref Range    QT Interval 460 ms    QTC Interval 466 ms   High Sensitivity Troponin T 1Hr    Collection Time: 07/28/25  9:55 AM    Specimen: Blood   Result Value Ref Range    HS Troponin T 8 <22 ng/L    Troponin T Numeric Delta -1 Abnormal if >/=3 ng/L   TSH    Collection Time: 07/28/25  9:55 AM    Specimen: Blood   Result Value Ref Range    TSH 3.230 0.270 - 4.200 uIU/mL   Ethanol    Collection Time: 07/28/25  9:55 AM    Specimen: Blood   Result Value Ref Range    Ethanol <10 0 - 10 mg/dL    Ethanol % <0.010 %   Urinalysis With Microscopic If Indicated (No Culture) - Urine, Clean Catch    Collection Time: 07/28/25  9:58 AM    Specimen: Urine, Clean Catch   Result Value Ref Range    Color, UA  Yellow Yellow, Straw    Appearance, UA Clear Clear    pH, UA 7.5 5.0 - 8.0    Specific Gravity, UA 1.008 1.005 - 1.030    Glucose, UA Negative Negative    Ketones, UA Trace (A) Negative    Bilirubin, UA Negative Negative    Blood, UA Negative Negative    Protein, UA 30 mg/dL (1+) (A) Negative    Leuk Esterase, UA Negative Negative    Nitrite, UA Negative Negative    Urobilinogen, UA 0.2 E.U./dL 0.2 - 1.0 E.U./dL   Urinalysis, Microscopic Only - Urine, Clean Catch    Collection Time: 07/28/25  9:58 AM    Specimen: Urine, Clean Catch   Result Value Ref Range    RBC, UA 0-2 None Seen, 0-2 /HPF    WBC, UA 0-2 None Seen, 0-2 /HPF    Bacteria, UA None Seen None Seen /HPF    Squamous Epithelial Cells, UA 0-2 None Seen, 0-2 /HPF    Hyaline Casts, UA None Seen None Seen /LPF    Methodology Automated Microscopy    Urine Drug Screen - Urine, Clean Catch    Collection Time: 07/28/25  9:58 AM    Specimen: Urine, Clean Catch   Result Value Ref Range    THC, Screen, Urine Negative Negative    Phencyclidine (PCP), Urine Negative Negative    Cocaine Screen, Urine Negative Negative    Methamphetamine, Ur Negative Negative    Opiate Screen Negative Negative    Amphetamine Screen, Urine Negative Negative    Benzodiazepine Screen, Urine Negative Negative    Tricyclic Antidepressants Screen Negative Negative    Methadone Screen, Urine Negative Negative    Barbiturates Screen, Urine Negative Negative    Oxycodone Screen, Urine Negative Negative    Buprenorphine, Screen, Urine Negative Negative   Fentanyl, Urine - Urine, Clean Catch    Collection Time: 07/28/25  9:58 AM    Specimen: Urine, Clean Catch   Result Value Ref Range    Fentanyl, Urine Negative Negative   Ammonia    Collection Time: 07/28/25 11:43 AM    Specimen: Blood   Result Value Ref Range    Ammonia 38 16 - 60 umol/L   ECG 12 Lead Bradycardia    Collection Time: 07/28/25 12:33 PM   Result Value Ref Range    QT Interval 490 ms    QTC Interval 418 ms      CT Head Without  Contrast   Final Result       1. Atrophy   2. No parenchymal mass, hemorrhage, or midline shift       This report was finalized on 7/28/2025 9:19 AM by Dr. Ralph Kohli MD.          XR Chest 1 View   Final Result   No radiographic evidence of acute cardiac or pulmonary disease.               This report was finalized on 7/28/2025 9:28 AM by Dr. Ralph Kohli MD.               ED Course  ED Course as of 07/28/25 1252   Mon Jul 28, 2025   0858 ECG 12 Lead Other; Weakness  Normal sinus rhythm with PVCs, rate 62, QTc 466, no acute ST or T wave changes [CW]   1111 Case d/w Dr. Farr, will admit to hospitalist service. [MB]      ED Course User Index  [CW] Nathan Bai DO  [MB] Eva Fisher APRN                                                       Medical Decision Making  Patient is a 75 year old male with PMH significant for arthritis, depression, anxiety, hypertension, hyperlipidemia and hx of prostate cancer. He presents to the ED for generalized weakness and confusion. He reports he was lightheaded this morning and had a fall. Due to his confusion he is a poor historian. Patient's sister at bedside report he lives home alone and does all of his ADL's independently at baseline. She reports she believes he was diagnosed with dementia at some point. Denies any other complaints.      --Admitted to hospitalist service.    Amount and/or Complexity of Data Reviewed  Labs: ordered.  Radiology: ordered.  ECG/medicine tests: ordered. Decision-making details documented in ED Course.    Risk  Prescription drug management.  Decision regarding hospitalization.        Final diagnoses:   Altered mental status, unspecified altered mental status type       ED Disposition  ED Disposition       ED Disposition   Decision to Admit    Condition   --    Comment   Level of Care: Progressive Care [20]   Diagnosis: AMS (altered mental status) [8009931]   Certification: I Certify That Inpatient Hospital Services Are Medically  Necessary For Greater Than 2 Midnights                 No follow-up provider specified.       Medication List      No changes were made to your prescriptions during this visit.            Eva Fisher, APRN  07/28/25 2704

## 2025-07-29 ENCOUNTER — TELEPHONE (OUTPATIENT)
Dept: FAMILY MEDICINE CLINIC | Facility: CLINIC | Age: 75
End: 2025-07-29
Payer: MEDICARE

## 2025-07-29 LAB
POTASSIUM SERPL-SCNC: 3.6 MMOL/L (ref 3.5–5.2)
RPR SER QL: NORMAL

## 2025-07-29 PROCEDURE — 99232 SBSQ HOSP IP/OBS MODERATE 35: CPT

## 2025-07-29 PROCEDURE — 99222 1ST HOSP IP/OBS MODERATE 55: CPT | Performed by: SPECIALIST

## 2025-07-29 PROCEDURE — 25010000002 ENOXAPARIN PER 10 MG: Performed by: INTERNAL MEDICINE

## 2025-07-29 RX ADMIN — Medication 10 ML: at 09:44

## 2025-07-29 RX ADMIN — HYDRALAZINE HYDROCHLORIDE 50 MG: 50 TABLET ORAL at 20:31

## 2025-07-29 RX ADMIN — SENNOSIDES, DOCUSATE SODIUM 2 TABLET: 50; 8.6 TABLET, FILM COATED ORAL at 09:44

## 2025-07-29 RX ADMIN — CHLORHEXIDINE GLUCONATE 5 ML: 1.2 RINSE ORAL at 09:49

## 2025-07-29 RX ADMIN — FLUTICASONE PROPIONATE 2 SPRAY: 50 SPRAY, METERED NASAL at 09:44

## 2025-07-29 RX ADMIN — CHLORHEXIDINE GLUCONATE 5 ML: 1.2 RINSE ORAL at 20:31

## 2025-07-29 RX ADMIN — PANTOPRAZOLE SODIUM 40 MG: 40 TABLET, DELAYED RELEASE ORAL at 09:42

## 2025-07-29 RX ADMIN — SENNOSIDES, DOCUSATE SODIUM 2 TABLET: 50; 8.6 TABLET, FILM COATED ORAL at 20:30

## 2025-07-29 RX ADMIN — ENOXAPARIN SODIUM 40 MG: 100 INJECTION SUBCUTANEOUS at 09:42

## 2025-07-29 RX ADMIN — AMLODIPINE BESYLATE 10 MG: 10 TABLET ORAL at 09:42

## 2025-07-29 RX ADMIN — SPIRONOLACTONE 25 MG: 25 TABLET ORAL at 09:44

## 2025-07-29 RX ADMIN — QUETIAPINE 25 MG: 25 TABLET ORAL at 20:30

## 2025-07-29 RX ADMIN — MONTELUKAST 10 MG: 10 TABLET, FILM COATED ORAL at 20:30

## 2025-07-29 RX ADMIN — HYDRALAZINE HYDROCHLORIDE 50 MG: 50 TABLET ORAL at 09:42

## 2025-07-29 RX ADMIN — Medication 10 ML: at 20:33

## 2025-07-29 NOTE — PAYOR COMM NOTE
"CONTACT:  MAYRA PARMAR, RN  UTILIZATION MANAGEMENT DEPT.   [facility]    [address] [facility], [address]   PHONE:  [phone]   FAX: [phone]   NPI [id number]        INPATIENT AUTH REQUEST        Carla Zamora (75 y.o. Male)       Date of Birth   1950    Social Security Number       Address   [address] [facility] [address]    Home Phone   [phone]    MRN   [id number]       [facility]    Marital Status   Unknown                            Admission Date   7/28/2025    Admission Type   Emergency    Admitting Provider   Onesimo Farr MD    Attending Provider   Onesimo Farr MD    Department, Room/Bed   [facility] PROGRESS CARE, P220/1P       Discharge Date       Discharge Disposition       Discharge Destination                                 Attending Provider: Onesimo Farr MD    Allergies: Crestor [Rosuvastatin Calcium], Amoxicillin    Isolation: None   Infection: None   Code Status: CPR    Ht: 177.8 cm (70\")   Wt: 70 kg (154 lb 5.2 oz)    Admission Cmt: None   Principal Problem: AMS (altered mental status) [R41.82]                   Active Insurance as of 7/28/2025       Primary Coverage       Payor Plan Insurance Group Employer/Plan Group    [facility] MEDICARE REPLACEMENT ANTH MEDICARE ADVANTAGE HMO [id number]       Payor Plan Address Payor Plan Phone Number Payor Plan Fax Number Effective Dates    [address] [phone]  1/1/2025 - None Entered    [facility] [id number]         Subscriber Name Subscriber Birth Date Member ID       CARLA ZAMORA 1950 [id number]                     Emergency Contacts        (Rel.) Home Phone Work Phone Mobile Phone    Brenda Tam (Daughter) [phone] -- [phone]    Janee Rubio (Sister) -- -- [phone]                 History & Physical        Onesimo Farr MD at 07/28/25 1139               [facility] Medicine " Services  HISTORY & PHYSICAL    Patient Identification:  Name:  Alexander Zamora  Age:  75 y.o.  Sex:  male  :  1950  MRN:  [id number]   Visit Number:  [id number]  Admit Date: 2025   Primary Care Physician:  Bhavna Vaughn MD     Subjective     Chief complaint:   Chief Complaint   Patient presents with    Weakness - Generalized     History of presenting illness:   Patient is a 75 y.o. male with past medical history significant for prostate cancer s/p radical prostatectomy, hypertension, depression, PUD, insomnia, mild dementia that presented to the [facility] emergency department for evaluation of altered mental status.    Patient examined at bedside in ED. Sister at bedside.  Patient reports he woke up this morning and went to get out of bed when he suddenly became dizzy and fell onto the floor.  Denies any injury or hitting his head.  Denies any chest pain, palpitations.  Denies any headache, fever, chills, neck pain.  Denies any nausea, vomiting, dysuria.  He does report that he started taking something new to help him sleep within the past few nights.  Could not be more specific on what this was but said he gets medication mailed to him.  He was oriented to person, and time, able to tell me the exact date.  Was not oriented to place.  Patient's sister states that he still does not seem like himself.  She states whenever she saw him  he seemed slowed and not quite like himself at that time as well.    Upon arrival to the ED, vitals were temperature 98.2, HR 65, RR 16, /69, SpO2 100% on room air.  Workup in the ED overall unremarkable, no significant lab abnormalities.  Chest x-ray with no abnormalities.  CT head shows atrophy with no acute changes.    Patient has been admitted to the PCU.   ---------------------------------------------------------------------------------------------------------------------   Review of Systems   Constitutional:  Negative for chills and  fatigue.   Respiratory:  Negative for cough and shortness of breath.    Cardiovascular:  Negative for chest pain, palpitations and leg swelling.   Gastrointestinal:  Negative for abdominal pain, diarrhea, nausea and vomiting.   Genitourinary:  Negative for difficulty urinating and dysuria.   Musculoskeletal:  Negative for neck pain and neck stiffness.   Skin:  Negative for rash and wound.   Neurological:  Positive for dizziness and light-headedness. Negative for headaches.   Psychiatric/Behavioral:  Positive for confusion. Negative for agitation.       ---------------------------------------------------------------------------------------------------------------------   Past Medical History:   Diagnosis Date    Arthritis     Bleeding ulcer     Cancer 2015    Prostate    Chronic back pain     Collapsed lung     Depression     Elevated prostate specific antigen (PSA)     Erectile dysfunction     Hypercholesterolemia     Primary hypertension     Prostate cancer     Prostatitis      Past Surgical History:   Procedure Laterality Date    OTHER SURGICAL HISTORY      surgery for an ulcer, PNEUMOTHORAX AND ULCER THERAPY     Family History   Problem Relation Age of Onset    Hypertension Mother     Bradycardia Mother         Had pacemaker    Heart attack Mother     Cancer Father         Throat    Nephrolithiasis Father     Parkinsonism Father     Diabetes Sister     Heart disease Sister     COPD Sister     Heart disease Sister     Heart attack Sister     Rheum arthritis Sister     Cancer Sister         Stomach    Cancer Sister         Lung    Cancer Brother         Kidney    Hyperlipidemia Brother     Prostatitis Brother     Diabetes Other      Social History     Socioeconomic History    Marital status: Unknown   Tobacco Use    Smoking status: Former     Current packs/day: 0.00     Average packs/day: 2.0 packs/day for 5.0 years (10.0 ttl pk-yrs)     Types: Cigarettes     Start date: 1965     Quit date: 1970     Years since  quittin.6    Smokeless tobacco: Never   Vaping Use    Vaping status: Never Used   Substance and Sexual Activity    Alcohol use: No    Drug use: No    Sexual activity: Defer     ---------------------------------------------------------------------------------------------------------------------   Allergies:  Crestor [rosuvastatin calcium] and Amoxicillin  ---------------------------------------------------------------------------------------------------------------------   Medications below are reported home medications pulling from within the system; at this time, these medications have not been reconciled unless otherwise specified and are in the verification process for further verifcation as current home medications.    Prior to Admission Medications       Prescriptions Last Dose Informant Patient Reported? Taking?    amLODIPine (NORVASC) 10 MG tablet   No No    Take 1 tablet by mouth Daily.    caphosol (SALIVA SUBSTITUTE) solution   No No    Take 30 mL by mouth As Needed (mucositis).    cetirizine (zyrTEC) 10 MG tablet   No No    Take 1 tablet by mouth Daily.    cevimeline (EVOXAC) 30 MG capsule   No No    Take 1 capsule by mouth 3 (Three) Times a Day.    chlorhexidine (PERIDEX) 0.12 % solution   No No    Apply 5 mL to the mouth or throat 2 (Two) Times a Day.    Cholecalciferol 25 MCG (1000 UT) tablet   Yes No    Take 1 tablet by mouth Daily.    donepezil (ARICEPT) 10 MG tablet   No No    Take 1 tablet by mouth Every Night.    ezetimibe (ZETIA) 10 MG tablet   No No    Take 1 tablet by mouth Daily.    fluticasone (FLONASE) 50 MCG/ACT nasal spray   No No    Administer 2 sprays into the nostril(s) as directed by provider Daily.    hydrALAZINE (APRESOLINE) 25 MG tablet   Yes No    Take 1 tablet by mouth Daily.    hydrALAZINE (APRESOLINE) 50 MG tablet   No No    Take 1 tablet by mouth 2 (Two) Times a Day.    Patient taking differently:  Take 1 tablet by mouth Daily.    ibuprofen (ADVIL,MOTRIN) 600 MG tablet    Yes No    Take 1 tablet by mouth 3 (Three) Times a Day.    Lidocaine Viscous HCl (XYLOCAINE) 2 % solution   Yes No    Take  by mouth As Needed for Mild Pain. Mix 20mL Lidocaine with 20mL of Distilled Water, place into a spray bottle, apply 3 sprays on each side where tonsils were every 1 to 2 hours as needed.    Melatonin 10 MG tablet   No No    Take 1 tablet by mouth Every Night.    methocarbamol (ROBAXIN) 500 MG tablet   No No    Take 1 tablet by mouth 2 (Two) Times a Day.    Misc Natural Products (NEURIVA PO)   Yes No    Take 1 capsule by mouth Daily.    montelukast (SINGULAIR) 10 MG tablet   No No    Take 1 tablet by mouth Every Night.    Multiple Vitamin (MULTI VITAMIN DAILY) tablet   Yes No    Take 1 tablet by mouth Daily.    Omega-3 Fatty Acids (SUPER OMEGA 3 PO)   Yes No    Take 1 capsule by mouth Daily.    pantoprazole (PROTONIX) 40 MG EC tablet   No No    Take 1 tablet by mouth Daily.    spironolactone (ALDACTONE) 25 MG tablet   No No    Take 1 tablet by mouth Daily.    vitamin C (ASCORBIC ACID) 250 MG tablet   Yes No    Take 1 tablet by mouth Daily.    vitamin E 100 UNIT capsule   Yes No    Take 1 capsule by mouth Daily.    Zinc 50 MG capsule   Yes No    Take  by mouth.          ---------------------------------------------------------------------------------------------------------------------    Objective     Hospital Scheduled Meds:  enoxaparin sodium, 40 mg, Subcutaneous, Daily  hydrALAZINE, 10 mg, Intravenous, Once  QUEtiapine, 25 mg, Oral, Nightly  senna-docusate sodium, 2 tablet, Oral, BID  sodium chloride, 10 mL, Intravenous, Q12H           Current listed hospital scheduled medications may not yet reflect those currently placed in orders that are signed and held, awaiting patient's arrival to floor/unit.    ---------------------------------------------------------------------------------------------------------------------   Vital Signs:  Temp:  [98 °F (36.7 °C)-98.2 °F (36.8 °C)] 98 °F (36.7  °C)  Heart Rate:  [43-82] 49  Resp:  [14-16] 14  BP: (149-203)/(64-79) 203/79  Mean Arterial Pressure (Non-Invasive) for the past 24 hrs (Last 3 readings):   Noninvasive MAP (mmHg)   07/28/25 1226 128   07/28/25 1142 112   07/28/25 1120 122     SpO2 Percentage    07/28/25 1200 07/28/25 1206 07/28/25 1209   SpO2: 98% 98% 99%     SpO2:  [96 %-100 %] 99 %  on   ;   Device (Oxygen Therapy): room air    Body mass index is 19.93 kg/m².  Wt Readings from Last 3 Encounters:   07/28/25 63 kg (138 lb 14.2 oz)   07/22/25 65 kg (143 lb 6.4 oz)   06/20/25 65 kg (143 lb 3.2 oz)     ---------------------------------------------------------------------------------------------------------------------   Physical Exam:  Physical Exam  Constitutional:       General: He is not in acute distress.     Appearance: Normal appearance.   HENT:      Head: Normocephalic and atraumatic.      Right Ear: External ear normal.      Left Ear: External ear normal.      Nose: No nasal deformity.      Mouth/Throat:      Lips: Pink.      Mouth: Mucous membranes are moist.   Eyes:      Conjunctiva/sclera: Conjunctivae normal.      Pupils: Pupils are equal, round, and reactive to light.   Cardiovascular:      Rate and Rhythm: Regular rhythm. Bradycardia present.      Pulses:           Dorsalis pedis pulses are 2+ on the right side and 2+ on the left side.      Heart sounds: Normal heart sounds.   Pulmonary:      Effort: Pulmonary effort is normal.      Breath sounds: Normal breath sounds. No wheezing, rhonchi or rales.   Abdominal:      General: Abdomen is flat. Bowel sounds are normal.      Palpations: Abdomen is soft.      Tenderness: There is no guarding or rebound.   Genitourinary:     Comments: No argueta catheter in place   Musculoskeletal:      Cervical back: Neck supple. Normal range of motion.      Right lower leg: No edema.      Left lower leg: No edema.   Skin:     General: Skin is warm and dry.   Neurological:      General: No focal deficit  "present.      Mental Status: He is alert.      Comments: Oriented to person and time not oriented to place   Psychiatric:         Mood and Affect: Mood normal.         Behavior: Behavior normal.       ---------------------------------------------------------------------------------------------------------------------  EKG:        I have personally reviewed the EKG/Telemetry strip  ---------------------------------------------------------------------------------------------------------------------   Results from last 7 days   Lab Units 07/28/25  0955 07/28/25  0855   HSTROP T ng/L 8 9           Results from last 7 days   Lab Units 07/28/25  0855   CRP mg/dL <0.30   WBC 10*3/mm3 8.08   HEMOGLOBIN g/dL 13.2   HEMATOCRIT % 39.0   MCV fL 88.4   MCHC g/dL 33.8   PLATELETS 10*3/mm3 235     Results from last 7 days   Lab Units 07/28/25  0855 07/22/25  1123   SODIUM mmol/L 138 139   POTASSIUM mmol/L 3.6 3.7   MAGNESIUM mg/dL  --  2.1   CHLORIDE mmol/L 101 103   CO2 mmol/L 23.3 28.0   BUN mg/dL 16.8 20.0   CREATININE mg/dL 1.16 1.72*   CALCIUM mg/dL 10.5 10.2   GLUCOSE mg/dL 117* 86   ALBUMIN g/dL 4.5 4.0   BILIRUBIN mg/dL 0.5 0.2   ALK PHOS U/L 103 85   AST (SGOT) U/L 27 20   ALT (SGPT) U/L 23 17   Estimated Creatinine Clearance: 48.3 mL/min (by C-G formula based on SCr of 1.16 mg/dL).  Ammonia   Date Value Ref Range Status   07/28/2025 38 16 - 60 umol/L Final       Glucose   Date/Time Value Ref Range Status   07/28/2025 0844 116 70 - 130 mg/dL Final     Comment:     Serial Number: [id number]:  [id number]     No results found for: \"HGBA1C\"  Lab Results   Component Value Date    TSH 3.230 07/28/2025       No results found for: \"BLOODCX\"  No results found for: \"URINECX\"  No results found for: \"WOUNDCX\"  No results found for: \"STOOLCX\"  No results found for: \"RESPCX\"  No results found for: \"MRSACX\"  Pain Management Panel  More data exists         Latest Ref Rng & Units 7/28/2025 7/22/2025   Pain Management Panel "   Creatinine, Urine mg/dL - 169.9    Amphetamine, Urine Qual Negative Negative  -   Barbiturates Screen, Urine Negative Negative  -   Benzodiazepine Screen, Urine Negative Negative  -   Buprenorphine, Screen, Urine Negative Negative  -   Cocaine Screen, Urine Negative Negative  -   Fentanyl, Urine Negative Negative  -   Methadone Screen , Urine Negative Negative  -   Methamphetamine, Ur Negative Negative  -     I have personally reviewed the above laboratory results.   ---------------------------------------------------------------------------------------------------------------------  Imaging Results (Last 7 Days)       Procedure Component Value Units Date/Time    XR Chest 1 View [[id number]] Collected: 07/28/25 0928     Updated: 07/28/25 0931    Narrative:      XR CHEST 1 VW-     CLINICAL INDICATION: weakness        COMPARISON: 7/29/2015     TECHNIQUE: Single frontal view of the chest.     FINDINGS:     LUNGS: Lungs are adequately aerated.      HEART AND MEDIASTINUM: Heart and mediastinal contours are unremarkable        SKELETON: Bony and soft tissue structures are unremarkable.             Impression:      No radiographic evidence of acute cardiac or pulmonary disease.           This report was finalized on 7/28/2025 9:28 AM by Dr. Ralph Kohli MD.       CT Head Without Contrast [[id number]] Collected: 07/28/25 0914     Updated: 07/28/25 0921    Narrative:      CT HEAD WO CONTRAST-     CLINICAL INDICATION: Weakness, dizziness        COMPARISON: 1/2/2025      TECHNIQUE: Axial images of the brain were obtained with out intravenous  contrast.  Reformatted images were created in the sagittal and coronal  planes.     DOSE:     Radiation dose reduction techniques were utilized per ALARA protocol.  Automated exposure control was initiated through either or [employer] or  [employer] software packages by  protocol.        FINDINGS:    BRAIN:  Unremarkable.  No hemorrhage.  No significant white matter  disease.   No edema.       VENTRICLES:  Unremarkable.  No ventriculomegaly.       BONES/JOINTS:  Unremarkable.  No acute fracture.       SOFT TISSUES:  Unremarkable.       SINUSES:  no air fluid levels       MASTOID AIR CELLS:  Unremarkable as visualized.  No mastoid effusion.          Impression:         1. Atrophy  2. No parenchymal mass, hemorrhage, or midline shift     This report was finalized on 7/28/2025 9:19 AM by Dr. Ralph Kohli MD.             I have personally reviewed the above radiology results.     Last Echocardiogram:  Results for orders placed during the hospital encounter of 01/05/23    Adult Stress Echo W/ Cont or Stress Agent if Necessary Per Protocol 01/11/2023  6:26 PM    Interpretation Summary    Echocardiogram Findings:    Normal left ventricular cavity size and wall thickness noted. All left ventricular wall segments contract normally.    Left ventricular ejection fraction appears to be 61 - 65%.    Left ventricular diastolic function is consistent with (grade I) impaired relaxation.    The mitral valve is structurally normal with no significant stenosis present. Mild mitral valve regurgitation is present.    The aortic valve is structurally normal with no regurgitation or stenosis present.    There is no evidence of pericardial effusion. .    Stress Procedure    A stress test was performed following the Humberto protocol.    Exercise duration (min) 8 min Estimated workload 10.1 METS    Baseline Vitals Baseline HR 53 bpm Baseline /66 mmHg Peak Stress Vitals Peak  bpm Peak /60 mmHg Recovery Vitals Recovery HR 75 bpm Recovery /71 mmHg Exercise Data Target HR (85%) 125 bpm Max. Pred. HR (100%) 147 bpm Percent Max Pred HR 85.71 %    Arrhythmias during stress: rare PVCs, bigeminy.    Findings consistent with an indeterminate ECG stress test.    Stress Echo Findings    Segment augmentation had an abnormal response to stress. Left ventricular function is normal    The following left  ventricular wall segments are hypokinetic: basal inferolateral, basal inferoseptal, mid inferoseptal, basal inferior and basal inferoseptal.    Abnormal stress echo with echocardiographic evidence for myocardial ischemia located in the inferior wall.    ---------------------------------------------------------------------------------------------------------------------    Assessment & Plan      Active Hospital Problems    Diagnosis  POA    **AMS (altered mental status) [R41.43]  Yes     Altered mental status, etiology unclear, POA  Dizziness  Fall  History of dementia  prostate cancer s/p radical prostatectomy  Hypertensive urgency   Insomnia   Sinus bradycardia  Patient presented with complaints of an episode of dizziness, fall, and reported confusion.  Patient does report taking something new for insomnia within the past few days, but could not be more specific.  Of note he is a questionably reliable historian with the current reports of altered mental status.  Overall, etiology of AMS is unclear.  No evidence of infection on workup thus far.  Ammonia normal.  UDS negative.  Ethanol level negative.  Potentially due to a questionably new insomnia medication.  Will review formal med rec when available  Dizziness/fall could be secondary to symptomatic bradycardia, however patient has been sinus bradycardia for quite some time on chart review.  Will continue to monitor on telemetry and monitor for recurrence of symptoms.  Orthostatic vital signs ordered.  Patient without any focal neurological symptoms, CT head negative.  Could consider further neurological workup with MRI if confusion persist  Patient hypertensive when arriving to the floor, IV hydralazine ordered.  Will resume home medications pending med rec.  Hold any that may contribute to bradycardia.  F/E/N: P.o. hydration.  Continue monitor electrolytes.  Regular diet.    ---------------------------------------------------  DVT Prophylaxis: SCDs  Activity: Up  with assistance    The patient is considered to be a high risk patient due to: Altered mental status    INPATIENT status due to the need for care which can only be reasonably provided in an hospital setting such as aggressive/expedited ancillary services and/or consultation services, the necessity for IV medications, close physician monitoring and/or the possible need for procedures.  In such, I feel patient’s risk for adverse outcomes and need for care warrant INPATIENT evaluation and predict the patient’s care encounter to likely last beyond 2 midnights.      Code Status: Full code    Disposition/Discharge planning: Pending clinical course    I have discussed the patient's assessment and plan with Dr. Yordan Hanson PA-C  Hospitalist Service -- [facility]       07/28/25  12:49 EDT    Attending Physician: Onesimo Farr,*        Electronically signed by Onesimo Farr MD at 07/28/25 1509          Emergency Department Notes        Eva Fisher APRN at 07/28/25 1249       Attestation signed by Nathan Bai DO at 07/28/25 1257          SUPERVISE: For this patient encounter, I reviewed the APC's documentation, treatment plan, and medical decision making.  Nathan Bai DO 7/28/2025 12:57 EDT                             Subjective   History of Present Illness  Patient is a 75 year old male with PMH significant for arthritis, depression, anxiety, hypertension, hyperlipidemia and hx of prostate cancer. He presents to the ED for generalized weakness and confusion. He reports he was lightheaded this morning and had a fall. Due to his confusion he is a poor historian. Patient's sister at bedside report he lives home alone and does all of his ADL's independently at baseline. She reports she believes he was diagnosed with dementia at some point. Denies any other complaints.        Review of Systems   Constitutional:  Positive for fatigue. Negative for fever.   HENT:  Negative.     Eyes: Negative.    Respiratory: Negative.     Cardiovascular: Negative.  Negative for chest pain.   Gastrointestinal: Negative.  Negative for abdominal pain.   Endocrine: Negative.    Genitourinary: Negative.  Negative for dysuria.   Musculoskeletal: Negative.    Skin: Negative.    Allergic/Immunologic: Negative.    Neurological:  Positive for weakness.   Hematological: Negative.    Psychiatric/Behavioral: Negative.     All other systems reviewed and are negative.      Past Medical History:   Diagnosis Date    Arthritis     Bleeding ulcer     Cancer 2015    Prostate    Chronic back pain     Collapsed lung     Depression     Elevated prostate specific antigen (PSA)     Erectile dysfunction     Hypercholesterolemia     Primary hypertension     Prostate cancer     Prostatitis        Allergies   Allergen Reactions    Crestor [Rosuvastatin Calcium] Myalgia    Amoxicillin Rash       Past Surgical History:   Procedure Laterality Date    OTHER SURGICAL HISTORY      surgery for an ulcer, PNEUMOTHORAX AND ULCER THERAPY       Family History   Problem Relation Age of Onset    Hypertension Mother     Bradycardia Mother         Had pacemaker    Heart attack Mother     Cancer Father         Throat    Nephrolithiasis Father     Parkinsonism Father     Diabetes Sister     Heart disease Sister     COPD Sister     Heart disease Sister     Heart attack Sister     Rheum arthritis Sister     Cancer Sister         Stomach    Cancer Sister         Lung    Cancer Brother         Kidney    Hyperlipidemia Brother     Prostatitis Brother     Diabetes Other        Social History     Socioeconomic History    Marital status: Unknown   Tobacco Use    Smoking status: Former     Current packs/day: 0.00     Average packs/day: 2.0 packs/day for 5.0 years (10.0 ttl pk-yrs)     Types: Cigarettes     Start date:      Quit date: 1970     Years since quittin.6    Smokeless tobacco: Never   Vaping Use    Vaping status: Never Used    Substance and Sexual Activity    Alcohol use: No    Drug use: No    Sexual activity: Defer           Objective   Physical Exam  Vitals and nursing note reviewed.   Constitutional:       General: He is not in acute distress.     Appearance: He is well-developed. He is not diaphoretic.   HENT:      Head: Normocephalic and atraumatic.      Right Ear: External ear normal.      Left Ear: External ear normal.      Nose: Nose normal.      Mouth/Throat:      Mouth: Mucous membranes are moist.      Pharynx: Oropharynx is clear.   Eyes:      Conjunctiva/sclera: Conjunctivae normal.      Pupils: Pupils are equal, round, and reactive to light.   Neck:      Vascular: No JVD.      Trachea: No tracheal deviation.   Cardiovascular:      Rate and Rhythm: Normal rate and regular rhythm.      Heart sounds: Normal heart sounds. No murmur heard.  Pulmonary:      Effort: Pulmonary effort is normal. No respiratory distress.      Breath sounds: Normal breath sounds. No wheezing.   Abdominal:      General: Bowel sounds are normal.      Palpations: Abdomen is soft.      Tenderness: There is no abdominal tenderness.   Musculoskeletal:         General: No deformity. Normal range of motion.      Cervical back: Normal range of motion and neck supple.   Skin:     General: Skin is warm and dry.      Capillary Refill: Capillary refill takes less than 2 seconds.      Coloration: Skin is not pale.      Findings: No erythema or rash.   Neurological:      General: No focal deficit present.      Mental Status: He is alert. He is disoriented.      Cranial Nerves: No cranial nerve deficit.   Psychiatric:         Mood and Affect: Mood normal.         Behavior: Behavior normal.         Thought Content: Thought content normal.         Judgment: Judgment normal.         Procedures      Results for orders placed or performed during the hospital encounter of 07/28/25   POC Glucose Once    Collection Time: 07/28/25  8:44 AM    Specimen: Blood   Result Value  Ref Range    Glucose 116 70 - 130 mg/dL   Comprehensive Metabolic Panel    Collection Time: 07/28/25  8:55 AM    Specimen: Blood   Result Value Ref Range    Glucose 117 (H) 65 - 99 mg/dL    BUN 16.8 8.0 - 23.0 mg/dL    Creatinine 1.16 0.76 - 1.27 mg/dL    Sodium 138 136 - 145 mmol/L    Potassium 3.6 3.5 - 5.2 mmol/L    Chloride 101 98 - 107 mmol/L    CO2 23.3 22.0 - 29.0 mmol/L    Calcium 10.5 8.6 - 10.5 mg/dL    Total Protein 7.4 6.0 - 8.5 g/dL    Albumin 4.5 3.5 - 5.2 g/dL    ALT (SGPT) 23 1 - 41 U/L    AST (SGOT) 27 1 - 40 U/L    Alkaline Phosphatase 103 39 - 117 U/L    Total Bilirubin 0.5 0.0 - 1.2 mg/dL    Globulin 2.9 gm/dL    A/G Ratio 1.6 g/dL    BUN/Creatinine Ratio 14.5 7.0 - 25.0    Anion Gap 13.7 5.0 - 15.0 mmol/L    eGFR 65.3 >60.0 mL/min/1.73   High Sensitivity Troponin T    Collection Time: 07/28/25  8:55 AM    Specimen: Blood   Result Value Ref Range    HS Troponin T 9 <22 ng/L   C-reactive Protein    Collection Time: 07/28/25  8:55 AM    Specimen: Blood   Result Value Ref Range    C-Reactive Protein <0.30 0.00 - 0.50 mg/dL   CBC Auto Differential    Collection Time: 07/28/25  8:55 AM    Specimen: Blood   Result Value Ref Range    WBC 8.08 3.40 - 10.80 10*3/mm3    RBC 4.41 4.14 - 5.80 10*6/mm3    Hemoglobin 13.2 13.0 - 17.7 g/dL    Hematocrit 39.0 37.5 - 51.0 %    MCV 88.4 79.0 - 97.0 fL    MCH 29.9 26.6 - 33.0 pg    MCHC 33.8 31.5 - 35.7 g/dL    RDW 12.3 12.3 - 15.4 %    RDW-SD 39.8 37.0 - 54.0 fl    MPV 11.1 6.0 - 12.0 fL    Platelets 235 140 - 450 10*3/mm3   Manual Differential    Collection Time: 07/28/25  8:55 AM    Specimen: Blood   Result Value Ref Range    Neutrophil % 71.0 42.7 - 76.0 %    Lymphocyte % 27.0 19.6 - 45.3 %    Monocyte % 1.0 (L) 5.0 - 12.0 %    Basophil % 1.0 0.0 - 1.5 %    Neutrophils Absolute 5.74 1.70 - 7.00 10*3/mm3    Lymphocytes Absolute 2.18 0.70 - 3.10 10*3/mm3    Monocytes Absolute 0.08 (L) 0.10 - 0.90 10*3/mm3    Basophils Absolute 0.08 0.00 - 0.20 10*3/mm3     Anisocytosis Slight/1+ None Seen    Platelet Morphology Normal Normal   Green Top (Gel)    Collection Time: 07/28/25  8:55 AM   Result Value Ref Range    Extra Tube Hold for add-ons.    Lavender Top    Collection Time: 07/28/25  8:55 AM   Result Value Ref Range    Extra Tube hold for add-on    Gold Top - SST    Collection Time: 07/28/25  8:55 AM   Result Value Ref Range    Extra Tube Hold for add-ons.    Light Blue Top    Collection Time: 07/28/25  8:55 AM   Result Value Ref Range    Extra Tube Hold for add-ons.    ECG 12 Lead Other; Weakness    Collection Time: 07/28/25  8:55 AM   Result Value Ref Range    QT Interval 460 ms    QTC Interval 466 ms   High Sensitivity Troponin T 1Hr    Collection Time: 07/28/25  9:55 AM    Specimen: Blood   Result Value Ref Range    HS Troponin T 8 <22 ng/L    Troponin T Numeric Delta -1 Abnormal if >/=3 ng/L   TSH    Collection Time: 07/28/25  9:55 AM    Specimen: Blood   Result Value Ref Range    TSH 3.230 0.270 - 4.200 uIU/mL   Ethanol    Collection Time: 07/28/25  9:55 AM    Specimen: Blood   Result Value Ref Range    Ethanol <10 0 - 10 mg/dL    Ethanol % <0.010 %   Urinalysis With Microscopic If Indicated (No Culture) - Urine, Clean Catch    Collection Time: 07/28/25  9:58 AM    Specimen: Urine, Clean Catch   Result Value Ref Range    Color, UA Yellow Yellow, Straw    Appearance, UA Clear Clear    pH, UA 7.5 5.0 - 8.0    Specific Gravity, UA 1.008 1.005 - 1.030    Glucose, UA Negative Negative    Ketones, UA Trace (A) Negative    Bilirubin, UA Negative Negative    Blood, UA Negative Negative    Protein, UA 30 mg/dL (1+) (A) Negative    Leuk Esterase, UA Negative Negative    Nitrite, UA Negative Negative    Urobilinogen, UA 0.2 E.U./dL 0.2 - 1.0 E.U./dL   Urinalysis, Microscopic Only - Urine, Clean Catch    Collection Time: 07/28/25  9:58 AM    Specimen: Urine, Clean Catch   Result Value Ref Range    RBC, UA 0-2 None Seen, 0-2 /HPF    WBC, UA 0-2 None Seen, 0-2 /HPF    Bacteria,  UA None Seen None Seen /HPF    Squamous Epithelial Cells, UA 0-2 None Seen, 0-2 /HPF    Hyaline Casts, UA None Seen None Seen /LPF    Methodology Automated Microscopy    Urine Drug Screen - Urine, Clean Catch    Collection Time: 07/28/25  9:58 AM    Specimen: Urine, Clean Catch   Result Value Ref Range    THC, Screen, Urine Negative Negative    Phencyclidine (PCP), Urine Negative Negative    Cocaine Screen, Urine Negative Negative    Methamphetamine, Ur Negative Negative    Opiate Screen Negative Negative    Amphetamine Screen, Urine Negative Negative    Benzodiazepine Screen, Urine Negative Negative    Tricyclic Antidepressants Screen Negative Negative    Methadone Screen, Urine Negative Negative    Barbiturates Screen, Urine Negative Negative    Oxycodone Screen, Urine Negative Negative    Buprenorphine, Screen, Urine Negative Negative   Fentanyl, Urine - Urine, Clean Catch    Collection Time: 07/28/25  9:58 AM    Specimen: Urine, Clean Catch   Result Value Ref Range    Fentanyl, Urine Negative Negative   Ammonia    Collection Time: 07/28/25 11:43 AM    Specimen: Blood   Result Value Ref Range    Ammonia 38 16 - 60 umol/L   ECG 12 Lead Bradycardia    Collection Time: 07/28/25 12:33 PM   Result Value Ref Range    QT Interval 490 ms    QTC Interval 418 ms      CT Head Without Contrast   Final Result       1. Atrophy   2. No parenchymal mass, hemorrhage, or midline shift       This report was finalized on 7/28/2025 9:19 AM by Dr. Ralph Kohli MD.          XR Chest 1 View   Final Result   No radiographic evidence of acute cardiac or pulmonary disease.               This report was finalized on 7/28/2025 9:28 AM by Dr. Ralph Kohli MD.               ED Course  ED Course as of 07/28/25 1252   Mon Jul 28, 2025   0858 ECG 12 Lead Other; Weakness  Normal sinus rhythm with PVCs, rate 62, QTc 466, no acute ST or T wave changes [CW]   1111 Case d/w Dr. Farr, will admit to hospitalist service. [MB]      ED Course User  Index  [CW] Nathan Bai DO  [MB] Eva Fisher APRN                                                       Medical Decision Making  Patient is a 75 year old male with PMH significant for arthritis, depression, anxiety, hypertension, hyperlipidemia and hx of prostate cancer. He presents to the ED for generalized weakness and confusion. He reports he was lightheaded this morning and had a fall. Due to his confusion he is a poor historian. Patient's sister at bedside report he lives home alone and does all of his ADL's independently at baseline. She reports she believes he was diagnosed with dementia at some point. Denies any other complaints.      --Admitted to hospitalist service.    Amount and/or Complexity of Data Reviewed  Labs: ordered.  Radiology: ordered.  ECG/medicine tests: ordered. Decision-making details documented in ED Course.    Risk  Prescription drug management.  Decision regarding hospitalization.        Final diagnoses:   Altered mental status, unspecified altered mental status type       ED Disposition  ED Disposition       ED Disposition   Decision to Admit    Condition   --    Comment   Level of Care: Progressive Care [20]   Diagnosis: AMS (altered mental status) [[id number]]   Certification: I Certify That Inpatient Hospital Services Are Medically Necessary For Greater Than 2 Midnights                 No follow-up provider specified.       Medication List      No changes were made to your prescriptions during this visit.            Eva Fisher APRN  07/28/25 1252      Electronically signed by Nathan Bai DO at 07/28/25 1257       Jyothi Osman at 07/28/25 0857          PT REFUSED VIRAL RESP SWAB     Electronically signed by Jyothi Osman at 07/28/25 0858       Facility-Administered Medications as of 7/29/2025   Medication Dose Route Frequency Provider Last Rate Last Admin    acetaminophen (TYLENOL) tablet 650 mg  650 mg Oral Q4H PRN Onesimo Farr MD         Or    acetaminophen (TYLENOL) 160 MG/5ML oral solution 650 mg  650 mg Oral Q4H PRN Onesimo Farr MD        Or    acetaminophen (TYLENOL) suppository 650 mg  650 mg Rectal Q4H PRN Onesimo Farr MD        amLODIPine (NORVASC) tablet 10 mg  10 mg Oral Daily Onesimo Farr MD   10 mg at 07/29/25 0942    sennosides-docusate (PERICOLACE) 8.6-50 MG per tablet 2 tablet  2 tablet Oral BID Onesimo Farr MD   2 tablet at 07/29/25 0944    And    polyethylene glycol (MIRALAX) packet 17 g  17 g Oral Daily PRN Onesimo Farr MD        And    bisacodyl (DULCOLAX) EC tablet 5 mg  5 mg Oral Daily PRN Onesimo Farr MD        And    bisacodyl (DULCOLAX) suppository 10 mg  10 mg Rectal Daily PRN Onesimo Farr MD        Calcium Replacement - Follow Nurse / BPA Driven Protocol   Not Applicable PRN Onesimo Farr MD        chlorhexidine (PERIDEX) 0.12 % solution 5 mL  5 mL Mouth/Throat BID Onesimo Farr MD   5 mL at 07/29/25 0949    donepezil (ARICEPT) tablet 10 mg  10 mg Oral Nightly Onesimo Farr MD   10 mg at 07/28/25 2034    enoxaparin sodium (LOVENOX) syringe 40 mg  40 mg Subcutaneous Daily Onesimo Farr MD   40 mg at 07/29/25 0942    fluticasone (FLONASE) 50 MCG/ACT nasal spray 2 spray  2 spray Nasal Daily Onesimo Farr MD   2 spray at 07/29/25 0944    [COMPLETED] hydrALAZINE (APRESOLINE) injection 10 mg  10 mg Intravenous Once Susie Hanson PA-C   10 mg at 07/28/25 1304    hydrALAZINE (APRESOLINE) tablet 50 mg  50 mg Oral BID Onesimo Farr MD   50 mg at 07/29/25 0942    [COMPLETED] LORazepam (ATIVAN) injection 0.5 mg  0.5 mg Intravenous Once Eva Fisher APRN   0.5 mg at 07/28/25 0954    Magnesium Standard Dose Replacement - Follow Nurse / BPA Driven Protocol   Not Applicable Onesimo Vega MD        montelukast (SINGULAIR) tablet 10 mg  10 mg Oral Nightly Yordan,  Onesimo Machuca MD   10 mg at 07/28/25 2034    nitroglycerin (NITROSTAT) SL tablet 0.4 mg  0.4 mg Sublingual Q5 Min PRN Onesimo Farr MD        pantoprazole (PROTONIX) EC tablet 40 mg  40 mg Oral QAM AC Onesimo Farr MD   40 mg at 07/29/25 0942    Phosphorus Replacement - Follow Nurse / BPA Driven Protocol   Not Applicable Onesimo Vega MD        [COMPLETED] potassium chloride (KLOR-CON M20) CR tablet 40 mEq  40 mEq Oral Q4H Onesimo Farr MD   40 mEq at 07/28/25 2034    Potassium Replacement - Follow Nurse / BPA Driven Protocol   Not Applicable Onesimo Vega MD        QUEtiapine (SEROquel) tablet 25 mg  25 mg Oral Nightly Onesimo Farr MD   25 mg at 07/28/25 2034    [COMPLETED] sodium chloride 0.9 % bolus 500 mL  500 mL Intravenous Once Eva Fisher APRN   Stopped at 07/28/25 1005    sodium chloride 0.9 % flush 10 mL  10 mL Intravenous PRN Onesimo Farr MD        sodium chloride 0.9 % flush 10 mL  10 mL Intravenous Q12H Onesimo Farr MD   10 mL at 07/29/25 0944    sodium chloride 0.9 % flush 10 mL  10 mL Intravenous PRN Onesimo Farr MD        sodium chloride 0.9 % infusion 40 mL  40 mL Intravenous PRN Onesimo Farr MD        spironolactone (ALDACTONE) tablet 25 mg  25 mg Oral Daily Onesimo Farr MD   25 mg at 07/29/25 0944    ziprasidone (GEODON) 10 mg in sterile water (preservative free) 0.5 mL injection  10 mg Intramuscular Q4H PRN Onesimo Farr MD         Orders (all)        Start     Ordered    07/29/25 0921  Case Management  Consult  Once        Provider:  (Not yet assigned)    07/29/25 0920    07/29/25 0836  PT Consult: Eval & Treat As Tolerated; Functional Mobility Below Baseline  Once         07/29/25 0836    07/29/25 0752  Inpatient Cardiology Consult  Once        Specialty:  Cardiology  Provider:  Doreen Parker MD    07/29/25 0751    07/29/25 0731   Adult Transthoracic Echo Complete W/ Cont if Necessary Per Protocol  Once         07/29/25 0731    07/28/25 2317  Potassium  Timed         07/28/25 1417    07/28/25 2108  Connectors / Hubs Must Be Scrubbed 15 Seconds Using 70% Alcohol Before Access - Allow to Dry Before Accessing Line  Continuous         07/28/25 2107 07/28/25 2100  QUEtiapine (SEROquel) tablet 25 mg  Nightly         07/28/25 1127    07/28/25 2100  chlorhexidine (PERIDEX) 0.12 % solution 5 mL  2 Times Daily         07/28/25 1511 07/28/25 2100  montelukast (SINGULAIR) tablet 10 mg  Nightly         07/28/25 1511    07/28/25 2100  donepezil (ARICEPT) tablet 10 mg  Nightly         07/28/25 1511 07/28/25 2100  hydrALAZINE (APRESOLINE) tablet 50 mg  2 Times Daily         07/28/25 1511    07/28/25 1800  Oral Care  2 Times Daily       07/28/25 1126    07/28/25 1600  pantoprazole (PROTONIX) EC tablet 40 mg  Every Morning Before Breakfast         07/28/25 1511    07/28/25 1600  amLODIPine (NORVASC) tablet 10 mg  Daily         07/28/25 1511    07/28/25 1600  fluticasone (FLONASE) 50 MCG/ACT nasal spray 2 spray  Daily         07/28/25 1511    07/28/25 1600  spironolactone (ALDACTONE) tablet 25 mg  Daily         07/28/25 1511    07/28/25 1500  potassium chloride (KLOR-CON M20) CR tablet 40 mEq  Every 4 Hours         07/28/25 1417    07/28/25 1345  hydrALAZINE (APRESOLINE) injection 10 mg  Once         07/28/25 1249    07/28/25 1321  Orthostatic Blood Pressure  Once         07/28/25 1320    07/28/25 1249  Place Sequential Compression Device  Once         07/28/25 1248    07/28/25 1249  Maintain Sequential Compression Device  Continuous,   Status:  Canceled         07/28/25 1248    07/28/25 1228  ECG 12 Lead Bradycardia  STAT         07/28/25 1228    07/28/25 1225  Telemetry Scan  Once         07/28/25 1225    07/28/25 1200  Vital Signs  Every 4 Hours       07/28/25 1126    07/28/25 1200  Neuro Checks  Every 4 Hours       07/28/25 1126    07/28/25 1200   "Strict Intake & Output  Every Hour       07/28/25 1126    07/28/25 1157  Ethanol  Once         07/28/25 1156    07/28/25 1142  sodium chloride 0.9 % flush 10 mL  Every 12 Hours Scheduled         07/28/25 1126    07/28/25 1142  enoxaparin sodium (LOVENOX) syringe 40 mg  Daily         07/28/25 1126    07/28/25 1142  sennosides-docusate (PERICOLACE) 8.6-50 MG per tablet 2 tablet  2 Times Daily        Placed in \"And\" Linked Group    07/28/25 1126    07/28/25 1127  Daily Weights  Daily       07/28/25 1126    07/28/25 1127  Ammonia  STAT         07/28/25 1126    07/28/25 1127  Fall Precautions  Continuous         07/28/25 1126    07/28/25 1126  ziprasidone (GEODON) 10 mg in sterile water (preservative free) 0.5 mL injection  Every 4 Hours PRN         07/28/25 1126    07/28/25 1126  polyethylene glycol (MIRALAX) packet 17 g  Daily PRN        Placed in \"And\" Linked Group    07/28/25 1126    07/28/25 1126  bisacodyl (DULCOLAX) EC tablet 5 mg  Daily PRN        Placed in \"And\" Linked Group    07/28/25 1126    07/28/25 1126  bisacodyl (DULCOLAX) suppository 10 mg  Daily PRN        Placed in \"And\" Linked Group    07/28/25 1126    07/28/25 1126  acetaminophen (TYLENOL) tablet 650 mg  Every 4 Hours PRN        Placed in \"Or\" Linked Group    07/28/25 1126    07/28/25 1126  acetaminophen (TYLENOL) 160 MG/5ML oral solution 650 mg  Every 4 Hours PRN        Placed in \"Or\" Linked Group    07/28/25 1126    07/28/25 1126  acetaminophen (TYLENOL) suppository 650 mg  Every 4 Hours PRN        Placed in \"Or\" Linked Group    07/28/25 1126    07/28/25 1126  Continuous Cardiac Monitoring  Continuous        Comments: Follow Standing Orders As Outlined in Process Instructions (Open Order Report to View Full Instructions)    07/28/25 1126    07/28/25 1126  Maintain IV Access  Continuous         07/28/25 1126    07/28/25 1126  Telemetry - Place Orders & Notify Provider of Results When Patient Experiences Acute Chest Pain, Dysrhythmia or Respiratory " Distress  Continuous        Comments: Open Order Report to View Parameters Requiring Provider Notification    07/28/25 1126    07/28/25 1126  May Be Off Telemetry for Tests  Continuous         07/28/25 1126    07/28/25 1126  Continuous Pulse Oximetry  Continuous         07/28/25 1126    07/28/25 1126  Ambulate Patient  Every Shift       07/28/25 1126    07/28/25 1126  Diet: Regular/House; Fluid Consistency: Thin (IDDSI 0)  Diet Effective Now         07/28/25 1126    07/28/25 1125  Potassium Replacement - Follow Nurse / BPA Driven Protocol  As Needed         07/28/25 1126    07/28/25 1125  Magnesium Standard Dose Replacement - Follow Nurse / BPA Driven Protocol  As Needed         07/28/25 1126    07/28/25 1125  Phosphorus Replacement - Follow Nurse / BPA Driven Protocol  As Needed         07/28/25 1126    07/28/25 1125  Calcium Replacement - Follow Nurse / BPA Driven Protocol  As Needed         07/28/25 1126    07/28/25 1125  nitroglycerin (NITROSTAT) SL tablet 0.4 mg  Every 5 Minutes PRN         07/28/25 1126    07/28/25 1125  Intake & Output  Every Shift       07/28/25 1126    07/28/25 1125  Weigh Patient  Once         07/28/25 1126    07/28/25 1125  Insert Peripheral IV  Once         07/28/25 1126    07/28/25 1125  Saline Lock & Maintain IV Access  Continuous,   Status:  Canceled         07/28/25 1126    07/28/25 1125  Inpatient Admission  Once         07/28/25 1126    07/28/25 1125  Code Status and Medical Interventions: CPR (Attempt to Resuscitate); Full Support  Continuous         07/28/25 1126    07/28/25 1124  sodium chloride 0.9 % flush 10 mL  As Needed         07/28/25 1126    07/28/25 1124  sodium chloride 0.9 % infusion 40 mL  As Needed         07/28/25 1126    07/28/25 1104  Fentanyl, Urine - Urine, Clean Catch  Once         07/28/25 1103    07/28/25 1101  Vitamin B12  Once         07/28/25 1100    07/28/25 1101  Folate  Once         07/28/25 1100    07/28/25 1101  RPR Qualitative with Reflex to Quant   STAT         07/28/25 1100    07/28/25 1100  Urine Drug Screen - Urine, Clean Catch  STAT         07/28/25 1059    07/28/25 1059  TSH  STAT         07/28/25 1059    07/28/25 1007  LORazepam (ATIVAN) injection 0.5 mg  Once         07/28/25 0951    07/28/25 1005  Urinalysis, Microscopic Only - Urine, Clean Catch  Once         07/28/25 1005    07/28/25 0955  High Sensitivity Troponin T 1Hr  PROCEDURE ONCE         07/28/25 0921    07/28/25 0939  sodium chloride 0.9 % bolus 500 mL  Once         07/28/25 0923    07/28/25 0908  Manual Differential  Once         07/28/25 0907    07/28/25 0903  Scan Slide  Once,   Status:  Canceled         07/28/25 0902    07/28/25 0854  CT Head Without Contrast  1 Time Imaging         07/28/25 0853    07/28/25 0848  CBC & Differential  Once         07/28/25 0847    07/28/25 0848  Comprehensive Metabolic Panel  Once         07/28/25 0847    07/28/25 0848  Respiratory Panel PCR w/COVID-19(SARS-CoV-2) JOAN/TARUN/SABINO/PAD/COR/PORTIA In-House, NP Swab in UTM/VTM, 2 HR TAT - Swab, Nasopharynx  Once,   Status:  Canceled         07/28/25 0847    07/28/25 0848  Urinalysis With Microscopic If Indicated (No Culture) - Urine, Clean Catch  Once         07/28/25 0847    07/28/25 0848  [place] Draw  Once         07/28/25 0847    07/28/25 0848  High Sensitivity Troponin T  Once         07/28/25 0847    07/28/25 0848  C-reactive Protein  Once         07/28/25 0847    07/28/25 0848  ECG 12 Lead Other; Weakness  Once         07/28/25 0847    07/28/25 0848  XR Chest 1 View  1 Time Imaging         07/28/25 0847    07/28/25 0848  Monitor Blood Pressure  Per Hospital Policy/Protocol         07/28/25 0847    07/28/25 0848  Cardiac Monitoring  Continuous,   Status:  Canceled        Comments: Follow Standing Orders As Outlined in Process Instructions (Open Order Report to View Full Instructions)    07/28/25 0847    07/28/25 0848  Continuous Pulse Oximetry  Continuous,   Status:  Canceled         07/28/25 0847    07/28/25  "0848  Insert Peripheral IV  Once        Placed in \"And\" Linked Group    07/28/25 0847    07/28/25 0848  CBC Auto Differential  PROCEDURE ONCE         07/28/25 0847    07/28/25 0848  Green Top (Gel)  PROCEDURE ONCE         07/28/25 0847    07/28/25 0848  Lavender Top  PROCEDURE ONCE         07/28/25 0847    07/28/25 0848  Gold Top - SST  PROCEDURE ONCE         07/28/25 0847    07/28/25 0848  Light Blue Top  PROCEDURE ONCE         07/28/25 0847    07/28/25 0847  sodium chloride 0.9 % flush 10 mL  As Needed        Placed in \"And\" Linked Group    07/28/25 0847    07/28/25 0847  POC Glucose Once  PROCEDURE ONCE        Comments: Complete no more than 45 minutes prior to patient eating      07/28/25 0844    Unscheduled  Oxygen Therapy- Nasal Cannula; Titrate 1-6 LPM Per SpO2; 90 - 95%  Continuous PRN       07/28/25 1126    Unscheduled  Change Dressing to IV Site As Needed When Damp, Loose or Soiled  As Needed       07/28/25 2107    Unscheduled  Change Needleless Connectors  As Needed      Comments: Change Needleless Connectors When:  - Administration Set Changed  - Dressing Changed  - Removed For Any Reason  - Residual Blood or Debris Within Connector  - Prior to Drawing Blood Cultures  - Contamination of Connector  - After Administration of Blood or Blood Components    07/28/25 2107    Pending  amLODIPine (NORVASC) tablet 10 mg  Daily         Pending    Pending  chlorhexidine (PERIDEX) 0.12 % solution 5 mL  2 Times Daily         Pending    Pending  donepezil (ARICEPT) tablet 10 mg  Nightly         Pending    Pending  fluticasone (FLONASE) 50 MCG/ACT nasal spray 2 spray  Daily         Pending    Pending  methocarbamol (ROBAXIN) tablet 500 mg  2 Times Daily         Pending    Pending  montelukast (SINGULAIR) tablet 10 mg  Nightly         Pending    Pending  pantoprazole (PROTONIX) EC tablet 40 mg  Daily         Pending    Pending  spironolactone (ALDACTONE) tablet 25 mg  Daily         Pending    Pending  amLODIPine " (NORVASC) tablet 10 mg  Daily         Pending    Pending  chlorhexidine (PERIDEX) 0.12 % solution 5 mL  2 Times Daily         Pending    Pending  fluticasone (FLONASE) 50 MCG/ACT nasal spray 2 spray  Daily         Pending    Pending  montelukast (SINGULAIR) tablet 10 mg  Nightly         Pending    Pending  pantoprazole (PROTONIX) EC tablet 40 mg  Daily         Pending    Pending  spironolactone (ALDACTONE) tablet 25 mg  Daily         Pending    Pending  donepezil (ARICEPT) tablet 10 mg  Nightly         Pending                     Physician Progress Notes (all)        Susie Hanson PA-C at 25 0750               [facility] Medicine Services  PROGRESS NOTE     Patient Identification:  Name:  Alexander Zamora  Age:  75 y.o.  Sex:  male  :  1950  MRN:  [id number]  Visit Number:  [id number]  Primary Care Provider:  Bhavna Vaughn MD    Length of stay:  1    ----------------------------------------------------------------------------------------------------------------------  Subjective     Chief Complaint:  Follow up for AMS      History of Presenting Illness/Hospital Course:  Patient is a 75 y.o. male  prostate cancer s/p radical prostatectomy, hypertension, depression, PUD, insomnia, mild dementia that presented to the [facility] emergency department for evaluation of altered mental status.     Subjective:  Today, the patient patient examined at bedside on PCU.  Patient looks much improved compared to exam yesterday.  He is alert, awake, oriented, and much more talkative.  He denies any recent medication changes.  Does report an episode of dizziness yesterday morning, but thinks it is because he ate a footlong hot dog the night before.    Present during exam: Daughter  ----------------------------------------------------------------------------------------------------------------------  Objective     Consults:  Cardiology    Current Hospital Meds:  amLODIPine, 10 mg,  Oral, Daily  chlorhexidine, 5 mL, Mouth/Throat, BID  donepezil, 10 mg, Oral, Nightly  enoxaparin sodium, 40 mg, Subcutaneous, Daily  fluticasone, 2 spray, Nasal, Daily  hydrALAZINE, 50 mg, Oral, BID  montelukast, 10 mg, Oral, Nightly  pantoprazole, 40 mg, Oral, QAM AC  QUEtiapine, 25 mg, Oral, Nightly  senna-docusate sodium, 2 tablet, Oral, BID  sodium chloride, 10 mL, Intravenous, Q12H  spironolactone, 25 mg, Oral, Daily         ----------------------------------------------------------------------------------------------------------------------  Vital Signs:  Temp:  [98 °F (36.7 °C)-98.6 °F (37 °C)] 98.5 °F (36.9 °C)  Heart Rate:  [43-82] 52  Resp:  [11-28] 11  BP: (132-203)/(49-83) 151/59  Mean Arterial Pressure (Non-Invasive) for the past 24 hrs (Last 3 readings):   Noninvasive MAP (mmHg)   07/29/25 0600 101   07/29/25 0500 101   07/29/25 0400 101     SpO2 Percentage    07/29/25 0400 07/29/25 0500 07/29/25 0600   SpO2: 97% 97% 96%     SpO2:  [95 %-100 %] 96 %  on   ;   Device (Oxygen Therapy): room air    Body mass index is 22.14 kg/m².  Wt Readings from Last 3 Encounters:   07/29/25 70 kg (154 lb 5.2 oz)   07/22/25 65 kg (143 lb 6.4 oz)   06/20/25 65 kg (143 lb 3.2 oz)        Intake/Output Summary (Last 24 hours) at 7/29/2025 6160  Last data filed at 7/28/2025 1700  Gross per 24 hour   Intake 900 ml   Output --   Net 900 ml     Diet: Regular/House; Fluid Consistency: Thin (IDDSI 0)  ----------------------------------------------------------------------------------------------------------------------  Physical exam:  Physical Exam  Constitutional:       General: He is not in acute distress.     Appearance: Normal appearance.   HENT:      Head: Normocephalic and atraumatic.      Right Ear: External ear normal.      Left Ear: External ear normal.      Nose: No nasal deformity.      Mouth/Throat:      Lips: Pink.      Mouth: Mucous membranes are moist.   Eyes:      Conjunctiva/sclera: Conjunctivae normal.       Pupils: Pupils are equal, round, and reactive to light.   Cardiovascular:      Rate and Rhythm: Regular rhythm. Bradycardia present.      Pulses:           Dorsalis pedis pulses are 2+ on the right side and 2+ on the left side.      Heart sounds: Normal heart sounds.   Pulmonary:      Effort: Pulmonary effort is normal.      Breath sounds: Normal breath sounds. No wheezing, rhonchi or rales.   Abdominal:      General: Abdomen is flat. Bowel sounds are normal.      Palpations: Abdomen is soft.      Tenderness: There is no guarding or rebound.   Genitourinary:     Comments: No argueta catheter in place   Musculoskeletal:      Cervical back: Neck supple. Normal range of motion.      Right lower leg: No edema.      Left lower leg: No edema.   Skin:     General: Skin is warm and dry.   Neurological:      General: No focal deficit present.      Mental Status: He is alert and oriented to person, place, and time.   Psychiatric:         Mood and Affect: Mood normal.         Behavior: Behavior normal.        ----------------------------------------------------------------------------------------------------------------------  Tele:        I have personally reviewed the EKG/Telemetry strips   ----------------------------------------------------------------------------------------------------------------------  Results from last 7 days   Lab Units 07/28/25  0955 07/28/25  0855   HSTROP T ng/L 8 9           Results from last 7 days   Lab Units 07/28/25  0855   CRP mg/dL <0.30   WBC 10*3/mm3 8.08   HEMOGLOBIN g/dL 13.2   HEMATOCRIT % 39.0   MCV fL 88.4   MCHC g/dL 33.8   PLATELETS 10*3/mm3 235     Results from last 7 days   Lab Units 07/28/25  2323 07/28/25  0855 07/22/25  1123   SODIUM mmol/L  --  138 139   POTASSIUM mmol/L 3.6 3.6 3.7   MAGNESIUM mg/dL  --   --  2.1   CHLORIDE mmol/L  --  101 103   CO2 mmol/L  --  23.3 28.0   BUN mg/dL  --  16.8 20.0   CREATININE mg/dL  --  1.16 1.72*   CALCIUM mg/dL  --  10.5 10.2   GLUCOSE  "mg/dL  --  117* 86   ALBUMIN g/dL  --  4.5 4.0   BILIRUBIN mg/dL  --  0.5 0.2   ALK PHOS U/L  --  103 85   AST (SGOT) U/L  --  27 20   ALT (SGPT) U/L  --  23 17   Estimated Creatinine Clearance: 53.6 mL/min (by C-G formula based on SCr of 1.16 mg/dL).  Ammonia   Date Value Ref Range Status   07/28/2025 38 16 - 60 umol/L Final       Glucose   Date/Time Value Ref Range Status   07/28/2025 0844 116 70 - 130 mg/dL Final     Comment:     Serial Number: [id number]:  [id number]     No results found for: \"HGBA1C\"  Lab Results   Component Value Date    TSH 3.230 07/28/2025       No results found for: \"BLOODCX\"  No results found for: \"URINECX\"  No results found for: \"WOUNDCX\"  No results found for: \"STOOLCX\"  No results found for: \"RESPCX\"  Pain Management Panel  More data exists         Latest Ref Rng & Units 7/28/2025 7/22/2025   Pain Management Panel   Creatinine, Urine mg/dL - 169.9    Amphetamine, Urine Qual Negative Negative  -   Barbiturates Screen, Urine Negative Negative  -   Benzodiazepine Screen, Urine Negative Negative  -   Buprenorphine, Screen, Urine Negative Negative  -   Cocaine Screen, Urine Negative Negative  -   Fentanyl, Urine Negative Negative  -   Methadone Screen , Urine Negative Negative  -   Methamphetamine, Ur Negative Negative  -     I have personally reviewed the above laboratory results.   ----------------------------------------------------------------------------------------------------------------------  Imaging Results (Last 24 Hours)       Procedure Component Value Units Date/Time    XR Chest 1 View [[id number]] Collected: 07/28/25 0928     Updated: 07/28/25 0931    Narrative:      XR CHEST 1 VW-     CLINICAL INDICATION: weakness        COMPARISON: 7/29/2015     TECHNIQUE: Single frontal view of the chest.     FINDINGS:     LUNGS: Lungs are adequately aerated.      HEART AND MEDIASTINUM: Heart and mediastinal contours are unremarkable        SKELETON: Bony and soft tissue structures are " unremarkable.             Impression:      No radiographic evidence of acute cardiac or pulmonary disease.           This report was finalized on 7/28/2025 9:28 AM by Dr. Ralph Kohli MD.       CT Head Without Contrast [[id number]] Collected: 07/28/25 0914     Updated: 07/28/25 0921    Narrative:      CT HEAD WO CONTRAST-     CLINICAL INDICATION: Weakness, dizziness        COMPARISON: 1/2/2025      TECHNIQUE: Axial images of the brain were obtained with out intravenous  contrast.  Reformatted images were created in the sagittal and coronal  planes.     DOSE:     Radiation dose reduction techniques were utilized per ALARA protocol.  Automated exposure control was initiated through either or Science or  DoseRight software packages by  protocol.        FINDINGS:    BRAIN:  Unremarkable.  No hemorrhage.  No significant white matter  disease.  No edema.       VENTRICLES:  Unremarkable.  No ventriculomegaly.       BONES/JOINTS:  Unremarkable.  No acute fracture.       SOFT TISSUES:  Unremarkable.       SINUSES:  no air fluid levels       MASTOID AIR CELLS:  Unremarkable as visualized.  No mastoid effusion.          Impression:         1. Atrophy  2. No parenchymal mass, hemorrhage, or midline shift     This report was finalized on 7/28/2025 9:19 AM by Dr. Ralph Kohli MD.             I have personally reviewed the above radiology results.   ----------------------------------------------------------------------------------------------------------------------  Assessment/Plan     Active Hospital Problems    Diagnosis  POA    **AMS (altered mental status) [R41.82]  Yes     Altered mental status, etiology unclear, POA  Dizziness  Fall  History of dementia  prostate cancer s/p radical prostatectomy  Hypertensive urgency   Insomnia   Sinus bradycardia  Patient presented with complaints of an episode of dizziness, fall, and reported confusion.  Today patient denied any recent medication changes.  States he has been  on Seroquel for the past several weeks without any issue.  Dizziness/fall could be secondary to symptomatic bradycardia, however patient has been sinus bradycardia for quite some time on chart review.  No arrhythmias captured on telemetry overnight.  Still possibly an episode of symptomatic bradycardia.  Will consult cardiology for input on further inpatient versus outpatient workup.  TTE ordered.   Orthostatic vital signs negative.  Patient without any focal neurological symptoms, CT head negative.  Patient now back to baseline mentation.  Blood pressure trend improving.  Continue home regimen will have PT evaluation due to fall prior to arrival.  May benefit from home health on discharge.    --------------------------------------------------  DVT Prophylaxis: SCDs  FEN: No current IV fluids. Replace electrolytes per protocol as necessary. Regular diet  Activity: up with assistance  Disposition: Pending further workup  --------------------------------------------------    The patient is high risk due to the following diagnoses/reasons:  AMS, symptomatic bradycardia?      I have discussed the patient's assessment and plan with the patient , family, and Dr. Yordan Hanson PA-C  Hospitalist Service -- [facility]       07/29/25  07:51 EDT    Attending Physician: Onesimo Farr,*      Electronically signed by Susie Hanson PA-C at 07/29/25 9140       Consult Notes (all)    No notes of this type exist for this encounter.

## 2025-07-29 NOTE — PLAN OF CARE
Problem: Adult Inpatient Plan of Care  Goal: Plan of Care Review  Outcome: Progressing     Patient resting on RA, VSS, A&O w/ intermittent confusion. Plan of care ongoing.

## 2025-07-29 NOTE — PROGRESS NOTES
[facility] Medicine Services  PROGRESS NOTE     Patient Identification:  Name:  Alexander Zamora  Age:  75 y.o.  Sex:  male  :  1950  MRN:  [id number]  Visit Number:  [id number]  Primary Care Provider:  Bhavna Vaughn MD    Length of stay:  1    ----------------------------------------------------------------------------------------------------------------------  Subjective     Chief Complaint:  Follow up for AMS      History of Presenting Illness/Hospital Course:  Patient is a 75 y.o. male  prostate cancer s/p radical prostatectomy, hypertension, depression, PUD, insomnia, mild dementia that presented to the [facility] emergency department for evaluation of altered mental status.     Subjective:  Today, the patient patient examined at bedside on PCU.  Patient looks much improved compared to exam yesterday.  He is alert, awake, oriented, and much more talkative.  He denies any recent medication changes.  Does report an episode of dizziness yesterday morning, but thinks it is because he ate a footlong hot dog the night before.    Present during exam: Daughter  ----------------------------------------------------------------------------------------------------------------------  Objective     Consults:  Cardiology    [facility] Meds:  amLODIPine, 10 mg, Oral, Daily  chlorhexidine, 5 mL, Mouth/Throat, BID  donepezil, 10 mg, Oral, Nightly  enoxaparin sodium, 40 mg, Subcutaneous, Daily  fluticasone, 2 spray, Nasal, Daily  hydrALAZINE, 50 mg, Oral, BID  montelukast, 10 mg, Oral, Nightly  pantoprazole, 40 mg, Oral, QAM AC  QUEtiapine, 25 mg, Oral, Nightly  senna-docusate sodium, 2 tablet, Oral, BID  sodium chloride, 10 mL, Intravenous, Q12H  spironolactone, 25 mg, Oral, Daily         ----------------------------------------------------------------------------------------------------------------------  Vital Signs:  Temp:  [98 °F (36.7 °C)-98.6 °F (37 °C)] 98.5 °F (36.9  °C)  Heart Rate:  [43-82] 52  Resp:  [11-28] 11  BP: (132-203)/(49-83) 151/59  Mean Arterial Pressure (Non-Invasive) for the past 24 hrs (Last 3 readings):   Noninvasive MAP (mmHg)   07/29/25 0600 101   07/29/25 0500 101   07/29/25 0400 101     SpO2 Percentage    07/29/25 0400 07/29/25 0500 07/29/25 0600   SpO2: 97% 97% 96%     SpO2:  [95 %-100 %] 96 %  on   ;   Device (Oxygen Therapy): room air    Body mass index is 22.14 kg/m².  Wt Readings from Last 3 Encounters:   07/29/25 70 kg (154 lb 5.2 oz)   07/22/25 65 kg (143 lb 6.4 oz)   06/20/25 65 kg (143 lb 3.2 oz)        Intake/Output Summary (Last 24 hours) at 7/29/2025 0751  Last data filed at 7/28/2025 1700  Gross per 24 hour   Intake 900 ml   Output --   Net 900 ml     Diet: Regular/House; Fluid Consistency: Thin (IDDSI 0)  ----------------------------------------------------------------------------------------------------------------------  Physical exam:  Physical Exam  Constitutional:       General: He is not in acute distress.     Appearance: Normal appearance.   HENT:      Head: Normocephalic and atraumatic.      Right Ear: External ear normal.      Left Ear: External ear normal.      Nose: No nasal deformity.      Mouth/Throat:      Lips: Pink.      Mouth: Mucous membranes are moist.   Eyes:      Conjunctiva/sclera: Conjunctivae normal.      Pupils: Pupils are equal, round, and reactive to light.   Cardiovascular:      Rate and Rhythm: Regular rhythm. Bradycardia present.      Pulses:           Dorsalis pedis pulses are 2+ on the right side and 2+ on the left side.      Heart sounds: Normal heart sounds.   Pulmonary:      Effort: Pulmonary effort is normal.      Breath sounds: Normal breath sounds. No wheezing, rhonchi or rales.   Abdominal:      General: Abdomen is flat. Bowel sounds are normal.      Palpations: Abdomen is soft.      Tenderness: There is no guarding or rebound.   Genitourinary:     Comments: No argueta catheter in place   Musculoskeletal:     "  Cervical back: Neck supple. Normal range of motion.      Right lower leg: No edema.      Left lower leg: No edema.   Skin:     General: Skin is warm and dry.   Neurological:      General: No focal deficit present.      Mental Status: He is alert and oriented to person, place, and time.   Psychiatric:         Mood and Affect: Mood normal.         Behavior: Behavior normal.        ----------------------------------------------------------------------------------------------------------------------  Tele:        I have personally reviewed the EKG/Telemetry strips   ----------------------------------------------------------------------------------------------------------------------  Results from last 7 days   Lab Units 07/28/25  0955 07/28/25  0855   HSTROP T ng/L 8 9           Results from last 7 days   Lab Units 07/28/25  0855   CRP mg/dL <0.30   WBC 10*3/mm3 8.08   HEMOGLOBIN g/dL 13.2   HEMATOCRIT % 39.0   MCV fL 88.4   MCHC g/dL 33.8   PLATELETS 10*3/mm3 235     Results from last 7 days   Lab Units 07/28/25  2323 07/28/25  0855 07/22/25  1123   SODIUM mmol/L  --  138 139   POTASSIUM mmol/L 3.6 3.6 3.7   MAGNESIUM mg/dL  --   --  2.1   CHLORIDE mmol/L  --  101 103   CO2 mmol/L  --  23.3 28.0   BUN mg/dL  --  16.8 20.0   CREATININE mg/dL  --  1.16 1.72*   CALCIUM mg/dL  --  10.5 10.2   GLUCOSE mg/dL  --  117* 86   ALBUMIN g/dL  --  4.5 4.0   BILIRUBIN mg/dL  --  0.5 0.2   ALK PHOS U/L  --  103 85   AST (SGOT) U/L  --  27 20   ALT (SGPT) U/L  --  23 17   Estimated Creatinine Clearance: 53.6 mL/min (by C-G formula based on SCr of 1.16 mg/dL).  Ammonia   Date Value Ref Range Status   07/28/2025 38 16 - 60 umol/L Final       Glucose   Date/Time Value Ref Range Status   07/28/2025 0844 116 70 - 130 mg/dL Final     Comment:     Serial Number: [id number]:  [id number]     No results found for: \"HGBA1C\"  Lab Results   Component Value Date    TSH 3.230 07/28/2025       No results found for: \"BLOODCX\"  No results found " "for: \"URINECX\"  No results found for: \"WOUNDCX\"  No results found for: \"STOOLCX\"  No results found for: \"RESPCX\"  Pain Management Panel  More data exists         Latest Ref Rng & Units 7/28/2025 7/22/2025   Pain Management Panel   Creatinine, Urine mg/dL - 169.9    Amphetamine, Urine Qual Negative Negative  -   Barbiturates Screen, Urine Negative Negative  -   Benzodiazepine Screen, Urine Negative Negative  -   Buprenorphine, Screen, Urine Negative Negative  -   Cocaine Screen, Urine Negative Negative  -   Fentanyl, Urine Negative Negative  -   Methadone Screen , Urine Negative Negative  -   Methamphetamine, Ur Negative Negative  -     I have personally reviewed the above laboratory results.   ----------------------------------------------------------------------------------------------------------------------  Imaging Results (Last 24 Hours)       Procedure Component Value Units Date/Time    XR Chest 1 View [[id number]] Collected: 07/28/25 0928     Updated: 07/28/25 0931    Narrative:      XR CHEST 1 VW-     CLINICAL INDICATION: weakness        COMPARISON: 7/29/2015     TECHNIQUE: Single frontal view of the chest.     FINDINGS:     LUNGS: Lungs are adequately aerated.      HEART AND MEDIASTINUM: Heart and mediastinal contours are unremarkable        SKELETON: Bony and soft tissue structures are unremarkable.             Impression:      No radiographic evidence of acute cardiac or pulmonary disease.           This report was finalized on 7/28/2025 9:28 AM by Dr. Ralph Kohli MD.       CT Head Without Contrast [[id number]] Collected: 07/28/25 0914     Updated: 07/28/25 0921    Narrative:      CT HEAD WO CONTRAST-     CLINICAL INDICATION: Weakness, dizziness        COMPARISON: 1/2/2025      TECHNIQUE: Axial images of the brain were obtained with out intravenous  contrast.  Reformatted images were created in the sagittal and coronal  planes.     DOSE:     Radiation dose reduction techniques were utilized per ALARA " protocol.  Automated exposure control was initiated through either or [employer] or  DoseRight software packages by  protocol.        FINDINGS:    BRAIN:  Unremarkable.  No hemorrhage.  No significant white matter  disease.  No edema.       VENTRICLES:  Unremarkable.  No ventriculomegaly.       BONES/JOINTS:  Unremarkable.  No acute fracture.       SOFT TISSUES:  Unremarkable.       SINUSES:  no air fluid levels       MASTOID AIR CELLS:  Unremarkable as visualized.  No mastoid effusion.          Impression:         1. Atrophy  2. No parenchymal mass, hemorrhage, or midline shift     This report was finalized on 7/28/2025 9:19 AM by Dr. Ralph Kohli MD.             I have personally reviewed the above radiology results.   ----------------------------------------------------------------------------------------------------------------------  Assessment/Plan     Active Hospital Problems    Diagnosis  POA    **AMS (altered mental status) [R41.87]  Yes     Altered mental status, etiology unclear, POA  Dizziness  Fall  History of dementia  prostate cancer s/p radical prostatectomy  Hypertensive urgency   Insomnia   Sinus bradycardia  Patient presented with complaints of an episode of dizziness, fall, and reported confusion.  Today patient denied any recent medication changes.  States he has been on Seroquel for the past several weeks without any issue.  Dizziness/fall could be secondary to symptomatic bradycardia, however patient has been sinus bradycardia for quite some time on chart review.  No arrhythmias captured on telemetry overnight.  Still possibly an episode of symptomatic bradycardia.  Will consult cardiology for input on further inpatient versus outpatient workup.  TTE ordered.   Orthostatic vital signs negative.  Patient without any focal neurological symptoms, CT head negative.  Patient now back to baseline mentation.  Blood pressure trend improving.  Continue home regimen will have PT evaluation  due to fall prior to arrival.  May benefit from home health on discharge.    --------------------------------------------------  DVT Prophylaxis: SCDs  FEN: No current IV fluids. Replace electrolytes per protocol as necessary. Regular diet  Activity: up with assistance  Disposition: Pending further workup  --------------------------------------------------    The patient is high risk due to the following diagnoses/reasons:  AMS, symptomatic bradycardia?      I have discussed the patient's assessment and plan with the patient , family, and Dr. Yordan Hanson PA-C  Hospitalist Service -- [facility]       07/29/25  07:51 EDT    Attending Physician: Onesimo Farr,*

## 2025-07-29 NOTE — TELEPHONE ENCOUNTER
Caller: Brenda Tam     Relationship: DAUGHTER    Best call back number: [phone]     What is your medical concern? PATIENT HAS BEEN ADMITTED TO THE [facility]    DAUGHTER STATES ANGEL PHELAN MAY WANT TO LOOK UP THE DETAILS.

## 2025-07-29 NOTE — PLAN OF CARE
Problem: Adult Inpatient Plan of Care  Goal: Plan of Care Review  Outcome: Progressing  Goal: Patient-Specific Goal (Individualized)  Outcome: Progressing  Goal: Absence of Hospital-Acquired Illness or Injury  Outcome: Progressing  Intervention: Identify and Manage Fall Risk  Recent Flowsheet Documentation  Taken 7/29/2025 1700 by Olga Aceves RN  Safety Promotion/Fall Prevention:   activity supervised   assistive device/personal items within reach   clutter free environment maintained   fall prevention program maintained   nonskid shoes/slippers when out of bed   room organization consistent   safety round/check completed  Taken 7/29/2025 1500 by Olga Aceves RN  Safety Promotion/Fall Prevention:   activity supervised   assistive device/personal items within reach   clutter free environment maintained   fall prevention program maintained   nonskid shoes/slippers when out of bed   room organization consistent   safety round/check completed  Taken 7/29/2025 1300 by Olga Aceves RN  Safety Promotion/Fall Prevention:   activity supervised   assistive device/personal items within reach   clutter free environment maintained   fall prevention program maintained   nonskid shoes/slippers when out of bed   room organization consistent   safety round/check completed  Taken 7/29/2025 1100 by Olga Aceves RN  Safety Promotion/Fall Prevention:   activity supervised   assistive device/personal items within reach   clutter free environment maintained   fall prevention program maintained   nonskid shoes/slippers when out of bed   room organization consistent   safety round/check completed  Taken 7/29/2025 0940 by Olga Aceves RN  Safety Promotion/Fall Prevention:   activity supervised   assistive device/personal items within reach   clutter free environment maintained   fall prevention program maintained   nonskid shoes/slippers when out of bed   room organization consistent   safety round/check completed  Taken  7/29/2025 0900 by Olga Aceves RN  Safety Promotion/Fall Prevention:   activity supervised   assistive device/personal items within reach   clutter free environment maintained   fall prevention program maintained   nonskid shoes/slippers when out of bed   room organization consistent   safety round/check completed  Taken 7/29/2025 0700 by Olga Aceves RN  Safety Promotion/Fall Prevention:   activity supervised   assistive device/personal items within reach   clutter free environment maintained   fall prevention program maintained   nonskid shoes/slippers when out of bed   room organization consistent   safety round/check completed  Intervention: Prevent Skin Injury  Recent Flowsheet Documentation  Taken 7/29/2025 0940 by Olga Aceves RN  Body Position: position changed independently  Skin Protection: incontinence pads utilized  Intervention: Prevent and Manage VTE (Venous Thromboembolism) Risk  Recent Flowsheet Documentation  Taken 7/29/2025 0940 by Olga Aceves RN  VTE Prevention/Management: (lovenox) other (see comments)  Intervention: Prevent Infection  Recent Flowsheet Documentation  Taken 7/29/2025 1700 by Olga Aceves RN  Infection Prevention:   rest/sleep promoted   single patient room provided  Taken 7/29/2025 1500 by Olga Aceves RN  Infection Prevention:   rest/sleep promoted   single patient room provided  Taken 7/29/2025 1300 by Olga Aceves RN  Infection Prevention:   rest/sleep promoted   single patient room provided  Taken 7/29/2025 1100 by Olga Aceves RN  Infection Prevention:   rest/sleep promoted   single patient room provided  Taken 7/29/2025 0940 by Olga Aceves RN  Infection Prevention:   rest/sleep promoted   single patient room provided  Taken 7/29/2025 0900 by Olga Aceves RN  Infection Prevention:   rest/sleep promoted   single patient room provided  Taken 7/29/2025 0700 by Olga Aceves RN  Infection Prevention:   rest/sleep promoted   single patient room  provided  Goal: Optimal Comfort and Wellbeing  Outcome: Progressing  Intervention: Provide Person-Centered Care  Recent Flowsheet Documentation  Taken 7/29/2025 0940 by Olga Aceves RN  Trust Relationship/Rapport:   choices provided   care explained   emotional support provided  Goal: Readiness for Transition of Care  Outcome: Progressing     Problem: Comorbidity Management  Goal: Maintenance of Behavioral Health Symptom Control  Outcome: Progressing  Intervention: Maintain Behavioral Health Symptom Control  Recent Flowsheet Documentation  Taken 7/29/2025 1700 by Olga Aceves RN  Medication Review/Management: medications reviewed  Taken 7/29/2025 1500 by Olga Aceves RN  Medication Review/Management: medications reviewed  Taken 7/29/2025 1300 by Olga Aceves RN  Medication Review/Management: medications reviewed  Taken 7/29/2025 1100 by Olga Aceves RN  Medication Review/Management: medications reviewed  Taken 7/29/2025 0940 by Olga Aceves RN  Medication Review/Management: medications reviewed  Taken 7/29/2025 0900 by Olga Aceves RN  Medication Review/Management: medications reviewed  Taken 7/29/2025 0700 by Olga Aceves RN  Medication Review/Management: medications reviewed  Goal: Blood Pressure in Desired Range  Outcome: Progressing  Intervention: Maintain Blood Pressure Management  Recent Flowsheet Documentation  Taken 7/29/2025 1700 by Olga Aceves RN  Medication Review/Management: medications reviewed  Taken 7/29/2025 1500 by Olga Aceves RN  Medication Review/Management: medications reviewed  Taken 7/29/2025 1300 by Olga Aceves RN  Medication Review/Management: medications reviewed  Taken 7/29/2025 1100 by Olga Aceves RN  Medication Review/Management: medications reviewed  Taken 7/29/2025 0940 by Olga Aceves RN  Medication Review/Management: medications reviewed  Taken 7/29/2025 0900 by Olga Aceves RN  Medication Review/Management: medications reviewed  Taken 7/29/2025 0700 by  Olga Aceves RN  Medication Review/Management: medications reviewed     Problem: Fall Injury Risk  Goal: Absence of Fall and Fall-Related Injury  Outcome: Progressing  Intervention: Identify and Manage Contributors  Recent Flowsheet Documentation  Taken 7/29/2025 1700 by Olga Aceves RN  Medication Review/Management: medications reviewed  Taken 7/29/2025 1500 by Olga Aceves RN  Medication Review/Management: medications reviewed  Taken 7/29/2025 1300 by Olga Aceves RN  Medication Review/Management: medications reviewed  Taken 7/29/2025 1100 by Olga Aceves RN  Medication Review/Management: medications reviewed  Taken 7/29/2025 0940 by Olga Aceves RN  Medication Review/Management: medications reviewed  Taken 7/29/2025 0900 by Olga Aceves RN  Medication Review/Management: medications reviewed  Taken 7/29/2025 0700 by Olga Aceves RN  Medication Review/Management: medications reviewed  Intervention: Promote Injury-Free Environment  Recent Flowsheet Documentation  Taken 7/29/2025 1700 by Olga Aceves RN  Safety Promotion/Fall Prevention:   activity supervised   assistive device/personal items within reach   clutter free environment maintained   fall prevention program maintained   nonskid shoes/slippers when out of bed   room organization consistent   safety round/check completed  Taken 7/29/2025 1500 by Olga Aceves RN  Safety Promotion/Fall Prevention:   activity supervised   assistive device/personal items within reach   clutter free environment maintained   fall prevention program maintained   nonskid shoes/slippers when out of bed   room organization consistent   safety round/check completed  Taken 7/29/2025 1300 by Olga Aceves RN  Safety Promotion/Fall Prevention:   activity supervised   assistive device/personal items within reach   clutter free environment maintained   fall prevention program maintained   nonskid shoes/slippers when out of bed   room organization consistent   safety  round/check completed  Taken 7/29/2025 1100 by Olga Aceves RN  Safety Promotion/Fall Prevention:   activity supervised   assistive device/personal items within reach   clutter free environment maintained   fall prevention program maintained   nonskid shoes/slippers when out of bed   room organization consistent   safety round/check completed  Taken 7/29/2025 0940 by Olga Aceves RN  Safety Promotion/Fall Prevention:   activity supervised   assistive device/personal items within reach   clutter free environment maintained   fall prevention program maintained   nonskid shoes/slippers when out of bed   room organization consistent   safety round/check completed  Taken 7/29/2025 0900 by Olga Aceves RN  Safety Promotion/Fall Prevention:   activity supervised   assistive device/personal items within reach   clutter free environment maintained   fall prevention program maintained   nonskid shoes/slippers when out of bed   room organization consistent   safety round/check completed  Taken 7/29/2025 0700 by Olga Aceves RN  Safety Promotion/Fall Prevention:   activity supervised   assistive device/personal items within reach   clutter free environment maintained   fall prevention program maintained   nonskid shoes/slippers when out of bed   room organization consistent   safety round/check completed   Goal Outcome Evaluation:               PT AOX4. RA, Afebrile.  Asymptomatic Bradycardia. PT ambulated around nurses station several times throughout the day with staff and tolerated well. No acute changes over shift.

## 2025-07-29 NOTE — CONSULTS
[facility]  CONSULT  NOTE      Patient information:  Date of Admit: 7/28/2025  Date of Consult: 07/29/25  Hospitalist/Referring MD:Onesimo Farr MD  Patient Care Team:  Bhavna Vaughn MD as PCP - General (Family Medicine)  Lowell Espinoza MD as Consulting Physician (Otolaryngology)  Wan Sadler Jr., MD (Otolaryngology)  Syeda Bhatia APRN as Nurse Practitioner (Cardiology)  Lowell Espinoza MD as Consulting Physician (Otolaryngology)  Syeda Bhatia APRN as Nurse Practitioner (Cardiology)  Wan Sadler Jr., MD (Otolaryngology)  Kyler Reese MD as Consulting Physician (Cardiology)  Augusto Sotelo MD as Consulting Physician (Nephrology)  PCP: Bhavna Vaughn MD  MRN:  [id number]  Visit Number:  [id number]    LOS: 1  CODE STATUS:  Code Status and Medical Interventions: CPR (Attempt to Resuscitate); Full Support   Ordered at: 07/28/25 1127     Code Status (Patient has no pulse and is not breathing):    CPR (Attempt to Resuscitate)     Medical Interventions (Patient has pulse or is breathing):    Full Support       Inpatient Cardiology Consult  Consult performed by: Doreen Parker MD  Consult ordered by: Susie Hanson PA-C        09:44 EDT  7/29/2025    Reason for Cardiology consultation: Symptomatic bradycardia    General Cardiology Consulting Physician: Dr. Doreen Parker MD    Primary Cardiologist: FELISHA Torrez    PROBLEM LIST:Principal Problem:    AMS (altered mental status)      Assessment    Acute mental status changes with reported falls  Sinus bradycardia, longstanding  Essential hypertension  Dyslipidemia  Dementia  History of prostate cancer status post radical prostatectomy with stress urinary incontinence  ASCVD status poststress test from January 2023 with evidence of ischemia in the inferior wall, CT coronary angiogram showing total calcium score of 175 with multiple stenotic  lesions at the distal left circumflex artery and proximal PDA with 50% in the proximal third of the LAD  Possible obstructive sleep apnea          Planning   1.  Currently he will more dynamically stable TSH reported normal in the past, currently is hemodynamically stable will monitor for now  2.  Consider sleep study as an outpatient  3.  No evidence of ongoing ischemia continue current management  4.  Will get an echocardiac to assess cardiac function wall motion and valve morphology          Subjective Data     7/29/2025    ADMISSION INFORMATION:  Chief Complaint   Patient presents with    Weakness - Generalized     History of Present Illness (HPI)  HPI obtained from chart review     Alexander Zamora is a 75 y.o. male with a past medical history significant for:  ASCVD  Stress echo 1/2023: Abnormal stress echo with echocardiographic evidence for myocardial ischemia located in the inferior wall.  CT coronary angiogram 2/2023: Total calcium score 175, multifocal stenotic lesions involving the distal left circumflex and proximal PDA with approximately 50% as well as stenotic lesion involving the proximal third of the LAD, bicuspid aortic valve  History of bradycardia  HR 49 in office on 3/25/2025  Noted on EKGs as far back as 2022  Hypertension  Hypercholesterolemia  History of prostate cancer s/p radical prostatectomy   Stress urinary incontinence  Chronic back pain  PUD  Insomnia  Depression  Mild dementia    Patient presented to [facility] ([facility] emergency department (ED) on 7/28/2025 with complaints of episode of dizziness, fall, and reported confusion.     While in the ED, HS troponin negative x 2, hemoglobin 13.2 and platelet count 235, potassium 3.6, magnesium 2.1, creatinine has improved from 1.72-1.16, and ammonia level was 38.  Single view chest x-ray with no radiographic evidence of acute cardiac or pulmonary disease.  CT of the head without contrast reveals atrophy but no mass, hemorrhage, or  midline shift. Dizziness/fall could be secondary to symptomatic bradycardia, however patient has been sinus bradycardia for quite some time on chart review.  No arrhythmias captured on telemetry overnight.  Still possibly an episode of symptomatic bradycardia.     Cardiology has been consulted for further evaluation and management for symptomatic bradycardia.     Primary Cardiologist has been FELISHA Torrez and he was last seen in the office on 03/25/2025. HR 49 in office on 3/25/2025.     Patient was in room  when he was seen and examined by Dr. Parker.  Patient is lying in bed resting quietly.  No acute distress noted at this time.  Patient is alert and oriented to person.  Patient does deny chest pain, shortness of breath, or palpitations.  Patient's daughter and sister at bedside to assist with HPI due to patient's history of dementia.  Daughter reports family medical history of bradycardia with patient's mother and sisters.  Mother and x 1 sister did have pacemaker implanted.  Daughter reports that he frequently has episodes of dizziness, shakiness, and falls frequently.  She reports she does live out of town and has not been present during one of his episodes of falling.  And he is unable to remember if he passed out or not.  She reports he has been on Aricept for couple years for dementia.  She does report daytime naps and snoring, however she states he is up and down throughout the night for him to the bathroom.  Telemetry reveals sinus bradycardia 50s while at bedside.  Head of bed is elevated to 15 degrees per bed monitor.     ORDERS:  DC Aricept 2/2 side effect of Bradycardia     EVENT TIMELINE:  7/28: TTE w results pending     Known medications given enroute via EMS and in the ER:   Medications   sodium chloride 0.9 % flush 10 mL (has no administration in time range)   sodium chloride 0.9 % flush 10 mL (10 mL Intravenous Given 7/29/25 0944)   sodium chloride 0.9 % flush 10 mL (has no  administration in time range)   sodium chloride 0.9 % infusion 40 mL (has no administration in time range)   enoxaparin sodium (LOVENOX) syringe 40 mg (40 mg Subcutaneous Given 7/29/25 0942)   nitroglycerin (NITROSTAT) SL tablet 0.4 mg (has no administration in time range)   Potassium Replacement - Follow Nurse / BPA Driven Protocol (has no administration in time range)   Magnesium Standard Dose Replacement - Follow Nurse / BPA Driven Protocol (has no administration in time range)   Phosphorus Replacement - Follow Nurse / BPA Driven Protocol (has no administration in time range)   Calcium Replacement - Follow Nurse / BPA Driven Protocol (has no administration in time range)   acetaminophen (TYLENOL) tablet 650 mg (has no administration in time range)     Or   acetaminophen (TYLENOL) 160 MG/5ML oral solution 650 mg (has no administration in time range)     Or   acetaminophen (TYLENOL) suppository 650 mg (has no administration in time range)   sennosides-docusate (PERICOLACE) 8.6-50 MG per tablet 2 tablet (2 tablets Oral Given 7/29/25 0944)     And   polyethylene glycol (MIRALAX) packet 17 g (has no administration in time range)     And   bisacodyl (DULCOLAX) EC tablet 5 mg (has no administration in time range)     And   bisacodyl (DULCOLAX) suppository 10 mg (has no administration in time range)   ziprasidone (GEODON) 10 mg in sterile water (preservative free) 0.5 mL injection (has no administration in time range)   QUEtiapine (SEROquel) tablet 25 mg (25 mg Oral Given 7/28/25 2034)   pantoprazole (PROTONIX) EC tablet 40 mg (40 mg Oral Given 7/29/25 0942)   amLODIPine (NORVASC) tablet 10 mg (10 mg Oral Given 7/29/25 0942)   chlorhexidine (PERIDEX) 0.12 % solution 5 mL (5 mL Mouth/Throat Given 7/29/25 0949)   fluticasone (FLONASE) 50 MCG/ACT nasal spray 2 spray (2 sprays Nasal Given 7/29/25 0944)   montelukast (SINGULAIR) tablet 10 mg (10 mg Oral Given 7/28/25 2034)   spironolactone (ALDACTONE) tablet 25 mg (25 mg Oral  Given 7/29/25 0944)   hydrALAZINE (APRESOLINE) tablet 50 mg (50 mg Oral Given 7/29/25 0942)   sodium chloride 0.9 % bolus 500 mL (0 mL Intravenous Stopped 7/28/25 1005)   LORazepam (ATIVAN) injection 0.5 mg (0.5 mg Intravenous Given 7/28/25 0954)   hydrALAZINE (APRESOLINE) injection 10 mg (10 mg Intravenous Given 7/28/25 1304)   potassium chloride (KLOR-CON M20) CR tablet 40 mEq (40 mEq Oral Given 7/28/25 2034)     Personal History     Cardiac risk factors:arteriosclerotic heart disease, hypercholesterolemia, and hypertension      Last Echo: Results for orders placed during the hospital encounter of 01/05/23    Adult Stress Echo W/ Cont or Stress Agent if Necessary Per Protocol 01/11/2023  6:26 PM    Interpretation Summary    Echocardiogram Findings:    Normal left ventricular cavity size and wall thickness noted. All left ventricular wall segments contract normally.    Left ventricular ejection fraction appears to be 61 - 65%.    Left ventricular diastolic function is consistent with (grade I) impaired relaxation.    The mitral valve is structurally normal with no significant stenosis present. Mild mitral valve regurgitation is present.    The aortic valve is structurally normal with no regurgitation or stenosis present.    There is no evidence of pericardial effusion. .    Stress Procedure    A stress test was performed following the Humberto protocol.    Exercise duration (min) 8 min Estimated workload 10.1 METS    Baseline Vitals Baseline HR 53 bpm Baseline /66 mmHg Peak Stress Vitals Peak  bpm Peak /60 mmHg Recovery Vitals Recovery HR 75 bpm Recovery /71 mmHg Exercise Data Target HR (85%) 125 bpm Max. Pred. HR (100%) 147 bpm Percent Max Pred HR 85.71 %    Arrhythmias during stress: rare PVCs, bigeminy.    Findings consistent with an indeterminate ECG stress test.    Stress Echo Findings    Segment augmentation had an abnormal response to stress. Left ventricular function is normal    The  following left ventricular wall segments are hypokinetic: basal inferolateral, basal inferoseptal, mid inferoseptal, basal inferior and basal inferoseptal.    Abnormal stress echo with echocardiographic evidence for myocardial ischemia located in the inferior wall.       CT coronary angiogram 2/2023: Total calcium score 175, multifocal stenotic lesions involving the distal left circumflex and proximal PDA with approximately 50% as well as stenotic lesion involving the proximal third of the LAD, bicuspid aortic valve    Last Stress: No results found for this or any previous visit.      Last Cath:  No results found for this or any previous visit.                         EP study: No results found for this or any previous visit.      Past Medical History:   Diagnosis Date    Arthritis     Bleeding ulcer     Cancer 2015    Prostate    Chronic back pain     Collapsed lung     Depression     Elevated prostate specific antigen (PSA)     Erectile dysfunction     Hypercholesterolemia     Primary hypertension     Prostate cancer     Prostatitis      Past Surgical History:   Procedure Laterality Date    OTHER SURGICAL HISTORY      surgery for an ulcer, PNEUMOTHORAX AND ULCER THERAPY     Family History   Problem Relation Age of Onset    Hypertension Mother     Bradycardia Mother         Had pacemaker    Heart attack Mother     Cancer Father         Throat    Nephrolithiasis Father     Parkinsonism Father     Diabetes Sister     Heart disease Sister     COPD Sister     Heart disease Sister     Heart attack Sister     Rheum arthritis Sister     Cancer Sister         Stomach    Cancer Sister         Lung    Cancer Brother         Kidney    Hyperlipidemia Brother     Prostatitis Brother     Diabetes Other      Social History     Tobacco Use    Smoking status: Former     Current packs/day: 0.00     Average packs/day: 2.0 packs/day for 5.0 years (10.0 ttl pk-yrs)     Types: Cigarettes     Start date: 1965     Quit date: 1970      Years since quittin.6    Smokeless tobacco: Never   Vaping Use    Vaping status: Never Used   Substance Use Topics    Alcohol use: No    Drug use: No     ALLERGIES: Crestor [rosuvastatin calcium] and Amoxicillin    Medications listed below are reported home medications pulling from within the system:  Medications Prior to Admission   Medication Sig Dispense Refill Last Dose/Taking    amLODIPine (NORVASC) 10 MG tablet Take 1 tablet by mouth Daily. 90 tablet 1 2025    cevimeline (EVOXAC) 30 MG capsule Take 1 capsule by mouth 3 (Three) Times a Day. 270 capsule 1 2025    chlorhexidine (PERIDEX) 0.12 % solution Apply 5 mL to the mouth or throat 2 (Two) Times a Day. 473 mL 1 2025    donepezil (ARICEPT) 10 MG tablet Take 1 tablet by mouth Every Night. 90 tablet 1 2025    ezetimibe (ZETIA) 10 MG tablet Take 1 tablet by mouth Daily. 90 tablet 1 2025    fluticasone (FLONASE) 50 MCG/ACT nasal spray Administer 2 sprays into the nostril(s) as directed by provider Daily. 16 g 5 2025    methocarbamol (ROBAXIN) 500 MG tablet Take 1 tablet by mouth 2 (Two) Times a Day. 180 tablet 0 2025    montelukast (SINGULAIR) 10 MG tablet Take 1 tablet by mouth Every Night. 90 tablet 1 2025    pantoprazole (PROTONIX) 40 MG EC tablet Take 1 tablet by mouth Daily. 90 tablet 1 2025    spironolactone (ALDACTONE) 25 MG tablet Take 1 tablet by mouth Daily. 90 tablet 0 2025    hydrALAZINE (APRESOLINE) 25 MG tablet Take 1 tablet by mouth Daily.   Unknown    hydrALAZINE (APRESOLINE) 50 MG tablet Take 1 tablet by mouth 2 (Two) Times a Day. (Patient taking differently: Take 1 tablet by mouth Daily.)   Unknown   Patient's daughter daughter and sister are at bedside to assist with ROS    Review of Systems   Constitutional:  Negative for activity change, diaphoresis and unexpected weight change.   HENT:  Negative for facial swelling and trouble swallowing.    Eyes:  Negative for visual disturbance.    Respiratory:  Negative for apnea, cough, chest tightness, shortness of breath, wheezing and stridor.    Cardiovascular:  Negative for chest pain, palpitations and leg swelling.   Gastrointestinal:  Negative for abdominal distention, nausea and vomiting.   Endocrine: Negative.    Genitourinary: Negative.    Skin:  Negative for color change.   Neurological:  Positive for dizziness. Negative for syncope, speech difficulty and weakness.        Frequent falls, shakiness   Psychiatric/Behavioral:  Positive for confusion. Negative for agitation and behavioral problems.         Decreased memory 2/2 Dementia on Aricept      Objective Data   Vital Signs  Temp:  [97.5 °F (36.4 °C)-98.6 °F (37 °C)] 98.1 °F (36.7 °C)  Heart Rate:  [46-66] 49  Resp:  [11-28] 16  BP: (127-183)/(49-83) 127/60  Device (Oxygen Therapy): room air  Vital Signs (last 72 hrs)         07/26 0700 07/27 0659 07/27 0700 07/28 0659 07/28 0700 07/29 0659 07/29 0700 07/29 0944   Most Recent      Temp (°F)     98 -  98.6      97.5     97.5 (36.4) 07/29 0800    Heart Rate     43 -  82      49     49 07/29 0942    Resp     11 -  28       11 07/29 0600    BP     132/49 -  203/79      148/70     148/70 07/29 0942    SpO2 (%)     95 -  100       96 07/29 0600          BMI:   Body mass index is 22.14 kg/m².  WEIGHT:  Wt Readings from Last 3 Encounters:   07/29/25 70 kg (154 lb 5.2 oz)   07/22/25 65 kg (143 lb 6.4 oz)   06/20/25 65 kg (143 lb 3.2 oz)     DIET:  Diet: Regular/House; Fluid Consistency: Thin (IDDSI 0)  I&O:  Intake & Output (last 3 days)         07/26 0701 07/27 0700 07/27 0701 07/28 0700 07/28 0701 07/29 0700 07/29 0701 07/30 0700    P.O.   400 360    I.V. (mL/kg)   0 (0) 0 (0)    IV Piggyback   500     Total Intake(mL/kg)   900 (12.9) 360 (5.1)    Net   +900 +360            Urine Unmeasured Occurrence   4 x           Physical Exam  Constitutional:       General: He is not in acute distress.     Appearance: Normal appearance. He is not  "ill-appearing, toxic-appearing or diaphoretic.   HENT:      Head: Normocephalic and atraumatic.      Nose: Nose normal.      Mouth/Throat:      Mouth: Mucous membranes are moist.   Eyes:      Extraocular Movements: Extraocular movements intact.      Pupils: Pupils are equal, round, and reactive to light.   Cardiovascular:      Rate and Rhythm: Regular rhythm. Bradycardia present.      Heart sounds: Normal heart sounds.   Pulmonary:      Effort: Pulmonary effort is normal.      Breath sounds: Normal breath sounds.   Abdominal:      General: Bowel sounds are normal.      Palpations: Abdomen is soft.   Musculoskeletal:         General: Normal range of motion.      Cervical back: Normal range of motion.   Skin:     General: Skin is warm and dry.   Neurological:      General: No focal deficit present.      Mental Status: He is alert. Mental status is at baseline.      Comments: Oriented to person but not time and events.   Psychiatric:         Mood and Affect: Mood normal.         Behavior: Behavior normal.       Results review   Results Review:    I have reviewed the patient's new clinical results. 07/29/25 13:49 EDT    Results from last 7 days   Lab Units 07/28/25  0955 07/28/25  0855   HSTROP T ng/L 8 9     No results found for: \"PROBNP\"  Results from last 7 days   Lab Units 07/28/25  0855   WBC 10*3/mm3 8.08   HEMOGLOBIN g/dL 13.2   PLATELETS 10*3/mm3 235     Results from last 7 days   Lab Units 07/28/25  2323 07/28/25  0855   SODIUM mmol/L  --  138   POTASSIUM mmol/L 3.6 3.6   CHLORIDE mmol/L  --  101   CO2 mmol/L  --  23.3   BUN mg/dL  --  16.8   CREATININE mg/dL  --  1.16   CALCIUM mg/dL  --  10.5   GLUCOSE mg/dL  --  117*   ALT (SGPT) U/L  --  23   AST (SGOT) U/L  --  27     Lab Results   Component Value Date    MG 2.1 07/22/2025    MG 1.9 07/16/2024    MG 2.0 03/11/2022     Lab Results   Component Value Date    CHOL 235 (H) 01/16/2025    TRIG 118 01/16/2025    HDL 70 (H) 01/16/2025     (H) 01/16/2025 " "    Estimated Creatinine Clearance: 53.6 mL/min (by C-G formula based on SCr of 1.16 mg/dL).  No results found for: \"HGBA1C\"  No results found for: \"INR\"  No results found for: \"LABHEPA\"  No components found for: \"DIG\"  Lab Results   Component Value Date    TSH 3.230 2025    PSA <0.014 2025      No results found for: \"URICACID\"  Pain Management Panel  More data exists         Latest Ref Rng & Units 2025   Pain Management Panel   Creatinine, Urine mg/dL - 169.9    Amphetamine, Urine Qual Negative Negative  -   Barbiturates Screen, Urine Negative Negative  -   Benzodiazepine Screen, Urine Negative Negative  -   Buprenorphine, Screen, Urine Negative Negative  -   Cocaine Screen, Urine Negative Negative  -   Fentanyl, Urine Negative Negative  -   Methadone Screen , Urine Negative Negative  -   Methamphetamine, Ur Negative Negative  -     Microbiology Results (last 10 days)       ** No results found for the last 240 hours. **           No results found for: \"BLOODCX\"      EC2025      ECG/EMG Results (last 24 hours)       Procedure Component Value Units Date/Time    Telemetry Scan [[id number]] Resulted: 25 1225     Updated: 25 1809    ECG 12 Lead Other; Weakness [[id number]] Collected: 25 0855     Updated: 25     QT Interval 460 ms      QTC Interval 466 ms     Narrative:      Test Reason : Other~  Blood Pressure :   */*   mmHG  Vent. Rate :  62 BPM     Atrial Rate :  62 BPM     P-R Int : 170 ms          QRS Dur :  80 ms      QT Int : 460 ms       P-R-T Axes :  74  70  64 degrees    QTcB Int : 466 ms    Sinus rhythm with occasional premature ventricular complexes  Septal infarct , age undetermined    Confirmed by Clarissa West ([id number]) on 2025 9:52:25 PM    Referred By: JASON           Confirmed By: Clarissa West    ECG 12 Lead Bradycardia [[id number]] Collected: 25 1233     Updated: 25     QT Interval 490 ms  "     QTC Interval 418 ms     Narrative:      Test Reason : Bradycardia  Blood Pressure :   */*   mmHG  Vent. Rate :  44 BPM     Atrial Rate :  44 BPM     P-R Int : 176 ms          QRS Dur : 100 ms      QT Int : 490 ms       P-R-T Axes :  46  51  78 degrees    QTcB Int : 418 ms    Marked sinus bradycardia  Minimal voltage criteria for LVH, may be normal variant  Abnormal ECG  Confirmed by Clarissa West ([id number]) on 7/28/2025 9:55:56 PM    Referred By:            Confirmed By: Clarissa West    Telemetry Scan [[id number]] Resulted: 07/29/25 0759     Updated: 07/29/25 1109            TELEMETRY:                   RADIOLOGY STUDIES:  Imaging Results (Last 72 Hours)       Procedure Component Value Units Date/Time    XR Chest 1 View [[id number]] Collected: 07/28/25 0928     Updated: 07/28/25 0931    Narrative:      XR CHEST 1 VW-     CLINICAL INDICATION: weakness        COMPARISON: 7/29/2015     TECHNIQUE: Single frontal view of the chest.     FINDINGS:     LUNGS: Lungs are adequately aerated.      HEART AND MEDIASTINUM: Heart and mediastinal contours are unremarkable        SKELETON: Bony and soft tissue structures are unremarkable.             Impression:      No radiographic evidence of acute cardiac or pulmonary disease.           This report was finalized on 7/28/2025 9:28 AM by Dr. Ralph Kohli MD.       CT Head Without Contrast [[id number]] Collected: 07/28/25 0914     Updated: 07/28/25 0921    Narrative:      CT HEAD WO CONTRAST-     CLINICAL INDICATION: Weakness, dizziness        COMPARISON: 1/2/2025      TECHNIQUE: Axial images of the brain were obtained with out intravenous  contrast.  Reformatted images were created in the sagittal and coronal  planes.     DOSE:     Radiation dose reduction techniques were utilized per ALARA protocol.  Automated exposure control was initiated through either or [employer] or  [employer] software packages by  protocol.        FINDINGS:    BRAIN:  Unremarkable.  No hemorrhage.  No  significant white matter  disease.  No edema.       VENTRICLES:  Unremarkable.  No ventriculomegaly.       BONES/JOINTS:  Unremarkable.  No acute fracture.       SOFT TISSUES:  Unremarkable.       SINUSES:  no air fluid levels       MASTOID AIR CELLS:  Unremarkable as visualized.  No mastoid effusion.          Impression:         1. Atrophy  2. No parenchymal mass, hemorrhage, or midline shift     This report was finalized on 7/28/2025 9:19 AM by Dr. Ralph Kohli MD.               ALLERGIES: Crestor [rosuvastatin calcium] and Amoxicillin    CURRENT MEDICATIONS:  Current list of medications may not reflect those currently placed in orders that are not signed or are being held.     amLODIPine, 10 mg, Oral, Daily  chlorhexidine, 5 mL, Mouth/Throat, BID  enoxaparin sodium, 40 mg, Subcutaneous, Daily  fluticasone, 2 spray, Nasal, Daily  hydrALAZINE, 50 mg, Oral, BID  montelukast, 10 mg, Oral, Nightly  pantoprazole, 40 mg, Oral, QAM AC  QUEtiapine, 25 mg, Oral, Nightly  senna-docusate sodium, 2 tablet, Oral, BID  sodium chloride, 10 mL, Intravenous, Q12H  spironolactone, 25 mg, Oral, Daily           acetaminophen **OR** acetaminophen **OR** acetaminophen    senna-docusate sodium **AND** polyethylene glycol **AND** bisacodyl **AND** bisacodyl    Calcium Replacement - Follow Nurse / BPA Driven Protocol    Magnesium Standard Dose Replacement - Follow Nurse / BPA Driven Protocol    nitroglycerin    Phosphorus Replacement - Follow Nurse / BPA Driven Protocol    Potassium Replacement - Follow Nurse / BPA Driven Protocol    [COMPLETED] Insert Peripheral IV **AND** sodium chloride    sodium chloride    sodium chloride    ziprasidone (GEODON) 10 mg in sterile water (preservative free) 0.5 mL injection        Thank you very much for asking us to be involved in this patient's care. Please do not hesitate to call for any questions or concerns.     Electronically signed by FELISHA Suh, 07/29/25, 1:49 PM EDT.    Electronically signed by Doreen Parker MD, 07/29/25, 4:06 PM EDT.                        Please note that portions of this note were copied and has been reviewed and is accurate as of 7/29/2025.      Please note that portions of this note were completed with a voice recognition program.

## 2025-07-30 ENCOUNTER — READMISSION MANAGEMENT (OUTPATIENT)
Dept: CALL CENTER | Facility: HOSPITAL | Age: 75
End: 2025-07-30
Payer: MEDICARE

## 2025-07-30 ENCOUNTER — APPOINTMENT (OUTPATIENT)
Dept: CARDIOLOGY | Facility: HOSPITAL | Age: 75
End: 2025-07-30
Payer: MEDICARE

## 2025-07-30 ENCOUNTER — APPOINTMENT (OUTPATIENT)
Dept: RESPIRATORY THERAPY | Facility: HOSPITAL | Age: 75
End: 2025-07-30
Payer: MEDICARE

## 2025-07-30 VITALS
OXYGEN SATURATION: 99 % | DIASTOLIC BLOOD PRESSURE: 71 MMHG | BODY MASS INDEX: 22.09 KG/M2 | SYSTOLIC BLOOD PRESSURE: 160 MMHG | RESPIRATION RATE: 26 BRPM | TEMPERATURE: 99 F | HEART RATE: 50 BPM | WEIGHT: 154.32 LBS | HEIGHT: 70 IN

## 2025-07-30 PROBLEM — R41.82 AMS (ALTERED MENTAL STATUS): Status: RESOLVED | Noted: 2025-07-28 | Resolved: 2025-07-30

## 2025-07-30 LAB
AORTIC DIMENSIONLESS INDEX: 0.44 (DI)
AV MEAN PRESS GRAD SYS DOP V1V2: 10 MMHG
AV VMAX SYS DOP: 216 CM/SEC
BH CV ECHO MEAS - ACS: 1.3 CM
BH CV ECHO MEAS - AO MAX PG: 18.7 MMHG
BH CV ECHO MEAS - AO ROOT DIAM: 3.5 CM
BH CV ECHO MEAS - AO V2 VTI: 47 CM
BH CV ECHO MEAS - AVA(I,D): 2.15 CM2
BH CV ECHO MEAS - EDV(CUBED): 103.8 ML
BH CV ECHO MEAS - EDV(MOD-SP4): 79.9 ML
BH CV ECHO MEAS - EF(MOD-SP4): 67.8 %
BH CV ECHO MEAS - ESV(CUBED): 32.8 ML
BH CV ECHO MEAS - ESV(MOD-SP4): 25.7 ML
BH CV ECHO MEAS - FS: 31.9 %
BH CV ECHO MEAS - IVS/LVPW: 0.92 CM
BH CV ECHO MEAS - IVSD: 1.1 CM
BH CV ECHO MEAS - LA DIMENSION: 2.7 CM
BH CV ECHO MEAS - LAT PEAK E' VEL: 8.9 CM/SEC
BH CV ECHO MEAS - LV DIASTOLIC VOL/BSA (35-75): 42.8 CM2
BH CV ECHO MEAS - LV MASS(C)D: 199.6 GRAMS
BH CV ECHO MEAS - LV MAX PG: 3 MMHG
BH CV ECHO MEAS - LV MEAN PG: 2 MMHG
BH CV ECHO MEAS - LV SYSTOLIC VOL/BSA (12-30): 13.8 CM2
BH CV ECHO MEAS - LV V1 MAX: 87 CM/SEC
BH CV ECHO MEAS - LV V1 VTI: 20.6 CM
BH CV ECHO MEAS - LVIDD: 4.7 CM
BH CV ECHO MEAS - LVIDS: 3.2 CM
BH CV ECHO MEAS - LVOT AREA: 4.9 CM2
BH CV ECHO MEAS - LVOT DIAM: 2.5 CM
BH CV ECHO MEAS - LVPWD: 1.2 CM
BH CV ECHO MEAS - MED PEAK E' VEL: 8.5 CM/SEC
BH CV ECHO MEAS - MV A MAX VEL: 80.6 CM/SEC
BH CV ECHO MEAS - MV DEC SLOPE: 146 CM/SEC2
BH CV ECHO MEAS - MV E MAX VEL: 65.6 CM/SEC
BH CV ECHO MEAS - MV E/A: 0.81
BH CV ECHO MEAS - MV P1/2T: 130.6 MSEC
BH CV ECHO MEAS - MVA(P1/2T): 1.68 CM2
BH CV ECHO MEAS - PA ACC TIME: 0.15 SEC
BH CV ECHO MEAS - PA V2 MAX: 122 CM/SEC
BH CV ECHO MEAS - RAP SYSTOLE: 10 MMHG
BH CV ECHO MEAS - RVSP: 25.1 MMHG
BH CV ECHO MEAS - SV(LVOT): 101.1 ML
BH CV ECHO MEAS - SV(MOD-SP4): 54.2 ML
BH CV ECHO MEAS - SVI(LVOT): 54.1 ML/M2
BH CV ECHO MEAS - SVI(MOD-SP4): 29 ML/M2
BH CV ECHO MEAS - TR MAX PG: 15.1 MMHG
BH CV ECHO MEAS - TR MAX VEL: 194 CM/SEC
BH CV ECHO MEASUREMENTS AVERAGE E/E' RATIO: 7.54
BH CV XLRA - RV BASE: 3.3 CM
BH CV XLRA - RV LENGTH: 7.3 CM
BH CV XLRA - RV MID: 2.8 CM
LEFT ATRIUM VOLUME INDEX: 20.2 ML/M2
T4 FREE SERPL-MCNC: 1.02 NG/DL (ref 0.92–1.68)

## 2025-07-30 PROCEDURE — 93306 TTE W/DOPPLER COMPLETE: CPT | Performed by: INTERNAL MEDICINE

## 2025-07-30 PROCEDURE — 97162 PT EVAL MOD COMPLEX 30 MIN: CPT

## 2025-07-30 PROCEDURE — 93306 TTE W/DOPPLER COMPLETE: CPT

## 2025-07-30 PROCEDURE — 25010000002 ENOXAPARIN PER 10 MG: Performed by: INTERNAL MEDICINE

## 2025-07-30 PROCEDURE — 93246 EXT ECG>7D<15D RECORDING: CPT

## 2025-07-30 PROCEDURE — 99232 SBSQ HOSP IP/OBS MODERATE 35: CPT | Performed by: INTERNAL MEDICINE

## 2025-07-30 PROCEDURE — 99239 HOSP IP/OBS DSCHRG MGMT >30: CPT

## 2025-07-30 RX ORDER — QUETIAPINE FUMARATE 25 MG/1
25 TABLET, FILM COATED ORAL NIGHTLY
Qty: 30 TABLET | Refills: 0 | Status: SHIPPED | OUTPATIENT
Start: 2025-07-30

## 2025-07-30 RX ORDER — HYDRALAZINE HYDROCHLORIDE 25 MG/1
25 TABLET, FILM COATED ORAL DAILY
Start: 2025-07-30

## 2025-07-30 RX ORDER — QUETIAPINE FUMARATE 25 MG/1
25 TABLET, FILM COATED ORAL NIGHTLY
Qty: 30 TABLET | Refills: 0 | Status: SHIPPED | OUTPATIENT
Start: 2025-07-30 | End: 2025-07-30

## 2025-07-30 RX ADMIN — ENOXAPARIN SODIUM 40 MG: 100 INJECTION SUBCUTANEOUS at 10:27

## 2025-07-30 RX ADMIN — CHLORHEXIDINE GLUCONATE 5 ML: 1.2 RINSE ORAL at 10:28

## 2025-07-30 RX ADMIN — PANTOPRAZOLE SODIUM 40 MG: 40 TABLET, DELAYED RELEASE ORAL at 10:27

## 2025-07-30 RX ADMIN — Medication 10 ML: at 10:28

## 2025-07-30 NOTE — OUTREACH NOTE
Prep Survey      Flowsheet Row Responses   [facility] facility patient discharged from? [facility]   Is LACE score < 7 ? No   Eligibility [facility]   Hospital AMS   Date of Admission 07/28/25   Date of Discharge 07/30/25   Discharge Disposition Home-Health Care Svc   Discharge diagnosis AMS   Does the patient have one of the following disease processes/diagnoses(primary or secondary)? Other   Does the patient have Home health ordered? No   Is there a DME ordered? No   Prep survey completed? Yes            MARCELLA MURRAY - Registered Nurse

## 2025-07-30 NOTE — THERAPY EVALUATION
Acute Care - Physical Therapy Initial Evaluation   Sin     Patient Name: Alexander Zamora  : 1950  MRN: [id number]  Today's Date: 2025   Onset of Illness/Injury or Date of Surgery: 25  Visit Dx:     ICD-10-CM ICD-9-CM   1. Altered mental status, unspecified altered mental status type  R41.82 780.97   2. Bradycardia, unspecified  R00.1 427.89     Patient Active Problem List   Diagnosis    Prostate cancer    Erectile dysfunction following radical prostatectomy    BEULAH (stress urinary incontinence), male    Arthritis    Chronic back pain    Hypercholesterolemia    Primary hypertension    Allergic rhinitis    History of oral aphthous ulcers    Precordial pain    ASCVD (arteriosclerotic cardiovascular disease)    Bradycardia    AMS (altered mental status)     Past Medical History:   Diagnosis Date    Arthritis     Bleeding ulcer     Cancer 2015    Prostate    Chronic back pain     Collapsed lung     Depression     Elevated prostate specific antigen (PSA)     Erectile dysfunction     Hypercholesterolemia     Primary hypertension     Prostate cancer     Prostatitis      Past Surgical History:   Procedure Laterality Date    OTHER SURGICAL HISTORY      surgery for an ulcer, PNEUMOTHORAX AND ULCER THERAPY     PT Assessment (Last 12 Hours)       PT Evaluation and Treatment       Row Name 25 1511          Physical Therapy Time and Intention    Subjective Information no complaints  -     Document Type evaluation  -     Mode of Treatment physical therapy  -     Patient Effort good  -     Symptoms Noted During/After Treatment none  -       Row Name 25 1511          General Information    Patient Profile Reviewed yes  -     Onset of Illness/Injury or Date of Surgery 25  -     Referring Physician Margie  -     Patient Observations alert;cooperative;agree to therapy  -     Patient/Family/Caregiver Comments/Observations Family present in room  -     General Observations of  Patient Patient has been ambulating halls with nursing staff  -     Prior Level of Function independent:;all household mobility;community mobility  -     Equipment Currently Used at Home none  -     Limitations/Impairments safety/cognitive  -       Row Name 07/30/25 1511          Previous Level of Function/Home Environm    Bed Mobility, Previous Functional Level independent  -     Transfers, Previous Functional Level independent  -     Household Ambulation, Previous Functional Level independent  -     Stairs, Previous Functional Level independent  -     Community Ambulation, Previous Functional Level independent  -     Previous Level of Function Pt lives alone and is independent with everything.  Family does report a history of falls.  Is unsure how many.  -       Row Name 07/30/25 1511          Living Environment    Current Living Arrangements home  -     Home Accessibility stairs to enter home  -     People in Home alone  -     Primary Care Provided by self  -       Row Name 07/30/25 1511          Home Use of Assistive/Adaptive Equipment    Equipment Currently Used at Home none  -       Row Name 07/30/25 1511          Pain    Pain Location back  -     Pain Side/Orientation lower  -     Pain Management Interventions activity modification encouraged;exercise or physical activity utilized  -     Pre/Posttreatment Pain Comment Pt reports hx chronic back pain but did not rate pain during evaluation.  He reports moving and ambulating makes him feel better.  -       Row Name 07/30/25 1511          Cognition    Orientation Status (Cognition) oriented to;person;place;situation  -     Follows Commands (Cognition) WFL  -     Cognitive Function (Cognition) safety deficit  -     Personal Safety Interventions fall prevention program maintained;gait belt;muscle strengthening facilitated;nonskid shoes/slippers when out of bed;supervised activity  -       Row Name 07/30/25 1511           Range of Motion Comprehensive    General Range of Motion no range of motion deficits identified  -       Row Name 07/30/25 1511          Strength Comprehensive (MMT)    General Manual Muscle Testing (MMT) Assessment no strength deficits identified  -[facility] Name 07/30/25 1511          Bed Mobility    Bed Mobility bed mobility (all) activities  -     All Activities, [place] (Bed Mobility) independent  -[facility] Name 07/30/25 1511          Transfers    Transfers stand-sit transfer;sit-stand transfer  -       Row Name 07/30/25 1511          Sit-Stand Transfer    Sit-Stand [place] (Transfers) independent  -       Row Name 07/30/25 1511          Stand-Sit Transfer    Stand-Sit [place] (Transfers) independent  -[facility] Name 07/30/25 1511          Gait/Stairs (Locomotion)    [place] Level (Gait) independent;supervision  -     Patient was able to Ambulate yes  -     Distance in Feet (Gait) 500  -     Pattern (Gait) step-through  -[facility] Name 07/30/25 1511          Balance    Balance Assessment sitting static balance;sitting dynamic balance;standing static balance;standing dynamic balance  -     Static Sitting Balance independent  -     Dynamic Sitting Balance independent  -     Position, Sitting Balance sitting edge of bed  -     Static Standing Balance independent  -     Dynamic Standing Balance supervision  -     Position/Device Used, Standing Balance unsupported  -       Row Name 07/30/25 1511          Plan of Care Review    Plan of Care Reviewed With patient  -     Outcome Evaluation PT evaluation completed.  Patient demonstrated independent mobility.  He does tend to have decreased safety awareness at times requiring reminders about safety.  He demonstrated independent to supervision with gait without assistive device needed.  He has no further needs from skilled acute care physical therapy at this time.  -       Row Name 07/30/25 1511           Positioning and Restraints    Pre-Treatment Position in bed  -     Post Treatment Position bed  -KP     In Bed notified nsg;supine;call light within reach;encouraged to call for assist;with family/caregiver;side rails up x2  Lines in tact and needs in reach  -       Row Name 07/30/25 1511          Therapy Assessment/Plan (PT)    Patient/Family Therapy Goals Statement (PT) return home  -     Functional Level at Time of Evaluation (PT) independent to supervision  -     Criteria for Skilled Interventions Met (PT) no;does not meet criteria for skilled intervention  -     Therapy Frequency (PT) evaluation only  -       Row Name 07/30/25 1511          PT Evaluation Complexity    History, PT Evaluation Complexity 3 or more personal factors and/or comorbidities  -     Examination of Body Systems (PT Eval Complexity) total of 4 or more elements  -     Clinical Presentation (PT Evaluation Complexity) evolving  -     Clinical Decision Making (PT Evaluation Complexity) moderate complexity  -     Overall Complexity (PT Evaluation Complexity) moderate complexity  -               User Key  (r) = Recorded By, (t) = Taken By, (c) = Cosigned By      Initials Name Provider Type    Anastasia Gallardo, PT Physical Therapist                      PT Recommendation and Plan  Anticipated Discharge Disposition (PT): home with assist  Therapy Frequency (PT): evaluation only  Plan of Care Reviewed With: patient  Outcome Evaluation: PT evaluation completed.  Patient demonstrated independent mobility.  He does tend to have decreased safety awareness at times requiring reminders about safety.  He demonstrated independent to supervision with gait without assistive device needed.  He has no further needs from skilled acute care physical therapy at this time.       Time Calculation:    PT Charges       Row Name 07/30/25 1522             Time Calculation    PT Received On 07/30/25  -                User Key  (r) = Recorded By,  (t) = Taken By, (c) = Cosigned By      Initials Name Provider Type     Anastasia Polanco, PT Physical Therapist                  Therapy Charges for Today       Code Description Service Date Service Provider Modifiers Qty    02215151281 HC PT EVAL MOD COMPLEXITY 3 7/30/2025 Anastasia Polanco, PT GP 1            PT G-Codes  AM-PAC 6 Clicks Score (PT): 23    Anastasia Polanco, PT  7/30/2025

## 2025-07-30 NOTE — NURSING NOTE
"Pt daughter Brenda declines Home Health at this time, states \"He goes to outpatient therapy and I don't think he needs anyone to come in the home.\" Will notify case management to follow up.  "

## 2025-07-30 NOTE — PROGRESS NOTES
[facility]  PROGRESS NOTE    Patient information:  Name: Alexander Zamora  Age/Sex: 75 y.o. male  :  1950        PCP: Bhanva Vaughn MD  Patient Care Team Members:  Patient Care Team:  Bhavna Vaughn MD as PCP - General (Family Medicine)  Lowell Espinoza MD as Consulting Physician (Otolaryngology)  Wan Sadler Jr., MD (Otolaryngology)  Syeda Bhatia APRN as Nurse Practitioner (Cardiology)  Lowell Espinoza MD as Consulting Physician (Otolaryngology)  Syeda Bhatia APRN as Nurse Practitioner (Cardiology)  Wan Sadler Jr., MD (Otolaryngology)  Kyler Reese MD as Consulting Physician (Cardiology)  Augusto Sotelo MD as Consulting Physician (Nephrology)    MRN:  [id number]  Visit Number:  [id number]  LOS: 2  CODE STATUS:    Code Status and Medical Interventions: CPR (Attempt to Resuscitate); Full Support   Ordered at: 25 1127     Code Status (Patient has no pulse and is not breathing):    CPR (Attempt to Resuscitate)     Medical Interventions (Patient has pulse or is breathing):    Full Support     PROBLEM LIST:Principal Problem:    AMS (altered mental status)    Reason for Cardiology follow-up: Symptomatic bradycardia     Subjective   ADMISSION INFORMATION:  Chief Complaint   Patient presents with    Weakness - Generalized     DATE:2025    Admission information/HPI:  obtained from chart review      Alexander Zamora is a 75 y.o. male with a past medical history significant for:  ASCVD  Stress echo 2023: Abnormal stress echo with echocardiographic evidence for myocardial ischemia located in the inferior wall.  CT coronary angiogram 2023: Total calcium score 175, multifocal stenotic lesions involving the distal left circumflex and proximal PDA with approximately 50% as well as stenotic lesion involving the proximal third of the LAD, bicuspid aortic valve  History of bradycardia  HR 49 in  office on 3/25/2025  Noted on EKGs as far back as 2022  Hypertension  Hypercholesterolemia  History of prostate cancer s/p radical prostatectomy   Stress urinary incontinence  Chronic back pain  PUD  Insomnia  Depression  Mild dementia     Patient presented to [facility]) emergency department (ED) on 7/28/2025 with complaints of episode of dizziness, fall, and reported confusion.      Primary Cardiologist has been FELISHA Torrez and he was last seen in the office on 03/25/2025. HR 49 in office on 3/25/2025.     Interval History:   Patient was in room  when he was seen and examined.  Patient is sitting up on the edge of the bed resting quietly.  No acute distress noted this time.  Patient is oriented to self but not time and events.  Patient's daughter is at bedside.  Telemetry reveals sinus bradycardia 50s.    ORDERS:   Holter Monitor has been ordered through the Respiratory Dept here at [facility] and is to be placed on patient prior to being DC home.  Nursing staff is to call the Respiratory Department to have this done before he is DC home.  These instructions have been placed in ADT discharge instruction area as well.    Objective     Vital Signs  Temp:  [97.8 °F (36.6 °C)-99.1 °F (37.3 °C)] 98.3 °F (36.8 °C)  Heart Rate:  [44-66] 48  Resp:  [10-25] 20  BP: (110-170)/(53-87) 153/72  Device (Oxygen Therapy): room air  Vital Signs (last 72 hrs)         07/27 0700 07/28 0659 07/28 0700 07/29 0659 07/29 0700 07/30 0659 07/30 0700  07/30 1043   Most Recent      Temp (°F)   98 -  98.6    97.5 -  98.5      99.1     99.1 (37.3) 07/30 0800    Heart Rate   43 -  82    44 -  61      53     53 07/30 1024    Resp   11 -  28    10 -  23       23 07/30 0400    BP   132/49 -  203/79    110/65 -  182/78      166/80     166/80 07/30 1024    SpO2 (%)   95 -  100    95 -  100       98 07/30 0600          BMI:Body mass index is 22.14 kg/m².    WEIGHT:      07/28/25  1226 07/29/25  0400 07/30/25  0405  "  Weight: 63 kg (138 lb 14.2 oz) 70 kg (154 lb 5.2 oz) 70 kg (154 lb 5.2 oz)       DIET:Diet: Regular/House; Fluid Consistency: Thin (IDDSI 0)    I&O:  Intake & Output (last 3 days)         07/27 0701 07/28 0700 07/28 0701 07/29 0700 07/29 0701 07/30 0700 07/30 0701 07/31 0700    P.O.  400 698 550    I.V. (mL/kg)  0 (0) 0 (0)     IV Piggyback  500      Total Intake(mL/kg)  900 (12.9) 698 (10) 550 (7.9)    Net  +900 +698 +550            Urine Unmeasured Occurrence  4 x 6 x              PHYSICAL EXAM:  Vitals and nursing note reviewed.   Constitutional:       Appearance: Not in distress.   HENT:      Head: Normocephalic.   Pulmonary:      Effort: Pulmonary effort is normal.      Breath sounds: Normal breath sounds. No wheezing. No rhonchi. No rales.   Cardiovascular:      Bradycardia present. Regular rhythm.      Murmurs: There is no murmur.      No gallop.  No click. No rub.   Pulses:     Intact distal pulses.   Edema:     Peripheral edema absent.   Musculoskeletal: Normal range of motion.      Cervical back: Normal range of motion and neck supple. Skin:     General: Skin is warm and dry.   Neurological:      General: No focal deficit present.      Mental Status: Alert.   Psychiatric:         Behavior: Behavior is cooperative.                  Results review   Results Review:    I have reviewed the patient's new clinical results. 07/30/25 13:15 EDT    Results from last 7 days   Lab Units 07/28/25  0955 07/28/25  0855   HSTROP T ng/L 8 9     No results found for: \"PROBNP\"  Results from last 7 days   Lab Units 07/28/25  0855   WBC 10*3/mm3 8.08   HEMOGLOBIN g/dL 13.2   PLATELETS 10*3/mm3 235     Results from last 7 days   Lab Units 07/28/25  2323 07/28/25  0855   SODIUM mmol/L  --  138   POTASSIUM mmol/L 3.6 3.6   CHLORIDE mmol/L  --  101   CO2 mmol/L  --  23.3   BUN mg/dL  --  16.8   CREATININE mg/dL  --  1.16   CALCIUM mg/dL  --  10.5   GLUCOSE mg/dL  --  117*   ALT (SGPT) U/L  --  23   AST (SGOT) U/L  --  27 " "    Lab Results   Component Value Date    MG 2.1 07/22/2025    MG 1.9 07/16/2024    MG 2.0 03/11/2022     Estimated Creatinine Clearance: 53.6 mL/min (by C-G formula based on SCr of 1.16 mg/dL).    No results found for: \"HGBA1C\"  Lab Results   Component Value Date    CHOL 235 (H) 01/16/2025    TRIG 118 01/16/2025     (H) 01/16/2025    HDL 70 (H) 01/16/2025        No results found for: \"INR\"  No results found for: \"LABHEPA\"    Lab Results   Component Value Date    TSH 3.230 07/28/2025    PSA <0.014 04/18/2025      Pain Management Panel  More data exists         Latest Ref Rng & Units 7/28/2025 7/22/2025   Pain Management Panel   Creatinine, Urine mg/dL - 169.9    Amphetamine, Urine Qual Negative Negative  -   Barbiturates Screen, Urine Negative Negative  -   Benzodiazepine Screen, Urine Negative Negative  -   Buprenorphine, Screen, Urine Negative Negative  -   Cocaine Screen, Urine Negative Negative  -   Fentanyl, Urine Negative Negative  -   Methadone Screen , Urine Negative Negative  -   Methamphetamine, Ur Negative Negative  -     Microbiology Results (last 10 days)       ** No results found for the last 240 hours. **           Imaging Results (Last 24 Hours)       ** No results found for the last 24 hours. **          ECHO:  Results for orders placed during the hospital encounter of 01/05/23    Adult Stress Echo W/ Cont or Stress Agent if Necessary Per Protocol 01/11/2023  6:26 PM    Interpretation Summary    Echocardiogram Findings:    Normal left ventricular cavity size and wall thickness noted. All left ventricular wall segments contract normally.    Left ventricular ejection fraction appears to be 61 - 65%.    Left ventricular diastolic function is consistent with (grade I) impaired relaxation.    The mitral valve is structurally normal with no significant stenosis present. Mild mitral valve regurgitation is present.    The aortic valve is structurally normal with no regurgitation or stenosis " present.    There is no evidence of pericardial effusion. .    Stress Procedure    A stress test was performed following the Humberto protocol.    Exercise duration (min) 8 min Estimated workload 10.1 METS    Baseline Vitals Baseline HR 53 bpm Baseline /66 mmHg Peak Stress Vitals Peak  bpm Peak /60 mmHg Recovery Vitals Recovery HR 75 bpm Recovery /71 mmHg Exercise Data Target HR (85%) 125 bpm Max. Pred. HR (100%) 147 bpm Percent Max Pred HR 85.71 %    Arrhythmias during stress: rare PVCs, bigeminy.    Findings consistent with an indeterminate ECG stress test.    Stress Echo Findings    Segment augmentation had an abnormal response to stress. Left ventricular function is normal    The following left ventricular wall segments are hypokinetic: basal inferolateral, basal inferoseptal, mid inferoseptal, basal inferior and basal inferoseptal.    Abnormal stress echo with echocardiographic evidence for myocardial ischemia located in the inferior wall.      STRESS TEST:   No results found for this or any previous visit.         HEART CATH:  No results found for this or any previous visit.        EP STUDY/Interrogations:   No results found for this or any previous visit.        TELEMETRY:                 I reviewed the patient's new clinical results.    ALLERGIES: Crestor [rosuvastatin calcium] and Amoxicillin    Medication Review:   Current list of medications may not reflect those currently placed in orders that are not signed or are being held.     amLODIPine, 10 mg, Oral, Daily  chlorhexidine, 5 mL, Mouth/Throat, BID  enoxaparin sodium, 40 mg, Subcutaneous, Daily  fluticasone, 2 spray, Nasal, Daily  hydrALAZINE, 50 mg, Oral, BID  montelukast, 10 mg, Oral, Nightly  pantoprazole, 40 mg, Oral, QAM AC  QUEtiapine, 25 mg, Oral, Nightly  senna-docusate sodium, 2 tablet, Oral, BID  sodium chloride, 10 mL, Intravenous, Q12H  spironolactone, 25 mg, Oral, Daily           acetaminophen **OR** acetaminophen  **OR** acetaminophen    senna-docusate sodium **AND** polyethylene glycol **AND** bisacodyl **AND** bisacodyl    Calcium Replacement - Follow Nurse / BPA Driven Protocol    Magnesium Standard Dose Replacement - Follow Nurse / BPA Driven Protocol    nitroglycerin    Phosphorus Replacement - Follow Nurse / BPA Driven Protocol    Potassium Replacement - Follow Nurse / BPA Driven Protocol    [COMPLETED] Insert Peripheral IV **AND** sodium chloride    sodium chloride    sodium chloride    ziprasidone (GEODON) 10 mg in sterile water (preservative free) 0.5 mL injection        Admitting Problem List Principal Problem:    AMS (altered mental status)    Assessment      Bradycardia        Recommendations / Plan     EKG reviewed: Sinus bradycardia  Telemetry reviewed: Sinus bradycardia, no evidence of long pauses or AV block  TTE reviewed, normal LV systolic function, mild diastolic dysfunction, mild valvular disease.  Troponin x 2 negative, no evidence of ischemia  No further cardiac workup indicated as an inpatient  Recommend event monitor on discharge.  Follow-up with outpatient cardiology.  Cardiology will sign off      Thank you very much for asking us to be involved in this patient's care.  Please do not hesitate to call for any questions or concerns.     Electronically signed by FELISHA Suh, 07/30/25, 1:16 PM EDT.               Please note that portions of this note were completed with a voice recognition program.    Please note that portions of this note were copied and has been reviewed and is accurate as of 7/30/2025 .

## 2025-07-30 NOTE — PLAN OF CARE
Goal Outcome Evaluation:              Outcome Evaluation: A&Ox3, RA, Bed alarm refused daughter at bedside.  Pt walked around nurses station with CNA and Phy therapy several times today.  Echo complete today.  Pt to have Holter monitor placed before discharge d/t bradycardia.  No other issues at this time.  Bed in  low position with call light in reach.  Care plan ongoing.

## 2025-07-30 NOTE — PLAN OF CARE
Problem: Adult Inpatient Plan of Care  Goal: Plan of Care Review  Outcome: Progressing     Patient resting on RA, VSS, A&O x4 w/ intermittent confusion. Plan of care ongoing.

## 2025-07-30 NOTE — CASE MANAGEMENT/SOCIAL WORK
Discharge Planning Assessment   Sin     Patient Name: Alexander Zamora  MRN: [id number]  Today's Date: 7/30/2025    Admit Date: 7/28/2025     Discharge Plan       Row Name 07/30/25 1207       Plan    Plan SS discussed pt with CM on this date. Dtr is requesting some information on possible care givers. SS provided dtr Brenda with contact infomation for home helpers an [employer]. Dtr voiced she has pt signed up for [employer] but due to wait list unsure how long that would take. Dtr voiced she would contact agencies. Dtr is requesting home health and has no preference for home health agency. CM will follow.               MALIKA Montgomery

## 2025-07-30 NOTE — DISCHARGE INSTRUCTIONS
Holter Monitor has been ordered through the Respiratory Dept here at [facility] and is to be placed on patient prior to being DC home.    Nursing staff is to call the Respiratory Department to have this done before he is DC home.

## 2025-07-30 NOTE — DISCHARGE SUMMARY
[facility] HOSPITALIST DISCHARGE SUMMARY    Patient Identification:  Name:  Alexander Zamora  Age:  75 y.o.  Sex:  male  :  1950  MRN:  [id number]  Visit Number:  [id number]    Date of Admission: 2025  Date of Discharge:  25     PCP: Bhavna Vaughn MD    Discharging Provider: Alejandro Underwood PA-C / Dr. Farr    Discharge Diagnoses     Discharge Diagnoses:  Dementia with reported AMS, PTA, currently at baseline mentation  Dizziness with fall  Sinus bradycardia  Hypertensive urgency, resolved    Secondary Diagnoses:  Prostate cancer s/p radical prostatectomy  ASCVD  Chronic back pain  Hyperlipidemia    Needs on follow up:  PCP 1 week  Consults/Procedures     Consults:   Consults       Date and Time Order Name Status Description    2025  7:51 AM Inpatient Cardiology Consult Completed             Procedures/Scans Performed:     CXR  CT head without contrast    History of Presenting Illness     Chief Complaint   Patient presents with    Weakness - Generalized       Patient is a 75 y.o. male who presented to [facility] complaining of generalized weakness.  Please see the admitting history and physical for further details.      Hospital Course     Patient was admitted to [facility] following presentation to [facility] ED for further evaluation of AMS, fall.  Patient has history of dementia but on admitting exam was oriented to person, time, not place.  Sister was present and aided in providing history-patient was able to report that he woke the a.m. of ED presentation got out of bed and suddenly became dizzy, falling onto the floor.  He denied head injury.  He denied any chest pain or palpitations.  He reported a new medication which had been prescribed for sleep-was unable to state exact drug name however per later conversation with daughter believes he was recently started on Seroquel.  Med reconciliation did not confirm this.  Laboratory workup was unrevealing, no significant lab  "abnormalities, chest x-ray negative, CT head showed atrophy with no acute changes.  BP was elevated on arrival at 174/69, did trend as high as 203/79.  He was admitted to the PCU for further workup and management.  Orthostatic vital signs were checked and negative.  On follow-up exam patient reported he felt to be at baseline.  There was concern that he had had an episode of symptomatic bradycardia.  Cardiology was consulted for further recommendations.  Did note concern that recently increased dose of Aricept may have worsened his chronic sinus bradycardia and this medication has been stopped.  He was started on Seroquel nightly 25 mg with good response noted.  Continue at discharge.  Recommend follow-up with PCP regarding other options for medications to help with memory loss. TSH and T4 checked and within normal limits.  He was further evaluated with TTE which showed normal LVEF, grade 1 diastolic dysfunction, mild aortic valve stenosis, normal RVSP.  Cardiology has ordered a cardiac event monitor to be placed at discharge.  He will need to follow-up with primary cardiologist in clinic in 2 weeks.  His blood pressure was elevated but has improved with Norvasc, hydralazine, spironolactone given.  Notably per pharmacy review patient has been filling both 25 mg hydralazine tablets and 50 mg hydralazine tablets both written for twice daily from 2 pharmacies and 2 different providers.  Hydralazine will be continued at discharge at 25 mg twice daily. Daughter and patient state he is diligent about taking his blood pressure daily.  Holding parameters added to hydralazine.  Given inpatient workup complete, patient clinically improved and adamant about returning home Case was discussed with attending physician and agree he has reached the maximum benefit of the current hospitalization.  Discussed discharge plan at length with patient and daughter-daughter stated \"I have my hands full.\"  She ultimately advised that she " would like the patient to remain at home and as independent as possible for as long as possible.  She and the patient were agreeable with home health and this will be ordered with PT and OT at discharge.  Will have the patient follow-up with his PCP in 1 week.  This discharge and follow-up plan was discussed with the patient and daughter on the date of this documentation and they expressed agreement and understanding.    Discharge Vitals/Physical Examination     Vital Signs:  Temp:  [97.8 °F (36.6 °C)-99.1 °F (37.3 °C)] 99 °F (37.2 °C)  Heart Rate:  [44-66] 50  Resp:  [10-25] 16  BP: (110-170)/(53-87) 160/71  Mean Arterial Pressure (Non-Invasive) for the past 24 hrs (Last 3 readings):   Noninvasive MAP (mmHg)   07/30/25 1600 127   07/30/25 1530 94   07/30/25 1430 96     SpO2 Percentage    07/30/25 1430 07/30/25 1530 07/30/25 1600   SpO2: 97% 94% 99%     SpO2:  [94 %-100 %] 99 %  on   ;   Device (Oxygen Therapy): room air    Body mass index is 22.14 kg/m².  Wt Readings from Last 3 Encounters:   07/30/25 70 kg (154 lb 5.2 oz)   07/22/25 65 kg (143 lb 6.4 oz)   06/20/25 65 kg (143 lb 3.2 oz)         Physical Exam:  Physical Exam  Vitals and nursing note reviewed.   Constitutional:       General: He is not in acute distress.  HENT:      Head: Normocephalic and atraumatic.   Cardiovascular:      Rate and Rhythm: Regular rhythm. Bradycardia present.   Pulmonary:      Effort: Pulmonary effort is normal. No respiratory distress.   Abdominal:      Palpations: Abdomen is soft.   Musculoskeletal:      Right lower leg: No edema.      Left lower leg: No edema.   Skin:     General: Skin is warm and dry.   Neurological:      Mental Status: He is alert. Mental status is at baseline.   Psychiatric:         Mood and Affect: Mood normal.         Behavior: Behavior normal.         Pertinent Laboratory/Radiology Results     Pertinent Laboratory Results:  Results from last 7 days   Lab Units 07/28/25  0955 07/28/25  0855   HSTROP T ng/L  "8 9           Results from last 7 days   Lab Units 07/28/25  0855   CRP mg/dL <0.30   WBC 10*3/mm3 8.08   HEMOGLOBIN g/dL 13.2   HEMATOCRIT % 39.0   MCV fL 88.4   MCHC g/dL 33.8   PLATELETS 10*3/mm3 235     Results from last 7 days   Lab Units 07/28/25  2323 07/28/25  0855   SODIUM mmol/L  --  138   POTASSIUM mmol/L 3.6 3.6   CHLORIDE mmol/L  --  101   CO2 mmol/L  --  23.3   BUN mg/dL  --  16.8   CREATININE mg/dL  --  1.16   CALCIUM mg/dL  --  10.5   GLUCOSE mg/dL  --  117*   ALBUMIN g/dL  --  4.5   BILIRUBIN mg/dL  --  0.5   ALK PHOS U/L  --  103   AST (SGOT) U/L  --  27   ALT (SGPT) U/L  --  23   Estimated Creatinine Clearance: 53.6 mL/min (by C-G formula based on SCr of 1.16 mg/dL).  Ammonia   Date Value Ref Range Status   07/28/2025 38 16 - 60 umol/L Final       Glucose   Date/Time Value Ref Range Status   07/28/2025 0844 116 70 - 130 mg/dL Final     Comment:     Serial Number: [id number]:  [id number]     No results found for: \"HGBA1C\"  Lab Results   Component Value Date    TSH 3.230 07/28/2025    FREET4 1.02 07/28/2025       No results found for: \"BLOODCX\"  No results found for: \"URINECX\"  No results found for: \"WOUNDCX\"  No results found for: \"STOOLCX\"  No results found for: \"RESPCX\"  Pain Management Panel  More data exists         Latest Ref Rng & Units 7/28/2025 7/22/2025   Pain Management Panel   Creatinine, Urine mg/dL - 169.9    Amphetamine, Urine Qual Negative Negative  -   Barbiturates Screen, Urine Negative Negative  -   Benzodiazepine Screen, Urine Negative Negative  -   Buprenorphine, Screen, Urine Negative Negative  -   Cocaine Screen, Urine Negative Negative  -   Fentanyl, Urine Negative Negative  -   Methadone Screen , Urine Negative Negative  -   Methamphetamine, Ur Negative Negative  -       Pertinent Radiology Results:  Imaging Results (All)       Procedure Component Value Units Date/Time    XR Chest 1 View [[id number]] Collected: 07/28/25 0928     Updated: 07/28/25 0931    Narrative:      " XR CHEST 1 VW-     CLINICAL INDICATION: weakness        COMPARISON: 7/29/2015     TECHNIQUE: Single frontal view of the chest.     FINDINGS:     LUNGS: Lungs are adequately aerated.      HEART AND MEDIASTINUM: Heart and mediastinal contours are unremarkable        SKELETON: Bony and soft tissue structures are unremarkable.             Impression:      No radiographic evidence of acute cardiac or pulmonary disease.           This report was finalized on 7/28/2025 9:28 AM by Dr. Ralph Kohli MD.       CT Head Without Contrast [[id number]] Collected: 07/28/25 0914     Updated: 07/28/25 0921    Narrative:      CT HEAD WO CONTRAST-     CLINICAL INDICATION: Weakness, dizziness        COMPARISON: 1/2/2025      TECHNIQUE: Axial images of the brain were obtained with out intravenous  contrast.  Reformatted images were created in the sagittal and coronal  planes.     DOSE:     Radiation dose reduction techniques were utilized per ALARA protocol.  Automated exposure control was initiated through either or CareDose or  DoseRight software packages by  protocol.        FINDINGS:    BRAIN:  Unremarkable.  No hemorrhage.  No significant white matter  disease.  No edema.       VENTRICLES:  Unremarkable.  No ventriculomegaly.       BONES/JOINTS:  Unremarkable.  No acute fracture.       SOFT TISSUES:  Unremarkable.       SINUSES:  no air fluid levels       MASTOID AIR CELLS:  Unremarkable as visualized.  No mastoid effusion.          Impression:         1. Atrophy  2. No parenchymal mass, hemorrhage, or midline shift     This report was finalized on 7/28/2025 9:19 AM by Dr. Ralph Kohli MD.               Discharge Disposition/Discharge Medications/Discharge Appointments     Discharge Disposition:   Home-Health Care [facility] [place]    Discharge Follow up:   Additional Instructions for the Follow-ups that You Need to Schedule       Ambulatory Referral to Home Health   As directed      Face to Face Visit Date: 7/30/2025   Follow-up  provider for Plan of Care?: I treated the patient in an acute care facility and will not continue treatment after discharge.   Follow-up provider: PROSPER PHELAN [[id number]]   Reason/Clinical Findings: dementia, advanced age, age related debility   Describe mobility limitations that make leaving home difficult: dementia, advanced age, age related debility   Nursing/Therapeutic Services Requested: Skilled Nursing Physical Therapy Occupational Therapy   Skilled nursing orders: Medication education Cardiopulmonary assessments Neurovascular assessments   PT orders: Total joint pathway Therapeutic exercise Gait Training Transfer training Strengthening Home safety assessment   Weight Bearing Status: As Tolerated   Occupational orders: Activities of daily living Strengthening Cognition Fine motor Home safety assessment   Frequency: 1 Week 1        Discharge Follow-up with PCP   As directed       Currently Documented PCP:    Prosper Phelan MD    PCP Phone Number:    [phone]     Follow Up Details: 1 week               Follow-up Information       Prosper Phelan MD .    Specialty: Family Medicine  Why: 1 week  Contact information:  [address] [place] [address]  [phone]               Syeda Bhatia APRN .    Specialty: Cardiology  Contact information:  [address] [place] [address]  [phone]                             Condition at Discharge:  Stable     Discharge Medications:     Your medication list        START taking these medications        Instructions Last Dose Given Next Dose Due   QUEtiapine 25 MG tablet  Commonly known as: SEROquel      Take 1 tablet by mouth Every Night.              CHANGE how you take these medications        Instructions Last Dose Given Next Dose Due   hydrALAZINE 25 MG tablet  Commonly known as: APRESOLINE  What changed:   additional instructions  Another medication with the same name was removed. Continue taking this medication, and follow the directions you  see here.      Take 1 tablet by mouth Daily. Hold for systolic blood pressure (top number) less than 110, diastolic blood pressure (bottom number) less than 60              CONTINUE taking these medications        Instructions Last Dose Given Next Dose Due   amLODIPine 10 MG tablet  Commonly known as: NORVASC      Take 1 tablet by mouth Daily.       cevimeline 30 MG capsule  Commonly known as: EVOXAC      Take 1 capsule by mouth 3 (Three) Times a Day.       chlorhexidine 0.12 % solution  Commonly known as: PERIDEX      Apply 5 mL to the mouth or throat 2 (Two) Times a Day.       ezetimibe 10 MG tablet  Commonly known as: ZETIA      Take 1 tablet by mouth Daily.       fluticasone 50 MCG/ACT nasal spray  Commonly known as: FLONASE      Administer 2 sprays into the nostril(s) as directed by provider Daily.       methocarbamol 500 MG tablet  Commonly known as: ROBAXIN      Take 1 tablet by mouth 2 (Two) Times a Day.       montelukast 10 MG tablet  Commonly known as: SINGULAIR      Take 1 tablet by mouth Every Night.       pantoprazole 40 MG EC tablet  Commonly known as: PROTONIX      Take 1 tablet by mouth Daily.       spironolactone 25 MG tablet  Commonly known as: ALDACTONE      Take 1 tablet by mouth Daily.              STOP taking these medications      donepezil 10 MG tablet  Commonly known as: ARICEPT                  Where to Get Your Medications        These medications were sent to [address] [phone] [facility] [phone] [address] [address]      Phone: [phone]   QUEtiapine 25 MG tablet       Information about where to get these medications is not yet available    Ask your nurse or doctor about these medications  hydrALAZINE 25 MG tablet        Discharge Diet:    Diet Order   Procedures    Diet: Regular/House; Fluid Consistency: Thin (IDDSI 0)        Discharge Activity:  as tolerated    The 10-year ASCVD risk score (Uriel RAHMAN, et al., 2019)  is: 39.5%    Values used to calculate the score:      Age: 75 years      Sex: Male      Is Non- : No      Diabetic: No      Tobacco smoker: No      Systolic Blood Pressure: 160 mmHg      Is BP treated: Yes      HDL Cholesterol: 70 mg/dL      Total Cholesterol: 235 mg/dL     Time spent on this discharge exceeded 30 minutes.

## 2025-07-30 NOTE — CASE MANAGEMENT/SOCIAL WORK
Continued Stay Note  [facility]     Patient Name: Alexander Zamora  MRN: [id number]  Today's Date: 7/30/2025    Admit Date: 7/28/2025    Plan: CM spoke with Pt and daughter Brenda at bedside. Pt daughter is requesting HH and other sources such as [facility]. SS provided daughter with contact information. Pt discharge plans remain to go home at d/c with HH.  CM will follow,.   Discharge Plan       Row Name 07/30/25 1607       Plan    Plan CM spoke with Pt and daughter Brenda at bedside. Pt daughter is requesting HH and other sources such as [facility]. SS provided daughter with contact information. Pt discharge plans remain to go home at d/c with HH.  CM will follow,.                             Soo Ram RN

## 2025-07-31 ENCOUNTER — TRANSITIONAL CARE MANAGEMENT TELEPHONE ENCOUNTER (OUTPATIENT)
Dept: CALL CENTER | Facility: HOSPITAL | Age: 75
End: 2025-07-31
Payer: MEDICARE

## 2025-07-31 NOTE — OUTREACH NOTE
Call Center TCM Note      Flowsheet Row Responses   [facility] patient discharged from? [facility]   Does the patient have one of the following disease processes/diagnoses(primary or secondary)? Other   TCM attempt successful? Yes   Call start time 1052   Call end time 1058   Discharge diagnosis AMS   Is patient permission given to speak with other caregiver? Yes   List who call center can speak with Daughter--Brenda Zook   Person spoke with today (if not patient) and relationship Daughter--Brenda Tam   Medication alerts for this patient Seroquel, Hydralazine.   Meds reviewed with patient/caregiver? Yes   Is the patient having any side effects they believe may be caused by any medication additions or changes? No   Does the patient have all medications ordered at discharge? Yes   Prescription comments No questions or concerns with medications.   Is the patient taking all medications as directed (includes completed medication regime)? Yes   Comments Assisted daughter in scheduling a [facility] f/u appt with Dr. Bhavna Vaughn on 8/6/25 at 0930 AM.   Does the patient have an appointment with their PCP within 7-14 days of discharge? No   Nursing Interventions Assisted patient with making appointment per protocol   Has home health visited the patient within 72 hours of discharge? N/A   Psychosocial issues? No   Comments Daughter states patient is doing much better. He wanted to go to the grocery last night, full of energy. Weakness has resolved. Patient has cardiac monitor in place. Will f/u with Cardiology for removal and f/u appt.   Did the patient receive a copy of their discharge instructions? Yes   Nursing interventions Reviewed instructions with patient   What is the patient's perception of their health status since discharge? Returned to baseline/stable   Is the patient/caregiver able to teach back signs and symptoms related to disease process for when to call PCP? Yes   Is the patient/caregiver able to  teach back signs and symptoms related to disease process for when to call 911? Yes   Is the patient/caregiver able to teach back the hierarchy of who to call/visit for symptoms/problems? PCP, Specialist, Home health nurse, Urgent Care, ED, 911 Yes   If the patient is a current smoker, are they able to teach back resources for cessation? Not a smoker   TCM call completed? Yes   Wrap up additional comments Assisted daughter in scheduling a [facility] f/u appt with Dr. Bhavna Vaughn on 8/6/25 at 0930 AM.   Call end time 1058   Would this patient benefit from a Referral to [facility] Social Work? No   Is the patient interested in additional calls from an ambulatory ? No            Anastasia MICHAELS - Registered Nurse    7/31/2025, 11:01 EDT

## 2025-07-31 NOTE — PAYOR COMM NOTE
"CONTACT:  MAYRA PARMAR RN  UTILIZATION MANAGEMENT DEPT.   [facility]    [address] [facility], [address]   PHONE:  [phone]   FAX: [phone]   [facility] [phone]        PR HOME/HOME HEALTH 7/30/25--AUTH PENDING    REF # [id number]              Carla Zamora (75 y.o. Male)       Date of Birth   1950    Social Security Number       Address   [address] [facility] [address]    Home Phone   [phone]    MRN   [id number]       [facility]    Marital Status   Unknown                            Admission Date   7/28/2025    Admission Type   Emergency    Admitting Provider   Onesimo Farr MD    Attending Provider       Department, Room/Bed   [facility] PROGRESS CARE, P220/1P       Discharge Date   7/30/2025    Discharge Disposition   Home-Health Care Svc    Discharge Destination                                 Attending Provider: (none)   Allergies: Crestor [Rosuvastatin Calcium], Amoxicillin    Isolation: None   Infection: None   Code Status: Prior    Ht: 177.8 cm (70\")   Wt: 70 kg (154 lb 5.2 oz)    Admission Cmt: None   Principal Problem: AMS (altered mental status) [R41.82]                   Active Insurance as of 7/28/2025       Primary Coverage       Payor Plan Insurance Group Employer/Plan Group    ANTHEM MEDICARE REPLACEMENT ANTH MEDICARE ADVANTAGE HMO [id number]       Payor Plan Address Payor Plan Phone Number Payor Plan Fax Number Effective Dates    [address] [phone]  1/1/2025 - None Entered    [facility] [address]         Subscriber Name Subscriber Birth Date Member ID       CARLA ZAMORA 1950 [id number]                     Emergency Contacts        (Rel.) Home Phone Work Phone Mobile Phone    Brenda Tam (Daughter) [phone] -- [phone]    Janee Rubio (Sister) -- -- [phone]                 Discharge Summary        Lencho Underwood PA-C at 07/30/25 1519              [facility] " HOSPITALIST DISCHARGE SUMMARY    Patient Identification:  Name:  Alexander Zamora  Age:  75 y.o.  Sex:  male  :  1950  MRN:  [id number]  Visit Number:  [id number]    Date of Admission: 2025  Date of Discharge:  25     PCP: Bhavna Vaughn MD    Discharging Provider: Alejandro Underwood PA-C / Dr. Farr    Discharge Diagnoses     Discharge Diagnoses:  Dementia with reported AMS, PTA, currently at baseline mentation  Dizziness with fall  Sinus bradycardia  Hypertensive urgency, resolved    Secondary Diagnoses:  Prostate cancer s/p radical prostatectomy  ASCVD  Chronic back pain  Hyperlipidemia    Needs on follow up:  PCP 1 week  Consults/Procedures     Consults:   Consults       Date and Time Order Name Status Description    2025  7:51 AM Inpatient Cardiology Consult Completed             Procedures/Scans Performed:     CXR  CT head without contrast    History of Presenting Illness     Chief Complaint   Patient presents with    Weakness - Generalized       Patient is a 75 y.o. male who presented to [facility] complaining of generalized weakness.  Please see the admitting history and physical for further details.      Hospital Course     Patient was admitted to [facility] following presentation to [facility] ED for further evaluation of AMS, fall.  Patient has history of dementia but on admitting exam was oriented to person, time, not place.  Sister was present and aided in providing history-patient was able to report that he woke the a.m. of ED presentation got out of bed and suddenly became dizzy, falling onto the floor.  He denied head injury.  He denied any chest pain or palpitations.  He reported a new medication which had been prescribed for sleep-was unable to state exact drug name however per later conversation with daughter believes he was recently started on Seroquel.  Med reconciliation did not confirm this.  Laboratory workup was unrevealing, no significant lab abnormalities, chest x-ray  "negative, CT head showed atrophy with no acute changes.  BP was elevated on arrival at 174/69, did trend as high as 203/79.  He was admitted to the PCU for further workup and management.  Orthostatic vital signs were checked and negative.  On follow-up exam patient reported he felt to be at baseline.  There was concern that he had had an episode of symptomatic bradycardia.  Cardiology was consulted for further recommendations.  Did note concern that recently increased dose of Aricept may have worsened his chronic sinus bradycardia and this medication has been stopped.  He was started on Seroquel nightly 25 mg with good response noted.  Continue at discharge.  Recommend follow-up with PCP regarding other options for medications to help with memory loss. TSH and T4 checked and within normal limits.  He was further evaluated with TTE which showed normal LVEF, grade 1 diastolic dysfunction, mild aortic valve stenosis, normal RVSP.  Cardiology has ordered a cardiac event monitor to be placed at discharge.  He will need to follow-up with primary cardiologist in clinic in 2 weeks.  His blood pressure was elevated but has improved with Norvasc, hydralazine, spironolactone given.  Notably per pharmacy review patient has been filling both 25 mg hydralazine tablets and 50 mg hydralazine tablets both written for twice daily from 2 pharmacies and 2 different providers.  Hydralazine will be continued at discharge at 25 mg twice daily. Daughter and patient state he is diligent about taking his blood pressure daily.  Holding parameters added to hydralazine.  Given inpatient workup complete, patient clinically improved and adamant about returning home Case was discussed with attending physician and agree he has reached the maximum benefit of the current hospitalization.  Discussed discharge plan at length with patient and daughter-daughter stated \"I have my hands full.\"  She ultimately advised that she would like the patient to " remain at home and as independent as possible for as long as possible.  She and the patient were agreeable with home health and this will be ordered with PT and OT at discharge.  Will have the patient follow-up with his PCP in 1 week.  This discharge and follow-up plan was discussed with the patient and daughter on the date of this documentation and they expressed agreement and understanding.    Discharge Vitals/Physical Examination     Vital Signs:  Temp:  [97.8 °F (36.6 °C)-99.1 °F (37.3 °C)] 99 °F (37.2 °C)  Heart Rate:  [44-66] 50  Resp:  [10-25] 16  BP: (110-170)/(53-87) 160/71  Mean Arterial Pressure (Non-Invasive) for the past 24 hrs (Last 3 readings):   Noninvasive MAP (mmHg)   07/30/25 1600 127   07/30/25 1530 94   07/30/25 1430 96     SpO2 Percentage    07/30/25 1430 07/30/25 1530 07/30/25 1600   SpO2: 97% 94% 99%     SpO2:  [94 %-100 %] 99 %  on   ;   Device (Oxygen Therapy): room air    Body mass index is 22.14 kg/m².  Wt Readings from Last 3 Encounters:   07/30/25 70 kg (154 lb 5.2 oz)   07/22/25 65 kg (143 lb 6.4 oz)   06/20/25 65 kg (143 lb 3.2 oz)         Physical Exam:  Physical Exam  Vitals and nursing note reviewed.   Constitutional:       General: He is not in acute distress.  HENT:      Head: Normocephalic and atraumatic.   Cardiovascular:      Rate and Rhythm: Regular rhythm. Bradycardia present.   Pulmonary:      Effort: Pulmonary effort is normal. No respiratory distress.   Abdominal:      Palpations: Abdomen is soft.   Musculoskeletal:      Right lower leg: No edema.      Left lower leg: No edema.   Skin:     General: Skin is warm and dry.   Neurological:      Mental Status: He is alert. Mental status is at baseline.   Psychiatric:         Mood and Affect: Mood normal.         Behavior: Behavior normal.         Pertinent Laboratory/Radiology Results     Pertinent Laboratory Results:  Results from last 7 days   Lab Units 07/28/25  0955 07/28/25  0855   HSTROP T ng/L 8 9           Results from  "last 7 days   Lab Units 07/28/25  0855   CRP mg/dL <0.30   WBC 10*3/mm3 8.08   HEMOGLOBIN g/dL 13.2   HEMATOCRIT % 39.0   MCV fL 88.4   MCHC g/dL 33.8   PLATELETS 10*3/mm3 235     Results from last 7 days   Lab Units 07/28/25  2323 07/28/25  0855   SODIUM mmol/L  --  138   POTASSIUM mmol/L 3.6 3.6   CHLORIDE mmol/L  --  101   CO2 mmol/L  --  23.3   BUN mg/dL  --  16.8   CREATININE mg/dL  --  1.16   CALCIUM mg/dL  --  10.5   GLUCOSE mg/dL  --  117*   ALBUMIN g/dL  --  4.5   BILIRUBIN mg/dL  --  0.5   ALK PHOS U/L  --  103   AST (SGOT) U/L  --  27   ALT (SGPT) U/L  --  23   Estimated Creatinine Clearance: 53.6 mL/min (by C-G formula based on SCr of 1.16 mg/dL).  Ammonia   Date Value Ref Range Status   07/28/2025 38 16 - 60 umol/L Final       Glucose   Date/Time Value Ref Range Status   07/28/2025 0844 116 70 - 130 mg/dL Final     Comment:     Serial Number: [id number]:  [id number]     No results found for: \"HGBA1C\"  Lab Results   Component Value Date    TSH 3.230 07/28/2025    FREET4 1.02 07/28/2025       No results found for: \"BLOODCX\"  No results found for: \"URINECX\"  No results found for: \"WOUNDCX\"  No results found for: \"STOOLCX\"  No results found for: \"RESPCX\"  Pain Management Panel  More data exists         Latest Ref Rng & Units 7/28/2025 7/22/2025   Pain Management Panel   Creatinine, Urine mg/dL - 169.9    Amphetamine, Urine Qual Negative Negative  -   Barbiturates Screen, Urine Negative Negative  -   Benzodiazepine Screen, Urine Negative Negative  -   Buprenorphine, Screen, Urine Negative Negative  -   Cocaine Screen, Urine Negative Negative  -   Fentanyl, Urine Negative Negative  -   Methadone Screen , Urine Negative Negative  -   Methamphetamine, Ur Negative Negative  -       Pertinent Radiology Results:  Imaging Results (All)       Procedure Component Value Units Date/Time    XR Chest 1 View [[id number]] Collected: 07/28/25 0928     Updated: 07/28/25 0931    Narrative:      XR CHEST 1 VW-   "   CLINICAL INDICATION: weakness        COMPARISON: 7/29/2015     TECHNIQUE: Single frontal view of the chest.     FINDINGS:     LUNGS: Lungs are adequately aerated.      HEART AND MEDIASTINUM: Heart and mediastinal contours are unremarkable        SKELETON: Bony and soft tissue structures are unremarkable.             Impression:      No radiographic evidence of acute cardiac or pulmonary disease.           This report was finalized on 7/28/2025 9:28 AM by Dr. Ralph Kohli MD.       CT Head Without Contrast [[id number]] Collected: 07/28/25 0914     Updated: 07/28/25 0921    Narrative:      CT HEAD WO CONTRAST-     CLINICAL INDICATION: Weakness, dizziness        COMPARISON: 1/2/2025      TECHNIQUE: Axial images of the brain were obtained with out intravenous  contrast.  Reformatted images were created in the sagittal and coronal  planes.     DOSE:     Radiation dose reduction techniques were utilized per ALARA protocol.  Automated exposure control was initiated through either or CareDose or  DoseRight software packages by  protocol.        FINDINGS:    BRAIN:  Unremarkable.  No hemorrhage.  No significant white matter  disease.  No edema.       VENTRICLES:  Unremarkable.  No ventriculomegaly.       BONES/JOINTS:  Unremarkable.  No acute fracture.       SOFT TISSUES:  Unremarkable.       SINUSES:  no air fluid levels       MASTOID AIR CELLS:  Unremarkable as visualized.  No mastoid effusion.          Impression:         1. Atrophy  2. No parenchymal mass, hemorrhage, or midline shift     This report was finalized on 7/28/2025 9:19 AM by Dr. Ralph Kohli MD.               Discharge Disposition/Discharge Medications/Discharge Appointments     Discharge Disposition:   Home-Health Care [facility]    Discharge Follow up:   Additional Instructions for the Follow-ups that You Need to Schedule       Ambulatory Referral to Home Health   As directed      Face to Face Visit Date: 7/30/2025   Follow-up provider for Plan of  Care?: I treated the patient in an acute care facility and will not continue treatment after discharge.   Follow-up provider: PROSPER PHELAN [[id number]]   Reason/Clinical Findings: dementia, advanced age, age related debility   Describe mobility limitations that make leaving home difficult: dementia, advanced age, age related debility   Nursing/Therapeutic Services Requested: Skilled Nursing Physical Therapy Occupational Therapy   Skilled nursing orders: Medication education Cardiopulmonary assessments Neurovascular assessments   PT orders: Total joint pathway Therapeutic exercise Gait Training Transfer training Strengthening Home safety assessment   Weight Bearing Status: As Tolerated   Occupational orders: Activities of daily living Strengthening Cognition Fine motor Home safety assessment   Frequency: 1 Week 1        Discharge Follow-up with PCP   As directed       Currently Documented PCP:    Prosper Phelan MD    PCP Phone Number:    [phone]     Follow Up Details: 1 week               Follow-up Information       Prosper Phelan MD .    Specialty: Family Medicine  Why: 1 week  Contact information:  [address] [place] [address]  [phone]               Syeda Bhatia APRN .    Specialty: Cardiology  Contact information:  [address] [place] [address]  [phone]                             Condition at Discharge:  Stable     Discharge Medications:     Your medication list        START taking these medications        Instructions Last Dose Given Next Dose Due   QUEtiapine 25 MG tablet  Commonly known as: SEROquel      Take 1 tablet by mouth Every Night.              CHANGE how you take these medications        Instructions Last Dose Given Next Dose Due   hydrALAZINE 25 MG tablet  Commonly known as: APRESOLINE  What changed:   additional instructions  Another medication with the same name was removed. Continue taking this medication, and follow the directions you see here.      Take 1  tablet by mouth Daily. Hold for systolic blood pressure (top number) less than 110, diastolic blood pressure (bottom number) less than 60              CONTINUE taking these medications        Instructions Last Dose Given Next Dose Due   amLODIPine 10 MG tablet  Commonly known as: NORVASC      Take 1 tablet by mouth Daily.       cevimeline 30 MG capsule  Commonly known as: EVOXAC      Take 1 capsule by mouth 3 (Three) Times a Day.       chlorhexidine 0.12 % solution  Commonly known as: PERIDEX      Apply 5 mL to the mouth or throat 2 (Two) Times a Day.       ezetimibe 10 MG tablet  Commonly known as: ZETIA      Take 1 tablet by mouth Daily.       fluticasone 50 MCG/ACT nasal spray  Commonly known as: FLONASE      Administer 2 sprays into the nostril(s) as directed by provider Daily.       methocarbamol 500 MG tablet  Commonly known as: ROBAXIN      Take 1 tablet by mouth 2 (Two) Times a Day.       montelukast 10 MG tablet  Commonly known as: SINGULAIR      Take 1 tablet by mouth Every Night.       pantoprazole 40 MG EC tablet  Commonly known as: PROTONIX      Take 1 tablet by mouth Daily.       spironolactone 25 MG tablet  Commonly known as: ALDACTONE      Take 1 tablet by mouth Daily.              STOP taking these medications      donepezil 10 MG tablet  Commonly known as: ARICEPT                  Where to Get Your Medications        These medications were sent to [address] [phone] [facility] [phone] [address] [address]      Phone: [phone]   QUEtiapine 25 MG tablet       Information about where to get these medications is not yet available    Ask your nurse or doctor about these medications  hydrALAZINE 25 MG tablet        Discharge Diet:    Diet Order   Procedures    Diet: Regular/House; Fluid Consistency: Thin (IDDSI 0)        Discharge Activity:  as tolerated    The 10-year ASCVD risk score (Uriel RAHMAN, et al., 2019) is: 39.5%    Values  used to calculate the score:      Age: 75 years      Sex: Male      Is Non- : No      Diabetic: No      Tobacco smoker: No      Systolic Blood Pressure: 160 mmHg      Is BP treated: Yes      HDL Cholesterol: 70 mg/dL      Total Cholesterol: 235 mg/dL     Time spent on this discharge exceeded 30 minutes.      Electronically signed by Lencho Underwood PA-C at 07/30/25 1706       Discharge Order (From admission, onward)       Start     Ordered    07/30/25 1650  Discharge patient  Once        Expected Discharge Date: 07/30/25   Discharge Disposition: [place]-Health [place] [facility]   Physician of Record for Attribution - Please select from Treatment Team: CHEIKH LUCERO [[id number]]   Review needed by CMO to determine Physician of Record: No      Question Answer Comment   Physician of Record for Attribution - Please select from Treatment Team CHEIKH LUCERO    Review needed by CMO to determine Physician of Record No        07/30/25 9744

## 2025-07-31 NOTE — CASE MANAGEMENT/SOCIAL WORK
Discharge Planning Assessment   Sin     Patient Name: Alexander Zamora  MRN: [id number]  Today's Date: 7/31/2025    Admit Date: 7/28/2025     Discharge Plan       Row Name 07/31/25 0841       Plan    Final Note Pt was discharged home with home health. SS was notified by lead RN dtr refused home health due to pt already going ot outpt therapy. No other needs identified.      Row Name 07/31/25 0800       Plan    Final Discharge Disposition Code 01 - home or self-care               MALIKA Montgomery

## 2025-08-01 ENCOUNTER — TELEPHONE (OUTPATIENT)
Dept: TELEMETRY | Facility: HOSPITAL | Age: 75
End: 2025-08-01
Payer: MEDICARE

## 2025-08-05 ENCOUNTER — HOSPITAL ENCOUNTER (OUTPATIENT)
Dept: PHYSICAL THERAPY | Facility: HOSPITAL | Age: 75
Setting detail: THERAPIES SERIES
Discharge: HOME | End: 2025-08-05
Payer: MEDICARE

## 2025-08-05 DIAGNOSIS — G89.29 CHRONIC MIDLINE LOW BACK PAIN WITH LEFT-SIDED SCIATICA: Primary | ICD-10-CM

## 2025-08-05 DIAGNOSIS — M54.42 CHRONIC MIDLINE LOW BACK PAIN WITH LEFT-SIDED SCIATICA: Primary | ICD-10-CM

## 2025-08-05 PROCEDURE — 97162 PT EVAL MOD COMPLEX 30 MIN: CPT | Performed by: PHYSICAL THERAPIST

## 2025-08-05 PROCEDURE — 97140 MANUAL THERAPY 1/> REGIONS: CPT | Performed by: PHYSICAL THERAPIST

## 2025-08-06 ENCOUNTER — TELEPHONE (OUTPATIENT)
Dept: FAMILY MEDICINE CLINIC | Facility: CLINIC | Age: 75
End: 2025-08-06

## 2025-08-06 ENCOUNTER — HOSPITAL ENCOUNTER (OUTPATIENT)
Dept: MRI IMAGING | Facility: HOSPITAL | Age: 75
Discharge: HOME | End: 2025-08-06
Admitting: FAMILY MEDICINE
Payer: MEDICARE

## 2025-08-06 DIAGNOSIS — R41.3 MEMORY DEFICIT: ICD-10-CM

## 2025-08-06 PROCEDURE — 70551 MRI BRAIN STEM W/O DYE: CPT

## 2025-08-07 ENCOUNTER — EXTERNAL PBMM DATA (OUTPATIENT)
Dept: PHARMACY | Facility: OTHER | Age: 75
End: 2025-08-07
Payer: MEDICARE

## 2025-08-07 ENCOUNTER — HOSPITAL ENCOUNTER (OUTPATIENT)
Dept: PHYSICAL THERAPY | Facility: HOSPITAL | Age: 75
Setting detail: THERAPIES SERIES
Discharge: HOME | End: 2025-08-07
Payer: MEDICARE

## 2025-08-07 ENCOUNTER — OFFICE VISIT (OUTPATIENT)
Dept: FAMILY MEDICINE CLINIC | Facility: CLINIC | Age: 75
End: 2025-08-07
Payer: MEDICARE

## 2025-08-07 VITALS
WEIGHT: 144 LBS | RESPIRATION RATE: 16 BRPM | DIASTOLIC BLOOD PRESSURE: 64 MMHG | BODY MASS INDEX: 20.62 KG/M2 | SYSTOLIC BLOOD PRESSURE: 136 MMHG | HEART RATE: 55 BPM | HEIGHT: 70 IN | TEMPERATURE: 97.3 F | OXYGEN SATURATION: 98 %

## 2025-08-07 DIAGNOSIS — G89.29 CHRONIC MIDLINE LOW BACK PAIN WITH LEFT-SIDED SCIATICA: Primary | ICD-10-CM

## 2025-08-07 DIAGNOSIS — I10 ESSENTIAL HYPERTENSION: ICD-10-CM

## 2025-08-07 DIAGNOSIS — M54.50 ACUTE BILATERAL LOW BACK PAIN, UNSPECIFIED WHETHER SCIATICA PRESENT: ICD-10-CM

## 2025-08-07 DIAGNOSIS — M54.42 CHRONIC MIDLINE LOW BACK PAIN WITH LEFT-SIDED SCIATICA: Primary | ICD-10-CM

## 2025-08-07 DIAGNOSIS — R42 DIZZINESS: ICD-10-CM

## 2025-08-07 DIAGNOSIS — R41.3 MEMORY DEFICIT: ICD-10-CM

## 2025-08-07 DIAGNOSIS — Z09 HOSPITAL DISCHARGE FOLLOW-UP: Primary | ICD-10-CM

## 2025-08-07 PROCEDURE — 97110 THERAPEUTIC EXERCISES: CPT | Performed by: PHYSICAL THERAPIST

## 2025-08-07 PROCEDURE — 97140 MANUAL THERAPY 1/> REGIONS: CPT | Performed by: PHYSICAL THERAPIST

## 2025-08-07 RX ORDER — HYDRALAZINE HYDROCHLORIDE 50 MG/1
50 TABLET, FILM COATED ORAL 2 TIMES DAILY
COMMUNITY
Start: 2025-06-18 | End: 2025-08-25 | Stop reason: SDUPTHER

## 2025-08-07 RX ORDER — METHOCARBAMOL 500 MG/1
500 TABLET, FILM COATED ORAL 2 TIMES DAILY
Qty: 180 TABLET | Refills: 0 | Status: SHIPPED | OUTPATIENT
Start: 2025-08-07

## 2025-08-07 RX ORDER — VITAMIN E 268 MG
1 CAPSULE ORAL 2 TIMES DAILY
COMMUNITY

## 2025-08-07 RX ORDER — DONEPEZIL HYDROCHLORIDE 10 MG/1
10 TABLET, FILM COATED ORAL NIGHTLY
COMMUNITY
End: 2025-08-07

## 2025-08-07 RX ORDER — SPIRONOLACTONE 25 MG/1
25 TABLET ORAL DAILY
Qty: 90 TABLET | Refills: 1 | Status: SHIPPED | OUTPATIENT
Start: 2025-08-07

## 2025-08-07 RX ORDER — DONEPEZIL HYDROCHLORIDE 5 MG/1
5 TABLET, ORALLY DISINTEGRATING ORAL NIGHTLY
COMMUNITY
End: 2025-08-07

## 2025-08-07 RX ORDER — QUETIAPINE FUMARATE 25 MG/1
25 TABLET, FILM COATED ORAL NIGHTLY
Qty: 90 TABLET | Refills: 0 | Status: SHIPPED | OUTPATIENT
Start: 2025-08-07

## 2025-08-07 RX ORDER — LISINOPRIL 40 MG/1
40 TABLET ORAL DAILY
COMMUNITY
End: 2025-08-07

## 2025-08-08 ENCOUNTER — TELEPHONE (OUTPATIENT)
Dept: FAMILY MEDICINE CLINIC | Facility: CLINIC | Age: 75
End: 2025-08-08
Payer: MEDICARE

## 2025-08-08 DIAGNOSIS — I10 ESSENTIAL HYPERTENSION: ICD-10-CM

## 2025-08-08 RX ORDER — HYDRALAZINE HYDROCHLORIDE 25 MG/1
25 TABLET, FILM COATED ORAL DAILY
Qty: 60 TABLET | Refills: 2 | OUTPATIENT
Start: 2025-08-08

## 2025-08-12 ENCOUNTER — TELEPHONE (OUTPATIENT)
Dept: FAMILY MEDICINE CLINIC | Facility: CLINIC | Age: 75
End: 2025-08-12
Payer: MEDICARE

## 2025-08-12 ENCOUNTER — HOSPITAL ENCOUNTER (OUTPATIENT)
Dept: PHYSICAL THERAPY | Facility: HOSPITAL | Age: 75
Setting detail: THERAPIES SERIES
Discharge: HOME | End: 2025-08-12
Payer: MEDICARE

## 2025-08-12 DIAGNOSIS — M54.42 CHRONIC MIDLINE LOW BACK PAIN WITH LEFT-SIDED SCIATICA: Primary | ICD-10-CM

## 2025-08-12 DIAGNOSIS — R68.2 DRY MOUTH: ICD-10-CM

## 2025-08-12 DIAGNOSIS — M54.50 ACUTE BILATERAL LOW BACK PAIN, UNSPECIFIED WHETHER SCIATICA PRESENT: ICD-10-CM

## 2025-08-12 DIAGNOSIS — I10 ESSENTIAL HYPERTENSION: ICD-10-CM

## 2025-08-12 DIAGNOSIS — G89.29 CHRONIC MIDLINE LOW BACK PAIN WITH LEFT-SIDED SCIATICA: Primary | ICD-10-CM

## 2025-08-12 PROCEDURE — 97110 THERAPEUTIC EXERCISES: CPT | Performed by: PHYSICAL THERAPIST

## 2025-08-12 PROCEDURE — 97140 MANUAL THERAPY 1/> REGIONS: CPT | Performed by: PHYSICAL THERAPIST

## 2025-08-12 RX ORDER — METHOCARBAMOL 500 MG/1
500 TABLET, FILM COATED ORAL 2 TIMES DAILY
Qty: 180 TABLET | Refills: 0 | OUTPATIENT
Start: 2025-08-12

## 2025-08-12 RX ORDER — SPIRONOLACTONE 25 MG/1
25 TABLET ORAL DAILY
Qty: 90 TABLET | Refills: 0 | OUTPATIENT
Start: 2025-08-12

## 2025-08-14 ENCOUNTER — HOSPITAL ENCOUNTER (OUTPATIENT)
Dept: PHYSICAL THERAPY | Facility: HOSPITAL | Age: 75
Setting detail: THERAPIES SERIES
Discharge: HOME | End: 2025-08-14
Payer: MEDICARE

## 2025-08-14 DIAGNOSIS — M54.42 CHRONIC MIDLINE LOW BACK PAIN WITH LEFT-SIDED SCIATICA: Primary | ICD-10-CM

## 2025-08-14 DIAGNOSIS — G89.29 CHRONIC MIDLINE LOW BACK PAIN WITH LEFT-SIDED SCIATICA: Primary | ICD-10-CM

## 2025-08-14 PROCEDURE — 97140 MANUAL THERAPY 1/> REGIONS: CPT | Performed by: PHYSICAL THERAPIST

## 2025-08-14 PROCEDURE — 97110 THERAPEUTIC EXERCISES: CPT | Performed by: PHYSICAL THERAPIST

## 2025-08-15 RX ORDER — CHLORHEXIDINE GLUCONATE ORAL RINSE 1.2 MG/ML
SOLUTION DENTAL
Qty: 473 ML | Refills: 1 | Status: SHIPPED | OUTPATIENT
Start: 2025-08-15

## 2025-08-18 ENCOUNTER — READMISSION MANAGEMENT (OUTPATIENT)
Dept: CALL CENTER | Facility: HOSPITAL | Age: 75
End: 2025-08-18
Payer: MEDICARE

## 2025-08-19 ENCOUNTER — HOSPITAL ENCOUNTER (OUTPATIENT)
Dept: PHYSICAL THERAPY | Facility: HOSPITAL | Age: 75
Setting detail: THERAPIES SERIES
Discharge: HOME | End: 2025-08-19
Payer: MEDICARE

## 2025-08-19 DIAGNOSIS — G89.29 CHRONIC MIDLINE LOW BACK PAIN WITH LEFT-SIDED SCIATICA: Primary | ICD-10-CM

## 2025-08-19 DIAGNOSIS — M54.42 CHRONIC MIDLINE LOW BACK PAIN WITH LEFT-SIDED SCIATICA: Primary | ICD-10-CM

## 2025-08-19 PROCEDURE — 97110 THERAPEUTIC EXERCISES: CPT | Performed by: PHYSICAL THERAPIST

## 2025-08-21 ENCOUNTER — EXTERNAL PBMM DATA (OUTPATIENT)
Dept: PHARMACY | Facility: OTHER | Age: 75
End: 2025-08-21
Payer: MEDICARE

## 2025-08-22 DIAGNOSIS — I10 ESSENTIAL HYPERTENSION: ICD-10-CM

## 2025-08-22 RX ORDER — HYDRALAZINE HYDROCHLORIDE 25 MG/1
25 TABLET, FILM COATED ORAL DAILY
Qty: 60 TABLET | Refills: 2 | OUTPATIENT
Start: 2025-08-22

## 2025-08-23 DIAGNOSIS — I10 ESSENTIAL HYPERTENSION: ICD-10-CM

## 2025-08-25 RX ORDER — HYDRALAZINE HYDROCHLORIDE 50 MG/1
50 TABLET, FILM COATED ORAL 2 TIMES DAILY
Qty: 60 TABLET | Refills: 4 | Status: SHIPPED | OUTPATIENT
Start: 2025-08-25

## 2025-08-25 RX ORDER — HYDRALAZINE HYDROCHLORIDE 25 MG/1
25 TABLET, FILM COATED ORAL DAILY
Qty: 60 TABLET | Refills: 2 | OUTPATIENT
Start: 2025-08-25

## 2025-08-29 ENCOUNTER — TELEPHONE (OUTPATIENT)
Dept: CARDIOLOGY | Facility: CLINIC | Age: 75
End: 2025-08-29
Payer: MEDICARE